# Patient Record
Sex: FEMALE | Race: WHITE | NOT HISPANIC OR LATINO | Employment: OTHER | URBAN - METROPOLITAN AREA
[De-identification: names, ages, dates, MRNs, and addresses within clinical notes are randomized per-mention and may not be internally consistent; named-entity substitution may affect disease eponyms.]

---

## 2017-01-03 ENCOUNTER — COMMUNICATION - HEALTHEAST (OUTPATIENT)
Dept: INTERNAL MEDICINE | Facility: CLINIC | Age: 77
End: 2017-01-03

## 2017-01-04 ENCOUNTER — AMBULATORY - HEALTHEAST (OUTPATIENT)
Dept: INTERNAL MEDICINE | Facility: CLINIC | Age: 77
End: 2017-01-04

## 2017-01-04 DIAGNOSIS — G89.29 CHRONIC HEADACHES: ICD-10-CM

## 2017-01-04 DIAGNOSIS — R51.9 CHRONIC HEADACHES: ICD-10-CM

## 2017-01-19 ENCOUNTER — AMBULATORY - HEALTHEAST (OUTPATIENT)
Dept: LAB | Facility: CLINIC | Age: 77
End: 2017-01-19

## 2017-01-19 ENCOUNTER — RECORDS - HEALTHEAST (OUTPATIENT)
Dept: ADMINISTRATIVE | Facility: OTHER | Age: 77
End: 2017-01-19

## 2017-01-19 DIAGNOSIS — M31.6 TA (TEMPORAL ARTERITIS) (H): ICD-10-CM

## 2017-01-27 ENCOUNTER — HOSPITAL ENCOUNTER (OUTPATIENT)
Dept: MRI IMAGING | Facility: CLINIC | Age: 77
Discharge: HOME | End: 2017-01-27
Attending: PSYCHIATRY & NEUROLOGY

## 2017-01-27 ENCOUNTER — RECORDS - HEALTHEAST (OUTPATIENT)
Dept: ADMINISTRATIVE | Facility: OTHER | Age: 77
End: 2017-01-27

## 2017-01-27 DIAGNOSIS — R51.9 HEAD ACHE: ICD-10-CM

## 2017-02-01 ENCOUNTER — COMMUNICATION - HEALTHEAST (OUTPATIENT)
Dept: INTERNAL MEDICINE | Facility: CLINIC | Age: 77
End: 2017-02-01

## 2017-02-01 ENCOUNTER — AMBULATORY - HEALTHEAST (OUTPATIENT)
Dept: INTERNAL MEDICINE | Facility: CLINIC | Age: 77
End: 2017-02-01

## 2017-02-27 ENCOUNTER — RECORDS - HEALTHEAST (OUTPATIENT)
Dept: ADMINISTRATIVE | Facility: OTHER | Age: 77
End: 2017-02-27

## 2017-03-20 ENCOUNTER — COMMUNICATION - HEALTHEAST (OUTPATIENT)
Dept: INTERNAL MEDICINE | Facility: CLINIC | Age: 77
End: 2017-03-20

## 2017-03-21 ENCOUNTER — RECORDS - HEALTHEAST (OUTPATIENT)
Dept: ADMINISTRATIVE | Facility: OTHER | Age: 77
End: 2017-03-21

## 2017-03-23 ENCOUNTER — COMMUNICATION - HEALTHEAST (OUTPATIENT)
Dept: INTERNAL MEDICINE | Facility: CLINIC | Age: 77
End: 2017-03-23

## 2017-05-22 ENCOUNTER — COMMUNICATION - HEALTHEAST (OUTPATIENT)
Dept: INTERNAL MEDICINE | Facility: CLINIC | Age: 77
End: 2017-05-22

## 2017-05-22 DIAGNOSIS — I10 HTN (HYPERTENSION): ICD-10-CM

## 2017-05-26 ENCOUNTER — AMBULATORY - HEALTHEAST (OUTPATIENT)
Dept: INTERNAL MEDICINE | Facility: CLINIC | Age: 77
End: 2017-05-26

## 2017-05-26 ENCOUNTER — COMMUNICATION - HEALTHEAST (OUTPATIENT)
Dept: SCHEDULING | Facility: CLINIC | Age: 77
End: 2017-05-26

## 2017-05-26 ENCOUNTER — COMMUNICATION - HEALTHEAST (OUTPATIENT)
Dept: INTERNAL MEDICINE | Facility: CLINIC | Age: 77
End: 2017-05-26

## 2017-06-05 ENCOUNTER — COMMUNICATION - HEALTHEAST (OUTPATIENT)
Dept: INTERNAL MEDICINE | Facility: CLINIC | Age: 77
End: 2017-06-05

## 2017-07-15 ENCOUNTER — COMMUNICATION - HEALTHEAST (OUTPATIENT)
Dept: INTERNAL MEDICINE | Facility: CLINIC | Age: 77
End: 2017-07-15

## 2017-08-31 ENCOUNTER — COMMUNICATION - HEALTHEAST (OUTPATIENT)
Dept: INTERNAL MEDICINE | Facility: CLINIC | Age: 77
End: 2017-08-31

## 2017-08-31 DIAGNOSIS — I10 ESSENTIAL HYPERTENSION: ICD-10-CM

## 2017-09-11 ENCOUNTER — RECORDS - HEALTHEAST (OUTPATIENT)
Dept: ADMINISTRATIVE | Facility: OTHER | Age: 77
End: 2017-09-11

## 2017-09-13 ENCOUNTER — COMMUNICATION - HEALTHEAST (OUTPATIENT)
Dept: INTERNAL MEDICINE | Facility: CLINIC | Age: 77
End: 2017-09-13

## 2017-09-14 ENCOUNTER — AMBULATORY - HEALTHEAST (OUTPATIENT)
Dept: INTERNAL MEDICINE | Facility: CLINIC | Age: 77
End: 2017-09-14

## 2017-09-14 DIAGNOSIS — I10 ESSENTIAL HYPERTENSION: ICD-10-CM

## 2017-10-02 ENCOUNTER — RECORDS - HEALTHEAST (OUTPATIENT)
Dept: ADMINISTRATIVE | Facility: OTHER | Age: 77
End: 2017-10-02

## 2017-10-20 ENCOUNTER — OFFICE VISIT - HEALTHEAST (OUTPATIENT)
Dept: INTERNAL MEDICINE | Facility: CLINIC | Age: 77
End: 2017-10-20

## 2017-10-20 DIAGNOSIS — I10 ESSENTIAL HYPERTENSION: ICD-10-CM

## 2017-10-20 DIAGNOSIS — H91.92 HEARING LOSS IN LEFT EAR: ICD-10-CM

## 2017-10-20 DIAGNOSIS — G89.29 CHRONIC HEADACHES: ICD-10-CM

## 2017-10-20 DIAGNOSIS — K21.9 GERD (GASTROESOPHAGEAL REFLUX DISEASE): ICD-10-CM

## 2017-10-20 DIAGNOSIS — Z00.00 MEDICARE ANNUAL WELLNESS VISIT, SUBSEQUENT: ICD-10-CM

## 2017-10-20 DIAGNOSIS — J30.9 ALLERGIC RHINITIS: ICD-10-CM

## 2017-10-20 DIAGNOSIS — M85.80 OSTEOPENIA: ICD-10-CM

## 2017-10-20 DIAGNOSIS — K58.9 IBS (IRRITABLE BOWEL SYNDROME): ICD-10-CM

## 2017-10-20 DIAGNOSIS — Z23 NEED FOR IMMUNIZATION AGAINST INFLUENZA: ICD-10-CM

## 2017-10-20 DIAGNOSIS — G47.00 INSOMNIA: ICD-10-CM

## 2017-10-20 DIAGNOSIS — R51.9 CHRONIC HEADACHES: ICD-10-CM

## 2017-10-20 DIAGNOSIS — C44.91 SKIN CANCER, BASAL CELL: ICD-10-CM

## 2017-10-20 DIAGNOSIS — E78.5 HYPERLIPIDEMIA: ICD-10-CM

## 2017-10-20 DIAGNOSIS — F41.1 ANXIETY, GENERALIZED: ICD-10-CM

## 2017-10-20 DIAGNOSIS — N32.81 OVERACTIVE BLADDER: ICD-10-CM

## 2017-10-20 LAB
CHOLEST SERPL-MCNC: 176 MG/DL
FASTING STATUS PATIENT QL REPORTED: YES
HDLC SERPL-MCNC: 73 MG/DL
LDLC SERPL CALC-MCNC: 89 MG/DL
TRIGL SERPL-MCNC: 72 MG/DL

## 2017-10-20 ASSESSMENT — MIFFLIN-ST. JEOR: SCORE: 1085.3

## 2017-10-25 ENCOUNTER — COMMUNICATION - HEALTHEAST (OUTPATIENT)
Dept: INTERNAL MEDICINE | Facility: CLINIC | Age: 77
End: 2017-10-25

## 2017-10-25 DIAGNOSIS — I10 ESSENTIAL HYPERTENSION: ICD-10-CM

## 2017-10-26 ENCOUNTER — COMMUNICATION - HEALTHEAST (OUTPATIENT)
Dept: SCHEDULING | Facility: CLINIC | Age: 77
End: 2017-10-26

## 2017-12-04 ENCOUNTER — COMMUNICATION - HEALTHEAST (OUTPATIENT)
Dept: INTERNAL MEDICINE | Facility: CLINIC | Age: 77
End: 2017-12-04

## 2017-12-04 ENCOUNTER — AMBULATORY - HEALTHEAST (OUTPATIENT)
Dept: INTERNAL MEDICINE | Facility: CLINIC | Age: 77
End: 2017-12-04

## 2017-12-04 DIAGNOSIS — I10 ESSENTIAL HYPERTENSION: ICD-10-CM

## 2017-12-04 DIAGNOSIS — G47.00 INSOMNIA: ICD-10-CM

## 2017-12-12 ENCOUNTER — RECORDS - HEALTHEAST (OUTPATIENT)
Dept: BONE DENSITY | Facility: CLINIC | Age: 77
End: 2017-12-12

## 2017-12-12 ENCOUNTER — OFFICE VISIT - HEALTHEAST (OUTPATIENT)
Dept: AUDIOLOGY | Facility: CLINIC | Age: 77
End: 2017-12-12

## 2017-12-12 ENCOUNTER — OFFICE VISIT - HEALTHEAST (OUTPATIENT)
Dept: OTOLARYNGOLOGY | Facility: CLINIC | Age: 77
End: 2017-12-12

## 2017-12-12 ENCOUNTER — RECORDS - HEALTHEAST (OUTPATIENT)
Dept: ADMINISTRATIVE | Facility: OTHER | Age: 77
End: 2017-12-12

## 2017-12-12 DIAGNOSIS — H90.3 SENSORINEURAL HEARING LOSS, ASYMMETRICAL: ICD-10-CM

## 2017-12-12 DIAGNOSIS — M85.80 OTHER SPECIFIED DISORDERS OF BONE DENSITY AND STRUCTURE, UNSPECIFIED SITE: ICD-10-CM

## 2017-12-12 DIAGNOSIS — H90.3 ASNHL (ASYMMETRICAL SENSORINEURAL HEARING LOSS): ICD-10-CM

## 2017-12-22 ENCOUNTER — OFFICE VISIT - HEALTHEAST (OUTPATIENT)
Dept: INTERNAL MEDICINE | Facility: CLINIC | Age: 77
End: 2017-12-22

## 2017-12-22 DIAGNOSIS — G47.00 INSOMNIA: ICD-10-CM

## 2017-12-22 DIAGNOSIS — E78.5 HYPERLIPIDEMIA: ICD-10-CM

## 2017-12-22 DIAGNOSIS — G89.29 CHRONIC HEADACHES: ICD-10-CM

## 2017-12-22 DIAGNOSIS — K58.9 IBS (IRRITABLE BOWEL SYNDROME): ICD-10-CM

## 2017-12-22 DIAGNOSIS — I10 ESSENTIAL HYPERTENSION: ICD-10-CM

## 2017-12-22 DIAGNOSIS — G89.29 CHRONIC ABDOMINAL PAIN: ICD-10-CM

## 2017-12-22 DIAGNOSIS — R10.9 CHRONIC ABDOMINAL PAIN: ICD-10-CM

## 2017-12-22 DIAGNOSIS — R51.9 CHRONIC HEADACHES: ICD-10-CM

## 2017-12-22 ASSESSMENT — MIFFLIN-ST. JEOR: SCORE: 1098.9

## 2018-01-02 ENCOUNTER — COMMUNICATION - HEALTHEAST (OUTPATIENT)
Dept: INTERNAL MEDICINE | Facility: CLINIC | Age: 78
End: 2018-01-02

## 2018-01-02 DIAGNOSIS — I10 HTN (HYPERTENSION): ICD-10-CM

## 2018-01-02 DIAGNOSIS — J30.9 ALLERGIC RHINITIS: ICD-10-CM

## 2018-02-06 ENCOUNTER — AMBULATORY - HEALTHEAST (OUTPATIENT)
Dept: INTERNAL MEDICINE | Facility: CLINIC | Age: 78
End: 2018-02-06

## 2018-02-06 ENCOUNTER — COMMUNICATION - HEALTHEAST (OUTPATIENT)
Dept: SCHEDULING | Facility: CLINIC | Age: 78
End: 2018-02-06

## 2018-02-06 ENCOUNTER — AMBULATORY - HEALTHEAST (OUTPATIENT)
Dept: LAB | Facility: CLINIC | Age: 78
End: 2018-02-06

## 2018-02-06 DIAGNOSIS — R30.0 DYSURIA: ICD-10-CM

## 2018-02-06 LAB
ALBUMIN UR-MCNC: NEGATIVE MG/DL
APPEARANCE UR: ABNORMAL
BACTERIA #/AREA URNS HPF: ABNORMAL HPF
BILIRUB UR QL STRIP: NEGATIVE
COLOR UR AUTO: YELLOW
GLUCOSE UR STRIP-MCNC: NEGATIVE MG/DL
HGB UR QL STRIP: ABNORMAL
KETONES UR STRIP-MCNC: NEGATIVE MG/DL
LEUKOCYTE ESTERASE UR QL STRIP: ABNORMAL
NITRATE UR QL: POSITIVE
PH UR STRIP: 6.5 [PH] (ref 5–8)
RBC #/AREA URNS AUTO: ABNORMAL HPF
SP GR UR STRIP: 1.01 (ref 1–1.03)
SQUAMOUS #/AREA URNS AUTO: ABNORMAL LPF
UROBILINOGEN UR STRIP-ACNC: ABNORMAL
WBC #/AREA URNS AUTO: ABNORMAL HPF

## 2018-02-08 LAB — BACTERIA SPEC CULT: ABNORMAL

## 2018-02-21 ENCOUNTER — COMMUNICATION - HEALTHEAST (OUTPATIENT)
Dept: INTERNAL MEDICINE | Facility: CLINIC | Age: 78
End: 2018-02-21

## 2018-03-10 ENCOUNTER — COMMUNICATION - HEALTHEAST (OUTPATIENT)
Dept: SCHEDULING | Facility: CLINIC | Age: 78
End: 2018-03-10

## 2018-03-10 DIAGNOSIS — N39.0 UTI (URINARY TRACT INFECTION): ICD-10-CM

## 2018-03-13 ENCOUNTER — COMMUNICATION - HEALTHEAST (OUTPATIENT)
Dept: INTERNAL MEDICINE | Facility: CLINIC | Age: 78
End: 2018-03-13

## 2018-03-13 ENCOUNTER — AMBULATORY - HEALTHEAST (OUTPATIENT)
Dept: INTERNAL MEDICINE | Facility: CLINIC | Age: 78
End: 2018-03-13

## 2018-03-13 DIAGNOSIS — N39.0 UTI (URINARY TRACT INFECTION): ICD-10-CM

## 2018-03-14 ENCOUNTER — COMMUNICATION - HEALTHEAST (OUTPATIENT)
Dept: INTERNAL MEDICINE | Facility: CLINIC | Age: 78
End: 2018-03-14

## 2018-03-27 ENCOUNTER — COMMUNICATION - HEALTHEAST (OUTPATIENT)
Dept: INTERNAL MEDICINE | Facility: CLINIC | Age: 78
End: 2018-03-27

## 2018-03-27 DIAGNOSIS — J30.9 ALLERGIC RHINITIS: ICD-10-CM

## 2018-04-09 ENCOUNTER — COMMUNICATION - HEALTHEAST (OUTPATIENT)
Dept: INTERNAL MEDICINE | Facility: CLINIC | Age: 78
End: 2018-04-09

## 2018-04-12 ENCOUNTER — COMMUNICATION - HEALTHEAST (OUTPATIENT)
Dept: SCHEDULING | Facility: CLINIC | Age: 78
End: 2018-04-12

## 2018-04-12 ENCOUNTER — AMBULATORY - HEALTHEAST (OUTPATIENT)
Dept: INTERNAL MEDICINE | Facility: CLINIC | Age: 78
End: 2018-04-12

## 2018-04-19 ENCOUNTER — COMMUNICATION - HEALTHEAST (OUTPATIENT)
Dept: INTERNAL MEDICINE | Facility: CLINIC | Age: 78
End: 2018-04-19

## 2018-05-29 ENCOUNTER — COMMUNICATION - HEALTHEAST (OUTPATIENT)
Dept: INTERNAL MEDICINE | Facility: CLINIC | Age: 78
End: 2018-05-29

## 2018-05-29 DIAGNOSIS — G47.00 INSOMNIA: ICD-10-CM

## 2018-06-04 ENCOUNTER — COMMUNICATION - HEALTHEAST (OUTPATIENT)
Dept: INTERNAL MEDICINE | Facility: CLINIC | Age: 78
End: 2018-06-04

## 2018-06-04 DIAGNOSIS — J30.9 ALLERGIC RHINITIS: ICD-10-CM

## 2018-06-07 ENCOUNTER — RECORDS - HEALTHEAST (OUTPATIENT)
Dept: ADMINISTRATIVE | Facility: OTHER | Age: 78
End: 2018-06-07

## 2018-07-06 ENCOUNTER — COMMUNICATION - HEALTHEAST (OUTPATIENT)
Dept: INTERNAL MEDICINE | Facility: CLINIC | Age: 78
End: 2018-07-06

## 2018-07-06 DIAGNOSIS — E78.5 HLD (HYPERLIPIDEMIA): ICD-10-CM

## 2018-07-24 ENCOUNTER — COMMUNICATION - HEALTHEAST (OUTPATIENT)
Dept: INTERNAL MEDICINE | Facility: CLINIC | Age: 78
End: 2018-07-24

## 2018-07-24 DIAGNOSIS — K27.9 PEPTIC ULCER DISEASE: ICD-10-CM

## 2018-10-01 ENCOUNTER — RECORDS - HEALTHEAST (OUTPATIENT)
Dept: ADMINISTRATIVE | Facility: OTHER | Age: 78
End: 2018-10-01

## 2018-10-22 ENCOUNTER — RECORDS - HEALTHEAST (OUTPATIENT)
Dept: ADMINISTRATIVE | Facility: OTHER | Age: 78
End: 2018-10-22

## 2018-10-22 ENCOUNTER — OFFICE VISIT - HEALTHEAST (OUTPATIENT)
Dept: INTERNAL MEDICINE | Facility: CLINIC | Age: 78
End: 2018-10-22

## 2018-10-22 ENCOUNTER — COMMUNICATION - HEALTHEAST (OUTPATIENT)
Dept: INTERNAL MEDICINE | Facility: CLINIC | Age: 78
End: 2018-10-22

## 2018-10-22 DIAGNOSIS — N32.81 OVERACTIVE BLADDER: ICD-10-CM

## 2018-10-22 DIAGNOSIS — G47.00 INSOMNIA: ICD-10-CM

## 2018-10-22 DIAGNOSIS — K21.9 GERD (GASTROESOPHAGEAL REFLUX DISEASE): ICD-10-CM

## 2018-10-22 DIAGNOSIS — Z23 NEED FOR IMMUNIZATION AGAINST INFLUENZA: ICD-10-CM

## 2018-10-22 DIAGNOSIS — Z00.00 MEDICARE ANNUAL WELLNESS VISIT, SUBSEQUENT: ICD-10-CM

## 2018-10-22 DIAGNOSIS — R51.9 CHRONIC HEADACHES: ICD-10-CM

## 2018-10-22 DIAGNOSIS — C44.91 SKIN CANCER, BASAL CELL: ICD-10-CM

## 2018-10-22 DIAGNOSIS — M85.80 OSTEOPENIA: ICD-10-CM

## 2018-10-22 DIAGNOSIS — F41.1 ANXIETY, GENERALIZED: ICD-10-CM

## 2018-10-22 DIAGNOSIS — E78.5 HYPERLIPIDEMIA: ICD-10-CM

## 2018-10-22 DIAGNOSIS — K58.9 IBS (IRRITABLE BOWEL SYNDROME): ICD-10-CM

## 2018-10-22 DIAGNOSIS — I10 ESSENTIAL HYPERTENSION: ICD-10-CM

## 2018-10-22 DIAGNOSIS — G89.29 CHRONIC HEADACHES: ICD-10-CM

## 2018-10-22 DIAGNOSIS — J30.9 ALLERGIC RHINITIS: ICD-10-CM

## 2018-10-22 LAB
ALBUMIN SERPL-MCNC: 4.2 G/DL (ref 3.5–5)
ALBUMIN UR-MCNC: NEGATIVE MG/DL
ALP SERPL-CCNC: 69 U/L (ref 45–120)
ALT SERPL W P-5'-P-CCNC: 14 U/L (ref 0–45)
ANION GAP SERPL CALCULATED.3IONS-SCNC: 12 MMOL/L (ref 5–18)
APPEARANCE UR: CLEAR
AST SERPL W P-5'-P-CCNC: 20 U/L (ref 0–40)
BACTERIA #/AREA URNS HPF: ABNORMAL HPF
BILIRUB DIRECT SERPL-MCNC: 0.2 MG/DL
BILIRUB SERPL-MCNC: 0.7 MG/DL (ref 0–1)
BILIRUB UR QL STRIP: NEGATIVE
BUN SERPL-MCNC: 8 MG/DL (ref 8–28)
CALCIUM SERPL-MCNC: 9.6 MG/DL (ref 8.5–10.5)
CHLORIDE BLD-SCNC: 96 MMOL/L (ref 98–107)
CHOLEST SERPL-MCNC: 170 MG/DL
CO2 SERPL-SCNC: 27 MMOL/L (ref 22–31)
COLOR UR AUTO: YELLOW
CREAT SERPL-MCNC: 0.68 MG/DL (ref 0.6–1.1)
FASTING STATUS PATIENT QL REPORTED: NORMAL
GFR SERPL CREATININE-BSD FRML MDRD: >60 ML/MIN/1.73M2
GLUCOSE BLD-MCNC: 99 MG/DL (ref 70–125)
GLUCOSE UR STRIP-MCNC: NEGATIVE MG/DL
HDLC SERPL-MCNC: 74 MG/DL
HGB BLD-MCNC: 13.2 G/DL (ref 12–16)
HGB UR QL STRIP: NEGATIVE
KETONES UR STRIP-MCNC: NEGATIVE MG/DL
LDLC SERPL CALC-MCNC: 79 MG/DL
LEUKOCYTE ESTERASE UR QL STRIP: ABNORMAL
NITRATE UR QL: NEGATIVE
PH UR STRIP: 8.5 [PH] (ref 5–8)
POTASSIUM BLD-SCNC: 4.5 MMOL/L (ref 3.5–5)
PROT SERPL-MCNC: 7.7 G/DL (ref 6–8)
RBC #/AREA URNS AUTO: ABNORMAL HPF
SODIUM SERPL-SCNC: 135 MMOL/L (ref 136–145)
SP GR UR STRIP: 1.01 (ref 1–1.03)
SQUAMOUS #/AREA URNS AUTO: ABNORMAL LPF
TRIGL SERPL-MCNC: 85 MG/DL
UROBILINOGEN UR STRIP-ACNC: ABNORMAL
WBC #/AREA URNS AUTO: ABNORMAL HPF

## 2018-10-22 ASSESSMENT — MIFFLIN-ST. JEOR: SCORE: 1089.83

## 2018-10-28 ENCOUNTER — COMMUNICATION - HEALTHEAST (OUTPATIENT)
Dept: INTERNAL MEDICINE | Facility: CLINIC | Age: 78
End: 2018-10-28

## 2018-11-06 ENCOUNTER — COMMUNICATION - HEALTHEAST (OUTPATIENT)
Dept: INTERNAL MEDICINE | Facility: CLINIC | Age: 78
End: 2018-11-06

## 2018-11-06 DIAGNOSIS — J30.9 ALLERGIC RHINITIS: ICD-10-CM

## 2018-11-17 ENCOUNTER — COMMUNICATION - HEALTHEAST (OUTPATIENT)
Dept: INTERNAL MEDICINE | Facility: CLINIC | Age: 78
End: 2018-11-17

## 2018-11-17 DIAGNOSIS — G47.00 INSOMNIA: ICD-10-CM

## 2018-11-17 DIAGNOSIS — I10 ESSENTIAL HYPERTENSION: ICD-10-CM

## 2018-12-12 ENCOUNTER — COMMUNICATION - HEALTHEAST (OUTPATIENT)
Dept: INTERNAL MEDICINE | Facility: CLINIC | Age: 78
End: 2018-12-12

## 2018-12-12 DIAGNOSIS — I10 HTN (HYPERTENSION): ICD-10-CM

## 2018-12-26 ENCOUNTER — COMMUNICATION - HEALTHEAST (OUTPATIENT)
Dept: INTERNAL MEDICINE | Facility: CLINIC | Age: 78
End: 2018-12-26

## 2018-12-26 DIAGNOSIS — I10 HTN (HYPERTENSION): ICD-10-CM

## 2018-12-30 ENCOUNTER — COMMUNICATION - HEALTHEAST (OUTPATIENT)
Dept: INTERNAL MEDICINE | Facility: CLINIC | Age: 78
End: 2018-12-30

## 2018-12-30 DIAGNOSIS — E78.5 HLD (HYPERLIPIDEMIA): ICD-10-CM

## 2018-12-30 DIAGNOSIS — I10 ESSENTIAL HYPERTENSION: ICD-10-CM

## 2019-01-17 ENCOUNTER — RECORDS - HEALTHEAST (OUTPATIENT)
Dept: ADMINISTRATIVE | Facility: OTHER | Age: 79
End: 2019-01-17

## 2019-01-17 ENCOUNTER — COMMUNICATION - HEALTHEAST (OUTPATIENT)
Dept: INTERNAL MEDICINE | Facility: CLINIC | Age: 79
End: 2019-01-17

## 2019-01-20 ENCOUNTER — COMMUNICATION - HEALTHEAST (OUTPATIENT)
Dept: INTERNAL MEDICINE | Facility: CLINIC | Age: 79
End: 2019-01-20

## 2019-01-20 DIAGNOSIS — K27.9 PEPTIC ULCER DISEASE: ICD-10-CM

## 2019-03-05 ENCOUNTER — COMMUNICATION - HEALTHEAST (OUTPATIENT)
Dept: INTERNAL MEDICINE | Facility: CLINIC | Age: 79
End: 2019-03-05

## 2019-03-06 ENCOUNTER — COMMUNICATION - HEALTHEAST (OUTPATIENT)
Dept: INTERNAL MEDICINE | Facility: CLINIC | Age: 79
End: 2019-03-06

## 2019-03-06 ENCOUNTER — AMBULATORY - HEALTHEAST (OUTPATIENT)
Dept: OTHER | Facility: CLINIC | Age: 79
End: 2019-03-06

## 2019-03-06 ENCOUNTER — OFFICE VISIT - HEALTHEAST (OUTPATIENT)
Dept: PODIATRY | Facility: CLINIC | Age: 79
End: 2019-03-06

## 2019-03-06 DIAGNOSIS — M21.6X2 PRONATION DEFORMITY OF BOTH FEET: ICD-10-CM

## 2019-03-06 DIAGNOSIS — G57.61 MORTON'S NEUROMA OF RIGHT FOOT: ICD-10-CM

## 2019-03-06 DIAGNOSIS — M21.6X1 PRONATION DEFORMITY OF BOTH FEET: ICD-10-CM

## 2019-03-06 ASSESSMENT — MIFFLIN-ST. JEOR: SCORE: 1079.63

## 2019-03-24 ENCOUNTER — COMMUNICATION - HEALTHEAST (OUTPATIENT)
Dept: INTERNAL MEDICINE | Facility: CLINIC | Age: 79
End: 2019-03-24

## 2019-03-24 DIAGNOSIS — R10.9 CHRONIC ABDOMINAL PAIN: ICD-10-CM

## 2019-03-24 DIAGNOSIS — G89.29 CHRONIC ABDOMINAL PAIN: ICD-10-CM

## 2019-04-09 ENCOUNTER — COMMUNICATION - HEALTHEAST (OUTPATIENT)
Dept: INTERNAL MEDICINE | Facility: CLINIC | Age: 79
End: 2019-04-09

## 2019-04-09 DIAGNOSIS — F41.1 ANXIETY, GENERALIZED: ICD-10-CM

## 2019-04-16 ENCOUNTER — COMMUNICATION - HEALTHEAST (OUTPATIENT)
Dept: INTERNAL MEDICINE | Facility: CLINIC | Age: 79
End: 2019-04-16

## 2019-04-23 ENCOUNTER — RECORDS - HEALTHEAST (OUTPATIENT)
Dept: ADMINISTRATIVE | Facility: OTHER | Age: 79
End: 2019-04-23

## 2019-05-02 ENCOUNTER — RECORDS - HEALTHEAST (OUTPATIENT)
Dept: ADMINISTRATIVE | Facility: OTHER | Age: 79
End: 2019-05-02

## 2019-06-15 ENCOUNTER — COMMUNICATION - HEALTHEAST (OUTPATIENT)
Dept: INTERNAL MEDICINE | Facility: CLINIC | Age: 79
End: 2019-06-15

## 2019-06-15 DIAGNOSIS — G89.29 CHRONIC ABDOMINAL PAIN: ICD-10-CM

## 2019-06-15 DIAGNOSIS — R10.9 CHRONIC ABDOMINAL PAIN: ICD-10-CM

## 2019-07-03 ENCOUNTER — COMMUNICATION - HEALTHEAST (OUTPATIENT)
Dept: SCHEDULING | Facility: CLINIC | Age: 79
End: 2019-07-03

## 2019-07-07 ENCOUNTER — COMMUNICATION - HEALTHEAST (OUTPATIENT)
Dept: INTERNAL MEDICINE | Facility: CLINIC | Age: 79
End: 2019-07-07

## 2019-08-13 ENCOUNTER — COMMUNICATION - HEALTHEAST (OUTPATIENT)
Dept: INTERNAL MEDICINE | Facility: CLINIC | Age: 79
End: 2019-08-13

## 2019-08-13 DIAGNOSIS — J30.9 ALLERGIC RHINITIS: ICD-10-CM

## 2019-08-14 ENCOUNTER — COMMUNICATION - HEALTHEAST (OUTPATIENT)
Dept: INTERNAL MEDICINE | Facility: CLINIC | Age: 79
End: 2019-08-14

## 2019-08-14 DIAGNOSIS — J30.9 ALLERGIC RHINITIS: ICD-10-CM

## 2019-08-15 ENCOUNTER — RECORDS - HEALTHEAST (OUTPATIENT)
Dept: ADMINISTRATIVE | Facility: OTHER | Age: 79
End: 2019-08-15

## 2019-09-12 ENCOUNTER — COMMUNICATION - HEALTHEAST (OUTPATIENT)
Dept: SCHEDULING | Facility: CLINIC | Age: 79
End: 2019-09-12

## 2019-09-13 ENCOUNTER — COMMUNICATION - HEALTHEAST (OUTPATIENT)
Dept: INTERNAL MEDICINE | Facility: CLINIC | Age: 79
End: 2019-09-13

## 2019-09-13 DIAGNOSIS — G47.00 INSOMNIA: ICD-10-CM

## 2019-09-13 DIAGNOSIS — R10.9 CHRONIC ABDOMINAL PAIN: ICD-10-CM

## 2019-09-13 DIAGNOSIS — G89.29 CHRONIC ABDOMINAL PAIN: ICD-10-CM

## 2019-09-13 DIAGNOSIS — I10 HTN (HYPERTENSION): ICD-10-CM

## 2019-09-16 ENCOUNTER — OFFICE VISIT - HEALTHEAST (OUTPATIENT)
Dept: INTERNAL MEDICINE | Facility: CLINIC | Age: 79
End: 2019-09-16

## 2019-09-16 DIAGNOSIS — R10.9 CHRONIC ABDOMINAL PAIN: ICD-10-CM

## 2019-09-16 DIAGNOSIS — G89.29 CHRONIC ABDOMINAL PAIN: ICD-10-CM

## 2019-09-16 DIAGNOSIS — K58.9 IRRITABLE BOWEL SYNDROME, UNSPECIFIED TYPE: ICD-10-CM

## 2019-09-16 LAB
ALBUMIN SERPL-MCNC: 4 G/DL (ref 3.5–5)
ALP SERPL-CCNC: 74 U/L (ref 45–120)
ALT SERPL W P-5'-P-CCNC: 10 U/L (ref 0–45)
ANION GAP SERPL CALCULATED.3IONS-SCNC: 10 MMOL/L (ref 5–18)
AST SERPL W P-5'-P-CCNC: 23 U/L (ref 0–40)
BILIRUB SERPL-MCNC: 0.5 MG/DL (ref 0–1)
BUN SERPL-MCNC: 9 MG/DL (ref 8–28)
CALCIUM SERPL-MCNC: 9.6 MG/DL (ref 8.5–10.5)
CHLORIDE BLD-SCNC: 95 MMOL/L (ref 98–107)
CO2 SERPL-SCNC: 26 MMOL/L (ref 22–31)
CREAT SERPL-MCNC: 0.73 MG/DL (ref 0.6–1.1)
ERYTHROCYTE [DISTWIDTH] IN BLOOD BY AUTOMATED COUNT: 12 % (ref 11–14.5)
GFR SERPL CREATININE-BSD FRML MDRD: >60 ML/MIN/1.73M2
GLUCOSE BLD-MCNC: 92 MG/DL (ref 70–125)
HCT VFR BLD AUTO: 39 % (ref 35–47)
HGB BLD-MCNC: 13.1 G/DL (ref 12–16)
LIPASE SERPL-CCNC: 28 U/L (ref 0–52)
MCH RBC QN AUTO: 30.8 PG (ref 27–34)
MCHC RBC AUTO-ENTMCNC: 33.6 G/DL (ref 32–36)
MCV RBC AUTO: 92 FL (ref 80–100)
PLATELET # BLD AUTO: 236 THOU/UL (ref 140–440)
PMV BLD AUTO: 7 FL (ref 7–10)
POTASSIUM BLD-SCNC: 4.2 MMOL/L (ref 3.5–5)
PROT SERPL-MCNC: 7.5 G/DL (ref 6–8)
RBC # BLD AUTO: 4.26 MILL/UL (ref 3.8–5.4)
SODIUM SERPL-SCNC: 131 MMOL/L (ref 136–145)
WBC: 5.1 THOU/UL (ref 4–11)

## 2019-09-16 ASSESSMENT — MIFFLIN-ST. JEOR: SCORE: 1097.77

## 2019-09-30 ENCOUNTER — COMMUNICATION - HEALTHEAST (OUTPATIENT)
Dept: SCHEDULING | Facility: CLINIC | Age: 79
End: 2019-09-30

## 2019-09-30 ENCOUNTER — OFFICE VISIT - HEALTHEAST (OUTPATIENT)
Dept: INTERNAL MEDICINE | Facility: CLINIC | Age: 79
End: 2019-09-30

## 2019-09-30 DIAGNOSIS — R51.9 RIGHT SIDED TEMPORAL HEADACHE: ICD-10-CM

## 2019-09-30 LAB — ERYTHROCYTE [SEDIMENTATION RATE] IN BLOOD BY WESTERGREN METHOD: 7 MM/HR (ref 0–20)

## 2019-10-01 ENCOUNTER — COMMUNICATION - HEALTHEAST (OUTPATIENT)
Dept: INTERNAL MEDICINE | Facility: CLINIC | Age: 79
End: 2019-10-01

## 2019-10-24 ENCOUNTER — OFFICE VISIT - HEALTHEAST (OUTPATIENT)
Dept: INTERNAL MEDICINE | Facility: CLINIC | Age: 79
End: 2019-10-24

## 2019-10-24 DIAGNOSIS — K21.9 GASTROESOPHAGEAL REFLUX DISEASE WITHOUT ESOPHAGITIS: ICD-10-CM

## 2019-10-24 DIAGNOSIS — Z00.00 MEDICARE ANNUAL WELLNESS VISIT, SUBSEQUENT: ICD-10-CM

## 2019-10-24 DIAGNOSIS — F41.1 ANXIETY, GENERALIZED: ICD-10-CM

## 2019-10-24 DIAGNOSIS — G47.00 INSOMNIA, UNSPECIFIED TYPE: ICD-10-CM

## 2019-10-24 DIAGNOSIS — I10 ESSENTIAL HYPERTENSION: ICD-10-CM

## 2019-10-24 DIAGNOSIS — K27.9 PEPTIC ULCER DISEASE: ICD-10-CM

## 2019-10-24 DIAGNOSIS — Z86.0100 PERSONAL HISTORY OF COLONIC POLYPS: ICD-10-CM

## 2019-10-24 DIAGNOSIS — E78.5 HYPERLIPIDEMIA, UNSPECIFIED HYPERLIPIDEMIA TYPE: ICD-10-CM

## 2019-10-24 DIAGNOSIS — K58.1 IRRITABLE BOWEL SYNDROME WITH CONSTIPATION: ICD-10-CM

## 2019-10-24 DIAGNOSIS — R51.9 CHRONIC NONINTRACTABLE HEADACHE, UNSPECIFIED HEADACHE TYPE: ICD-10-CM

## 2019-10-24 DIAGNOSIS — J30.9 ALLERGIC RHINITIS, UNSPECIFIED SEASONALITY, UNSPECIFIED TRIGGER: ICD-10-CM

## 2019-10-24 DIAGNOSIS — G89.29 CHRONIC NONINTRACTABLE HEADACHE, UNSPECIFIED HEADACHE TYPE: ICD-10-CM

## 2019-10-24 DIAGNOSIS — Z23 NEED FOR IMMUNIZATION AGAINST INFLUENZA: ICD-10-CM

## 2019-10-24 DIAGNOSIS — M85.80 OSTEOPENIA, UNSPECIFIED LOCATION: ICD-10-CM

## 2019-10-24 DIAGNOSIS — C44.91 SKIN CANCER, BASAL CELL: ICD-10-CM

## 2019-10-24 LAB
ALBUMIN UR-MCNC: ABNORMAL MG/DL
APPEARANCE UR: CLEAR
BACTERIA #/AREA URNS HPF: ABNORMAL HPF
BILIRUB UR QL STRIP: NEGATIVE
CHOLEST SERPL-MCNC: 175 MG/DL
COLOR UR AUTO: YELLOW
FASTING STATUS PATIENT QL REPORTED: YES
GLUCOSE UR STRIP-MCNC: NEGATIVE MG/DL
HDLC SERPL-MCNC: 82 MG/DL
HGB UR QL STRIP: NEGATIVE
KETONES UR STRIP-MCNC: NEGATIVE MG/DL
LDLC SERPL CALC-MCNC: 78 MG/DL
LEUKOCYTE ESTERASE UR QL STRIP: ABNORMAL
MUCOUS THREADS #/AREA URNS LPF: ABNORMAL LPF
NITRATE UR QL: NEGATIVE
PH UR STRIP: 7.5 [PH] (ref 5–8)
RBC #/AREA URNS AUTO: ABNORMAL HPF
SP GR UR STRIP: 1.01 (ref 1–1.03)
SQUAMOUS #/AREA URNS AUTO: ABNORMAL LPF
TRIGL SERPL-MCNC: 73 MG/DL
UROBILINOGEN UR STRIP-ACNC: ABNORMAL
WBC #/AREA URNS AUTO: ABNORMAL HPF

## 2019-10-24 ASSESSMENT — MIFFLIN-ST. JEOR: SCORE: 1077.36

## 2019-10-25 LAB
25(OH)D3 SERPL-MCNC: 41.2 NG/ML (ref 30–80)
25(OH)D3 SERPL-MCNC: 41.2 NG/ML (ref 30–80)
BACTERIA SPEC CULT: NORMAL

## 2019-11-25 ENCOUNTER — COMMUNICATION - HEALTHEAST (OUTPATIENT)
Dept: INTERNAL MEDICINE | Facility: CLINIC | Age: 79
End: 2019-11-25

## 2019-11-25 ENCOUNTER — AMBULATORY - HEALTHEAST (OUTPATIENT)
Dept: INTERNAL MEDICINE | Facility: CLINIC | Age: 79
End: 2019-11-25

## 2019-11-25 ENCOUNTER — COMMUNICATION - HEALTHEAST (OUTPATIENT)
Dept: SCHEDULING | Facility: CLINIC | Age: 79
End: 2019-11-25

## 2019-11-25 DIAGNOSIS — N39.0 URINARY TRACT INFECTION WITHOUT HEMATURIA, SITE UNSPECIFIED: ICD-10-CM

## 2019-12-05 ENCOUNTER — RECORDS - HEALTHEAST (OUTPATIENT)
Dept: ADMINISTRATIVE | Facility: OTHER | Age: 79
End: 2019-12-05

## 2019-12-08 ENCOUNTER — TRANSFERRED RECORDS (OUTPATIENT)
Dept: HEALTH INFORMATION MANAGEMENT | Facility: CLINIC | Age: 79
End: 2019-12-08

## 2019-12-12 ENCOUNTER — COMMUNICATION - HEALTHEAST (OUTPATIENT)
Dept: INTERNAL MEDICINE | Facility: CLINIC | Age: 79
End: 2019-12-12

## 2019-12-12 DIAGNOSIS — E78.5 HLD (HYPERLIPIDEMIA): ICD-10-CM

## 2019-12-12 DIAGNOSIS — R10.9 CHRONIC ABDOMINAL PAIN: ICD-10-CM

## 2019-12-12 DIAGNOSIS — G89.29 CHRONIC ABDOMINAL PAIN: ICD-10-CM

## 2019-12-12 DIAGNOSIS — I10 HTN (HYPERTENSION): ICD-10-CM

## 2019-12-12 DIAGNOSIS — G47.00 INSOMNIA: ICD-10-CM

## 2019-12-12 DIAGNOSIS — I10 ESSENTIAL HYPERTENSION: ICD-10-CM

## 2019-12-14 ENCOUNTER — COMMUNICATION - HEALTHEAST (OUTPATIENT)
Dept: INTERNAL MEDICINE | Facility: CLINIC | Age: 79
End: 2019-12-14

## 2019-12-14 DIAGNOSIS — I10 ESSENTIAL HYPERTENSION: ICD-10-CM

## 2019-12-16 ENCOUNTER — AMBULATORY - HEALTHEAST (OUTPATIENT)
Dept: INTERNAL MEDICINE | Facility: CLINIC | Age: 79
End: 2019-12-16

## 2019-12-16 ENCOUNTER — COMMUNICATION - HEALTHEAST (OUTPATIENT)
Dept: SCHEDULING | Facility: CLINIC | Age: 79
End: 2019-12-16

## 2019-12-16 DIAGNOSIS — N39.0 URINARY TRACT INFECTION WITHOUT HEMATURIA, SITE UNSPECIFIED: ICD-10-CM

## 2020-01-07 ENCOUNTER — RECORDS - HEALTHEAST (OUTPATIENT)
Dept: ADMINISTRATIVE | Facility: OTHER | Age: 80
End: 2020-01-07

## 2020-01-14 ENCOUNTER — COMMUNICATION - HEALTHEAST (OUTPATIENT)
Dept: SCHEDULING | Facility: CLINIC | Age: 80
End: 2020-01-14

## 2020-01-15 ENCOUNTER — RECORDS - HEALTHEAST (OUTPATIENT)
Dept: ADMINISTRATIVE | Facility: OTHER | Age: 80
End: 2020-01-15

## 2020-01-31 ENCOUNTER — RECORDS - HEALTHEAST (OUTPATIENT)
Dept: ADMINISTRATIVE | Facility: OTHER | Age: 80
End: 2020-01-31

## 2020-02-17 ENCOUNTER — RECORDS - HEALTHEAST (OUTPATIENT)
Dept: ADMINISTRATIVE | Facility: OTHER | Age: 80
End: 2020-02-17

## 2020-02-26 ENCOUNTER — COMMUNICATION - HEALTHEAST (OUTPATIENT)
Dept: INTERNAL MEDICINE | Facility: CLINIC | Age: 80
End: 2020-02-26

## 2020-02-26 DIAGNOSIS — F41.1 ANXIETY, GENERALIZED: ICD-10-CM

## 2020-03-20 ENCOUNTER — COMMUNICATION - HEALTHEAST (OUTPATIENT)
Dept: INTERNAL MEDICINE | Facility: CLINIC | Age: 80
End: 2020-03-20

## 2020-03-20 DIAGNOSIS — K21.9 GASTROESOPHAGEAL REFLUX DISEASE WITHOUT ESOPHAGITIS: ICD-10-CM

## 2020-04-12 ENCOUNTER — COMMUNICATION - HEALTHEAST (OUTPATIENT)
Dept: INTERNAL MEDICINE | Facility: CLINIC | Age: 80
End: 2020-04-12

## 2020-04-13 ENCOUNTER — OFFICE VISIT - HEALTHEAST (OUTPATIENT)
Dept: INTERNAL MEDICINE | Facility: CLINIC | Age: 80
End: 2020-04-13

## 2020-04-13 DIAGNOSIS — N39.0 URINARY TRACT INFECTION WITHOUT HEMATURIA, SITE UNSPECIFIED: ICD-10-CM

## 2020-04-13 ASSESSMENT — PATIENT HEALTH QUESTIONNAIRE - PHQ9: SUM OF ALL RESPONSES TO PHQ QUESTIONS 1-9: 0

## 2020-04-20 ENCOUNTER — OFFICE VISIT - HEALTHEAST (OUTPATIENT)
Dept: INTERNAL MEDICINE | Facility: CLINIC | Age: 80
End: 2020-04-20

## 2020-04-20 ENCOUNTER — AMBULATORY - HEALTHEAST (OUTPATIENT)
Dept: LAB | Facility: CLINIC | Age: 80
End: 2020-04-20

## 2020-04-20 ENCOUNTER — COMMUNICATION - HEALTHEAST (OUTPATIENT)
Dept: SCHEDULING | Facility: CLINIC | Age: 80
End: 2020-04-20

## 2020-04-20 DIAGNOSIS — N39.0 URINARY TRACT INFECTION WITHOUT HEMATURIA, SITE UNSPECIFIED: ICD-10-CM

## 2020-04-20 LAB
ALBUMIN UR-MCNC: NEGATIVE MG/DL
APPEARANCE UR: CLEAR
BILIRUB UR QL STRIP: NEGATIVE
COLOR UR AUTO: YELLOW
GLUCOSE UR STRIP-MCNC: NEGATIVE MG/DL
HGB UR QL STRIP: NEGATIVE
KETONES UR STRIP-MCNC: NEGATIVE MG/DL
LEUKOCYTE ESTERASE UR QL STRIP: NEGATIVE
NITRATE UR QL: NEGATIVE
PH UR STRIP: 8 [PH] (ref 4.5–8)
SP GR UR STRIP: 1.01 (ref 1–1.03)
UROBILINOGEN UR STRIP-ACNC: NORMAL

## 2020-04-24 ENCOUNTER — COMMUNICATION - HEALTHEAST (OUTPATIENT)
Dept: SCHEDULING | Facility: CLINIC | Age: 80
End: 2020-04-24

## 2020-05-04 ENCOUNTER — OFFICE VISIT - HEALTHEAST (OUTPATIENT)
Dept: INTERNAL MEDICINE | Facility: CLINIC | Age: 80
End: 2020-05-04

## 2020-05-04 DIAGNOSIS — N32.81 OVERACTIVE BLADDER: ICD-10-CM

## 2020-05-18 ENCOUNTER — COMMUNICATION - HEALTHEAST (OUTPATIENT)
Dept: INTERNAL MEDICINE | Facility: CLINIC | Age: 80
End: 2020-05-18

## 2020-05-18 ENCOUNTER — OFFICE VISIT - HEALTHEAST (OUTPATIENT)
Dept: INTERNAL MEDICINE | Facility: CLINIC | Age: 80
End: 2020-05-18

## 2020-05-18 DIAGNOSIS — M54.50 ACUTE MIDLINE LOW BACK PAIN WITHOUT SCIATICA: ICD-10-CM

## 2020-05-21 ENCOUNTER — COMMUNICATION - HEALTHEAST (OUTPATIENT)
Dept: INTERNAL MEDICINE | Facility: CLINIC | Age: 80
End: 2020-05-21

## 2020-05-21 DIAGNOSIS — M54.50 ACUTE BILATERAL LOW BACK PAIN WITHOUT SCIATICA: ICD-10-CM

## 2020-06-12 ENCOUNTER — RECORDS - HEALTHEAST (OUTPATIENT)
Dept: ADMINISTRATIVE | Facility: OTHER | Age: 80
End: 2020-06-12

## 2020-08-06 ENCOUNTER — COMMUNICATION - HEALTHEAST (OUTPATIENT)
Dept: INTERNAL MEDICINE | Facility: CLINIC | Age: 80
End: 2020-08-06

## 2020-08-06 DIAGNOSIS — F41.1 ANXIETY, GENERALIZED: ICD-10-CM

## 2020-08-20 ENCOUNTER — RECORDS - HEALTHEAST (OUTPATIENT)
Dept: ADMINISTRATIVE | Facility: OTHER | Age: 80
End: 2020-08-20

## 2020-09-08 ENCOUNTER — AMBULATORY - HEALTHEAST (OUTPATIENT)
Dept: INTERNAL MEDICINE | Facility: CLINIC | Age: 80
End: 2020-09-08

## 2020-09-08 ENCOUNTER — COMMUNICATION - HEALTHEAST (OUTPATIENT)
Dept: INTERNAL MEDICINE | Facility: CLINIC | Age: 80
End: 2020-09-08

## 2020-09-08 ENCOUNTER — COMMUNICATION - HEALTHEAST (OUTPATIENT)
Dept: SCHEDULING | Facility: CLINIC | Age: 80
End: 2020-09-08

## 2020-09-08 DIAGNOSIS — I10 ESSENTIAL HYPERTENSION: ICD-10-CM

## 2020-09-08 DIAGNOSIS — N39.0 URINARY TRACT INFECTION WITHOUT HEMATURIA, SITE UNSPECIFIED: ICD-10-CM

## 2020-09-17 ENCOUNTER — COMMUNICATION - HEALTHEAST (OUTPATIENT)
Dept: SCHEDULING | Facility: CLINIC | Age: 80
End: 2020-09-17

## 2020-09-17 ENCOUNTER — OFFICE VISIT - HEALTHEAST (OUTPATIENT)
Dept: INTERNAL MEDICINE | Facility: CLINIC | Age: 80
End: 2020-09-17

## 2020-09-17 ENCOUNTER — COMMUNICATION - HEALTHEAST (OUTPATIENT)
Dept: INTERNAL MEDICINE | Facility: CLINIC | Age: 80
End: 2020-09-17

## 2020-09-17 DIAGNOSIS — M25.50 ARTHRALGIA, UNSPECIFIED JOINT: ICD-10-CM

## 2020-09-21 ENCOUNTER — AMBULATORY - HEALTHEAST (OUTPATIENT)
Dept: LAB | Facility: CLINIC | Age: 80
End: 2020-09-21

## 2020-09-21 DIAGNOSIS — M25.50 ARTHRALGIA, UNSPECIFIED JOINT: ICD-10-CM

## 2020-09-22 ENCOUNTER — COMMUNICATION - HEALTHEAST (OUTPATIENT)
Dept: INTERNAL MEDICINE | Facility: CLINIC | Age: 80
End: 2020-09-22

## 2020-09-28 ENCOUNTER — RECORDS - HEALTHEAST (OUTPATIENT)
Dept: ADMINISTRATIVE | Facility: OTHER | Age: 80
End: 2020-09-28

## 2020-09-30 ENCOUNTER — COMMUNICATION - HEALTHEAST (OUTPATIENT)
Dept: INTERNAL MEDICINE | Facility: CLINIC | Age: 80
End: 2020-09-30

## 2020-10-02 ENCOUNTER — AMBULATORY - HEALTHEAST (OUTPATIENT)
Dept: INTERNAL MEDICINE | Facility: CLINIC | Age: 80
End: 2020-10-02

## 2020-10-02 DIAGNOSIS — Z51.81 ENCOUNTER FOR THERAPEUTIC DRUG MONITORING: ICD-10-CM

## 2020-10-02 DIAGNOSIS — E78.5 HYPERLIPIDEMIA, UNSPECIFIED HYPERLIPIDEMIA TYPE: ICD-10-CM

## 2020-10-02 DIAGNOSIS — I10 ESSENTIAL HYPERTENSION: ICD-10-CM

## 2020-10-02 DIAGNOSIS — M85.80 OSTEOPENIA, UNSPECIFIED LOCATION: ICD-10-CM

## 2020-10-05 ENCOUNTER — COMMUNICATION - HEALTHEAST (OUTPATIENT)
Dept: INTERNAL MEDICINE | Facility: CLINIC | Age: 80
End: 2020-10-05

## 2020-10-05 DIAGNOSIS — J30.9 ALLERGIC RHINITIS: ICD-10-CM

## 2020-10-05 LAB
B BURGDOR IGG+IGM SER QL: 0.07 INDEX VALUE
ERYTHROCYTE [SEDIMENTATION RATE] IN BLOOD BY WESTERGREN METHOD: 8 MM/HR (ref 0–20)

## 2020-10-13 ENCOUNTER — RECORDS - HEALTHEAST (OUTPATIENT)
Dept: ADMINISTRATIVE | Facility: OTHER | Age: 80
End: 2020-10-13

## 2020-11-11 ENCOUNTER — AMBULATORY - HEALTHEAST (OUTPATIENT)
Dept: LAB | Facility: CLINIC | Age: 80
End: 2020-11-11

## 2020-11-11 DIAGNOSIS — M85.80 OSTEOPENIA, UNSPECIFIED LOCATION: ICD-10-CM

## 2020-11-11 DIAGNOSIS — I10 ESSENTIAL HYPERTENSION: ICD-10-CM

## 2020-11-11 DIAGNOSIS — Z51.81 ENCOUNTER FOR THERAPEUTIC DRUG MONITORING: ICD-10-CM

## 2020-11-11 LAB
ALBUMIN SERPL-MCNC: 4.1 G/DL (ref 3.5–5)
ALP SERPL-CCNC: 57 U/L (ref 45–120)
ALT SERPL W P-5'-P-CCNC: 13 U/L (ref 0–45)
ANION GAP SERPL CALCULATED.3IONS-SCNC: 9 MMOL/L (ref 5–18)
AST SERPL W P-5'-P-CCNC: 19 U/L (ref 0–40)
BILIRUB SERPL-MCNC: 0.5 MG/DL (ref 0–1)
BUN SERPL-MCNC: 10 MG/DL (ref 8–28)
CALCIUM SERPL-MCNC: 8.7 MG/DL (ref 8.5–10.5)
CHLORIDE BLD-SCNC: 93 MMOL/L (ref 98–107)
CO2 SERPL-SCNC: 27 MMOL/L (ref 22–31)
CREAT SERPL-MCNC: 0.67 MG/DL (ref 0.6–1.1)
ERYTHROCYTE [DISTWIDTH] IN BLOOD BY AUTOMATED COUNT: 13.2 % (ref 11–14.5)
GFR SERPL CREATININE-BSD FRML MDRD: >60 ML/MIN/1.73M2
GLUCOSE BLD-MCNC: 89 MG/DL (ref 70–125)
HCT VFR BLD AUTO: 37.9 % (ref 35–47)
HGB BLD-MCNC: 13.1 G/DL (ref 12–16)
MAGNESIUM SERPL-MCNC: 2 MG/DL (ref 1.8–2.6)
MCH RBC QN AUTO: 30.5 PG (ref 27–34)
MCHC RBC AUTO-ENTMCNC: 34.6 G/DL (ref 32–36)
MCV RBC AUTO: 88 FL (ref 80–100)
PLATELET # BLD AUTO: 217 THOU/UL (ref 140–440)
PMV BLD AUTO: 8.4 FL (ref 8.5–12.5)
POTASSIUM BLD-SCNC: 3.7 MMOL/L (ref 3.5–5)
PROT SERPL-MCNC: 7.1 G/DL (ref 6–8)
RBC # BLD AUTO: 4.29 MILL/UL (ref 3.8–5.4)
SODIUM SERPL-SCNC: 129 MMOL/L (ref 136–145)
WBC: 5.5 THOU/UL (ref 4–11)

## 2020-11-12 LAB — 25(OH)D3 SERPL-MCNC: 41.4 NG/ML (ref 30–80)

## 2020-11-16 ENCOUNTER — OFFICE VISIT - HEALTHEAST (OUTPATIENT)
Dept: INTERNAL MEDICINE | Facility: CLINIC | Age: 80
End: 2020-11-16

## 2020-11-16 DIAGNOSIS — M85.80 OSTEOPENIA, UNSPECIFIED LOCATION: ICD-10-CM

## 2020-11-16 DIAGNOSIS — G47.00 INSOMNIA, UNSPECIFIED TYPE: ICD-10-CM

## 2020-11-16 DIAGNOSIS — K21.9 GASTROESOPHAGEAL REFLUX DISEASE WITHOUT ESOPHAGITIS: ICD-10-CM

## 2020-11-16 DIAGNOSIS — R51.9 CHRONIC NONINTRACTABLE HEADACHE, UNSPECIFIED HEADACHE TYPE: ICD-10-CM

## 2020-11-16 DIAGNOSIS — J30.9 ALLERGIC RHINITIS, UNSPECIFIED SEASONALITY, UNSPECIFIED TRIGGER: ICD-10-CM

## 2020-11-16 DIAGNOSIS — E78.5 HYPERLIPIDEMIA, UNSPECIFIED HYPERLIPIDEMIA TYPE: ICD-10-CM

## 2020-11-16 DIAGNOSIS — Z86.0100 PERSONAL HISTORY OF COLONIC POLYPS: ICD-10-CM

## 2020-11-16 DIAGNOSIS — G89.29 CHRONIC NONINTRACTABLE HEADACHE, UNSPECIFIED HEADACHE TYPE: ICD-10-CM

## 2020-11-16 DIAGNOSIS — C44.91 SKIN CANCER, BASAL CELL: ICD-10-CM

## 2020-11-16 DIAGNOSIS — F41.1 ANXIETY, GENERALIZED: ICD-10-CM

## 2020-11-16 DIAGNOSIS — K58.1 IRRITABLE BOWEL SYNDROME WITH CONSTIPATION: ICD-10-CM

## 2020-11-16 DIAGNOSIS — I10 ESSENTIAL HYPERTENSION: ICD-10-CM

## 2020-11-16 DIAGNOSIS — Z00.00 MEDICARE ANNUAL WELLNESS VISIT, SUBSEQUENT: ICD-10-CM

## 2020-11-16 LAB
CHOLEST SERPL-MCNC: 200 MG/DL
FASTING STATUS PATIENT QL REPORTED: YES
HDLC SERPL-MCNC: 96 MG/DL
LDLC SERPL CALC-MCNC: 86 MG/DL
TRIGL SERPL-MCNC: 91 MG/DL

## 2020-11-16 ASSESSMENT — MIFFLIN-ST. JEOR: SCORE: 1068.28

## 2020-12-14 ENCOUNTER — COMMUNICATION - HEALTHEAST (OUTPATIENT)
Dept: INTERNAL MEDICINE | Facility: CLINIC | Age: 80
End: 2020-12-14

## 2020-12-14 DIAGNOSIS — I10 HTN (HYPERTENSION): ICD-10-CM

## 2020-12-18 ENCOUNTER — COMMUNICATION - HEALTHEAST (OUTPATIENT)
Dept: INTERNAL MEDICINE | Facility: CLINIC | Age: 80
End: 2020-12-18

## 2020-12-18 ENCOUNTER — COMMUNICATION - HEALTHEAST (OUTPATIENT)
Dept: SCHEDULING | Facility: CLINIC | Age: 80
End: 2020-12-18

## 2020-12-18 DIAGNOSIS — I10 HTN (HYPERTENSION): ICD-10-CM

## 2020-12-18 DIAGNOSIS — I10 ESSENTIAL HYPERTENSION: ICD-10-CM

## 2020-12-18 DIAGNOSIS — E78.5 HLD (HYPERLIPIDEMIA): ICD-10-CM

## 2020-12-29 ENCOUNTER — AMBULATORY - HEALTHEAST (OUTPATIENT)
Dept: INTERNAL MEDICINE | Facility: CLINIC | Age: 80
End: 2020-12-29

## 2020-12-29 ENCOUNTER — COMMUNICATION - HEALTHEAST (OUTPATIENT)
Dept: INTERNAL MEDICINE | Facility: CLINIC | Age: 80
End: 2020-12-29

## 2020-12-29 DIAGNOSIS — R10.9 CHRONIC ABDOMINAL PAIN: ICD-10-CM

## 2020-12-29 DIAGNOSIS — G89.29 CHRONIC ABDOMINAL PAIN: ICD-10-CM

## 2021-01-06 ENCOUNTER — COMMUNICATION - HEALTHEAST (OUTPATIENT)
Dept: ADMINISTRATIVE | Facility: CLINIC | Age: 81
End: 2021-01-06

## 2021-01-07 ENCOUNTER — RECORDS - HEALTHEAST (OUTPATIENT)
Dept: ADMINISTRATIVE | Facility: OTHER | Age: 81
End: 2021-01-07

## 2021-01-07 ENCOUNTER — OFFICE VISIT - HEALTHEAST (OUTPATIENT)
Dept: INTERNAL MEDICINE | Facility: CLINIC | Age: 81
End: 2021-01-07

## 2021-01-07 DIAGNOSIS — N39.0 URINARY TRACT INFECTION WITHOUT HEMATURIA, SITE UNSPECIFIED: ICD-10-CM

## 2021-01-14 ENCOUNTER — COMMUNICATION - HEALTHEAST (OUTPATIENT)
Dept: INTERNAL MEDICINE | Facility: CLINIC | Age: 81
End: 2021-01-14

## 2021-01-30 ENCOUNTER — IMMUNIZATION (OUTPATIENT)
Dept: NURSING | Facility: CLINIC | Age: 81
End: 2021-01-30
Payer: COMMERCIAL

## 2021-01-30 PROCEDURE — 0001A PR COVID VAC PFIZER DIL RECON 30 MCG/0.3 ML IM: CPT

## 2021-01-30 PROCEDURE — 91300 PR COVID VAC PFIZER DIL RECON 30 MCG/0.3 ML IM: CPT

## 2021-02-02 ENCOUNTER — AMBULATORY - HEALTHEAST (OUTPATIENT)
Dept: INTERNAL MEDICINE | Facility: CLINIC | Age: 81
End: 2021-02-02

## 2021-02-02 ENCOUNTER — COMMUNICATION - HEALTHEAST (OUTPATIENT)
Dept: SCHEDULING | Facility: CLINIC | Age: 81
End: 2021-02-02

## 2021-02-02 DIAGNOSIS — F41.1 ANXIETY, GENERALIZED: ICD-10-CM

## 2021-02-20 ENCOUNTER — IMMUNIZATION (OUTPATIENT)
Dept: NURSING | Facility: CLINIC | Age: 81
End: 2021-02-20
Attending: INTERNAL MEDICINE
Payer: COMMERCIAL

## 2021-02-20 PROCEDURE — 0002A PR COVID VAC PFIZER DIL RECON 30 MCG/0.3 ML IM: CPT

## 2021-02-20 PROCEDURE — 91300 PR COVID VAC PFIZER DIL RECON 30 MCG/0.3 ML IM: CPT

## 2021-03-04 ENCOUNTER — COMMUNICATION - HEALTHEAST (OUTPATIENT)
Dept: INTERNAL MEDICINE | Facility: CLINIC | Age: 81
End: 2021-03-04

## 2021-03-04 DIAGNOSIS — K21.9 GASTROESOPHAGEAL REFLUX DISEASE WITHOUT ESOPHAGITIS: ICD-10-CM

## 2021-03-12 ENCOUNTER — OFFICE VISIT - HEALTHEAST (OUTPATIENT)
Dept: INTERNAL MEDICINE | Facility: CLINIC | Age: 81
End: 2021-03-12

## 2021-03-12 DIAGNOSIS — R63.5 WEIGHT GAIN: ICD-10-CM

## 2021-03-12 DIAGNOSIS — G47.00 INSOMNIA: ICD-10-CM

## 2021-03-12 DIAGNOSIS — F41.1 ANXIETY, GENERALIZED: ICD-10-CM

## 2021-03-12 DIAGNOSIS — J30.9 ALLERGIC RHINITIS: ICD-10-CM

## 2021-03-12 DIAGNOSIS — M15.9 OSTEOARTHRITIS OF MULTIPLE JOINTS, UNSPECIFIED OSTEOARTHRITIS TYPE: ICD-10-CM

## 2021-03-12 DIAGNOSIS — M54.16 LUMBAR RADICULOPATHY: ICD-10-CM

## 2021-03-12 DIAGNOSIS — I10 ESSENTIAL HYPERTENSION: ICD-10-CM

## 2021-03-12 LAB — TSH SERPL DL<=0.005 MIU/L-ACNC: 1.01 UIU/ML (ref 0.3–5)

## 2021-03-12 ASSESSMENT — MIFFLIN-ST. JEOR: SCORE: 1104.57

## 2021-03-15 ENCOUNTER — COMMUNICATION - HEALTHEAST (OUTPATIENT)
Dept: INTERNAL MEDICINE | Facility: CLINIC | Age: 81
End: 2021-03-15

## 2021-03-18 ENCOUNTER — RECORDS - HEALTHEAST (OUTPATIENT)
Dept: ADMINISTRATIVE | Facility: OTHER | Age: 81
End: 2021-03-18

## 2021-05-05 ENCOUNTER — COMMUNICATION - HEALTHEAST (OUTPATIENT)
Dept: INTERNAL MEDICINE | Facility: CLINIC | Age: 81
End: 2021-05-05

## 2021-05-06 ENCOUNTER — COMMUNICATION - HEALTHEAST (OUTPATIENT)
Dept: INTERNAL MEDICINE | Facility: CLINIC | Age: 81
End: 2021-05-06

## 2021-05-06 DIAGNOSIS — J30.9 ALLERGIC RHINITIS: ICD-10-CM

## 2021-05-19 ENCOUNTER — OFFICE VISIT - HEALTHEAST (OUTPATIENT)
Dept: FAMILY MEDICINE | Facility: CLINIC | Age: 81
End: 2021-05-19

## 2021-05-19 DIAGNOSIS — L03.012 CELLULITIS OF LEFT RING FINGER: ICD-10-CM

## 2021-05-19 DIAGNOSIS — R14.0 ABDOMINAL BLOATING: ICD-10-CM

## 2021-05-26 ASSESSMENT — PATIENT HEALTH QUESTIONNAIRE - PHQ9: SUM OF ALL RESPONSES TO PHQ QUESTIONS 1-9: 0

## 2021-05-27 ENCOUNTER — RECORDS - HEALTHEAST (OUTPATIENT)
Dept: ADMINISTRATIVE | Facility: CLINIC | Age: 81
End: 2021-05-27

## 2021-05-27 VITALS
WEIGHT: 141.31 LBS | HEART RATE: 53 BPM | RESPIRATION RATE: 14 BRPM | OXYGEN SATURATION: 100 % | TEMPERATURE: 97.8 F | SYSTOLIC BLOOD PRESSURE: 148 MMHG | DIASTOLIC BLOOD PRESSURE: 78 MMHG

## 2021-05-27 VITALS — SYSTOLIC BLOOD PRESSURE: 135 MMHG | HEART RATE: 40 BPM | DIASTOLIC BLOOD PRESSURE: 69 MMHG

## 2021-05-27 NOTE — TELEPHONE ENCOUNTER
Refill Approved    Rx renewed per Medication Renewal Policy. Medication was last renewed on 4/12/18.    Emma Cuba, [facility] Triage/Med Refill 3/24/2019     Requested Prescriptions   Pending Prescriptions Disp Refills     gabapentin (NEURONTIN) 600 MG tablet [Pharmacy Med Name: GABAPENTIN 600MG TABLETS] 270 tablet 0     Sig: TAKE 1 TABLET(600 MG) BY MOUTH THREE TIMES DAILY    Gabapentin/Levetiracetam/Tiagabine Refill Protocol  Passed - 3/24/2019  1:01 PM       Passed - PCP or prescribing provider visit in past 12 months or next 3 months    Last office visit with prescriber/PCP: 12/22/2017 Kevin Ho MD OR same dept: Visit date not found OR same specialty: 12/22/2017 Kevin Ho MD  Last physical: 10/22/2018 Last MTM visit: Visit date not found   Next visit within 3 mo: Visit date not found  Next physical within 3 mo: Visit date not found  Prescriber OR PCP: Kevin Ho MD  Last diagnosis associated with med order: There are no diagnoses linked to this encounter.  If protocol passes may refill for 12 months if within 3 months of last provider visit (or a total of 15 months).

## 2021-05-27 NOTE — TELEPHONE ENCOUNTER
Controlled Substance Refill Request  Medication:   Requested Prescriptions     Pending Prescriptions Disp Refills     diazePAM (VALIUM) 5 MG tablet [Pharmacy Med Name: DIAZEPAM 5MG TABLETS] 60 tablet 0     Sig: TAKE 1 TABLET(5 MG) BY MOUTH EVERY 6 HOURS AS NEEDED FOR ANXIETY     Date Last Fill: 10/22/18  Pharmacy: [facility]   Submit electronically to pharmacy    Controlled Substance Agreement on File:   Encounter-Level CSA Scan Date:    There are no encounter-level csa scan date.       Last office visit with primary: 12/22/2017

## 2021-05-28 ENCOUNTER — RECORDS - HEALTHEAST (OUTPATIENT)
Dept: ADMINISTRATIVE | Facility: CLINIC | Age: 81
End: 2021-05-28

## 2021-05-29 NOTE — TELEPHONE ENCOUNTER
Refill Approved    Rx renewed per Medication Renewal Policy. Medication was last renewed on 3/24/19.    Josiane Claros, [facility] Triage/Med Refill 6/17/2019     Requested Prescriptions   Pending Prescriptions Disp Refills     gabapentin (NEURONTIN) 600 MG tablet [Pharmacy Med Name: GABAPENTIN 600MG TABLETS] 270 tablet 0     Sig: TAKE 1 TABLET(600 MG) BY MOUTH THREE TIMES DAILY       Gabapentin/Levetiracetam/Tiagabine Refill Protocol  Passed - 6/15/2019  5:04 AM        Passed - PCP or prescribing provider visit in past 12 months or next 3 months     Last office visit with prescriber/PCP: 12/22/2017 Kevin Ho MD OR same dept: Visit date not found OR same specialty: 12/22/2017 Kevin Ho MD  Last physical: 10/22/2018 Last MTM visit: Visit date not found   Next visit within 3 mo: Visit date not found  Next physical within 3 mo: Visit date not found  Prescriber OR PCP: Kevin Ho MD  Last diagnosis associated with med order: 1. Chronic abdominal pain  - gabapentin (NEURONTIN) 600 MG tablet [Pharmacy Med Name: GABAPENTIN 600MG TABLETS]; TAKE 1 TABLET(600 MG) BY MOUTH THREE TIMES DAILY  Dispense: 270 tablet; Refill: 0    If protocol passes may refill for 12 months if within 3 months of last provider visit (or a total of 15 months).

## 2021-05-30 NOTE — TELEPHONE ENCOUNTER
Triage note:    78 year old female called with concerns about increased frequency of left sided head numbness/tingling over the last 2 weeks and an episode of double vision today that lasted 15 minutes.     The last couple of weeks she had been getting numb/tingling sensation on the left side of head at the end of the day or early evening. But during the day she has been feeling well. She is active and play pickle ball most days. She is on Gabapentin for headaches. She has prisms in the glasses for 10 years.       Today she got 'wavering' or 'double vision' in the left eye that lasted 15 minutes and now she already has the numb feeling in the left side of her head. Sometimes she thinks her left hand is numb but she isn't sure but she thinks it is just her arthritis.  She can't make a fist with her left hand which has been that way for years.      RN triaged to be seen in ED now (or in clinic with PCP approval).  PCP not in clinic the rest of the week.  She will go to [facility] ED now.     Josiane Claros RN, [facility] Med Refill/Triage, 7/3/2019 10:40 AM      Reason for Disposition    Double vision    Protocols used: VISION LOSS OR CHANGE-A-[place]

## 2021-05-31 ENCOUNTER — COMMUNICATION - HEALTHEAST (OUTPATIENT)
Dept: SCHEDULING | Facility: CLINIC | Age: 81
End: 2021-05-31

## 2021-05-31 ENCOUNTER — HOSPITAL ENCOUNTER (EMERGENCY)
Dept: EMERGENCY MEDICINE | Facility: CLINIC | Age: 81
Discharge: HOME | End: 2021-05-31
Attending: EMERGENCY MEDICINE
Payer: COMMERCIAL

## 2021-05-31 VITALS — BODY MASS INDEX: 22.99 KG/M2 | WEIGHT: 138 LBS | HEIGHT: 65 IN

## 2021-05-31 VITALS — HEIGHT: 65 IN | WEIGHT: 135 LBS | BODY MASS INDEX: 22.49 KG/M2

## 2021-05-31 DIAGNOSIS — R00.2 PALPITATIONS: ICD-10-CM

## 2021-05-31 LAB
ANION GAP SERPL CALCULATED.3IONS-SCNC: 11 MMOL/L (ref 5–18)
BUN SERPL-MCNC: 9 MG/DL (ref 8–28)
CALCIUM SERPL-MCNC: 9.2 MG/DL (ref 8.5–10.5)
CHLORIDE BLD-SCNC: 94 MMOL/L (ref 98–107)
CO2 SERPL-SCNC: 27 MMOL/L (ref 22–31)
CREAT SERPL-MCNC: 0.66 MG/DL (ref 0.6–1.1)
ERYTHROCYTE [DISTWIDTH] IN BLOOD BY AUTOMATED COUNT: 13.2 % (ref 11–14.5)
GFR SERPL CREATININE-BSD FRML MDRD: >60 ML/MIN/1.73M2
GLUCOSE BLD-MCNC: 100 MG/DL (ref 70–125)
HCT VFR BLD AUTO: 36.1 % (ref 35–47)
HGB BLD-MCNC: 12.3 G/DL (ref 12–16)
MAGNESIUM SERPL-MCNC: 2 MG/DL (ref 1.8–2.6)
MCH RBC QN AUTO: 29.8 PG (ref 27–34)
MCHC RBC AUTO-ENTMCNC: 34.1 G/DL (ref 32–36)
MCV RBC AUTO: 87 FL (ref 80–100)
PLATELET # BLD AUTO: 245 THOU/UL (ref 140–440)
PMV BLD AUTO: 9 FL (ref 8.5–12.5)
POTASSIUM BLD-SCNC: 3.3 MMOL/L (ref 3.5–5)
RBC # BLD AUTO: 4.13 MILL/UL (ref 3.8–5.4)
SODIUM SERPL-SCNC: 132 MMOL/L (ref 136–145)
TROPONIN I SERPL-MCNC: <0.01 NG/ML (ref 0–0.29)
TSH SERPL DL<=0.005 MIU/L-ACNC: 1.63 UIU/ML (ref 0.3–5)
WBC: 6.1 THOU/UL (ref 4–11)

## 2021-05-31 ASSESSMENT — MIFFLIN-ST. JEOR: SCORE: 1072.82

## 2021-05-31 NOTE — TELEPHONE ENCOUNTER
RN cannot approve Refill Request    RN can NOT refill this medication Pharmacy requesting 96 ml vs grams. Rx needs to be resent with 90 day supply in ml's. . Last office visit: 12/22/2017 Kevin Ho MD Last Physical: 10/22/2018 Last MTM visit: Visit date not found Last visit same specialty: 12/22/2017 Kevin Ho MD.  Next visit within 3 mo: Visit date not found  Next physical within 3 mo: Visit date not found      Christie Pringle, [facility] Triage/Med Refill 8/14/2019    Requested Prescriptions   Pending Prescriptions Disp Refills     fluticasone propionate (FLONASE) 50 mcg/actuation nasal spray [Pharmacy Med Name: FLUTICASONE 50MCG NASAL SP (120) RX] 96 g 0     Sig: SHAKE LIQUID AND USE 2 SPRAYS IN EACH NOSTRIL TWICE DAILY       Nasal Steroid Refill Protocol Passed - 8/14/2019  4:49 PM        Passed - Patient has had office visit/physical in last 2 years     Last office visit with prescriber/PCP: 12/22/2017 OR same dept: Visit date not found OR same specialty: 12/22/2017 Kevin Ho MD Last physical: 10/22/2018 Last MTM visit: Visit date not found    Next appt within 3 mo: Visit date not found  Next physical within 3 mo: Visit date not found  Prescriber OR PCP: Kevin Ho MD  Last diagnosis associated with med order: 1. Allergic rhinitis  - fluticasone propionate (FLONASE) 50 mcg/actuation nasal spray [Pharmacy Med Name: FLUTICASONE 50MCG NASAL SP (120) RX]; SHAKE LIQUID AND USE 2 SPRAYS IN EACH NOSTRIL TWICE DAILY; Refill: 2     If protocol passes may refill for 12 months if within 3 months of last provider visit (or a total of 15 months).

## 2021-05-31 NOTE — TELEPHONE ENCOUNTER
Refill Approved    Rx renewed per Medication Renewal Policy. Medication was last renewed on 11/8/18.    Elba Bowman, [facility] Triage/Med Refill 8/14/2019     Requested Prescriptions   Pending Prescriptions Disp Refills     fluticasone propionate (FLONASE) 50 mcg/actuation nasal spray [Pharmacy Med Name: FLUTICASONE 50MCG NASAL SP (120) RX]  0     Sig: SHAKE LIQUID AND USE 2 SPRAYS IN EACH NOSTRIL TWICE DAILY       Nasal Steroid Refill Protocol Passed - 8/13/2019  7:12 PM        Passed - Patient has had office visit/physical in last 2 years     Last office visit with prescriber/PCP: 12/22/2017 OR same dept: Visit date not found OR same specialty: 12/22/2017 Kevin Ho MD Last physical: 10/22/2018 Last MTM visit: Visit date not found    Next appt within 3 mo: Visit date not found  Next physical within 3 mo: Visit date not found  Prescriber OR PCP: Kevin Ho MD  Last diagnosis associated with med order: 1. Allergic rhinitis  - fluticasone propionate (FLONASE) 50 mcg/actuation nasal spray [Pharmacy Med Name: FLUTICASONE 50MCG NASAL SP (120) RX]; SHAKE LIQUID AND USE 2 SPRAYS IN EACH NOSTRIL TWICE DAILY; Refill: 0     If protocol passes may refill for 12 months if within 3 months of last provider visit (or a total of 15 months).

## 2021-06-01 ENCOUNTER — RECORDS - HEALTHEAST (OUTPATIENT)
Dept: ADMINISTRATIVE | Facility: CLINIC | Age: 81
End: 2021-06-01

## 2021-06-01 LAB
ATRIAL RATE - MUSE: 68 BPM
DIASTOLIC BLOOD PRESSURE - MUSE: NORMAL
INTERPRETATION ECG - MUSE: NORMAL
P AXIS - MUSE: 60 DEGREES
PR INTERVAL - MUSE: 236 MS
QRS DURATION - MUSE: 94 MS
QT - MUSE: 404 MS
QTC - MUSE: 429 MS
R AXIS - MUSE: 5 DEGREES
SYSTOLIC BLOOD PRESSURE - MUSE: NORMAL
T AXIS - MUSE: 66 DEGREES
VENTRICULAR RATE- MUSE: 68 BPM

## 2021-06-01 NOTE — TELEPHONE ENCOUNTER
Woke up at 4:30 with pulsating headaches.    Having one or 2 every 4-6 minutes.    On gabapentin for chronic headaches.  Patient states she was seen iin the ED @ [facility] this summer for HA's. and did have a CT scan then.  Could get back to sleep , until 7:30,am, and then became closer together .      Appointment made @ 1:20pm with Dr. Church.    Marie Miller RN  [facility] Triage/refill nurse        Reason for Disposition    Patient wants to be seen    Protocols used: HEADACHE-A-[place]

## 2021-06-01 NOTE — TELEPHONE ENCOUNTER
ROSA - Status Update  Who is Calling: Patient  Update: Patient stated that her headache is much better. Patient stated that she is still experiencing occasional episodes but they are much less. Patient stated that she slept well with no problem there so it was good. Patient stated that her blood pressure last night at 6 pm was 135/65 with a pule at 63, this morning at 9 am blood pressure was at 131/67 with a pulse at 61, and blood pressure now (11:25am) is at 130/74 with a pulse at 50.  Okay to leave a detailed message?:  Yes  [phone]

## 2021-06-01 NOTE — PROGRESS NOTES
[facility] Clinic Note  Rene Matthews   79 y.o. female    Date of Visit: 9/30/2019  Chief Complaint   Patient presents with     Headache     RT side near temple - just started early this AM       Assessment/Plan  1. Right sided temporal headache  Unknown etiology with noted elevated blood pressure today.  History and exam not consistent with cluster, migraine or tension type headache.  Does not have constellation of symptoms suggestive of GCA but this also remains on the differential.  Likely her elevated blood pressure may play a role however counseled the patient to recheck her blood pressure tomorrow and contact the clinic especially if her symptoms persist and/or worsen.  Potentially there is a role her anxiety might be playing, however she denies any major issues or life stressors at this time.  Counseled her to continue to take her gabapentin and as needed Valium.  - Erythrocyte Sedimentation Rate    Much or all of the text in this note was generated through the use of Dragon Dictate voice-to-text software. Errors in spelling or words which seem out of context are unintentional. Sound alike errors, in particular, may have escaped editing  Constantin Church MD    No follow-ups on file.    Subjective  This 79 y.o. old  female presenting with her  with complaint of acute pulsating headaches since 4:30 AM occurring every 4 to 6 minutes.  Roughly has had 57 episodes prior to presenting to the clinic today.  Each episode last 1 to 2 seconds with no associated symptoms.  Review of systems is negative for jaw pain or claudication, visual disturbance or eye pain, fatigue, nausea or emesis, lacrimation, photophobia, rhinorrhea, neck pain or ear pain, discomfort in the shoulders or in the hip girdle.  She describes the episode as a pulse like feeling and localized to the right side of her temple.  She denies any recent fall or head trauma.  This is never happened to her before.   She did have a history of shingles just superior to the affected area but denies any rash, numbness or tingling over that area.  She drinks an 8 ounce of Coke daily, rarely drinks alcohol most recently and 1/2 glass of wine on Saturday and is a never smoker.  Endorses a long-standing history of anxiety for which she takes Valium 5 mg daily.  Had a BMP and CBC done on 9/16/2019 that was completely unremarkable.  Rheumatologic disease and takes gabapentin 600 mg 3 times daily for nonspecific chronic headaches.  She is an otherwise active individual who next 15 miles a day and does not feel the symptoms have affected her function her quality of life today.  She endorses biking for roughly 15 minutes earlier this morning and thinks that it may have made her symptoms worse.  She endorses a chronically low heart rate due to the metoprolol any new medications.  Any significant life stressors at this time.  History is also pertinent for essential hypertension. Systolic pressures were elevated in my personal recheck I was able to get 170/70.  She does endorse whitecoat syndrome with normally systolic blood pressures at home less than 140.  She tries to avoid checking her blood pressure because she notes that checking it increases her anxiety.  She is adamant that she remains steadfast with her medications, including her antihypertensives as prescribed  She has a history of chronic headaches, sometimes quite severe, managed with gabapentin 3 times daily, as needed tramadol. Recently seen in July 2019 for strokelike symptoms with CT and MR head negative for any acute findings and discharged on doubling of aspirin dose from 81 mg 162 mg. Mild chronic small vessel ischemic changes were noted on imaging. Differential diagnosis at that time included atypical migraine or postherpetic neuralgia.   ROS A comprehensive review of systems was performed and was otherwise negative    Medications, allergies, and problem list were reviewed  and updated    Exam  General appearance: Pleasant, nontoxic, appears younger than stated age, alert and oriented and in no acute distress  Vitals:    09/30/19 1323   BP: 168/86   Pulse:    SpO2:      EYES: Eyelids, conjunctiva, and sclera were normal. Pupils were normal. Cornea, iris, and lens were normal bilaterally.  HEAD, EARS, NOSE, MOUTH, AND THROAT: Head and face were normal. Hearing was normal to voice and the ears were normal to external exam. Nose appearance was normal and there was no discharge. Oropharynx was normal.  No tenderness to percussion of the frontal and maxillary sinuses.  Unable to produce any tenderness to palpation on either side of the temple  RESPIRATORY: Breathing pattern was normal and the chest moved symmetrically. Lung sounds were normal and there were no abnormal sounds.  CARDIOVASCULAR: Heart rate and rhythm were normal.  S1 and S2 were normal and there were no extra sounds or murmurs. Peripheral pulses in arms and legs were normal.  JVP was normal. No peripheral edema.  GASTROINTESTINAL: Normoactive bowel sounds were present.  No organ enlargement or tenderness.  No tenderness, mass, or enlarged organs.   MUSCULOSKELETAL: Skeletal configuration was normal and muscle mass was normal for age.  Chronic osteoarthritic changes in both hands  SKIN/HAIR/NAILS: Skin color was normal.  There were no skin lesions.  Hair and nails were normal.  NEUROLOGIC: Alert and oriented to person, place, time, and circumstance. Speech was normal. Cranial nerves were normal. Motor strength was normal for age   PSYCHIATRIC:  Mood and affect were normal and the patient had normal recent and remote memory. The patient's judgment and insight were normal.    Additional Information   Current Outpatient Medications   Medication Sig Dispense Refill     acetaminophen (TYLENOL) 325 MG tablet Take 650 mg by mouth every 6 (six) hours as needed for pain.       amLODIPine (NORVASC) 2.5 MG tablet TAKE 1 TABLET(2.5 MG) BY  MOUTH DAILY 90 tablet 0     aspirin 81 MG EC tablet Take 81 mg by mouth daily.       calcium carbonate-vitamin D2 500 mg(1,250mg) -200 unit tablet Take 1 tablet by mouth 2 (two) times a day.       diazePAM (VALIUM) 5 MG tablet TAKE 1 TABLET(5 MG) BY MOUTH EVERY 6 HOURS AS NEEDED FOR ANXIETY 60 tablet 0     fluticasone propionate (FLONASE) 50 mcg/actuation nasal spray SHAKE LIQUID AND USE 2 SPRAYS IN EACH NOSTRIL TWICE DAILY 96 g 3     gabapentin (NEURONTIN) 600 MG tablet TAKE 1 TABLET(600 MG) BY MOUTH THREE TIMES DAILY 270 tablet 0     hydroCHLOROthiazide (HYDRODIURIL) 25 MG tablet Take 1 tablet (25 mg total) by mouth daily. 90 tablet 3     irbesartan (AVAPRO) 300 MG tablet TAKE 1 TABLET BY MOUTH EVERY DAY 90 tablet 3     metoprolol tartrate (LOPRESSOR) 50 MG tablet TAKE 1 TABLET BY MOUTH TWICE DAILY 180 tablet 3     omeprazole (PRILOSEC) 20 MG capsule Take 20 mg by mouth daily before breakfast.       polyethylene glycol (MIRALAX) 17 gram packet Take 17 g by mouth daily.       simvastatin (ZOCOR) 10 MG tablet Take 1 tablet (10 mg total) by mouth daily. (Patient taking differently: Take 10 mg by mouth at bedtime.       ) 90 tablet 3     traZODone (DESYREL) 50 MG tablet TAKE 1 TABLET BY MOUTH EVERY NIGHT AT BEDTIME 90 tablet 0     SHINGRIX, PF, 50 mcg/0.5 mL SusR TO BE ADMINISTERED BY PHARMACIST FOR IMMUNIZATION  0     No current facility-administered medications for this visit.      No Known Allergies  Social History     Patient does not qualify to have social determinant information on file (likely too young).   Social History Narrative    Retired teacher    , [place], 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     Social History     Tobacco Use     Smoking status: Never Smoker     Smokeless tobacco: Never Used   Substance Use Topics     Alcohol use: No     Drug use: No     Review and/or order of clinical lab tests: CBC and CMP from 9/16/2019  Review and/or order of radiology tests: CT and MR head from July 2019

## 2021-06-01 NOTE — PROGRESS NOTES
Office Visit - Follow Up   Rene Matthews   79 y.o. female    Date of Visit: 9/16/2019    Chief Complaint   Patient presents with     bloating     burping and gas        Assessment and Plan   1. Chronic abdominal pain with belching and burping and increased flatulence attributed to IBS  Exam is normal and history is reassuring.  We discussed dietary modification and information on FODMAPs provided.  - HM2(CBC w/o Differential)  - Comprehensive Metabolic Panel  - Lipase        Return in about 4 weeks (around 10/14/2019) for Annual physical.     History of Present Illness   This 79 y.o. old woman with hypertension and history of IBS here to discuss increasing bloating, belching and gas over the past couple months.  Denies any severe abdominal pain.  Some occasional cramping.  No change in bowel movements.  Typically has 4 or 5 BMs daily.  She continues to use MiraLAX every day.  No blood in her stools.  Last colonoscopy was in January of this year.  Reviewed her diet.  She probably is eating more fruits and vegetables during the summer months.  She also enjoys her ice cream which she eats on a daily basis.  She has had no unexpected weight loss.  Good appetite.  No nausea or vomiting.    She noticed some reflux-like symptoms when biking.  Relieved with belching.  She typically bikes 15 to 18 miles daily.  On one occasion when on uphill stretch, she noticed some symptoms but has since biked the same trial without difficulty.    Review of Systems:  Otherwise, a comprehensive review of systems was negative except as noted.     Medications, Allergies and Problem List   Patient Active Problem List   Diagnosis     Hypertension     Hyperlipidemia     IBS (irritable bowel syndrome)     Osteoarthritis     Allergic rhinitis     Peptic ulcer disease     Anxiety, generalized     Insomnia     Chronic abdominal pain     GERD (gastroesophageal reflux disease)     Chronic headaches     Overactive bladder     Hearing loss in left ear      Skin cancer, basal cell     Osteopenia     Personal history of colonic polyps       She has a past surgical history that includes Basal cell skin cancer; Ovarian cyst removal; Cervical biopsy w/ loop electrode excision; Endometrial biopsy; Strabismus surgery (2015); and Radiofrequency ablation right greater saphenous vein (2012).    No Known Allergies    Current Outpatient Medications   Medication Sig Dispense Refill     acetaminophen (TYLENOL) 325 MG tablet Take 650 mg by mouth every 6 (six) hours as needed for pain.       amLODIPine (NORVASC) 2.5 MG tablet TAKE 1 TABLET(2.5 MG) BY MOUTH DAILY 90 tablet 0     aspirin 81 MG EC tablet Take 81 mg by mouth daily.       calcium carbonate-vitamin D2 500 mg(1,250mg) -200 unit tablet Take 1 tablet by mouth 2 (two) times a day.       diazePAM (VALIUM) 5 MG tablet TAKE 1 TABLET(5 MG) BY MOUTH EVERY 6 HOURS AS NEEDED FOR ANXIETY 60 tablet 0     fluticasone propionate (FLONASE) 50 mcg/actuation nasal spray SHAKE LIQUID AND USE 2 SPRAYS IN EACH NOSTRIL TWICE DAILY 96 g 3     gabapentin (NEURONTIN) 600 MG tablet TAKE 1 TABLET(600 MG) BY MOUTH THREE TIMES DAILY 270 tablet 0     hydroCHLOROthiazide (HYDRODIURIL) 25 MG tablet Take 1 tablet (25 mg total) by mouth daily. 90 tablet 3     irbesartan (AVAPRO) 300 MG tablet TAKE 1 TABLET BY MOUTH EVERY DAY 90 tablet 3     metoprolol tartrate (LOPRESSOR) 50 MG tablet TAKE 1 TABLET BY MOUTH TWICE DAILY 180 tablet 3     omeprazole (PRILOSEC) 20 MG capsule Take 20 mg by mouth daily before breakfast.       polyethylene glycol (MIRALAX) 17 gram packet Take 17 g by mouth daily.       SHINGRIX, PF, 50 mcg/0.5 mL SusR TO BE ADMINISTERED BY PHARMACIST FOR IMMUNIZATION  0     simvastatin (ZOCOR) 10 MG tablet Take 1 tablet (10 mg total) by mouth daily. (Patient taking differently: Take 10 mg by mouth at bedtime.       ) 90 tablet 3     traZODone (DESYREL) 50 MG tablet TAKE 1 TABLET BY MOUTH EVERY NIGHT AT BEDTIME 90 tablet 0     No current  "facility-administered medications for this visit.         Physical Exam   General Appearance:   Well-appearing elderly woman    /72 (Patient Site: Left Arm, Patient Position: Sitting, Cuff Size: Adult Large)   Pulse (!) 48   Ht 5' 5\" (1.651 m)   Wt 136 lb (61.7 kg)   LMP  (LMP Unknown)   SpO2 97%   BMI 22.63 kg/m        Abdomen soft, no hepatosplenomegaly masses or tenderness     Additional Information   Social History     Tobacco Use     Smoking status: Never Smoker     Smokeless tobacco: Never Used   Substance Use Topics     Alcohol use: No     Drug use: No            Kevin Ho MD  "

## 2021-06-01 NOTE — TELEPHONE ENCOUNTER
Refill Approved    Rx renewed per Medication Renewal Policy. Medication was last renewed on 12/14/18.6/17/19.11/19/18    Elba Bowman, [facility] Triage/Med Refill 9/13/2019     Requested Prescriptions   Pending Prescriptions Disp Refills     traZODone (DESYREL) 50 MG tablet [Pharmacy Med Name: TRAZODONE 50MG TABLETS] 90 tablet 0     Sig: TAKE 1 TABLET BY MOUTH EVERY NIGHT AT BEDTIME       Tricyclics/Misc Antidepressant/Antianxiety Meds Refill Protocol Passed - 9/13/2019  5:04 AM        Passed - PCP or prescribing provider visit in last year     Last office visit with prescriber/PCP: 12/22/2017 Kevin Ho MD OR same dept: Visit date not found OR same specialty: 12/22/2017 Kevin Ho MD  Last physical: 10/22/2018 Last MTM visit: Visit date not found   Next visit within 3 mo: Visit date not found  Next physical within 3 mo: Visit date not found  Prescriber OR PCP: Kevin Ho MD  Last diagnosis associated with med order: 1. Insomnia  - traZODone (DESYREL) 50 MG tablet [Pharmacy Med Name: TRAZODONE 50MG TABLETS]; TAKE 1 TABLET BY MOUTH EVERY NIGHT AT BEDTIME  Dispense: 90 tablet; Refill: 0    2. HTN (hypertension)  - amLODIPine (NORVASC) 2.5 MG tablet [Pharmacy Med Name: AMLODIPINE BESYLATE 2.5MG TABLETS]; TAKE 1 TABLET(2.5 MG) BY MOUTH DAILY  Dispense: 90 tablet; Refill: 0    3. Chronic abdominal pain  - gabapentin (NEURONTIN) 600 MG tablet [Pharmacy Med Name: GABAPENTIN 600MG TABLETS]; TAKE 1 TABLET(600 MG) BY MOUTH THREE TIMES DAILY  Dispense: 270 tablet; Refill: 0    If protocol passes may refill for 12 months if within 3 months of last provider visit (or a total of 15 months).             amLODIPine (NORVASC) 2.5 MG tablet [Pharmacy Med Name: AMLODIPINE BESYLATE 2.5MG TABLETS] 90 tablet 0     Sig: TAKE 1 TABLET(2.5 MG) BY MOUTH DAILY       Calcium-Channel Blockers Protocol Passed - 9/13/2019  5:04 AM        Passed - PCP or prescribing provider visit in past 12 months or next 3  months     Last office visit with prescriber/PCP: 12/22/2017 Kevin Ho MD OR same dept: Visit date not found OR same specialty: 12/22/2017 Kevin Ho MD  Last physical: 10/22/2018 Last MTM visit: Visit date not found   Next visit within 3 mo: Visit date not found  Next physical within 3 mo: Visit date not found  Prescriber OR PCP: Kevin Ho MD  Last diagnosis associated with med order: 1. Insomnia  - traZODone (DESYREL) 50 MG tablet [Pharmacy Med Name: TRAZODONE 50MG TABLETS]; TAKE 1 TABLET BY MOUTH EVERY NIGHT AT BEDTIME  Dispense: 90 tablet; Refill: 0    2. HTN (hypertension)  - amLODIPine (NORVASC) 2.5 MG tablet [Pharmacy Med Name: AMLODIPINE BESYLATE 2.5MG TABLETS]; TAKE 1 TABLET(2.5 MG) BY MOUTH DAILY  Dispense: 90 tablet; Refill: 0    3. Chronic abdominal pain  - gabapentin (NEURONTIN) 600 MG tablet [Pharmacy Med Name: GABAPENTIN 600MG TABLETS]; TAKE 1 TABLET(600 MG) BY MOUTH THREE TIMES DAILY  Dispense: 270 tablet; Refill: 0    If protocol passes may refill for 12 months if within 3 months of last provider visit (or a total of 15 months).             Passed - Blood pressure filed in past 12 months     BP Readings from Last 1 Encounters:   07/03/19 162/71             gabapentin (NEURONTIN) 600 MG tablet [Pharmacy Med Name: GABAPENTIN 600MG TABLETS] 270 tablet 0     Sig: TAKE 1 TABLET(600 MG) BY MOUTH THREE TIMES DAILY       Gabapentin/Levetiracetam/Tiagabine Refill Protocol  Passed - 9/13/2019  5:04 AM        Passed - PCP or prescribing provider visit in past 12 months or next 3 months     Last office visit with prescriber/PCP: 12/22/2017 Kevin Ho MD OR same dept: Visit date not found OR same specialty: 12/22/2017 Kevin Ho MD  Last physical: 10/22/2018 Last MTM visit: Visit date not found   Next visit within 3 mo: Visit date not found  Next physical within 3 mo: Visit date not found  Prescriber OR PCP: Kevin Ho MD  Last diagnosis  associated with med order: 1. Insomnia  - traZODone (DESYREL) 50 MG tablet [Pharmacy Med Name: TRAZODONE 50MG TABLETS]; TAKE 1 TABLET BY MOUTH EVERY NIGHT AT BEDTIME  Dispense: 90 tablet; Refill: 0    2. HTN (hypertension)  - amLODIPine (NORVASC) 2.5 MG tablet [Pharmacy Med Name: AMLODIPINE BESYLATE 2.5MG TABLETS]; TAKE 1 TABLET(2.5 MG) BY MOUTH DAILY  Dispense: 90 tablet; Refill: 0    3. Chronic abdominal pain  - gabapentin (NEURONTIN) 600 MG tablet [Pharmacy Med Name: GABAPENTIN 600MG TABLETS]; TAKE 1 TABLET(600 MG) BY MOUTH THREE TIMES DAILY  Dispense: 270 tablet; Refill: 0    If protocol passes may refill for 12 months if within 3 months of last provider visit (or a total of 15 months).

## 2021-06-01 NOTE — TELEPHONE ENCOUNTER
Triage call:   Last 2 weeks has been more tired and bloated. Burping relives the feeling. Sour taste in her mouth when she has to burp. Is on Prilosec. Hx of an ulcer- from taking ASA. Reports that she is able to do normal activities but today she was more tired and did not go to play pickle ball with friends.      Triaged to be seen within the next 3 days- reviewed additional care advice with patient and she verbalizes understanding. Patient warm transferred to scheduling for appointment. Appointment with PCP on Monday @ 11:20am    Eladia Buenrostro RN [facility] Triage/Med Refill 9/12/2019 12:21 PM    Reason for Disposition    MILD weakness (i.e., does not interfere with ability to work, go to school, normal activities) and persists > 1 week    Protocols used: WEAKNESS (GENERALIZED) AND FATIGUE-A-[place]

## 2021-06-02 VITALS — BODY MASS INDEX: 21.99 KG/M2 | WEIGHT: 132 LBS | HEIGHT: 65 IN

## 2021-06-02 VITALS — HEIGHT: 65 IN | BODY MASS INDEX: 22.66 KG/M2 | WEIGHT: 136 LBS

## 2021-06-02 NOTE — PROGRESS NOTES
Assessment and Plan:       1. Medicare annual wellness visit, subsequent  Immunizations are reviewed and providing flu shot.  Everything else is up-to-date.  She has a living will.  Non-smoker.  Uses alcohol in moderation.  Exercising on a regular basis.  Up to date with colonoscopies and this should be repeated in January 2022 after multiple polyps removed earlier this year.  Breast exam completed and she will continue to get a mammogram annually.  Pelvic exam completed earlier this year by OB/GYN and I will resume at her next physical.  Last DEXA was 2017 and this should be repeated next year. Dementia and depression screening completed.  She sees an ophthalmologist regularly and gets glaucoma screening.  Skin exam performed and recommending regular use of sunblock.  She will continue to see dermatology every year.  Fasting glucose for diabetes screening was normal.    2. Essential hypertension  Good blood pressure control with current medications including amlodipine, HCTZ, Avapro, and metoprolol  - Urinalysis-UC if Indicated    3. Hyperlipidemia, unspecified hyperlipidemia type  Recheck lipid profile on simvastatin.  Continues aspirin 81 mg daily  - Lipid Cascade    4. Irritable bowel syndrome with constipation  Chronic abdominal pain currently well controlled.  We discussed FODMAP diet at last visit    5. Anxiety, generalized  She takes diazepam daily as needed usually averaging 8 to 10 days/month and this works well  - diazePAM (VALIUM) 5 MG tablet; Take 1 tablet (5 mg total) by mouth daily as needed for anxiety.  Dispense: 60 tablet; Refill: 0    6. Chronic nonintractable headache, unspecified headache type  Well-controlled taking gabapentin 600 mg 3 times daily    7. Skin cancer, basal cell  She will see her dermatologist annually    8. Osteopenia, unspecified location  We will plan to repeat DEXA in 2020.  She tries to get adequate calcium and vitamin D in her diet and with supplements  - Vitamin D, Total  (25-Hydroxy)    9. Insomnia, unspecified type  Using trazodone at bedtime    10. Gastroesophageal reflux disease without esophagitis  Well-controlled with omeprazole    11. Peptic ulcer disease  Continue omeprazole while on chronic aspirin    12. Allergic rhinitis, unspecified seasonality, unspecified trigger  Well-controlled with Flonase and no longer using Afrin    13. Personal history of colonic polyps  3 adenomatous polyps removed January 2019 with recommendation to return in 3 years    14. Need for immunization against influenza    - Influenza High Dose, Seasonal 65+ yrs    Over 25 minutes was spent addressing these chronic and new medical problems beyond time spent performing annual wellness visit with over 50% of the time spent counseling and coordination of care including discussing chronic medical problems of IBS with chronic abdominal pain, hypertension, generalized anxiety, allergic rhinitis, and history of peptic ulcer disease.    The patient's current medical problems were reviewed.    I have had an Advance Directives discussion with the patient.  The following health maintenance schedule was reviewed with the patient and provided in printed form in the after visit summary:   Health Maintenance   Topic Date Due     ZOSTER VACCINES (3 of 3) 04/26/2019     MEDICARE ANNUAL WELLNESS VISIT  10/22/2019     DXA SCAN  12/12/2019     FALL RISK ASSESSMENT  10/24/2020     TD 18+ HE  09/28/2022     ADVANCE CARE PLANNING  10/24/2024     PNEUMOCOCCAL IMMUNIZATION 65+ LOW/MEDIUM RISK  Completed     INFLUENZA VACCINE RULE BASED  Completed        Subjective:   Chief Complaint: Rene Matthews is an 79 y.o. female here for an Annual Wellness visit.   HPI: In addition to annual wellness visit, several chronic medical problems discussed during today's visit    Hypertension is well controlled with combination of medications including amlodipine, HCTZ, Avapro, and metoprolol.  She checks her blood pressure at home and it is  consistently under 140 systolic.  Occasionally under 110 but asymptomatic.  Heart rate is in the 50s but asymptomatic.  She is able to exercise including riding her bike for 18 miles per day without trouble    Allergic rhinitis symptoms are well controlled using Flonase daily and no longer needing Afrin    Recently evaluated for severe right-sided headache which did resolve.  Sed rate reassuring.  She does have chronic headaches and continues on gabapentin 600 mg 3 times daily which is working well    Reflux symptoms are well controlled with omeprazole and she has had no recurrent ulcer symptoms.  She also has IBS with chronic abdominal pain currently tolerable.    Some increased itching in her scalp no rash seen    Some itching and dryness in her back.  She can try some steroid cream that she has available    Review of Systems:    Please see above.  The rest of the review of systems are negative for all systems.    Patient Care Team:  Kevin Ho MD as PCP - General (Internal Medicine)  Kevin Ho MD as Assigned PCP     Patient Active Problem List   Diagnosis     Hypertension     Hyperlipidemia     IBS (irritable bowel syndrome)     Osteoarthritis     Allergic rhinitis     Peptic ulcer disease     Anxiety, generalized     Insomnia     Chronic abdominal pain     GERD (gastroesophageal reflux disease)     Chronic headaches     Overactive bladder     Hearing loss in left ear     Skin cancer, basal cell     Osteopenia     Personal history of colonic polyps     Past Medical History:   Diagnosis Date     Abnormal Pap smear of cervix     ASCUS with negative biopsy     Allergic rhinitis      Anxiety, generalized      Cervical polyp      Chest pain     secondary to gerd     Chronic abdominal pain     Negative CT scan and colonoscopy, musculoskeletal etiology suspected     Chronic sinusitis 10/18/2016     Frequent headaches      GERD (gastroesophageal reflux disease)     Noncardiac chest pain     Hearing  loss in left ear 10/20/2017     Herpes zoster 2006     Hyperlipidemia      Hypertension      IBS (irritable bowel syndrome)      Insomnia      Osteoarthritis      Osteopenia     DEXA 2014 T score -2.0 stable, DEXA December 2017 T score -1.6 left femoral neck stable     Overactive bladder 10/18/2016     Peptic ulcer disease 2006    w/ gastric ulcer     Personal history of colonic polyps      colonoscopy January 2014 normal.  Colonoscopy January 2019 with multiple polyps, repeat in 3 years     Screening     Negative for AAA CT scan 2013     Skin cancer, basal cell 10/20/2017      Past Surgical History:   Procedure Laterality Date     Basal cell skin cancer       CERVICAL BIOPSY  W/ LOOP ELECTRODE EXCISION       ENDOMETRIAL BIOPSY      Negative     OVARIAN CYST REMOVAL      lysis of adhesions     Radiofrequency ablation right greater saphenous vein  2012     STRABISMUS SURGERY  2015      Family History   Problem Relation Age of Onset     Stroke Mother      Hypertension Sister      Hypertension Brother       Social History     Socioeconomic History     Marital status:      Spouse name: Not on file     Number of children: Not on file     Years of education: Not on file     Highest education level: Not on file   Occupational History     Not on file   Social Needs     Financial resource strain: Not on file     Food insecurity:     Worry: Not on file     Inability: Not on file     Transportation needs:     Medical: Not on file     Non-medical: Not on file   Tobacco Use     Smoking status: Never Smoker     Smokeless tobacco: Never Used   Substance and Sexual Activity     Alcohol use: No     Drug use: No     Sexual activity: Not on file   Lifestyle     Physical activity:     Days per week: Not on file     Minutes per session: Not on file     Stress: Not on file   Relationships     Social connections:     Talks on phone: Not on file     Gets together: Not on file     Attends Che[employer] service: Not on file     Active  member of club or organization: Not on file     Attends meetings of clubs or organizations: Not on file     Relationship status: Not on file     Intimate partner violence:     Fear of current or ex partner: Not on file     Emotionally abused: Not on file     Physically abused: Not on file     Forced sexual activity: Not on file   Other Topics Concern     Not on file   Social History Narrative    Retired teacher    , [place], 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       amLODIPine (NORVASC) 2.5 MG tablet TAKE 1 TABLET(2.5 MG) BY MOUTH DAILY 90 tablet 0     aspirin 81 MG EC tablet Take 81 mg by mouth daily.       calcium carbonate-vitamin D2 500 mg(1,250mg) -200 unit tablet Take 1 tablet by mouth daily.              diazePAM (VALIUM) 5 MG tablet TAKE 1 TABLET(5 MG) BY MOUTH EVERY 6 HOURS AS NEEDED FOR ANXIETY 60 tablet 0     fluticasone propionate (FLONASE) 50 mcg/actuation nasal spray SHAKE LIQUID AND USE 2 SPRAYS IN EACH NOSTRIL TWICE DAILY 96 g 3     gabapentin (NEURONTIN) 600 MG tablet TAKE 1 TABLET(600 MG) BY MOUTH THREE TIMES DAILY 270 tablet 0     hydroCHLOROthiazide (HYDRODIURIL) 25 MG tablet Take 1 tablet (25 mg total) by mouth daily. 90 tablet 3     irbesartan (AVAPRO) 300 MG tablet TAKE 1 TABLET BY MOUTH EVERY DAY 90 tablet 3     metoprolol tartrate (LOPRESSOR) 50 MG tablet TAKE 1 TABLET BY MOUTH TWICE DAILY 180 tablet 3     omeprazole (PRILOSEC) 20 MG capsule Take 20 mg by mouth daily before breakfast.       polyethylene glycol (MIRALAX) 17 gram packet Take 17 g by mouth daily.       SHINGRIX, PF, 50 mcg/0.5 mL SusR TO BE ADMINISTERED BY PHARMACIST FOR IMMUNIZATION  0     simvastatin (ZOCOR) 10 MG tablet Take 1 tablet (10 mg total) by mouth daily. (Patient taking differently: Take 10 mg by mouth at bedtime.       ) 90 tablet 3     traZODone  "(DESYREL) 50 MG tablet TAKE 1 TABLET BY MOUTH EVERY NIGHT AT BEDTIME 90 tablet 0     No current facility-administered medications for this visit.       Objective:   Vital Signs:   Visit Vitals  /70 (Patient Site: Left Arm, Patient Position: Sitting, Cuff Size: Adult Large)   Pulse (!) 46   Ht 5' 4\" (1.626 m)   Wt 135 lb (61.2 kg)   LMP  (LMP Unknown)   SpO2 98%   BMI 23.17 kg/m           PHYSICAL EXAM  EYES: Eyelids, conjunctiva, and sclera were normal. Pupils were normal. Cornea, iris, and lens were normal bilaterally.  HEAD, EARS, NOSE, MOUTH, AND THROAT: Head and face were normal. Nose appearance was normal and there was no discharge. Oropharynx was normal.  NECK: Neck appearance was normal. There were no neck masses and the thyroid was not enlarged and no nodules are felt.  No lymphadenopathy.  RESPIRATORY: Breathing pattern was normal and the chest moved symmetrically.  Percussion/auscultatory percussion was normal.  Lung sounds were normal and there were no rales or wheezes.  CARDIOVASCULAR: Heart rate and rhythm were normal.  S1 and S2 were normal and there were no extra sounds or murmurs. Peripheral pulses in arms and legs were normal.  Jugular venous pressure was normal.  There was no peripheral edema.  No carotid bruits.  BREAST: No palpable masses or tenderness.  No axillary nodes.  GASTROINTESTINAL: The abdomen was normal in contour.  Bowel sounds were present.   Palpation detected no tenderness, mass, or enlarged organs.   PELVIC: Deferred as performed by OB/GYN April 2019, resume at her physical in 2020  MUSCULOSKELETAL: Skeletal configuration was normal and muscle mass was normal for age. Joint appearance was overall normal.  LYMPHATIC: There were no enlarged nodes.  SKIN/HAIR/NAILS: Skin color was normal.  Hair and nails were normal.There were no worrisome skin lesions.  NEUROLOGIC: The patient was alert and oriented to person, place, time, and circumstance. Speech was normal. Cranial nerves " were normal. Motor strength was normal for age. The patient was normally coordinated.  Sensation intact.  PSYCHIATRIC:  Mood and affect were normal and the patient had normal recent and remote memory. The patient's judgment and insight were normal.    Assessment Results 10/24/2019   Activities of Daily Living No help needed   Instrumental Activities of Daily Living No help needed   Get Up and Go Score Less than 12 seconds   Mini Cog Total Score 5   Some recent data might be hidden     A Mini-Cog score of 0-2 suggests the possibility of dementia, score of 3-5 suggests no dementia    Identified Health Risks:     Patient's advanced directive was discussed and I am comfortable with the patient's wishes.

## 2021-06-02 NOTE — PATIENT INSTRUCTIONS - HE
Advance Directive  Patient s advance directive was discussed and I am comfortable with the patient s wishes.  Patient Education   Personalized Prevention Plan  You are due for the preventive services outlined below.  Your care team is available to assist you in scheduling these services.  If you have already completed any of these items, please share that information with your care team to update in your medical record.  Health Maintenance   Topic Date Due     ZOSTER VACCINES (3 of 3) 04/26/2019     DXA SCAN  12/12/2019     MEDICARE ANNUAL WELLNESS VISIT  10/24/2020     FALL RISK ASSESSMENT  10/24/2020     TD 18+ HE  09/28/2022     ADVANCE CARE PLANNING  10/24/2024     PNEUMOCOCCAL IMMUNIZATION 65+ LOW/MEDIUM RISK  Completed     INFLUENZA VACCINE RULE BASED  Completed         Schedule mammogram for this fall and continue annually  Repeat DEXA in 2020 for osteoporosis screening  Colonoscopy will be due in early 2022  Continue to see dermatology every year

## 2021-06-03 VITALS
SYSTOLIC BLOOD PRESSURE: 140 MMHG | WEIGHT: 136 LBS | BODY MASS INDEX: 22.66 KG/M2 | OXYGEN SATURATION: 97 % | HEIGHT: 65 IN | DIASTOLIC BLOOD PRESSURE: 72 MMHG | HEART RATE: 48 BPM

## 2021-06-03 VITALS
WEIGHT: 134 LBS | BODY MASS INDEX: 22.3 KG/M2 | HEART RATE: 56 BPM | DIASTOLIC BLOOD PRESSURE: 86 MMHG | OXYGEN SATURATION: 96 % | SYSTOLIC BLOOD PRESSURE: 168 MMHG

## 2021-06-03 VITALS
WEIGHT: 135 LBS | SYSTOLIC BLOOD PRESSURE: 138 MMHG | OXYGEN SATURATION: 98 % | BODY MASS INDEX: 23.05 KG/M2 | HEIGHT: 64 IN | HEART RATE: 46 BPM | DIASTOLIC BLOOD PRESSURE: 70 MMHG

## 2021-06-03 NOTE — TELEPHONE ENCOUNTER
"  Caller is in [place]      Would like PCP to call in RX to [place] for ABX for suspected UTI      \"Urgency\"      [facility]        Pt tele# [phone]      I will send a message to provider to address as able       She will await call from provider before starting AZO Stat that she has on hand           Tanner Grant, RN   Triage and Medication Refills    "

## 2021-06-03 NOTE — TELEPHONE ENCOUNTER
Left detailed message for the patient relaying message below from Dr. Ho.  Asked the patient to call if she has any further questions.  Meena ROLLINS CMA/CMT....................12:42 PM

## 2021-06-04 NOTE — TELEPHONE ENCOUNTER
Called patient and she is still in [place], [place]. Please send to [facility].     Called prescription to [facility].     Mere Norris LPN

## 2021-06-04 NOTE — TELEPHONE ENCOUNTER
Refill Approved    Rx renewed per Medication Renewal Policy. Medication was last renewed on 11/19/18.    Elba Bowman, [facility] Triage/Med Refill 12/16/2019     Requested Prescriptions   Pending Prescriptions Disp Refills     irbesartan (AVAPRO) 300 MG tablet [Pharmacy Med Name: IRBESARTAN 300MG TABLETS] 90 tablet 0     Sig: TAKE 1 TABLET BY MOUTH EVERY DAY       Angiotensin Receptor Blocker Protocol Passed - 12/14/2019  9:47 PM        Passed - PCP or prescribing provider visit in past 12 months       Last office visit with prescriber/PCP: 9/16/2019 Kevin Ho MD OR same dept: 9/30/2019 Constantin Church MD OR same specialty: 9/30/2019 Constantin Church MD  Last physical: 10/24/2019 Last MTM visit: Visit date not found   Next visit within 3 mo: Visit date not found  Next physical within 3 mo: Visit date not found  Prescriber OR PCP: Kevin Ho MD  Last diagnosis associated with med order: 1. Essential hypertension  - irbesartan (AVAPRO) 300 MG tablet [Pharmacy Med Name: IRBESARTAN 300MG TABLETS]; TAKE 1 TABLET BY MOUTH EVERY DAY  Dispense: 90 tablet; Refill: 0    If protocol passes may refill for 12 months if within 3 months of last provider visit (or a total of 15 months).             Passed - Serum potassium within the past 12 months     Lab Results   Component Value Date    Potassium 4.2 09/16/2019             Passed - Blood pressure filed in past 12 months     BP Readings from Last 1 Encounters:   10/24/19 138/70             Passed - Serum creatinine within the past 12 months     Creatinine   Date Value Ref Range Status   09/16/2019 0.73 0.60 - 1.10 mg/dL Final

## 2021-06-04 NOTE — TELEPHONE ENCOUNTER
Refills Approved x 6    Rx renewed per Medication Renewal Policy. Medication was last renewed on   Amlodipine, Gabapentin and Trazodone = 9/13/2019  HCTZ and Simvastatin = 12/30/2018 with 3 refills  Metoprolol tartrate = 12/27/2018 with 3 refills  Last office visit: 10/24/2019 with PCP: Dr LETITIA Sanchez, [facility] Triage/Med Refill 12/14/2019     Requested Prescriptions   Pending Prescriptions Disp Refills     amLODIPine (NORVASC) 2.5 MG tablet [Pharmacy Med Name: AMLODIPINE BESYLATE 2.5MG TABLETS] 90 tablet 0     Sig: TAKE 1 TABLET(2.5 MG) BY MOUTH DAILY       Calcium-Channel Blockers Protocol Passed - 12/14/2019  8:51 AM        Passed - PCP or prescribing provider visit in past 12 months or next 3 months     Last office visit with prescriber/PCP: 9/16/2019 Kevin Ho MD OR same dept: 9/30/2019 Constantin Church MD OR same specialty: 9/30/2019 Constantin Church MD  Last physical: 10/24/2019 Last MTM visit: Visit date not found   Next visit within 3 mo: Visit date not found  Next physical within 3 mo: Visit date not found  Prescriber OR PCP: Kevin Ho MD  Last diagnosis associated with med order: 1. HTN (hypertension)  - amLODIPine (NORVASC) 2.5 MG tablet [Pharmacy Med Name: AMLODIPINE BESYLATE 2.5MG TABLETS]; TAKE 1 TABLET(2.5 MG) BY MOUTH DAILY  Dispense: 90 tablet; Refill: 0  - metoprolol tartrate (LOPRESSOR) 50 MG tablet [Pharmacy Med Name: METOPROLOL TARTRATE 50MG TABLETS]; TAKE 1 TABLET BY MOUTH TWICE DAILY  Dispense: 180 tablet; Refill: 0    2. HLD (hyperlipidemia)  - simvastatin (ZOCOR) 10 MG tablet [Pharmacy Med Name: SIMVASTATIN 10MG TABLETS]; TAKE 1 TABLET(10 MG) BY MOUTH DAILY  Dispense: 90 tablet; Refill: 0    3. Insomnia  - traZODone (DESYREL) 50 MG tablet [Pharmacy Med Name: TRAZODONE 50MG TABLETS]; TAKE 1 TABLET BY MOUTH EVERY NIGHT AT BEDTIME  Dispense: 90 tablet; Refill: 0    4. Essential hypertension  - hydroCHLOROthiazide  (HYDRODIURIL) 25 MG tablet [Pharmacy Med Name: HYDROCHLOROTHIAZIDE 25MG TABLETS]; TAKE 1 TABLET(25 MG) BY MOUTH DAILY  Dispense: 90 tablet; Refill: 0    5. Chronic abdominal pain  - gabapentin (NEURONTIN) 600 MG tablet [Pharmacy Med Name: GABAPENTIN 600MG TABLETS]; TAKE 1 TABLET(600 MG) BY MOUTH THREE TIMES DAILY  Dispense: 270 tablet; Refill: 0    If protocol passes may refill for 12 months if within 3 months of last provider visit (or a total of 15 months).             Passed - Blood pressure filed in past 12 months     BP Readings from Last 1 Encounters:   10/24/19 138/70             simvastatin (ZOCOR) 10 MG tablet [Pharmacy Med Name: SIMVASTATIN 10MG TABLETS] 90 tablet 0     Sig: TAKE 1 TABLET(10 MG) BY MOUTH DAILY       Statins Refill Protocol (Hmg CoA Reductase Inhibitors) Passed - 12/14/2019  8:51 AM        Passed - PCP or prescribing provider visit in past 12 months      Last office visit with prescriber/PCP: 9/16/2019 Kevin Ho MD OR same dept: 9/30/2019 Constantin Church MD OR same specialty: 9/30/2019 Constantin Church MD  Last physical: 10/24/2019 Last MTM visit: Visit date not found   Next visit within 3 mo: Visit date not found  Next physical within 3 mo: Visit date not found  Prescriber OR PCP: eKvin Ho MD  Last diagnosis associated with med order: 1. HTN (hypertension)  - amLODIPine (NORVASC) 2.5 MG tablet [Pharmacy Med Name: AMLODIPINE BESYLATE 2.5MG TABLETS]; TAKE 1 TABLET(2.5 MG) BY MOUTH DAILY  Dispense: 90 tablet; Refill: 0  - metoprolol tartrate (LOPRESSOR) 50 MG tablet [Pharmacy Med Name: METOPROLOL TARTRATE 50MG TABLETS]; TAKE 1 TABLET BY MOUTH TWICE DAILY  Dispense: 180 tablet; Refill: 0    2. HLD (hyperlipidemia)  - simvastatin (ZOCOR) 10 MG tablet [Pharmacy Med Name: SIMVASTATIN 10MG TABLETS]; TAKE 1 TABLET(10 MG) BY MOUTH DAILY  Dispense: 90 tablet; Refill: 0    3. Insomnia  - traZODone (DESYREL) 50 MG tablet [Pharmacy Med Name: TRAZODONE 50MG  TABLETS]; TAKE 1 TABLET BY MOUTH EVERY NIGHT AT BEDTIME  Dispense: 90 tablet; Refill: 0    4. Essential hypertension  - hydroCHLOROthiazide (HYDRODIURIL) 25 MG tablet [Pharmacy Med Name: HYDROCHLOROTHIAZIDE 25MG TABLETS]; TAKE 1 TABLET(25 MG) BY MOUTH DAILY  Dispense: 90 tablet; Refill: 0    5. Chronic abdominal pain  - gabapentin (NEURONTIN) 600 MG tablet [Pharmacy Med Name: GABAPENTIN 600MG TABLETS]; TAKE 1 TABLET(600 MG) BY MOUTH THREE TIMES DAILY  Dispense: 270 tablet; Refill: 0    If protocol passes may refill for 12 months if within 3 months of last provider visit (or a total of 15 months).             traZODone (DESYREL) 50 MG tablet [Pharmacy Med Name: TRAZODONE 50MG TABLETS] 90 tablet 0     Sig: TAKE 1 TABLET BY MOUTH EVERY NIGHT AT BEDTIME       Tricyclics/Misc Antidepressant/Antianxiety Meds Refill Protocol Passed - 12/14/2019  8:51 AM        Passed - PCP or prescribing provider visit in last year     Last office visit with prescriber/PCP: 9/16/2019 Kevin Ho MD OR same dept: 9/30/2019 Constantin Church MD OR same specialty: 9/30/2019 Constantin Church MD  Last physical: 10/24/2019 Last MTM visit: Visit date not found   Next visit within 3 mo: Visit date not found  Next physical within 3 mo: Visit date not found  Prescriber OR PCP: Kevin Ho MD  Last diagnosis associated with med order: 1. HTN (hypertension)  - amLODIPine (NORVASC) 2.5 MG tablet [Pharmacy Med Name: AMLODIPINE BESYLATE 2.5MG TABLETS]; TAKE 1 TABLET(2.5 MG) BY MOUTH DAILY  Dispense: 90 tablet; Refill: 0  - metoprolol tartrate (LOPRESSOR) 50 MG tablet [Pharmacy Med Name: METOPROLOL TARTRATE 50MG TABLETS]; TAKE 1 TABLET BY MOUTH TWICE DAILY  Dispense: 180 tablet; Refill: 0    2. HLD (hyperlipidemia)  - simvastatin (ZOCOR) 10 MG tablet [Pharmacy Med Name: SIMVASTATIN 10MG TABLETS]; TAKE 1 TABLET(10 MG) BY MOUTH DAILY  Dispense: 90 tablet; Refill: 0    3. Insomnia  - traZODone (DESYREL) 50 MG tablet  [Pharmacy Med Name: TRAZODONE 50MG TABLETS]; TAKE 1 TABLET BY MOUTH EVERY NIGHT AT BEDTIME  Dispense: 90 tablet; Refill: 0    4. Essential hypertension  - hydroCHLOROthiazide (HYDRODIURIL) 25 MG tablet [Pharmacy Med Name: HYDROCHLOROTHIAZIDE 25MG TABLETS]; TAKE 1 TABLET(25 MG) BY MOUTH DAILY  Dispense: 90 tablet; Refill: 0    5. Chronic abdominal pain  - gabapentin (NEURONTIN) 600 MG tablet [Pharmacy Med Name: GABAPENTIN 600MG TABLETS]; TAKE 1 TABLET(600 MG) BY MOUTH THREE TIMES DAILY  Dispense: 270 tablet; Refill: 0    If protocol passes may refill for 12 months if within 3 months of last provider visit (or a total of 15 months).             metoprolol tartrate (LOPRESSOR) 50 MG tablet [Pharmacy Med Name: METOPROLOL TARTRATE 50MG TABLETS] 180 tablet 0     Sig: TAKE 1 TABLET BY MOUTH TWICE DAILY       Beta-Blockers Refill Protocol Passed - 12/14/2019  8:51 AM        Passed - PCP or prescribing provider visit in past 12 months or next 3 months     Last office visit with prescriber/PCP: 9/16/2019 Kevin Ho MD OR same dept: 9/30/2019 Constantin Church MD OR same specialty: 9/30/2019 Constantin Church MD  Last physical: 10/24/2019 Last MTM visit: Visit date not found   Next visit within 3 mo: Visit date not found  Next physical within 3 mo: Visit date not found  Prescriber OR PCP: Kevin Ho MD  Last diagnosis associated with med order: 1. HTN (hypertension)  - amLODIPine (NORVASC) 2.5 MG tablet [Pharmacy Med Name: AMLODIPINE BESYLATE 2.5MG TABLETS]; TAKE 1 TABLET(2.5 MG) BY MOUTH DAILY  Dispense: 90 tablet; Refill: 0  - metoprolol tartrate (LOPRESSOR) 50 MG tablet [Pharmacy Med Name: METOPROLOL TARTRATE 50MG TABLETS]; TAKE 1 TABLET BY MOUTH TWICE DAILY  Dispense: 180 tablet; Refill: 0    2. HLD (hyperlipidemia)  - simvastatin (ZOCOR) 10 MG tablet [Pharmacy Med Name: SIMVASTATIN 10MG TABLETS]; TAKE 1 TABLET(10 MG) BY MOUTH DAILY  Dispense: 90 tablet; Refill: 0    3. Insomnia  -  traZODone (DESYREL) 50 MG tablet [Pharmacy Med Name: TRAZODONE 50MG TABLETS]; TAKE 1 TABLET BY MOUTH EVERY NIGHT AT BEDTIME  Dispense: 90 tablet; Refill: 0    4. Essential hypertension  - hydroCHLOROthiazide (HYDRODIURIL) 25 MG tablet [Pharmacy Med Name: HYDROCHLOROTHIAZIDE 25MG TABLETS]; TAKE 1 TABLET(25 MG) BY MOUTH DAILY  Dispense: 90 tablet; Refill: 0    5. Chronic abdominal pain  - gabapentin (NEURONTIN) 600 MG tablet [Pharmacy Med Name: GABAPENTIN 600MG TABLETS]; TAKE 1 TABLET(600 MG) BY MOUTH THREE TIMES DAILY  Dispense: 270 tablet; Refill: 0    If protocol passes may refill for 12 months if within 3 months of last provider visit (or a total of 15 months).             Passed - Blood pressure filed in past 12 months     BP Readings from Last 1 Encounters:   10/24/19 138/70             hydroCHLOROthiazide (HYDRODIURIL) 25 MG tablet [Pharmacy Med Name: HYDROCHLOROTHIAZIDE 25MG TABLETS] 90 tablet 0     Sig: TAKE 1 TABLET(25 MG) BY MOUTH DAILY       Diuretics/Combination Diuretics Refill Protocol  Passed - 12/14/2019  8:51 AM        Passed - Visit with PCP or prescribing provider visit in past 12 months     Last office visit with prescriber/PCP: 9/16/2019 Kevin Ho MD OR same dept: 9/30/2019 Constantin Church MD OR same specialty: 9/30/2019 Constantin Church MD  Last physical: 10/24/2019 Last MTM visit: Visit date not found   Next visit within 3 mo: Visit date not found  Next physical within 3 mo: Visit date not found  Prescriber OR PCP: Kevin Ho MD  Last diagnosis associated with med order: 1. HTN (hypertension)  - amLODIPine (NORVASC) 2.5 MG tablet [Pharmacy Med Name: AMLODIPINE BESYLATE 2.5MG TABLETS]; TAKE 1 TABLET(2.5 MG) BY MOUTH DAILY  Dispense: 90 tablet; Refill: 0  - metoprolol tartrate (LOPRESSOR) 50 MG tablet [Pharmacy Med Name: METOPROLOL TARTRATE 50MG TABLETS]; TAKE 1 TABLET BY MOUTH TWICE DAILY  Dispense: 180 tablet; Refill: 0    2. HLD (hyperlipidemia)  -  simvastatin (ZOCOR) 10 MG tablet [Pharmacy Med Name: SIMVASTATIN 10MG TABLETS]; TAKE 1 TABLET(10 MG) BY MOUTH DAILY  Dispense: 90 tablet; Refill: 0    3. Insomnia  - traZODone (DESYREL) 50 MG tablet [Pharmacy Med Name: TRAZODONE 50MG TABLETS]; TAKE 1 TABLET BY MOUTH EVERY NIGHT AT BEDTIME  Dispense: 90 tablet; Refill: 0    4. Essential hypertension  - hydroCHLOROthiazide (HYDRODIURIL) 25 MG tablet [Pharmacy Med Name: HYDROCHLOROTHIAZIDE 25MG TABLETS]; TAKE 1 TABLET(25 MG) BY MOUTH DAILY  Dispense: 90 tablet; Refill: 0    5. Chronic abdominal pain  - gabapentin (NEURONTIN) 600 MG tablet [Pharmacy Med Name: GABAPENTIN 600MG TABLETS]; TAKE 1 TABLET(600 MG) BY MOUTH THREE TIMES DAILY  Dispense: 270 tablet; Refill: 0    If protocol passes may refill for 12 months if within 3 months of last provider visit (or a total of 15 months).             Passed - Serum Potassium in past 12 months      Lab Results   Component Value Date    Potassium 4.2 09/16/2019             Passed - Serum Sodium in past 12 months      Lab Results   Component Value Date    Sodium 131 (L) 09/16/2019             Passed - Blood pressure on file in past 12 months     BP Readings from Last 1 Encounters:   10/24/19 138/70             Passed - Serum Creatinine in past 12 months      Creatinine   Date Value Ref Range Status   09/16/2019 0.73 0.60 - 1.10 mg/dL Final             gabapentin (NEURONTIN) 600 MG tablet [Pharmacy Med Name: GABAPENTIN 600MG TABLETS] 270 tablet 0     Sig: TAKE 1 TABLET(600 MG) BY MOUTH THREE TIMES DAILY       Gabapentin/Levetiracetam/Tiagabine Refill Protocol  Passed - 12/14/2019  8:51 AM        Passed - PCP or prescribing provider visit in past 12 months or next 3 months     Last office visit with prescriber/PCP: 9/16/2019 Kevin Ho MD OR same dept: 9/30/2019 Constantin Church MD OR same specialty: 9/30/2019 Constantin Church MD  Last physical: 10/24/2019 Last MTM visit: Visit date not found   Next  visit within 3 mo: Visit date not found  Next physical within 3 mo: Visit date not found  Prescriber OR PCP: Kevin Ho MD  Last diagnosis associated with med order: 1. HTN (hypertension)  - amLODIPine (NORVASC) 2.5 MG tablet [Pharmacy Med Name: AMLODIPINE BESYLATE 2.5MG TABLETS]; TAKE 1 TABLET(2.5 MG) BY MOUTH DAILY  Dispense: 90 tablet; Refill: 0  - metoprolol tartrate (LOPRESSOR) 50 MG tablet [Pharmacy Med Name: METOPROLOL TARTRATE 50MG TABLETS]; TAKE 1 TABLET BY MOUTH TWICE DAILY  Dispense: 180 tablet; Refill: 0    2. HLD (hyperlipidemia)  - simvastatin (ZOCOR) 10 MG tablet [Pharmacy Med Name: SIMVASTATIN 10MG TABLETS]; TAKE 1 TABLET(10 MG) BY MOUTH DAILY  Dispense: 90 tablet; Refill: 0    3. Insomnia  - traZODone (DESYREL) 50 MG tablet [Pharmacy Med Name: TRAZODONE 50MG TABLETS]; TAKE 1 TABLET BY MOUTH EVERY NIGHT AT BEDTIME  Dispense: 90 tablet; Refill: 0    4. Essential hypertension  - hydroCHLOROthiazide (HYDRODIURIL) 25 MG tablet [Pharmacy Med Name: HYDROCHLOROTHIAZIDE 25MG TABLETS]; TAKE 1 TABLET(25 MG) BY MOUTH DAILY  Dispense: 90 tablet; Refill: 0    5. Chronic abdominal pain  - gabapentin (NEURONTIN) 600 MG tablet [Pharmacy Med Name: GABAPENTIN 600MG TABLETS]; TAKE 1 TABLET(600 MG) BY MOUTH THREE TIMES DAILY  Dispense: 270 tablet; Refill: 0    If protocol passes may refill for 12 months if within 3 months of last provider visit (or a total of 15 months).

## 2021-06-04 NOTE — TELEPHONE ENCOUNTER
Status update - UTI in [place] a month ago       Completed ABX     Starting at 3am today started urinating every hour or so      She did a UTI test strip at home   Negative Nitrates   Positive  Lukocyes       No fevers     No pain with urination    Yes frequency  / urgency     No bloody   Not cloudy   Not odor     No back pain  No discharge / no odor       Tele# [phone]      Would like to get message to provider about this for advice / direction        Tanner Grant, RN   Triage and Medication Refills      Reason for Disposition    Urinating more frequently than usual (i.e., frequency)    Protocols used: URINARY SYMPTOMS-A-AH

## 2021-06-05 VITALS
SYSTOLIC BLOOD PRESSURE: 120 MMHG | OXYGEN SATURATION: 98 % | BODY MASS INDEX: 22.71 KG/M2 | HEART RATE: 53 BPM | WEIGHT: 133 LBS | HEIGHT: 64 IN | DIASTOLIC BLOOD PRESSURE: 80 MMHG

## 2021-06-05 VITALS
WEIGHT: 141 LBS | HEART RATE: 58 BPM | TEMPERATURE: 97.7 F | SYSTOLIC BLOOD PRESSURE: 120 MMHG | HEIGHT: 64 IN | DIASTOLIC BLOOD PRESSURE: 72 MMHG | BODY MASS INDEX: 24.07 KG/M2

## 2021-06-05 NOTE — TELEPHONE ENCOUNTER
Pt is calling in about a recent injury behind the left knee in December after playing pickle ball on 12/5. Pt went to the Ortho urgent care on 12/8, and x-rays were negative. Pt used ice, heat, takes Tylenol twice per day, and rested for a week and a half, but is very active playing frequent pickle ball, riding an indoor bike, so then returned to her normal activities.   Pt still has a lot of discomfort in the knee, and sometimes when using stairs it feels like it may give out. Pt denies pain, but reports it is very uncomfortable at times.   Home care measures discussed, and per protocol pt should be evaluated by a physician within 3 days. Pt would prefer to go back to the orthopedic place, and will try to get an appointment there first. Pt was advised to call back if she is unable to get into the ortho clinic in the next few days, or if symptoms worsen.     Gerald Khalil, RN [facility] Triage/Medication Refill    Reason for Disposition    Injury is still painful or swollen after 2 weeks    Knee giving way (or buckling) when walking    Protocols used: KNEE INJURY-A-OH

## 2021-06-06 NOTE — TELEPHONE ENCOUNTER
Controlled Substance Refill Request  Medication Name:   Requested Prescriptions     Pending Prescriptions Disp Refills     diazePAM (VALIUM) 5 MG tablet [Pharmacy Med Name: DIAZEPAM 5MG TABLETS] 60 tablet 0     Sig: TAKE 1 TABLET(5 MG) BY MOUTH DAILY AS NEEDED FOR ANXIETY     Date Last Fill: 10/24/19 #60 tablets  [facility]  Submit electronically to pharmacy  Controlled Substance Agreement on file:   Encounter-Level CSA Scan Date:    There are no encounter-level csa scan date.        Last office visit:  10/24/19    Natasha Noble RN  Triage Nurse Advisor

## 2021-06-07 ENCOUNTER — OFFICE VISIT - HEALTHEAST (OUTPATIENT)
Dept: INTERNAL MEDICINE | Facility: CLINIC | Age: 81
End: 2021-06-07

## 2021-06-07 DIAGNOSIS — K58.1 IRRITABLE BOWEL SYNDROME WITH CONSTIPATION: ICD-10-CM

## 2021-06-07 DIAGNOSIS — F41.1 ANXIETY, GENERALIZED: ICD-10-CM

## 2021-06-07 DIAGNOSIS — R00.2 PALPITATIONS: ICD-10-CM

## 2021-06-07 DIAGNOSIS — Z51.81 ENCOUNTER FOR THERAPEUTIC DRUG MONITORING: ICD-10-CM

## 2021-06-07 DIAGNOSIS — N39.41 URGE INCONTINENCE OF URINE: ICD-10-CM

## 2021-06-07 DIAGNOSIS — I10 ESSENTIAL HYPERTENSION: ICD-10-CM

## 2021-06-07 LAB
ALBUMIN UR-MCNC: NEGATIVE G/DL
ANION GAP SERPL CALCULATED.3IONS-SCNC: 12 MMOL/L (ref 5–18)
APPEARANCE UR: CLEAR
BILIRUB UR QL STRIP: NEGATIVE
BUN SERPL-MCNC: 9 MG/DL (ref 8–28)
CALCIUM SERPL-MCNC: 9 MG/DL (ref 8.5–10.5)
CHLORIDE BLD-SCNC: 96 MMOL/L (ref 98–107)
CO2 SERPL-SCNC: 25 MMOL/L (ref 22–31)
COLOR UR AUTO: YELLOW
CREAT SERPL-MCNC: 0.68 MG/DL (ref 0.6–1.1)
GFR SERPL CREATININE-BSD FRML MDRD: >60 ML/MIN/1.73M2
GLUCOSE BLD-MCNC: 86 MG/DL (ref 70–125)
GLUCOSE UR STRIP-MCNC: NEGATIVE MG/DL
HGB UR QL STRIP: NEGATIVE
KETONES UR STRIP-MCNC: NEGATIVE MG/DL
LEUKOCYTE ESTERASE UR QL STRIP: NEGATIVE
MAGNESIUM SERPL-MCNC: 2.3 MG/DL (ref 1.8–2.6)
NITRATE UR QL: NEGATIVE
PH UR STRIP: 7 [PH] (ref 5–8)
POTASSIUM BLD-SCNC: 3.9 MMOL/L (ref 3.5–5)
SODIUM SERPL-SCNC: 133 MMOL/L (ref 136–145)
SP GR UR STRIP: 1.02 (ref 1–1.03)
UROBILINOGEN UR STRIP-ACNC: NORMAL

## 2021-06-07 ASSESSMENT — MIFFLIN-ST. JEOR: SCORE: 1095.5

## 2021-06-07 NOTE — PROGRESS NOTES
"Rene Matthews is a 79 y.o. female who is being evaluated via a billable telephone visit.      The patient has been notified of following:     \"This telephone visit will be conducted via a call between you and your physician/provider. We have found that certain health care needs can be provided without the need for a physical exam.  This service lets us provide the care you need with a short phone conversation.  If a prescription is necessary we can send it directly to your pharmacy.  If lab work is needed we can place an order for that and you can then stop by our lab to have the test done at a later time.    Telephone visits are billed at different rates depending on your insurance coverage. During this emergency period, for some insurers they may be billed the same as an in-person visit.  Please reach out to your insurance provider with any questions.    If during the course of the call the physician/provider feels a telephone visit is not appropriate, you will not be charged for this service.\"    Patient has given verbal consent to a Telephone visit? Yes      Additional provider notes:    79-year-old woman diagnosed with UTI last week with urinary frequency and urgency prescribed Cipro 500 mg twice daily for 5 days.  This seemed to improve her symptoms but after discontinuing the antibiotic, symptoms have returned over the weekend.  She describes some mild dysuria.  No fevers or chills.  No hematuria.  No back pain or abdominal pain.  No associated nausea.    Assessment/Plan:  1. Urinary tract infection without hematuria, site unspecified  I suspect she has a UTI with organism resistant to quinolones.  I am recommending that she leave a UA UC so that we can identify cause of her UTI and get her a new prescription based on sensitivities.  - Urinalysis-UC if Indicated; Future        Phone call duration:  5 minutes    Kevin Ho MD    "

## 2021-06-07 NOTE — TELEPHONE ENCOUNTER
Dr. Ho,  Patient has been scheduled for a video visit today at 2:30 pm to discuss.  Meena ROLLINS, KEMI/CMT....................9:11 AM

## 2021-06-07 NOTE — TELEPHONE ENCOUNTER
Medication Request  Medication name: Cipro Take 1 tablet (500 mg total) by mouth 2 times a day for 5 days. Requested Pharmacy: [facility], [phone]  Reason for request: urinary urgency  When did you use medication last?:  12/16/19  Patient offered appointment:  patient declined  Okay to leave a detailed message: yes

## 2021-06-07 NOTE — TELEPHONE ENCOUNTER
Spoke with the patient and relayed message below from Dr. Ho.  She verbalized understanding and had no further questions at this time.  Patient has scheduled a video visit with Dr. Ho for Monday May 4th.  Meena ROLLINS CMA/JACKY....................4:58 PM

## 2021-06-07 NOTE — TELEPHONE ENCOUNTER
Spoke with the patient and relayed message below from Dr. Ho.  She verbalized understanding and had no further questions at this time.  Appointment has been scheduled at 11:20 am today.  Meena ROLLINS CMA/JACKY....................10:14 AM

## 2021-06-07 NOTE — TELEPHONE ENCOUNTER
Hao Alarcon for prescription requested below?  If so, please advise on dose, sig, and quantity and we can set up for you to review.  Meena ROLLINS, CMA/CMT....................7:19 AM

## 2021-06-07 NOTE — TELEPHONE ENCOUNTER
Refill Approved    Rx renewed per Medication Renewal Policy. Medication was last renewed on 7/3/19    Elba Bowman, [facility] Triage/Med Refill 3/20/2020     Requested Prescriptions   Pending Prescriptions Disp Refills     omeprazole (PRILOSEC) 20 MG capsule [Pharmacy Med Name: OMEPRAZOLE 20MG CAPSULES] 180 capsule 0     Sig: TAKE 1 CAPSULE BY MOUTH TWICE DAILY       GI Medications Refill Protocol Passed - 3/20/2020  3:59 AM        Passed - PCP or prescribing provider visit in last 12 or next 3 months.     Last office visit with prescriber/PCP: 9/16/2019 Kevin Ho MD OR same dept: 9/30/2019 Constantin Church MD OR same specialty: 9/30/2019 Constantin Church MD  Last physical: 10/24/2019 Last MTM visit: Visit date not found   Next visit within 3 mo: Visit date not found  Next physical within 3 mo: Visit date not found  Prescriber OR PCP: Kevin Ho MD  Last diagnosis associated with med order: There are no diagnoses linked to this encounter.  If protocol passes may refill for 12 months if within 3 months of last provider visit (or a total of 15 months).

## 2021-06-07 NOTE — TELEPHONE ENCOUNTER
Done with Abx but continues to have urgency and feels that she has UTI  Did home testing strip which shows elevated leukocytes.      Reason for Disposition    [1] Taking antibiotic > 72 hours (3 days) for UTI AND [2] painful urination or frequency not improved    Protocols used: URINARY TRACT INFECTION ON ANTIBIOTIC FOLLOW-UP CALL - FEMALE-A-

## 2021-06-07 NOTE — PROGRESS NOTES
"Rene Matthews is a 79 y.o. female who is being evaluated via a billable video visit.      The patient has been notified of following:     \"This video visit will be conducted via a call between you and your physician/provider. We have found that certain health care needs can be provided without the need for an in-person physical exam.  This service lets us provide the care you need with a video conversation.  If a prescription is necessary we can send it directly to your pharmacy.  If lab work is needed we can place an order for that and you can then stop by our lab to have the test done at a later time.    Video visits are billed at different rates depending on your insurance coverage. Please reach out to your insurance provider with any questions.    If during the course of the call the physician/provider feels a video visit is not appropriate, you will not be charged for this service.\"    Patient has given verbal consent to a Video visit? Yes        Patient would like the video invitation sent by: Send to e-mail at: [email or web address]    Will anyone else be joining your video visit? No        Video Start Time: 10:39am    Additional provider notes:     Office Visit - Follow Up   Rene Matthews   79 y.o. female    Date of Visit: 5/4/2020    Chief Complaint   Patient presents with     Urinary Urgency     cyst in stomach, may that be the issue        Assessment and Plan   1. Overactive bladder  Intermittent urinary frequency which I suspect is more related to overactive bladder rather than recurrent UTI.  No antibiotics are indicated at this time.  Most recent urine culture without growth.  She will monitor her symptoms and any increase in symptoms such as urgency, frequency or dysuria, will recheck UA UC.  He is very comfortable with this plan.     Return in about 6 months (around 11/4/2020) for Annual physical.     History of Present Illness   This 79 y.o. old woman with hypertension and multiple other medical problems " recently experiencing increased urinary frequency and urgency.  Thought to be possible UTI and treated with Cipro.  Symptoms seem to be improved but after completing the 5-day course, she began to notice persistent urgency.  A UA UC was obtained but did reveal no growth in the urinary culture.  No antibiotics were restarted.  She reports that she will still have some intermittent urgency.  However, most of the day, symptoms are not bothersome and not interfering with her usual activities.  When she thinks about needing to urinate, he will notice some urgency at those times.  She denies any hematuria.  She is asking about the history of a cyst that is known to be present in her liver which has not been imaged for a number of years.  She denies any new pain in her right upper quadrant.    Review of Systems: No fevers or chills.       Medications, Allergies and Problem List   Patient Active Problem List   Diagnosis     Hypertension     Hyperlipidemia     IBS (irritable bowel syndrome)     Osteoarthritis     Allergic rhinitis     Peptic ulcer disease     Anxiety, generalized     Insomnia     Chronic abdominal pain     GERD (gastroesophageal reflux disease)     Chronic headaches     Overactive bladder     Hearing loss in left ear     Skin cancer, basal cell     Osteopenia     Personal history of colonic polyps       She has a past surgical history that includes Basal cell skin cancer; Ovarian cyst removal; Cervical biopsy w/ loop electrode excision; Endometrial biopsy; Strabismus surgery (2015); and Radiofrequency ablation right greater saphenous vein (2012).    No Known Allergies    Current Outpatient Medications   Medication Sig Dispense Refill     acetaminophen (TYLENOL) 325 MG tablet Take 650 mg by mouth every 6 (six) hours as needed for pain.       amLODIPine (NORVASC) 2.5 MG tablet Take 1 tablet (2.5 mg total) by mouth daily. 90 tablet 3     aspirin 81 MG EC tablet Take 81 mg by mouth daily.       calcium  carbonate-vitamin D2 500 mg(1,250mg) -200 unit tablet Take 1 tablet by mouth daily.              diazePAM (VALIUM) 5 MG tablet TAKE 1 TABLET(5 MG) BY MOUTH DAILY AS NEEDED FOR ANXIETY 60 tablet 0     fluticasone propionate (FLONASE) 50 mcg/actuation nasal spray SHAKE LIQUID AND USE 2 SPRAYS IN EACH NOSTRIL TWICE DAILY 96 g 3     gabapentin (NEURONTIN) 600 MG tablet Take 1 tablet (600 mg total) by mouth 3 (three) times a day. 270 tablet 3     hydroCHLOROthiazide (HYDRODIURIL) 25 MG tablet Take 1 tablet (25 mg total) by mouth daily. 90 tablet 3     irbesartan (AVAPRO) 300 MG tablet TAKE 1 TABLET BY MOUTH EVERY DAY 90 tablet 2     metoprolol tartrate (LOPRESSOR) 50 MG tablet TAKE 1 TABLET BY MOUTH TWICE DAILY 180 tablet 3     omeprazole (PRILOSEC) 20 MG capsule TAKE 1 CAPSULE BY MOUTH TWICE DAILY 180 capsule 1     polyethylene glycol (MIRALAX) 17 gram packet Take 17 g by mouth daily.       SHINGRIX, PF, 50 mcg/0.5 mL SusR TO BE ADMINISTERED BY PHARMACIST FOR IMMUNIZATION  0     simvastatin (ZOCOR) 10 MG tablet Take 1 tablet (10 mg total) by mouth at bedtime. 90 tablet 3     traZODone (DESYREL) 50 MG tablet Take 1 tablet (50 mg total) by mouth at bedtime. 90 tablet 3     No current facility-administered medications for this visit.         Physical Exam   General Appearance:   Well-appearing elderly woman             Additional Information   Social History     Tobacco Use     Smoking status: Never Smoker     Smokeless tobacco: Never Used   Substance Use Topics     Alcohol use: No     Drug use: No             Time: total time spent with the patient was 15 minutes of which >50% was spent in counseling and coordination of care     Kevin Ho MD      Video-Visit Details    Type of service:  Video Visit    Video End Time (time video stopped): 10:54 AM  Originating Location (pt. Location): Home    Distant Location (provider location):  [facility] INTERNAL MEDICINE     Platform used for Video Visit: Susan DEVI  MD [place]

## 2021-06-07 NOTE — TELEPHONE ENCOUNTER
Patient called last week,afteer having a positive AZO test.  She reports, Dr. Mirza gave her a 5 day script for CIPRO, 500mg for 5 days. She states she finished this RX, and now, again today, she is having frequency and urgency once again.  She reports no other symptoms, ie; fever, pain with urination.    Per MD, how should patient follow up. Another RX, or leaving a urine sample at LAB?    Please advise. Patient pharmacy listed in chart, and   Please let patient know.    Thank You.    Marie Miller RN  [facility] Triage/refill nurse        Reason for Disposition    [1] Reasonable improvement on antibiotics AND [2] no fever    Protocols used: URINARY TRACT INFECTION ON ANTIBIOTIC FOLLOW-UP CALL - FEMALE-A-RONAL

## 2021-06-07 NOTE — TELEPHONE ENCOUNTER
I'm not sure why she is having urgency but it's not a UTI with the normal culture this week. There are other causes of urinary frequency including overactive bladder. I would suggest setting up an appoint with me to discuss when I return.

## 2021-06-07 NOTE — PROGRESS NOTES
"Rene Matthews is a 79 y.o. female who is being evaluated via a billable telephone visit.      The patient has been notified of following:     \"This telephone visit will be conducted via a call between you and your physician/provider. We have found that certain health care needs can be provided without the need for a physical exam.  This service lets us provide the care you need with a short phone conversation.  If a prescription is necessary we can send it directly to your pharmacy.  If lab work is needed we can place an order for that and you can then stop by our lab to have the test done at a later time.    Telephone visits are billed at different rates depending on your insurance coverage. During this emergency period, for some insurers they may be billed the same as an in-person visit.  Please reach out to your insurance provider with any questions.    If during the course of the call the physician/provider feels a telephone visit is not appropriate, you will not be charged for this service.\"    Patient has given verbal consent to a Telephone visit? Yes    Patient would like to receive their AVS by AVS Preference: Leonarda.    Additional provider notes:    79-year-old woman with increased urinary frequency and urgency over the weekend feeling like her typical UTI symptoms.  No hematuria.  No fever or chills.    Assessment/Plan:  1. Urinary tract infection without hematuria, site unspecified  Probable UTI.  Begin Cipro 500 mg twice daily for 5 days.  - ciprofloxacin HCl (CIPRO) 500 MG tablet; Take 1 tablet (500 mg total) by mouth 2 (two) times a day for 5 days.  Dispense: 10 tablet; Refill: 0        Phone call duration:  5 minutes    Kevin Ho MD    "

## 2021-06-08 NOTE — PROGRESS NOTES
"Rene Matthews is a 79 y.o. female who is being evaluated via a billable telephone visit.      The patient has been notified of following:     \"This telephone visit will be conducted via a call between you and your physician/provider. We have found that certain health care needs can be provided without the need for a physical exam.  This service lets us provide the care you need with a short phone conversation.  If a prescription is necessary we can send it directly to your pharmacy.  If lab work is needed we can place an order for that and you can then stop by our lab to have the test done at a later time.    Telephone visits are billed at different rates depending on your insurance coverage. During this emergency period, for some insurers they may be billed the same as an in-person visit.  Please reach out to your insurance provider with any questions.    If during the course of the call the physician/provider feels a telephone visit is not appropriate, you will not be charged for this service.\"    Patient has given verbal consent to a Telephone visit? Yes    What phone number would you like to be contacted at? [phone]        Additional provider notes:     Office Visit - Follow Up   Rene Matthews   79 y.o. female    Date of Visit: 5/18/2020    Chief Complaint   Patient presents with     Back Pain     see mesage        Assessment and Plan   1. Acute midline low back pain without sciatica  Acute pain involving low back located midline at level of waist.  Nothing radiating down her leg.  This does not sound like vertebral fracture or herniated disc.  I suspect musculoligamentous injury.  Recommending continued use of Tylenol and applying heat as needed.  She can try some tizanidine as a muscle relaxer.  I would prefer that to her using increasing amounts of her Valium.  Most importantly, she should avoid any activities that increase her pain.  She should not bike or play pickle ball.  She wants to resume some of her " back exercises and stretching exercises may be reasonable but she should avoid any that require use of the muscles of her low back.  Walking should be fine.  We discussed referral to physical therapy if symptoms persist.  Imaging is not indicated at this time.  We will have her give this another 7 to 10 days.  - TiZANidine (ZANAFLEX) 2 MG capsule; Take 1 capsule (2 mg total) by mouth 3 (three) times a day as needed for muscle spasms.  Dispense: 20 capsule; Refill: 0    No follow-ups on file.     History of Present Illness   This 79 y.o. old woman with hypertension experiencing pain in her lower mid back for the last week.  She does not recall any injury or trauma.  She describes pain centered in her low back near her spine at the waistline.  Nothing radiating into her buttocks or down her legs.  Pain is a 6/10 at its worst and bothers her most when she is changing position including getting up from sitting to standing.  Pain is less bothersome when she is lying down and also does not bother her as much when she is standing and walking.  She has been trying to avoid her usual exercises and activities including biking.  She takes Tylenol twice a day.  She tried some heat earlier in the week that was partially helpful.  She does not take NSAIDs as she has history of peptic ulcer disease.  She has tried some extra Valium thinking this might provide some muscle relaxation.    Review of Systems: There is no skin rash.  No abdominal pain.  No hematuria.       Medications, Allergies and Problem List   Patient Active Problem List   Diagnosis     Hypertension     Hyperlipidemia     IBS (irritable bowel syndrome)     Osteoarthritis     Allergic rhinitis     Peptic ulcer disease     Anxiety, generalized     Insomnia     Chronic abdominal pain     GERD (gastroesophageal reflux disease)     Chronic headaches     Overactive bladder     Hearing loss in left ear     Skin cancer, basal cell     Osteopenia     Personal history of  colonic polyps     Acute low back pain without sciatica       She has a past surgical history that includes Basal cell skin cancer; Ovarian cyst removal; Cervical biopsy w/ loop electrode excision; Endometrial biopsy; Strabismus surgery (2015); and Radiofrequency ablation right greater saphenous vein (2012).    No Known Allergies    Current Outpatient Medications   Medication Sig Dispense Refill     acetaminophen (TYLENOL) 325 MG tablet Take 650 mg by mouth every 6 (six) hours as needed for pain.       amLODIPine (NORVASC) 2.5 MG tablet Take 1 tablet (2.5 mg total) by mouth daily. 90 tablet 3     aspirin 81 MG EC tablet Take 81 mg by mouth daily.       calcium carbonate-vitamin D2 500 mg(1,250mg) -200 unit tablet Take 1 tablet by mouth daily.              diazePAM (VALIUM) 5 MG tablet TAKE 1 TABLET(5 MG) BY MOUTH DAILY AS NEEDED FOR ANXIETY 60 tablet 0     fluticasone propionate (FLONASE) 50 mcg/actuation nasal spray SHAKE LIQUID AND USE 2 SPRAYS IN EACH NOSTRIL TWICE DAILY 96 g 3     gabapentin (NEURONTIN) 600 MG tablet Take 1 tablet (600 mg total) by mouth 3 (three) times a day. 270 tablet 3     hydroCHLOROthiazide (HYDRODIURIL) 25 MG tablet Take 1 tablet (25 mg total) by mouth daily. 90 tablet 3     irbesartan (AVAPRO) 300 MG tablet TAKE 1 TABLET BY MOUTH EVERY DAY 90 tablet 2     metoprolol tartrate (LOPRESSOR) 50 MG tablet TAKE 1 TABLET BY MOUTH TWICE DAILY 180 tablet 3     omeprazole (PRILOSEC) 20 MG capsule TAKE 1 CAPSULE BY MOUTH TWICE DAILY 180 capsule 1     polyethylene glycol (MIRALAX) 17 gram packet Take 17 g by mouth daily.       SHINGRIX, PF, 50 mcg/0.5 mL SusR TO BE ADMINISTERED BY PHARMACIST FOR IMMUNIZATION  0     simvastatin (ZOCOR) 10 MG tablet Take 1 tablet (10 mg total) by mouth at bedtime. 90 tablet 3     traZODone (DESYREL) 50 MG tablet Take 1 tablet (50 mg total) by mouth at bedtime. 90 tablet 3     TiZANidine (ZANAFLEX) 2 MG capsule Take 1 capsule (2 mg total) by mouth 3 (three) times a day  as needed for muscle spasms. 20 capsule 0     No current facility-administered medications for this visit.         Physical Exam            Additional Information   Social History     Tobacco Use     Smoking status: Never Smoker     Smokeless tobacco: Never Used   Substance Use Topics     Alcohol use: No     Drug use: No              Kevin Ho MD        Phone call duration:  21 minutes    Kevin Ho MD

## 2021-06-08 NOTE — TELEPHONE ENCOUNTER
Who is calling:  Patient  Reason for Call:  Pt calling sunny she has been experiencing back weakness X 4-5 days.  Pt states she has to be careful when stooping down, and as the day progresses she starts to feel better due to movement.  Pain/wekness is intermittent.  Pt wondering if she should see Ortho? Pharmacy on file. Please advise  Date of last appointment with primary care: N/A  Okay to leave a detailed message: Yes

## 2021-06-08 NOTE — TELEPHONE ENCOUNTER
Incoming fax from pharmacy stating to change Tizanidine caps to tablets. Insurance will no longer cover capsules.     Mere Norris LPN

## 2021-06-08 NOTE — TELEPHONE ENCOUNTER
Appointment made today with Dr Ho. Patient notified. Serena Valerio, [facility]

## 2021-06-11 NOTE — TELEPHONE ENCOUNTER
Not sure what is responsible for her symptoms.  I would recommend a video visit this afternoon at 4 PM

## 2021-06-11 NOTE — TELEPHONE ENCOUNTER
Had sore knee and hip joints.  Plays pickel ball.  Recent flu shots.  Aching lower body all weekend.  Low back sore too.    Monday covid test negative.    Very uncomfortable due to achyness.    She did bike yesterday afternoon and then took a hot bath.    Hardest part of the day is morning.  Knees and small of the back is worst areas.    Taking tylenol 3 x day now.    What would pcp advise?? She does not have a physical scheduled with him till late October.    Provider please advise and have team update patient      Mellissa Banda RN [facility] Triage Nurse Advisor    Reason for Disposition    MODERATE pain (e.g., symptoms interfere with work or school, limping) and present > 3 days    Additional Information    Negative: Thigh or calf pain and only 1 side and present > 1 hour    Negative: Thigh or calf swelling and only 1 side    Negative: Patient sounds very sick or weak to the triager    Negative: Can't move swollen joint at all    Negative: SEVERE pain (e.g., excruciating, unable to walk)    Negative: Very swollen joint    Negative: Painful rash with multiple small blisters grouped together (i.e., dermatomal distribution or 'band' or 'stripe')    Negative: Looks like a boil, infected sore, deep ulcer, or other infected rash (spreading redness, pus)    Protocols used: KNEE PAIN-A-OH

## 2021-06-11 NOTE — PROGRESS NOTES
"Rene Matthews is a 80 y.o. female who is being evaluated via a billable telephone visit.      The patient has been notified of following:     \"This telephone visit will be conducted via a call between you and your physician/provider. We have found that certain health care needs can be provided without the need for a physical exam.  This service lets us provide the care you need with a short phone conversation.  If a prescription is necessary we can send it directly to your pharmacy.  If lab work is needed we can place an order for that and you can then stop by our lab to have the test done at a later time.    Telephone visits are billed at different rates depending on your insurance coverage. During this emergency period, for some insurers they may be billed the same as an in-person visit.  Please reach out to your insurance provider with any questions.    If during the course of the call the physician/provider feels a telephone visit is not appropriate, you will not be charged for this service.\"    Patient has given verbal consent to a Telephone visit? Yes    What phone number would you like to be contacted at? [phone]    Patient would like to receive their AVS by AVS Preference: Leonarda.    Additional provider notes:   80-year-old woman with osteoarthritis involving both knees now having increasing pain in multiple joints including hips and ankles.  Also pain across low back.  No increased activity.  Symptoms began over the weekend 4 days ago.  Using Tylenol without much benefit.  Denies any new pain in her shoulders or upper extremities.  No fevers or chills.  No rashes.  She did get a flu shot last Friday.  She does do quite a bit of outdoor activity but does not recall removing any ticks from her body.    Assessment/Plan:  1. Arthralgia, unspecified joint  Increasing joint pain over the past week following flu shot.  Possible reaction.  If symptoms are not subsiding, I am recommending that she get labs " drawn including a Lyme titer and sed rate.  PMR consideration although upper body symptoms are absent at this time  - Lyme Antibody Cascade; Future  - Sedimentation Rate; Future        Phone call duration:  8 minutes    Kevin Ho MD

## 2021-06-11 NOTE — TELEPHONE ENCOUNTER
Spoke to patient to reschedule 10/27 AWV.    Rescheduled for 11/16.  Pt requested have fasting labs done the week before because the rescheduled appt is in the afternoon.    Advised Pt that I would request, orders from Dr. Ho.

## 2021-06-11 NOTE — TELEPHONE ENCOUNTER
Rene is calling and states that last time she was up three times to go to bathroom.  Rene saved sample and tested with azo urine was red and tan and high nitrates.  Rene is having urgency and frequency.  Rene denies fever cough and shortness of breath.  Rene is requesting an antibiotic for UTI.  Rene states that she had intercourse and was swimming in the river over the weekend.      COVID 19 Nurse Triage Plan/Patient Instructions    Please be aware that novel coronavirus (COVID-19) may be circulating in the community. If you develop symptoms such as fever, cough, or SOB or if you have concerns about the presence of another infection including coronavirus (COVID-19), please contact your health care provider or visit [email or web address].     Disposition/Instructions    Virtual Visit with provider recommended. Reference Visit Selection Guide.    Thank you for taking steps to prevent the spread of this virus.  o Limit your contact with others.  o Wear a simple mask to cover your cough.  o Wash your hands well and often.    Resources    [facility]: About COVID-19: [email or web address]    CDC: What to Do If You're Sick: www.cdc.gov/coronavirus/2019-ncov/about/steps-when-sick.html    CDC: Ending Home Isolation: www.cdc.gov/coronavirus/2019-ncov/hcp/disposition-in-home-patients.html     CDC: Caring for Someone: www.cdc.gov/coronavirus/2019-ncov/if-you-are-sick/care-for-someone.html     [facility]: Interim Guidance for Hospital Discharge to Home: www.health.[facility].mn.us/diseases/coronavirus/hcp/hospdischarge.pdf    [facility] clinical trials (COVID-19 research studies): clinicalaffairs.[facility]-clinical-trials     Below are the COVID-19 hotlines at the [facility] ([facility]). Interpreters are available.   o For health questions: Call 809-907-3138 or 1-524.853.9316 (7 a.m. to 7 p.m.)  o For questions about schools and childcare: Call 544-314-6168 or 1-768.809.2049 (7 a.m. to 7 p.m.)        Reason for Disposition    Urinating more frequently than usual (i.e., frequency)    Additional Information    Negative: Shock suspected (e.g., cold/pale/clammy skin, too weak to stand, low BP, rapid pulse)    Negative: Sounds like a life-threatening emergency to the triager    Negative: [1] Unable to urinate (or only a few drops) > 4 hours AND     [2] bladder feels very full (e.g., palpable bladder or strong urge to urinate)    Negative: [1] Decreased urination and [2] drinking very little AND [2] dehydration suspected (e.g., dark urine, no urine > 12 hours, very dry mouth, very lightheaded)    Negative: Patient sounds very sick or weak to the triager    Negative: Fever > 100.5 F (38.1 C)    Negative: Side (flank) or lower back pain present    Negative: [1] Can't control passage of urine (i.e., urinary incontinence) AND [2] new onset (< 2 weeks) or worsening    Protocols used: URINARY SYMPTOMS-A-AH

## 2021-06-11 NOTE — TELEPHONE ENCOUNTER
Left a detailed message with patient. Advised that Dr. Ho wants to do a video visit and to call back if that does not work.     Mere Norris LPN

## 2021-06-11 NOTE — TELEPHONE ENCOUNTER
I have called a prescription for Cipro 500 mg twice daily for 5 days to her pharmacy.  Please inform patient.  Also, she should not take muscle relaxer tizanidine during the 5 days that she is on the Cipro.

## 2021-06-11 NOTE — TELEPHONE ENCOUNTER
Who is calling:  Patient  Reason for Call:  4:00 virtual visit appointment today. 9/17/2020  Date of last appointment with primary care: Today  Okay to leave a detailed message: Yes    Patient would like to have a face time visit today on her Ipad if possible. If not able to she would like to do a phone visit.  Please advise.

## 2021-06-12 ENCOUNTER — HEALTH MAINTENANCE LETTER (OUTPATIENT)
Age: 81
End: 2021-06-12

## 2021-06-12 NOTE — TELEPHONE ENCOUNTER
Left message on voicemail regarding message below. Please help patient schedule lab appt. Serena Valerio CMA

## 2021-06-13 NOTE — PROGRESS NOTES
Assessment and Plan:       1. Medicare annual wellness visit, subsequent  Immunizations are reviewed and will provide flu shot.  She has a living will. non-smoker.  Uses alcohol in moderation.  Regular exercise discussed.  Up to date with colonoscopies and this should be repeated in 2019.  Breast exam completed and she will continue to get a mammogram annually.  Pelvic exam is normal.  Last DEXA was 2014 and this should be repeated this year. Dementia and depression screening completed.  She sees an ophthalmologist regularly and gets glaucoma screening.  Skin exam performed and recommending regular use of sunblock.  She sees a dermatologist every year.    Will screen for diabetes with fasting glucose.      2. Need for immunization against influenza    - Influenza High Dose, Seasonal 65+ yrs    3. Essential hypertension  Blood pressure is mildly elevated today.  She has a history of whitecoat hypertension.  She will recheck at home several times over the next week and report if persistently elevated.  - Urinalysis  - Basic Metabolic Panel  - Hemoglobin    4. Hyperlipidemia  Recheck lipid profile on simvastatin and monitor LFTs.  Continue aspirin 81 mg daily  - Lipid Cascade  - Hepatic Profile    5. Anxiety, generalized  She has used diazepam every 2-3 days and was has been stable.      6. Chronic headaches  Evaluated by neurology last winter.  MRI unremarkable.  Initially thought due to sinus infection but proved not to be.  Much improved using gabapentin 600 mg 3 times daily.  She is hoping to taper and she can decrease by 300 mg every week.  She understands that there is a good chance her symptoms will recur    7. Overactive bladder  We discussed timed voiding    8. Osteopenia  We will make arrangements for follow-up DEXA.  Recheck vitamin D level  - Vitamin D, Total (25-Hydroxy)  - DXA Bone Density Scan; Future    9. Insomnia  Using trazodone    10. GERD (gastroesophageal reflux disease)  History of recurrent  chest pain.  Currently stable with omeprazole.  Most days she only takes once daily    11. Allergic rhinitis  Doing very well with Flonase twice daily.  No longer needing Afrin.  No longer needing Claritin    12. IBS (irritable bowel syndrome)  Chronic constipation doing well with MiraLAX daily    13. Hearing loss in left ear  Refer to audiology    14. Skin cancer, basal cell  She sees dermatology annually      The patient's current medical problems were reviewed.  Over 25 minutes was spent addressing these chronic and new medical problems beyond time spent performing annual wellness visit with over 50% of the time spent counseling and coordination of care      I have had an Advance Directives discussion with the patient.  The following health maintenance schedule was reviewed with the patient and provided in printed form in the after visit summary:   Health Maintenance   Topic Date Due     DXA SCAN  08/20/2007     FALL RISK ASSESSMENT  10/20/2018     TD 18+ HE  09/28/2022     ADVANCE DIRECTIVES DISCUSSED WITH PATIENT  10/20/2022     COLONOSCOPY  01/08/2024     PNEUMOCOCCAL POLYSACCHARIDE VACCINE AGE 65 AND OVER  Completed     INFLUENZA VACCINE RULE BASED  Completed     PNEUMOCOCCAL CONJUGATE VACCINE FOR ADULTS (PCV13 OR PREVNAR)  Completed     ZOSTER VACCINE  Completed        Subjective:   Chief Complaint: Rene Matthews is an 77 y.o. female here for an Annual Wellness visit.   HPI: In addition to wellness visit, multiple chronic medical problems were addressed.  She has noticed worsening hearing out of her left ear.  She is interested in pursuing a hearing aid.  Also history of osteopenia.  Last DEXA was stable in 2014.  She tries to get adequate calcium and vitamin D in her diet and with supplements.  She has hypertension and remains on combination of Avapro, hydrochlorothiazide, and metoprolol.  She had not been checking her blood pressure regularly but did check it yesterday and it was moderately elevated.  She  rates checked it later in the day and it was fine with systolic under 140.  She does continue to deal with generalized anxiety.  She will use diazepam every 2-3 days.  This seems to work well for her.  Remains on simvastatin to manage her cholesterol.  No recurrent chest pains.  This has been attributed to GERD and she remains on omeprazole.  Chronic abdominal pain secondary to IBS and constipation is also well controlled with MiraLAX.  She describes some bloating.  Benign hepatic cyst noted 4 years ago.  Reassured.    Review of Systems:    Please see above.  The rest of the review of systems are negative for all systems.    Patient Care Team:  Kevin Ho MD as PCP - General (Internal Medicine)     Patient Active Problem List   Diagnosis     Hypertension     Hyperlipidemia     Osteopenia     IBS (irritable bowel syndrome)     Osteoarthritis     Personal history of colonic polyps     Allergic rhinitis     Peptic ulcer disease     Anxiety, generalized     Insomnia     Chronic abdominal pain     GERD (gastroesophageal reflux disease)     Chest pain     Chronic headaches     Overactive bladder     Hearing loss in left ear     Skin cancer, basal cell     Past Medical History:   Diagnosis Date     Abnormal Pap smear of cervix     ASCUS with negative biopsy     Allergic rhinitis      Anxiety, generalized      Cervical polyp      Chest pain     secondary to gerd     Chronic abdominal pain     Negative CT scan and colonoscopy, musculoskeletal etiology suspected     Chronic sinusitis 10/18/2016     Frequent headaches      GERD (gastroesophageal reflux disease)     Noncardiac chest pain     Hearing loss in left ear 10/20/2017     Herpes zoster 2006     Hyperlipidemia      Hypertension      IBS (irritable bowel syndrome)      Insomnia      Osteoarthritis      Osteopenia     DEXA 2014 T score -2.0 stable     Overactive bladder 10/18/2016     Peptic ulcer disease 2006    w/ gastric ulcer     Personal history of colonic  polyps     Last colonoscopy January 2014 normal     Screening     Negative for AAA CT scan 2013     Skin cancer, basal cell 10/20/2017      Past Surgical History:   Procedure Laterality Date     Basal cell skin cancer       CERVICAL BIOPSY  W/ LOOP ELECTRODE EXCISION       ENDOMETRIAL BIOPSY      Negative     OVARIAN CYST REMOVAL      lysis of adhesions     Radiofrequency ablation right greater saphenous vein  2012     STRABISMUS SURGERY  2015      Family History   Problem Relation Age of Onset     Stroke Mother      Hypertension Sister      Hypertension Brother       Social History     Social History     Marital status:      Spouse name: N/A     Number of children: N/A     Years of education: N/A     Occupational History     Not on file.     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       acetaminophen-codeine (TYLENOL #3) 300-30 mg per tablet Take 1 tablet by mouth every 4 (four) hours as needed for pain. 30 tablet 0     aspirin 81 MG EC tablet Take 81 mg by mouth daily.       calcium carbonate-vitamin D2 500 mg(1,250mg) -200 unit tablet Take 1 tablet by mouth 2 (two) times a day.       diazePAM (VALIUM) 5 MG tablet Take 1 tablet (5 mg total) by mouth every 6 (six) hours as needed for anxiety. 60 tablet 0     fluticasone (FLONASE) 50 mcg/actuation nasal spray 2 sprays into each nostril 2 (two) times a day. 32 g 11     gabapentin (NEURONTIN) 600 MG tablet Take 600 mg by mouth 3 (three) times a day. Take 2 tablets 3 times a day       hydroCHLOROthiazide (HYDRODIURIL) 25 MG  "tablet Take 1 tablet (25 mg total) by mouth daily. 90 tablet 3     irbesartan (AVAPRO) 300 MG tablet TAKE ONE TABLET BY MOUTH ONCE DAILY AT BEDTIME 90 tablet 3     metoprolol tartrate (LOPRESSOR) 50 MG tablet TAKE ONE TABLET BY MOUTH TWICE DAILY 180 tablet 3     OMEGA-3/DHA/EPA/FISH OIL (FISH OIL-OMEGA-3 FATTY ACIDS) 300-1,000 mg capsule Take 2 g by mouth daily.       omeprazole (PRILOSEC) 20 MG capsule Take 1 capsule (20 mg total) by mouth daily before breakfast. 90 capsule 3     polyethylene glycol (MIRALAX) 17 gram packet Take 17 g by mouth daily.       simvastatin (ZOCOR) 10 MG tablet TAKE ONE TABLET BY MOUTH ONCE DAILY 90 tablet 3     traZODone (DESYREL) 50 MG tablet TAKE ONE TABLET BY MOUTH ONCE DAILY 90 tablet 3     No current facility-administered medications for this visit.       Objective:   Vital Signs:   Visit Vitals     /80 (Patient Site: Left Arm, Patient Position: Sitting, Cuff Size: Adult Regular)     Pulse (!) 46     Ht 5' 4.5\" (1.638 m)     Wt 135 lb (61.2 kg)     LMP  (LMP Unknown)     SpO2 98%     BMI 22.81 kg/m2            PHYSICAL EXAM   EYES: Eyelids, conjunctiva, and sclera were normal. Pupils were normal. Cornea, iris, and lens were normal bilaterally.  HEAD, EARS, NOSE, MOUTH, AND THROAT: Head and face were normal. Nose appearance was normal and there was no discharge. Oropharynx was normal.  NECK: Neck appearance was normal. There were no neck masses and the thyroid was not enlarged and no nodules are felt.  No lymphadenopathy.  RESPIRATORY: Breathing pattern was normal and the chest moved symmetrically.  Percussion/auscultatory percussion was normal.  Lung sounds were normal and there were no rales or wheezes.  CARDIOVASCULAR: Heart rate and rhythm were normal.  S1 and S2 were normal and there were no extra sounds or murmurs. Peripheral pulses in arms and legs were normal.  Jugular venous pressure was normal.  There was no peripheral edema.  No carotid bruits.  BREAST: No palpable " masses or tenderness.  No axillary nodes.  GASTROINTESTINAL: The abdomen was normal in contour.  Bowel sounds were present.   Palpation detected no tenderness, mass, or enlarged organs.   PELVIC: No external lesions.  Uterus was normal size, shape, and consistency, without palpable lesions.  Adnexa were nontender without palpable mass.  MUSCULOSKELETAL: Skeletal configuration was normal and muscle mass was normal for age. Joint appearance was overall normal.  LYMPHATIC: There were no enlarged nodes.  SKIN/HAIR/NAILS: Skin color was normal.  Hair and nails were normal.There were no skin lesions.  NEUROLOGIC: The patient was alert and oriented to person, place, time, and circumstance. Speech was normal. Cranial nerves were normal. Motor strength was normal for age. The patient was normally coordinated.  Sensation intact.  PSYCHIATRIC:  Mood and affect were normal and the patient had normal recent and remote memory. The patient's judgment and insight were normal.    Assessment Results 10/20/2017   Activities of Daily Living No help needed   Instrumental Activities of Daily Living No help needed   Get Up and Go Score Less than 12 seconds   Mini Cog Total Score 5   Some recent data might be hidden     A Mini-Cog score of 0-2 suggests the possibility of dementia, score of 3-5 suggests no dementia    Identified Health Risks:     The patient was provided with written information regarding signs of hearing loss.  Refer to audiology  She is at risk for falling and has been provided with information to reduce the risk of falling at home.  Patient's advanced directive was discussed and I am comfortable with the patient's wishes.

## 2021-06-13 NOTE — PROGRESS NOTES
Assessment and Plan:     1. Medicare annual wellness visit, subsequent  Immunizations are reviewed and everything is up-to-date.  She has a living will.  Non-smoker.  Uses alcohol in moderation.  Exercising on a regular basis.  Up to date with colonoscopies and this should be repeated in January 2022 with history of multiple polyps found on last colonoscopy.  Breast exam completed and she will continue to get a mammogram annually.   Last DEXA was 2017 and this should be repeated in 2021. Dementia and depression screening completed.  She sees an ophthalmologist every year. Skin exam performed and recommending regular use of sunblock.  She will continue to see dermatology every year.  Screened for diabetes with fasting glucose.     2. Anxiety, generalized  Increasing anxiety, some of it situational.  Using diazepam on a more regular basis.  I am discouraging her from doing this.  Recommending that she start mirtazapine 7.5 mg at bedtime.  The dose can be increased to 15 mg if needed.  This will replace trazodone.  - mirtazapine (REMERON) 7.5 MG tablet; Take 1 tablet (7.5 mg total) by mouth at bedtime.  Dispense: 90 tablet; Refill: 3    3. Essential hypertension  Blood pressure looks adequately controlled with combination of amlodipine, metoprolol, Avapro and HCTZ    4. Osteopenia, unspecified location  Last DEXA with osteopenia.  We will plan to recheck in 2021.  Continue to maintain good calcium and vitamin D intake.    5. Insomnia, unspecified type  Mirtazapine is replacing trazodone  - mirtazapine (REMERON) 7.5 MG tablet; Take 1 tablet (7.5 mg total) by mouth at bedtime.  Dispense: 90 tablet; Refill: 3    6. Irritable bowel syndrome with constipation  Managing chronic constipation with MiraLAX    7. Hyperlipidemia, unspecified hyperlipidemia type  Recheck lipid profile on simvastatin  - Lipid Cascade FASTING    8. Gastroesophageal reflux disease without esophagitis  Reflux well-controlled with omeprazole.  No  dysphagia    9. Chronic nonintractable headache, unspecified headache type  We discussed tapering of gabapentin.  Currently 600 mg 3 times daily.  She can try decreasing by 300 mg every 2 weeks.    10. Skin cancer, basal cell  She will continue to see her dermatologist annually    11. Allergic rhinitis, unspecified seasonality, unspecified trigger  Well-controlled with Flonase    12. Personal history of colonic polyps  Multiple polyps found on her last colonoscopy and this should be repeated in 2022    Over 25 minutes was spent addressing these chronic and new medical problems beyond time spent performing annual wellness visit with over 50% of the time spent counseling and coordination of care discussing anxiety, hypertension, chronic headaches, and GERD    The patient's current medical problems were reviewed.    I have had an Advance Directives discussion with the patient.  The following health maintenance schedule was reviewed with the patient and provided in printed form in the after visit summary:   Health Maintenance   Topic Date Due     DXA SCAN  12/12/2019     MEDICARE ANNUAL WELLNESS VISIT  11/16/2021     FALL RISK ASSESSMENT  11/16/2021     TD 18+ HE  09/28/2022     LIPID  10/24/2024     ADVANCE CARE PLANNING  11/16/2025     Pneumococcal Vaccine: 65+ Years  Completed     INFLUENZA VACCINE RULE BASED  Completed     ZOSTER VACCINES  Completed     Pneumococcal Vaccine: Pediatrics (0 to 5 Years) and At-Risk Patients (6 to 64 Years)  Aged Out     HEPATITIS B VACCINES  Aged Out     HEPATITIS C SCREENING  Discontinued        Subjective:   Chief Complaint: Rene Matthews is an 80 y.o. female here for an Annual Wellness visit.   HPI: In addition to annual wellness visit, multiple chronic medical problems were addressed during today's visit.    She is experiencing increasing anxiety some of it situational secondary to elections and Covid.  Using diazepam on a more regular basis which has been discouraged.  Sleeping well  with trazodone.    Managing hypertension with combination of medications generally well controlled when checking at home.    We discussed her chronic headaches.  Well controlled taking gabapentin 600 mg 3 times daily but discussed whether she might be able to taper this dose down slowly.    IBS symptoms including chronic constipation stable using MiraLAX regularly.    Review of Systems:    Please see above.  The rest of the review of systems are negative for all systems.    Patient Care Team:  Kevin Ho MD as PCP - General (Internal Medicine)  Kevin Ho MD as Assigned PCP     Patient Active Problem List   Diagnosis     Hypertension     Hyperlipidemia     IBS (irritable bowel syndrome)     Osteoarthritis     Allergic rhinitis     Peptic ulcer disease     Anxiety, generalized     Insomnia     Chronic abdominal pain     GERD (gastroesophageal reflux disease)     Chronic headaches     Overactive bladder     Hearing loss in left ear     Skin cancer, basal cell     Osteopenia     Personal history of colonic polyps     Past Medical History:   Diagnosis Date     Abnormal Pap smear of cervix     ASCUS with negative biopsy     Acute low back pain without sciatica 5/18/2020     Allergic rhinitis      Anxiety, generalized      Cervical polyp      Chest pain     secondary to gerd     Chronic abdominal pain     Negative CT scan and colonoscopy, musculoskeletal etiology suspected     Chronic sinusitis 10/18/2016     Frequent headaches      GERD (gastroesophageal reflux disease)     Noncardiac chest pain     Hearing loss in left ear 10/20/2017     Herpes zoster 2006     Hyperlipidemia      Hypertension      IBS (irritable bowel syndrome)      Insomnia      Osteoarthritis      Osteopenia     DEXA 2014 T score -2.0 stable, DEXA December 2017 T score -1.6 left femoral neck stable     Overactive bladder 10/18/2016     Peptic ulcer disease 2006    w/ gastric ulcer     Personal history of colonic polyps       colonoscopy January 2014 normal.  Colonoscopy January 2019 with multiple polyps, repeat in 3 years     Screening     Negative for AAA CT scan 2013     Skin cancer, basal cell 10/20/2017      Past Surgical History:   Procedure Laterality Date     Basal cell skin cancer       CERVICAL BIOPSY  W/ LOOP ELECTRODE EXCISION       ENDOMETRIAL BIOPSY      Negative     OVARIAN CYST REMOVAL      lysis of adhesions     Radiofrequency ablation right greater saphenous vein  2012     STRABISMUS SURGERY  2015      Family History   Problem Relation Age of Onset     Stroke Mother      Hypertension Sister      Hypertension Brother       Social History     Socioeconomic History     Marital status:      Spouse name: Not on file     Number of children: Not on file     Years of education: Not on file     Highest education level: Not on file   Occupational History     Not on file   Social Needs     Financial resource strain: Not on file     Food insecurity     Worry: Not on file     Inability: Not on file     Transportation needs     Medical: Not on file     Non-medical: Not on file   Tobacco Use     Smoking status: Never Smoker     Smokeless tobacco: Never Used   Substance and Sexual Activity     Alcohol use: No     Drug use: No     Sexual activity: Not on file   Lifestyle     Physical activity     Days per week: Not on file     Minutes per session: Not on file     Stress: Not on file   Relationships     Social connections     Talks on phone: Not on file     Gets together: Not on file     Attends [employer] service: Not on file     Active member of club or organization: Not on file     Attends meetings of clubs or organizations: Not on file     Relationship status: Not on file     Intimate partner violence     Fear of current or ex partner: Not on file     Emotionally abused: Not on file     Physically abused: Not on file     Forced sexual activity: Not on file   Other Topics Concern     Not on file   Social History Narrative     "Retired teacher    , [place], 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       amLODIPine (NORVASC) 2.5 MG tablet Take 1 tablet (2.5 mg total) by mouth daily. 90 tablet 3     aspirin 81 MG EC tablet Take 81 mg by mouth daily.       calcium carbonate-vitamin D2 500 mg(1,250mg) -200 unit tablet Take 1 tablet by mouth daily.              diazePAM (VALIUM) 5 MG tablet TAKE 1 TABLET(5 MG) BY MOUTH DAILY AS NEEDED FOR ANXIETY 60 tablet 0     fluticasone propionate (FLONASE) 50 mcg/actuation nasal spray SHAKE LIQUID AND USE 2 SPRAYS IN EACH NOSTRIL TWICE DAILY 96 g 3     gabapentin (NEURONTIN) 600 MG tablet Take 1 tablet (600 mg total) by mouth 3 (three) times a day. 270 tablet 3     hydroCHLOROthiazide (HYDRODIURIL) 25 MG tablet Take 1 tablet (25 mg total) by mouth daily. 90 tablet 3     irbesartan (AVAPRO) 300 MG tablet Take 1 tablet (300 mg total) by mouth daily. 90 tablet 3     melatonin 2.5 mg Tab half-tablet Take 2.5 mg by mouth at bedtime as needed.       metoprolol tartrate (LOPRESSOR) 50 MG tablet TAKE 1 TABLET BY MOUTH TWICE DAILY 180 tablet 3     omeprazole (PRILOSEC) 20 MG capsule TAKE 1 CAPSULE BY MOUTH TWICE DAILY 180 capsule 1     polyethylene glycol (MIRALAX) 17 gram packet Take 17 g by mouth daily.       SHINGRIX, PF, 50 mcg/0.5 mL SusR TO BE ADMINISTERED BY PHARMACIST FOR IMMUNIZATION  0     simvastatin (ZOCOR) 10 MG tablet Take 1 tablet (10 mg total) by mouth at bedtime. 90 tablet 3     mirtazapine (REMERON) 7.5 MG tablet Take 1 tablet (7.5 mg total) by mouth at bedtime. 90 tablet 3     No current facility-administered medications for this visit.       Objective:   Vital Signs:   Visit Vitals  /80   Pulse (!) 53   Ht 5' 4\" (1.626 m)   Wt 133 lb (60.3 kg)   LMP  (LMP Unknown)   SpO2 98%   BMI 22.83 kg/m           VisionScreening:  No " exam data present     PHYSICAL EXAM  EYES: Eyelids, conjunctiva, and sclera were normal. Pupils were normal. Cornea, iris, and lens were normal bilaterally.  HEAD, EARS, NOSE, MOUTH, AND THROAT: Head and face were normal.  TMs normal  NECK: Neck appearance was normal. There were no neck masses and the thyroid was not enlarged and no nodules are felt.  No lymphadenopathy.  RESPIRATORY: Breathing pattern was normal and the chest moved symmetrically.  Percussion/auscultatory percussion was normal.  Lung sounds were normal and there were no rales or wheezes.  CARDIOVASCULAR: Heart rate and rhythm were normal.  S1 and S2 were normal and there were no extra sounds or murmurs. Peripheral pulses in arms and legs were normal.  Jugular venous pressure was normal.  There was no peripheral edema.  No carotid bruits.  BREAST: No palpable masses or tenderness.  No axillary nodes.  GASTROINTESTINAL: The abdomen was normal in contour.  Bowel sounds were present.   Palpation detected no tenderness, mass, or enlarged organs.   PELVIC: No external lesions.    Uterus was normal size, shape, and consistency, without palpable lesions.  Adnexa were nontender without palpable mass.  MUSCULOSKELETAL: Skeletal configuration was normal and muscle mass was normal for age. Joint appearance was overall normal.  LYMPHATIC: There were no enlarged nodes.  SKIN/HAIR/NAILS: Skin color was normal.  Hair and nails were normal.There were no skin lesions.  NEUROLOGIC: The patient was alert and oriented to person, place, time, and circumstance. Speech was normal. Cranial nerves were normal. Motor strength was normal for age. The patient was normally coordinated.  Sensation intact.  PSYCHIATRIC:  Mood and affect were normal and the patient had normal recent and remote memory. The patient's judgment and insight were normal.    Assessment Results 11/16/2020   Activities of Daily Living No help needed   Instrumental Activities of Daily Living No help needed    Get Up and Go Score Less than 12 seconds   Mini Cog Total Score 4   Some recent data might be hidden     A Mini-Cog score of 0-2 suggests the possibility of dementia, score of 3-5 suggests no dementia      Identified Health Risks:     The patient was provided with suggestions to help her develop a healthy emotional lifestyle.   Patient's advanced directive was discussed and I am comfortable with the patient's wishes.

## 2021-06-13 NOTE — TELEPHONE ENCOUNTER
Pt is calling in about her medication refills that are being refused at her pharmacy. Pt was last seen for a physical on 11/16/2020. No refills available.      Please call Rene at [phone] to renew her prescriptions.       Gerald Khalil RN [facility] Triage/Medication Refill

## 2021-06-13 NOTE — PATIENT INSTRUCTIONS - HE
Patient Education   Your Health Risk Assessment indicates you feel you are not in good emotional health.    Recreation   Recreation is not limited to sports and team events. It includes any activity that provides relaxation, interest, enjoyment, and exercise. Recreation provides an outlet for physical, mental, and social energy. It can give a sense of worth and achievement. It can help you stay healthy.    Mental Exercise and Social Involvement  Mental and emotional health is as important as physical health. Keep in touch with friends and family. Stay as active as possible. Continue to learn and challenge yourself.   Things you can do to stay mentally active are:    Learn something new, like a foreign language or musical instrument.     Play SCRABBLE or do crossword puzzles. If you cannot find people to play these games with you at home, you can play them with others on your computer through the Internet.     Join a games club--anything from card games to chess or checkers or lawn bowling.     Start a new hobby.     Go back to school.     Volunteer.     Read.     Keep up with world events.         Advance Directive  Patient s advance directive was discussed and I am comfortable with the patient s wishes.  Patient Education   Personalized Prevention Plan  You are due for the preventive services outlined below.  Your care team is available to assist you in scheduling these services.  If you have already completed any of these items, please share that information with your care team to update in your medical record.  Health Maintenance   Topic Date Due     DXA SCAN  12/12/2019     MEDICARE ANNUAL WELLNESS VISIT  11/16/2021     FALL RISK ASSESSMENT  11/16/2021     TD 18+ HE  09/28/2022     LIPID  10/24/2024     ADVANCE CARE PLANNING  10/24/2024     Pneumococcal Vaccine: 65+ Years  Completed     INFLUENZA VACCINE RULE BASED  Completed     ZOSTER VACCINES  Completed     Pneumococcal Vaccine: Pediatrics (0 to 5 Years)  and At-Risk Patients (6 to 64 Years)  Aged Out     HEPATITIS B VACCINES  Aged Out     HEPATITIS C SCREENING  Discontinued         Continue annual mammograms    Repeat DEXA in 2021 for osteoporosis screening    Continue to see your eye doctor every year    Schedule appointment with dermatology every year for skin exam    You can try tapering gabapentin later this year by decreasing the dose to 600 mg in the morning, 300 mg in the afternoon and 600 mg at night.  After 2 weeks, you can try decreasing by another 300 mg by taking 300 mg in the morning, 300 mg in the afternoon and 600 mg at night.  After another 2 weeks, try decreasing to 300 mg 3 times daily.

## 2021-06-13 NOTE — TELEPHONE ENCOUNTER
Refill Approved    Rx renewed per Medication Renewal Policy. Medication was last renewed on 12/14/19.    Elba Bowman, [facility] Connection Triage/Med Refill 12/14/2020     Requested Prescriptions   Pending Prescriptions Disp Refills     metoprolol tartrate (LOPRESSOR) 50 MG tablet 180 tablet 3     Sig: TAKE 1 TABLET BY MOUTH TWICE DAILY       Beta-Blockers Refill Protocol Passed - 12/14/2020  3:16 PM        Passed - PCP or prescribing provider visit in past 12 months or next 3 months     Last office visit with prescriber/PCP: 9/16/2019 Kevin Ho MD OR same dept: Visit date not found OR same specialty: 9/30/2019 Constantin Church MD  Last physical: 11/16/2020 Last MTM visit: Visit date not found   Next visit within 3 mo: Visit date not found  Next physical within 3 mo: Visit date not found  Prescriber OR PCP: Kevin Ho MD  Last diagnosis associated with med order: 1. HTN (hypertension)  - metoprolol tartrate (LOPRESSOR) 50 MG tablet; TAKE 1 TABLET BY MOUTH TWICE DAILY  Dispense: 180 tablet; Refill: 3    If protocol passes may refill for 12 months if within 3 months of last provider visit (or a total of 15 months).             Passed - Blood pressure filed in past 12 months     BP Readings from Last 1 Encounters:   11/16/20 120/80

## 2021-06-14 NOTE — TELEPHONE ENCOUNTER
I think mirtazapine is a much better option than trazodone.  Another strategy would be to get her the 15 mg tablet which she can break in half every night.  It may not cost much more than the 7.5 mg dose and would result in costing only half as much.  I will send this if she agrees

## 2021-06-14 NOTE — TELEPHONE ENCOUNTER
Reason for call: BLADDER INFECTION    Symptom or request: PT REQUESTING A RX FOR CIPRO, SHE HAS URGENCY AND HAVING TO GO ALL THE TIME. SXS STARTED OVER NIGHT. PT TOOK ONE OF THOSE EASY-O(?) TEST THAT YOU BUY OTC AND IT SHOWED HER LUKOCYTES WERE ON THE HIGH SIDE. CAN A PARTNER SEND IN A RX FOR HER OR PLEASE ADVISE.     Duration (how long have symptoms been present): OVERNIGHT    Have you been treated for this before? No    Additional comments: [facility] IN [place]    Phone Number patient can be reached at:  Home number on file [phone] (home)    Best Time:  ANYTIME    Can we leave a detailed message on this number: Yes    Call taken on 1/6/2021 at 9:55 AM by Sharon Gay

## 2021-06-14 NOTE — TELEPHONE ENCOUNTER
Florence is calling and states that she was put on a different medication and was started on Mirtazapine 7.5 mg.   Florence states she ordered Mirtazapine 7.5mg is going to cost $142.46 for 96 day supply.   Florence is willing to go back to Trazadone.  Please phone Florence.  Florence would like to be put on Trazadone not unless there is a huge difference.  Please phone Florence.  Florence did get her first covid vaccination in [place], [place].      COVID 19 Nurse Triage Plan/Patient Instructions    Please be aware that novel coronavirus (COVID-19) may be circulating in the community. If you develop symptoms such as fever, cough, or SOB or if you have concerns about the presence of another infection including coronavirus (COVID-19), please contact your health care provider or visit [email or web address].     Disposition/Instructions    Home care recommended. Follow home care protocol based instructions.    Thank you for taking steps to prevent the spread of this virus.  o Limit your contact with others.  o Wear a simple mask to cover your cough.  o Wash your hands well and often.    Resources    [facility]: About COVID-19: [email or web address]    CDC: What to Do If You're Sick: www.cdc.gov/coronavirus/2019-ncov/about/steps-when-sick.html    CDC: Ending Home Isolation: www.cdc.gov/coronavirus/2019-ncov/hcp/disposition-in-home-patients.html     CDC: Caring for Someone: www.cdc.gov/coronavirus/2019-ncov/if-you-are-sick/care-for-someone.html     [facility]: Interim Guidance for Hospital Discharge to Home: www.health.[facility].[place]/diseases/coronavirus/hcp/hospdischarge.pdf    [facility] clinical trials (COVID-19 research studies): clinicalaffairs.[facility]/umn-clinical-trials     Below are the COVID-19 hotlines at the [place] Department of Health ([facility]). Interpreters are available.   o For health questions: Call 006-246-2835 or 1-326.186.9628 (7 a.m. to 7 p.m.)  o For questions about schools and childcare: Call  [phone] or 1-797.460.5953 (7 a.m. to 7 p.m.)       Reason for Disposition    Caller has NON-URGENT medication question about med that PCP prescribed and triager unable to answer question    Additional Information    Negative: MORE THAN A DOUBLE DOSE of a prescription or over-the-counter (OTC) drug    Negative: DOUBLE DOSE (an extra dose or lesser amount) of over-the-counter (OTC) drug and any symptoms (e.g., dizziness, nausea, pain, sleepiness)    Negative: DOUBLE DOSE (an extra dose or lesser amount) of prescription drug and any symptoms (e.g., dizziness, nausea, pain, sleepiness)    Negative: Took another person's prescription drug    Negative: DOUBLE DOSE (an extra dose or lesser amount) of prescription drug and NO symptoms (Exception: a double dose of antibiotics)    Negative: Diabetes drug error or overdose (e.g., took wrong type of insulin or took extra dose)    Negative: Caller has medication question about med not prescribed by PCP and triager unable to answer question (e.g., compatibility with other med, storage)    Negative: Request for URGENT new prescription or refill of 'essential' medication (i.e., likelihood of harm to patient if not taken) and triager unable to fill per department policy    Negative: Prescription not at pharmacy and was prescribed today by PCP    Negative: Pharmacy calling with prescription questions and triager unable to answer question    Negative: Caller has URGENT medication question about med that PCP prescribed and triager unable to answer question    Protocols used: MEDICATION QUESTION CALL-A-[place]

## 2021-06-14 NOTE — PROGRESS NOTES
Office Visit - Follow Up   Rene Matthews   77 y.o. female    Date of Visit: 12/22/2017    Chief Complaint   Patient presents with     medication check        Assessment and Plan   1. Essential hypertension  Blood pressure remains elevated at the office but running much better controlled at home.  Frequently systolic of around 130.  The current dose of amlodipine 2.5 mg which was recently added to her usual medications including Avapro, hydrochlorothiazide, and metoprolol.  Not experiencing any side effects.    2. Insomnia  Stopping Benadryl seems reasonable as she is doing well with trazodone.  I would not add melatonin unless insomnia becomes worse    3. Hyperlipidemia  I would encourage her to continue the same dose of simvastatin as her recent lipids looked under excellent control.    4. Chronic abdominal pain with IBS and chronic constipation  She wishes to decrease the use of MiraLAX.  Every other day or half scoop seems reasonable if her symptoms remain stable        6. Chronic headaches  Doing well with gabapentin but wishes to decrease the dose.  She can slowly titrate down to 300 mg 3 times daily watching for any recurrent symptoms    Return in about 6 months (around 6/22/2018) for Annual physical.     History of Present Illness   This 77 y.o. old woman is here to follow-up hypertension as well as multiple other concerns.  She called several times since her last visit with concerns that her blood pressure was running above goal.  Systolic was as high as 180 on one occasion.  This is not entirely new but normally her blood pressure will typically trended down when she rechecks but this was not happening.  Often around 160 systolic despite taking combination of medications including metoprolol, Avapro, and hydrochlorothiazide.  We started 2.5 mg of amlodipine.  She is tolerating the medication well without side effects.  No edema.  She has had some numbness in her left hand and temple region which is  intermittent and sounding minimally bothersome.  The numbness will quickly resolve when she shakes her hand.  She also has concerns about her other medications.  She would like to cut back on the doses of gabapentin and MiraLAX.  Chronic abdominal pain secondary to constipation and IBS is well managed since starting MiraLAX.  Headaches are doing well with gabapentin.  She is also wanting to stop Benadryl which she takes at night for insomnia but is also on trazodone.  She is questioning whether she should add melatonin.    Review of Systems:  Otherwise, a comprehensive review of systems was negative except as noted.     Medications, Allergies and Problem List   Patient Active Problem List   Diagnosis     Hypertension     Hyperlipidemia     IBS (irritable bowel syndrome)     Osteoarthritis     Personal history of colonic polyps     Allergic rhinitis     Peptic ulcer disease     Anxiety, generalized     Insomnia     Chronic abdominal pain     GERD (gastroesophageal reflux disease)     Chest pain     Chronic headaches     Overactive bladder     Hearing loss in left ear     Skin cancer, basal cell     Osteopenia       She has a past surgical history that includes Basal cell skin cancer; Ovarian cyst removal; Cervical biopsy w/ loop electrode excision; Endometrial biopsy; Strabismus surgery (2015); and Radiofrequency ablation right greater saphenous vein (2012).    No Known Allergies    Current Outpatient Prescriptions   Medication Sig Dispense Refill     acetaminophen (TYLENOL) 325 MG tablet Take 650 mg by mouth every 6 (six) hours as needed for pain.       acetaminophen-codeine (TYLENOL #3) 300-30 mg per tablet Take 1 tablet by mouth every 4 (four) hours as needed for pain. 30 tablet 0     amLODIPine (NORVASC) 2.5 MG tablet Take 1 tablet (2.5 mg total) by mouth daily. 30 tablet 11     aspirin 81 MG EC tablet Take 81 mg by mouth daily.       calcium carbonate-vitamin D2 500 mg(1,250mg) -200 unit tablet Take 1 tablet by  "mouth 2 (two) times a day.       diazePAM (VALIUM) 5 MG tablet Take 1 tablet (5 mg total) by mouth every 6 (six) hours as needed for anxiety. 60 tablet 0     fluticasone (FLONASE) 50 mcg/actuation nasal spray 2 sprays into each nostril 2 (two) times a day. 32 g 11     gabapentin (NEURONTIN) 600 MG tablet Take 600 mg by mouth 3 (three) times a day. Take 2 tablets 3 times a day       hydroCHLOROthiazide (HYDRODIURIL) 25 MG tablet Take 1 tablet (25 mg total) by mouth daily. 90 tablet 3     irbesartan (AVAPRO) 300 MG tablet Take 1 tablet (300 mg total) by mouth daily. 90 tablet 3     metoprolol tartrate (LOPRESSOR) 50 MG tablet TAKE ONE TABLET BY MOUTH TWICE DAILY 180 tablet 3     OMEGA-3/DHA/EPA/FISH OIL (FISH OIL-OMEGA-3 FATTY ACIDS) 300-1,000 mg capsule Take 2 g by mouth daily.       omeprazole (PRILOSEC) 20 MG capsule Take 1 capsule (20 mg total) by mouth daily before breakfast. 90 capsule 3     polyethylene glycol (MIRALAX) 17 gram packet Take 17 g by mouth daily.       simvastatin (ZOCOR) 10 MG tablet TAKE ONE TABLET BY MOUTH ONCE DAILY 90 tablet 3     traZODone (DESYREL) 50 MG tablet Take 1 tablet (50 mg total) by mouth at bedtime. 90 tablet 1     No current facility-administered medications for this visit.         Physical Exam   General Appearance:   Well-appearing elderly woman    /60 (Patient Site: Left Arm, Patient Position: Sitting, Cuff Size: Adult Regular)  Pulse (!) 54  Ht 5' 4.5\" (1.638 m)  Wt 138 lb (62.6 kg)  LMP  (LMP Unknown)  SpO2 98%  BMI 23.32 kg/m2             Additional Information   Social History   Substance Use Topics     Smoking status: Never Smoker     Smokeless tobacco: Never Used     Alcohol use No            Kevin Ho MD  "

## 2021-06-14 NOTE — PROGRESS NOTES
Hearing evaluation in conjunction with ENT exam (Dr. Duong)    History:  Decreased hearing in left ear for approximately ten years; she denied history of head injury, fall, or MVA. She denied tinnitus, dizziness, otalgia, recent illness, or history of significant noise exposure.    Results:  Otoscopy was unremarkable in either ear. Hearing sensitivity was assessed with good reliability using insert phones.      Right ear:   Normal/borderline normal hearing sensitivity for 500-3000 Hz, sloping to moderate-severe sensorineural hearing loss for 7496-5548 Hz.    Left ear:  Normal/borderline normal hearing sensitivity for 250-1000 Hz, sloping to moderate-severe sensorineural hearing loss for 1558-6045 Hz.  Hearing asymmetry was noted between ears; Charisma was negative at multiple frequencies.      Speech reception thresholds showed agreement with pure tone findings in each ear. Word recognition ability was good for the right ear; fair for the left ear, with presentation levels above typical conversational volume in both ears.    Tympanometry was consistent with normal middle ear function, bilaterally.    Recommendations:  Follow-up with ENT; retest hearing annually (to monitor) or per medical management.  Wear hearing protection consistently in noise.  Rene Matthews is a potential binaural amplification candidate, if patient motivation exists and medical clearance is granted.    Emily Morales, [facility]  [place] Licensed Audiologist [phone]

## 2021-06-14 NOTE — PROGRESS NOTES
Rene Matthews is a 80 y.o. female who is being evaluated via a billable telephone visit.      What phone number would you like to be contacted at? [phone]  How would you like to obtain your AVS? AVS Preference: MyChart.     1. Urinary tract infection without hematuria, site unspecified  Increased urgency and frequency of urination in the past 48 hours with history of recurrent UTI.  Symptoms similar to past episodes.  No dysuria or hematuria.  No fevers or chills.  No abdominal or back pain.  She is using Pyridium which is partially helpful.  Will begin Cipro 500 mg twice daily for 5 days.  Should her symptoms persist, she will need to come in and drop off a UA UC.  - ciprofloxacin HCl (CIPRO) 500 MG tablet; Take 1 tablet (500 mg total) by mouth 2 (two) times a day for 5 days.  Dispense: 10 tablet; Refill: 0    Subjective     Rene Matthews is 80 y.o. and presents to clinic today for the following health issues   HPI experiencing increased urinary urgency and frequency over the past 48 hours.  See above            Review of Systems  See above      Objective       Vitals:  No vitals were obtained today due to virtual visit.    Physical Exam  No physical exam was able to be completed during virtual visit            Phone call duration: 5 minutes

## 2021-06-14 NOTE — PROGRESS NOTES
Rene Matthews is a 77 y.o. female seen in consultation at the request of Dr. Vargas for hearing loss.  Patient has noticed gradual hearing loss over 10 years.  Denies otologic history of infections or surgeries. Denies tinnitus and vertigo.  Denied strong family history of hearing loss.       ALLERGY:  No Known Allergies    MEDICATIONS:     Current Outpatient Prescriptions on File Prior to Visit   Medication Sig Dispense Refill     acetaminophen (TYLENOL) 325 MG tablet Take 650 mg by mouth every 6 (six) hours as needed for pain.       acetaminophen-codeine (TYLENOL #3) 300-30 mg per tablet Take 1 tablet by mouth every 4 (four) hours as needed for pain. 30 tablet 0     amLODIPine (NORVASC) 2.5 MG tablet Take 1 tablet (2.5 mg total) by mouth daily. 30 tablet 11     aspirin 81 MG EC tablet Take 81 mg by mouth daily.       calcium carbonate-vitamin D2 500 mg(1,250mg) -200 unit tablet Take 1 tablet by mouth 2 (two) times a day.       diazePAM (VALIUM) 5 MG tablet Take 1 tablet (5 mg total) by mouth every 6 (six) hours as needed for anxiety. 60 tablet 0     fluticasone (FLONASE) 50 mcg/actuation nasal spray 2 sprays into each nostril 2 (two) times a day. 32 g 11     gabapentin (NEURONTIN) 600 MG tablet Take 600 mg by mouth 3 (three) times a day. Take 2 tablets 3 times a day       hydroCHLOROthiazide (HYDRODIURIL) 25 MG tablet Take 1 tablet (25 mg total) by mouth daily. 90 tablet 3     irbesartan (AVAPRO) 300 MG tablet Take 1 tablet (300 mg total) by mouth daily. 90 tablet 3     metoprolol tartrate (LOPRESSOR) 50 MG tablet TAKE ONE TABLET BY MOUTH TWICE DAILY 180 tablet 3     OMEGA-3/DHA/EPA/FISH OIL (FISH OIL-OMEGA-3 FATTY ACIDS) 300-1,000 mg capsule Take 2 g by mouth daily.       omeprazole (PRILOSEC) 20 MG capsule Take 1 capsule (20 mg total) by mouth daily before breakfast. 90 capsule 3     polyethylene glycol (MIRALAX) 17 gram packet Take 17 g by mouth daily.       simvastatin (ZOCOR) 10 MG tablet TAKE ONE TABLET BY  MOUTH ONCE DAILY 90 tablet 3     traZODone (DESYREL) 50 MG tablet Take 1 tablet (50 mg total) by mouth at bedtime. 90 tablet 1     No current facility-administered medications on file prior to visit.        Past Medical/Surgical History, Family History and Social History reviewed in detail and documented separately in the medical record.    Complete Review of Systems:  A 10-point review was performed.  Pertinent positives are noted in the HPI and on a separate scanned document in the chart.    EXAM:  There were no vitals filed for this visit.    Nurse documentation reviewed  and documented separately.    General Appearance: Pleasant, alert, appropriate appearance for age. No acute distress    Head Exam: Normal. Normocephalic, atraumatic.    Eye Exam: Normal external eye, conjunctiva, lids, cornea. Extra-ocular movements are intact.    Left external ear: normal  Left otoscopic exam: Normal EAC. Normal TM     Right external ear: normal  Right otoscopic exam: Normal EAC. Normal TM    Nose Exam: Normal external nose. Septum midline. Nasal mucosa normal.  Inferior turbinates normal.    OroPharynx Exam: Dental hygiene adequate. Normal tongue. Normal buccal mucosa. Normal palate.  Normal pharynx. Normal tonsils.    Neck Exam: Supple, no masses or nodes. Trachea and larynx midline.    Thyroid Exam: No tenderness, nodules or enlargement.    Salivary Glands: nontender without masses    Neuro: Alert and oriented times 3, CN 2-12 grossly intact, no nystagmus, PERRL, EOMI, normal speech and gait    Chest/Respiratory Exam: Normal chest wall motion and respiratory effort. No audible stridor or wheezing.    Cardiovascular Exam: Regular rate and rhythm.  No cyanosis, clubbing or edema.    Pulses: carotid pulses normal    ASSESSMENT:  1. ASNHL (asymmetrical sensorineural hearing loss)        PLAN: Findings, assessment, and management options were discussed. She has had two MRIs in the last 8 years, the last just 11 months ago and  showed no retrocochlear abnormality.  She is medically cleared for hearing aids.  Recommend annual hearing test.

## 2021-06-14 NOTE — TELEPHONE ENCOUNTER
Yes, pt is willing to try the 15mg tablets and cut n half.  Please send to [facility] in [place].  Dasia Huang CMA

## 2021-06-14 NOTE — TELEPHONE ENCOUNTER
Refill Request  Did you contact pharmacy: Yes  Medication name: Gabapentin 600mg three times a day.   Requested Prescriptions      No prescriptions requested or ordered in this encounter     Who prescribed the medication: Kevin Ho MD   Requested Pharmacy: [facility] [place]  Is patient out of medication: No.  7 days left  Patient notified refills processed in 3 business days:  yes and no  Okay to leave a detailed message: yes      Spoke to patient, states that she has called for this refill and didn't get any response. Please advise. Thanks.

## 2021-06-14 NOTE — TELEPHONE ENCOUNTER
Patient contacted.  Offered appointment today with Marek Cai CNP.  Patient declined stating she does not want to come in.  I explained the need for a UA and Culture to be done to determine best medication for treatment.    Patient agreed to schedule with Dr. Ho at 9:40 tomorrow, however, she would like him to review her message to see if he may be willing to prescribe over the phone, without UA/UC.  Mackenzie LOUISE CMA

## 2021-06-15 ENCOUNTER — HOSPITAL ENCOUNTER (OUTPATIENT)
Dept: CARDIOLOGY | Facility: CLINIC | Age: 81
Discharge: HOME | End: 2021-06-15
Attending: INTERNAL MEDICINE
Payer: COMMERCIAL

## 2021-06-15 DIAGNOSIS — R00.2 PALPITATIONS: ICD-10-CM

## 2021-06-15 LAB
AORTIC ROOT: 3.3 CM
AR DECEL SLOPE: 1660 MM/S2
AR PEAK VELOCITY: 415 CM/S
ASCENDING AORTA: 3.3 CM
AV REGURGITANT PEAK GRADIENT: 68.9 MMHG
AV REGURGITATION PRESSURE HALF TIME: 732 MS
BSA FOR ECHO PROCEDURE: 1.69 M2
CV BLOOD PRESSURE: ABNORMAL MMHG
CV ECHO HEIGHT: 64 IN
CV ECHO WEIGHT: 139 LBS
DOP CALC LVOT AREA: 3.14 CM2
DOP CALC LVOT DIAMETER: 2 CM
DOP CALC LVOT PEAK VEL: 105 CM/S
DOP CALC LVOT STROKE VOLUME: 82 CM3
DOP CALCLVOT PEAK VEL VTI: 26.1 CM
EJECTION FRACTION: 66 % (ref 55–75)
FRACTIONAL SHORTENING: 32.1 % (ref 28–44)
INTERVENTRICULAR SEPTUM IN END DIASTOLE: 0.8 CM (ref 0.6–0.9)
IVC PROX: 1.7 CM
IVS/PW RATIO: 0.9
LA AREA 1: 17.2 CM2
LA AREA 2: 19.8 CM2
LEFT ATRIUM LENGTH: 4.42 CM
LEFT ATRIUM SIZE: 4 CM
LEFT ATRIUM TO AORTIC ROOT RATIO: 1.21 NO UNITS
LEFT ATRIUM VOLUME INDEX: 38.8 ML/M2
LEFT ATRIUM VOLUME: 65.5 ML
LEFT VENTRICLE CARDIAC INDEX: 2.7 L/MIN/M2
LEFT VENTRICLE CARDIAC OUTPUT: 4.5 L/MIN
LEFT VENTRICLE DIASTOLIC VOLUME INDEX: 46.7 CM3/M2 (ref 29–61)
LEFT VENTRICLE DIASTOLIC VOLUME: 79 CM3 (ref 46–106)
LEFT VENTRICLE HEART RATE: 55 BPM
LEFT VENTRICLE MASS INDEX: 95.9 G/M2
LEFT VENTRICLE SYSTOLIC VOLUME INDEX: 16 CM3/M2 (ref 8–24)
LEFT VENTRICLE SYSTOLIC VOLUME: 27 CM3 (ref 14–42)
LEFT VENTRICULAR INTERNAL DIMENSION IN DIASTOLE: 5.3 CM (ref 3.8–5.2)
LEFT VENTRICULAR INTERNAL DIMENSION IN SYSTOLE: 3.6 CM (ref 2.2–3.5)
LEFT VENTRICULAR MASS: 162.1 G
LEFT VENTRICULAR OUTFLOW TRACT MEAN GRADIENT: 2 MMHG
LEFT VENTRICULAR OUTFLOW TRACT MEAN VELOCITY: 72.1 CM/S
LEFT VENTRICULAR OUTFLOW TRACT PEAK GRADIENT: 4 MMHG
LEFT VENTRICULAR POSTERIOR WALL IN END DIASTOLE: 0.9 CM (ref 0.6–0.9)
LV STROKE VOLUME INDEX: 48.5 ML/M2
MITRAL VALVE E/A RATIO: 0.7
MV AVERAGE E/E' RATIO: 7.8 CM/S
MV DECELERATION TIME: 250 MS
MV E'TISSUE VEL-LAT: 7.41 CM/S
MV E'TISSUE VEL-MED: 10.6 CM/S
MV LATERAL E/E' RATIO: 9.5
MV MEDIAL E/E' RATIO: 6.6
MV PEAK A VELOCITY: 96 CM/S
MV PEAK E VELOCITY: 70.2 CM/S
NUC REST DIASTOLIC VOLUME INDEX: 2224 LBS
NUC REST SYSTOLIC VOLUME INDEX: 64 IN
PV ACCELERATION TIME: 120 MS
RIGHT VENTRICULAR INTERNAL DIMENSION IN DYSTOLE: 3.4 CM
TRICUSPID REGURGITATION PEAK PRESSURE GRADIENT: 28.9 MMHG
TRICUSPID VALVE ANULAR PLANE SYSTOLIC EXCURSION: 2.9 CM
TRICUSPID VALVE PEAK REGURGITANT VELOCITY: 269 CM/S

## 2021-06-15 ASSESSMENT — MIFFLIN-ST. JEOR: SCORE: 1095.5

## 2021-06-15 NOTE — TELEPHONE ENCOUNTER
Refill Request  Medication name:   Requested Prescriptions     Pending Prescriptions Disp Refills     omeprazole (PRILOSEC) 20 MG capsule 180 capsule 3     Sig: TAKE 1 CAPSULE BY MOUTH TWICE DAILY     Who prescribed the medication: pcp  Last refill on medication: 12/5/20  [facility]  Last appointment with PCP: 1/7/21 Virtual  Next appointment: Appointment scheduled for 12/6/2021    Yoselin Sr CMA

## 2021-06-15 NOTE — PROGRESS NOTES
Assessment & Plan     Lumbar radiculopathy  Recent evaluation by [facility] regarding low back pain radiating into her right hip and involving her knee.  MRI obtained and epidural steroid injection was given at L2-L3.  Review of MRI shows moderate right L2-L3 joint effusions with mild to moderate stenosis but no high-grade spinal canal or foraminal stenosis.  Still having some pain across her back but reports that right leg pain may be partially better.  We discussed increasing her gabapentin back to 600 mg 3 times daily to help manage her symptoms which would be reasonable.  She will slowly increase by 300 mg every 2 weeks.    Osteoarthritis of multiple joints, unspecified osteoarthritis type  Increasing arthritis symptoms involving multiple joints.  Worse when she first starts moving and improves with activity.  Recommending that she continue acetaminophen taking up to 3000 mg daily.    Weight gain  She has gained 8 pounds over the past 3 months.  I suspect this is a side effect of the mirtazapine 7.5 mg at bedtime.  She is not convinced it is helping her anymore than the trazodone and it would be reasonable to discontinue.  We will also check TSH to exclude hypothyroidism.  She will monitor her weight and let me know if it does not return to baseline.  - Thyroid Stimulating Hormone (TSH)    Anxiety, generalized  Mirtazapine was started in November as she was feeling increasing anxiety much of it situational related to Covid and politics.  She was needing more diazepam at that time.  She states that she seems to be doing better and is unsure if she needs this medication.  Given potential side effects of weight gain, will discontinue and have her resume her previous trazodone 50 mg at bedtime.  We discussed restricting her diazepam use taking no more than once or twice per week and only with high levels of anxiety.    Insomnia  Resuming trazodone 50 mg at bedtime for chronic insomnia replacing  mirtazapine  - traZODone (DESYREL) 50 MG tablet  Dispense: 90 tablet; Refill: 3    Essential hypertension  Blood pressure looks well controlled with current medication    Allergic rhinitis  Sent new prescription for Flonase which will hopefully be covered with instructions to give once daily but I still want her to use twice daily.  She may need to purchase OTC to supplement what her insurance will cover.  - fluticasone propionate (FLONASE) 50 mcg/actuation nasal spray  Dispense: 48 g; Refill: 3        34 minutes spent on the date of the encounter doing chart review, history and exam, documentation and further activities as noted above       Return in about 9 months (around 12/12/2021) for Annual physical.    Kevin Ho MD  [facility]    Subjective       HPI 80-year-old woman here to discuss increasing pain involving multiple joints and low back pain radiating into her right leg along with concerns for weight gain with history of anxiety and chronic insomnia.  See assessment and plan for details of visit    Current Outpatient Medications on File Prior to Visit   Medication Sig Dispense Refill     acetaminophen (TYLENOL) 325 MG tablet Take 650 mg by mouth every 6 (six) hours as needed for pain.       amLODIPine (NORVASC) 2.5 MG tablet Take 1 tablet (2.5 mg total) by mouth daily. 90 tablet 3     aspirin 81 MG EC tablet Take 81 mg by mouth daily.       calcium carbonate-vitamin D2 500 mg(1,250mg) -200 unit tablet Take 1 tablet by mouth daily.              gabapentin (NEURONTIN) 600 MG tablet Take 1 tablet (600 mg total) by mouth 3 (three) times a day. (Patient taking differently: Take 300 mg by mouth 3 (three) times a day. ) 270 tablet 3     hydroCHLOROthiazide (HYDRODIURIL) 25 MG tablet Take 1 tablet (25 mg total) by mouth daily. 90 tablet 3     irbesartan (AVAPRO) 300 MG tablet Take 1 tablet (300 mg total) by mouth daily. 90 tablet 3     melatonin 2.5 mg Tab half-tablet Take  "2.5 mg by mouth at bedtime as needed.       metoprolol tartrate (LOPRESSOR) 50 MG tablet TAKE 1 TABLET BY MOUTH TWICE DAILY 180 tablet 3     omeprazole (PRILOSEC) 20 MG capsule TAKE 1 CAPSULE BY MOUTH TWICE DAILY 180 capsule 3     polyethylene glycol (MIRALAX) 17 gram packet Take 17 g by mouth daily.       SHINGRIX, PF, 50 mcg/0.5 mL SusR TO BE ADMINISTERED BY PHARMACIST FOR IMMUNIZATION  0     simvastatin (ZOCOR) 10 MG tablet Take 1 tablet (10 mg total) by mouth at bedtime. 90 tablet 3     [DISCONTINUED] fluticasone propionate (FLONASE) 50 mcg/actuation nasal spray SHAKE LIQUID AND USE 2 SPRAYS IN EACH NOSTRIL TWICE DAILY 96 g 3     [DISCONTINUED] mirtazapine (REMERON) 15 MG tablet Take 0.5 tablets (7.5 mg total) by mouth at bedtime. 45 tablet 3     diazePAM (VALIUM) 5 MG tablet TAKE 1 TABLET(5 MG) BY MOUTH DAILY AS NEEDED FOR ANXIETY 60 tablet 0     No current facility-administered medications on file prior to visit.         Review of Systems    12 point ROS is negative other than what is described in assessment and plan and above      Objective    Vitals:    03/12/21 1454   BP: 120/72   Patient Site: Right Arm   Patient Position: Sitting   Pulse: (!) 58   Temp: 97.7  F (36.5  C)   Weight: 141 lb (64 kg)   Height: 5' 4\" (1.626 m)        Physical Exam  Abdomen is soft without hepatosplenomegaly masses or tenderness  Negative straight leg raise  Normal range of motion of right hip including flexion and rotation.  Normal range of motion of knee with good flexion and no effusion      "

## 2021-06-15 NOTE — PATIENT INSTRUCTIONS - HE
Increase gabapentin to 300 mg in the morning, 300 mg in the afternoon and 600 mg at bedtime for the next 2 weeks.  If this seems to be helping, continue to increase the dose by 300 mg every 2 weeks back to your previous dose of 600 mg 3 times daily.

## 2021-06-16 PROBLEM — H91.92 HEARING LOSS IN LEFT EAR: Status: ACTIVE | Noted: 2017-10-20

## 2021-06-16 PROBLEM — N39.41 URGE INCONTINENCE OF URINE: Status: ACTIVE | Noted: 2021-06-07

## 2021-06-16 PROBLEM — R00.2 PALPITATIONS: Status: RESOLVED | Noted: 2021-06-07 | Resolved: 2021-06-15

## 2021-06-16 PROBLEM — R63.5 WEIGHT GAIN: Status: ACTIVE | Noted: 2021-03-12

## 2021-06-16 PROBLEM — C44.91 SKIN CANCER, BASAL CELL: Status: ACTIVE | Noted: 2017-10-20

## 2021-06-17 ENCOUNTER — COMMUNICATION - HEALTHEAST (OUTPATIENT)
Dept: INTERNAL MEDICINE | Facility: CLINIC | Age: 81
End: 2021-06-17

## 2021-06-17 DIAGNOSIS — I10 ESSENTIAL HYPERTENSION: ICD-10-CM

## 2021-06-17 NOTE — TELEPHONE ENCOUNTER
Pharmacy called to notify us that her insurance will not cover Fluticasone. Do you want to try something else or do a prior auth? Thanks.

## 2021-06-17 NOTE — TELEPHONE ENCOUNTER
I suspect they will not cover it as it is an OTC medication.  Let patient know that she will have to purchase the medicine out-of-pocket as it is over-the-counter.

## 2021-06-17 NOTE — TELEPHONE ENCOUNTER
Refill Request  Medication name:   Requested Prescriptions     Pending Prescriptions Disp Refills     fluticasone propionate (FLONASE) 50 mcg/actuation nasal spray 48 g 3     Si sprays into each nostril daily.     Who prescribed the medication: [place]  Last refill on medication: 21  Requested Pharmacy: [facility]  Last appointment with PCP: 21  Next appointment: Appointment scheduled for 21    RT Meagan (R)

## 2021-06-21 NOTE — PROGRESS NOTES
Assessment and Plan:       1. Medicare annual wellness visit, subsequent  Immunizations are reviewed and flu shot given.  Recommending Shingrix.  She has a living will.  Non-smoker.  Uses alcohol in moderation.  Exercising on a regular basis.  Up to date with colonoscopies and this should be repeated in January 2019.  Breast exam completed and she will continue to get a mammogram annually.  Pelvic exam normal.  Last DEXA was 2017 and was stable and this should be repeated 2-3 years. Dementia and depression screening completed.  She sees an ophthalmologist regularly and gets glaucoma screening.  Skin exam performed and recommending regular use of sunblock.    Will screen for diabetes with fasting glucose.      2. Need for immunization against influenza    - Influenza High Dose, Seasonal 65+ yrs    3. Essential hypertension  Good blood pressure control with current medication  - Basic Metabolic Panel  - Urinalysis  - Hemoglobin    4. Hyperlipidemia  Recheck lipid profile on simvastatin and monitor LFTs.  Continue aspirin daily  - Lipid Cascade  - Hepatic Profile    5. Anxiety, generalized  Using diazepam more frequently because of increasing stress worrying about [place].  We discussed restricting to using no more than 2 or 3 times per week.    6. IBS (irritable bowel syndrome)  Well-controlled with MiraLAX.  No further abdominal pain    7. Allergic rhinitis  Using Flonase.  We discussed tapering dose to 2 sprays each nostril daily    8. Chronic headaches  Well-controlled with gabapentin    9. GERD (gastroesophageal reflux disease) and history of peptic ulcer disease  Well-controlled with omeprazole    10. Osteopenia  DEXA 2017 stable.  Continue to get adequate calcium and vitamin D.  Recheck 2-3 years    11. Overactive bladder  Discussed Kegel exercises    12. Skin cancer, basal cell  She sees dermatologist regularly    13. Insomnia  Using trazodone at bedtime        The patient's current medical problems were  reviewed.    Over 25 minutes was spent addressing these chronic and new medical problems beyond time spent performing annual wellness visit with over 50% of the time spent counseling and coordination of care    I have had an Advance Directives discussion with the patient.  The following health maintenance schedule was reviewed with the patient and provided in printed form in the after visit summary:   Health Maintenance   Topic Date Due     FALL RISK ASSESSMENT  10/22/2019     DXA SCAN  12/12/2019     TD 18+ HE  09/28/2022     ADVANCE DIRECTIVES DISCUSSED WITH PATIENT  10/22/2023     PNEUMOCOCCAL POLYSACCHARIDE VACCINE AGE 65 AND OVER  Completed     INFLUENZA VACCINE RULE BASED  Completed     PNEUMOCOCCAL CONJUGATE VACCINE FOR ADULTS (PCV13 OR PREVNAR)  Completed     ZOSTER VACCINE  Completed        Subjective:   Chief Complaint: Rene Matthews is an 78 y.o. female here for an Annual Wellness visit.   HPI: In addition to wellness visit, multiple chronic medical problems addressed.    Hypertension is currently well controlled with combination of amlodipine, hydrochlorothiazide, metoprolol, and Avapro.  Heart rate runs low but asymptomatic and unchanged.  Denies chest pain.  No TIA or CVA symptoms.    Cholesterol is well managed taking simvastatin without side effects.  No myalgias.    IBS and chronic abdominal pain with chronic constipation well controlled with MiraLAX.  She will often have several stools after taking but is tolerable.    Chronic headaches well controlled with gabapentin 600 mg 3 times daily.    Chronic anxiety worsened by concerns for 's health and political climate.  Using Valium more frequently but understands habit-forming nature and the need to restrict taking it daily.  Also uses trazodone at bedtime to help with sleep.    Review of Systems:    Please see above.  The rest of the review of systems are negative for all systems.    Patient Care Team:  Kevin Ho MD as PCP - General  (Internal Medicine)     Patient Active Problem List   Diagnosis     Hypertension     Hyperlipidemia     IBS (irritable bowel syndrome)     Osteoarthritis     Personal history of colonic polyps     Allergic rhinitis     Peptic ulcer disease     Anxiety, generalized     Insomnia     Chronic abdominal pain     GERD (gastroesophageal reflux disease)     Chronic headaches     Overactive bladder     Hearing loss in left ear     Skin cancer, basal cell     Osteopenia     Past Medical History:   Diagnosis Date     Abnormal Pap smear of cervix     ASCUS with negative biopsy     Allergic rhinitis      Anxiety, generalized      Cervical polyp      Chest pain     secondary to gerd     Chronic abdominal pain     Negative CT scan and colonoscopy, musculoskeletal etiology suspected     Chronic sinusitis 10/18/2016     Frequent headaches      GERD (gastroesophageal reflux disease)     Noncardiac chest pain     Hearing loss in left ear 10/20/2017     Herpes zoster 2006     Hyperlipidemia      Hypertension      IBS (irritable bowel syndrome)      Insomnia      Osteoarthritis      Osteopenia     DEXA 2014 T score -2.0 stable, DEXA December 2017 T score -1.6 left femoral neck stable     Overactive bladder 10/18/2016     Peptic ulcer disease 2006    w/ gastric ulcer     Personal history of colonic polyps     Last colonoscopy January 2014 normal     Screening     Negative for AAA CT scan 2013     Skin cancer, basal cell 10/20/2017      Past Surgical History:   Procedure Laterality Date     Basal cell skin cancer       CERVICAL BIOPSY  W/ LOOP ELECTRODE EXCISION       ENDOMETRIAL BIOPSY      Negative     OVARIAN CYST REMOVAL      lysis of adhesions     Radiofrequency ablation right greater saphenous vein  2012     STRABISMUS SURGERY  2015      Family History   Problem Relation Age of Onset     Stroke Mother      Hypertension Sister      Hypertension Brother       Social History     Social History     Marital status:      Spouse  name: N/A     Number of children: N/A     Years of education: N/A     Occupational History     Not on file.     Social History Main Topics     Smoking status: Never Smoker     Smokeless tobacco: Never Used     Alcohol use No     Drug use: Not on file     Sexual activity: Not on file     Other Topics Concern     Not on file     Social History Narrative    Retired teacher    , [place], 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      Current Outpatient Prescriptions   Medication Sig Dispense Refill     acetaminophen (TYLENOL) 325 MG tablet Take 650 mg by mouth every 6 (six) hours as needed for pain.       amLODIPine (NORVASC) 2.5 MG tablet Take 1 tablet (2.5 mg total) by mouth daily. 90 tablet 3     aspirin 81 MG EC tablet Take 81 mg by mouth daily.       calcium carbonate-vitamin D2 500 mg(1,250mg) -200 unit tablet Take 1 tablet by mouth 2 (two) times a day.       diazePAM (VALIUM) 5 MG tablet Take 1 tablet (5 mg total) by mouth every 6 (six) hours as needed for anxiety. 60 tablet 0     fluticasone (FLONASE) 50 mcg/actuation nasal spray INSTILL 2 SPRAYS IN EACH NOSTRIL TWICE DAILY 48 g 2     gabapentin (NEURONTIN) 600 MG tablet Take 1 tablet (600 mg total) by mouth 3 (three) times a day. 270 tablet 3     hydroCHLOROthiazide (HYDRODIURIL) 25 MG tablet Take 1 tablet (25 mg total) by mouth daily. 90 tablet 3     irbesartan (AVAPRO) 300 MG tablet Take 1 tablet (300 mg total) by mouth daily. 90 tablet 3     metoprolol tartrate (LOPRESSOR) 50 MG tablet TAKE ONE TABLET BY MOUTH TWICE DAILY 180 tablet 3     omeprazole (PRILOSEC) 20 MG capsule TAKE 1 CAPSULE BY MOUTH TWICE DAILY 180 capsule 0     polyethylene glycol (MIRALAX) 17 gram packet Take 17 g by mouth daily.       simvastatin (ZOCOR) 10 MG tablet TAKE 1 TABLET BY MOUTH EVERY DAY 90 tablet 1     traZODone (DESYREL) 50 MG tablet TAKE 1 TABLET BY MOUTH EVERY NIGHT AT BEDTIME 90 tablet 1     OMEGA-3/DHA/EPA/FISH OIL (FISH OIL-OMEGA-3 FATTY ACIDS) 300-1,000 mg capsule Take 2 g by  "mouth daily.       No current facility-administered medications for this visit.       Objective:   Vital Signs:   Visit Vitals     /70 (Patient Site: Left Arm, Patient Position: Sitting, Cuff Size: Adult Regular)     Pulse (!) 50     Ht 5' 4.5\" (1.638 m)     Wt 136 lb (61.7 kg)     LMP  (LMP Unknown)     SpO2 98%     BMI 22.98 kg/m2            PHYSICAL EXAM  EYES: Eyelids, conjunctiva, and sclera were normal. Pupils were normal. Cornea, iris, and lens were normal bilaterally.  HEAD, EARS, NOSE, MOUTH, AND THROAT: Head and face were normal. Nose appearance was normal and there was no discharge. Oropharynx was normal.  NECK: Neck appearance was normal. There were no neck masses and the thyroid was not enlarged and no nodules are felt.  No lymphadenopathy.  RESPIRATORY: Breathing pattern was normal and the chest moved symmetrically.  Percussion/auscultatory percussion was normal.  Lung sounds were normal and there were no rales or wheezes.  CARDIOVASCULAR: Heart rate and rhythm were normal.  S1 and S2 were normal and there were no extra sounds or murmurs. Peripheral pulses in arms and legs were normal.  Jugular venous pressure was normal.  There was no peripheral edema.  No carotid bruits.  BREAST: No palpable masses or tenderness.  No axillary nodes.  GASTROINTESTINAL: The abdomen was normal in contour.  Bowel sounds were present.   Palpation detected no tenderness, mass, or enlarged organs.   PELVIC: No external lesions.  Uterus was normal size, shape, and consistency, without palpable lesions.  Adnexa were nontender without palpable mass.  MUSCULOSKELETAL: Skeletal configuration was normal and muscle mass was normal for age. Joint appearance was overall normal.  LYMPHATIC: There were no enlarged nodes.  SKIN/HAIR/NAILS: Skin color was normal.  Hair and nails were normal.There were no skin lesions.  NEUROLOGIC: The patient was alert and oriented to person, place, time, and circumstance. Speech was normal. " Cranial nerves were normal. Motor strength was normal for age. The patient was normally coordinated.  Sensation intact.  PSYCHIATRIC:  Mood and affect were normal and the patient had normal recent and remote memory. The patient's judgment and insight were normal.    Assessment Results 10/22/2018   Activities of Daily Living No help needed   Instrumental Activities of Daily Living No help needed   Get Up and Go Score Less than 12 seconds   Mini Cog Total Score 5   Some recent data might be hidden     A Mini-Cog score of 0-2 suggests the possibility of dementia, score of 3-5 suggests no dementia    Identified Health Risks:     She already has hearing aids  She is at risk for falling and has been provided with information to reduce the risk of falling at home.  Patient's advanced directive was discussed and I am comfortable with the patient's wishes.

## 2021-06-23 NOTE — TELEPHONE ENCOUNTER
Who is calling:  Patient   Reason for Call:  Requesting past dates of colonscopies. Writer went through chart with patient. No further f/u needed for patient   Date of last appointment with primary care: 10/22/18  Has the patient been recently seen:  No  Okay to leave a detailed message: Yes

## 2021-06-23 NOTE — TELEPHONE ENCOUNTER
Refill Approved    Rx renewed per Medication Renewal Policy. Medication was last renewed on 7/24/18.    Elba Bowman, [facility] Triage/Med Refill 1/22/2019     Requested Prescriptions   Pending Prescriptions Disp Refills     omeprazole (PRILOSEC) 20 MG capsule [Pharmacy Med Name: OMEPRAZOLE 20MG CAPSULES] 180 capsule 0     Sig: TAKE 1 CAPSULE BY MOUTH TWICE DAILY    GI Medications Refill Protocol Passed - 1/20/2019  3:14 PM       Passed - PCP or prescribing provider visit in last 12 or next 3 months.    Last office visit with prescriber/PCP: 12/22/2017 Kevin Ho MD OR same dept: Visit date not found OR same specialty: 12/22/2017 Kevin Ho MD  Last physical: 10/22/2018 Last MTM visit: Visit date not found   Next visit within 3 mo: Visit date not found  Next physical within 3 mo: Visit date not found  Prescriber OR PCP: Kevin Ho MD  Last diagnosis associated with med order: 1. Peptic ulcer disease  - omeprazole (PRILOSEC) 20 MG capsule [Pharmacy Med Name: OMEPRAZOLE 20MG CAPSULES]; TAKE 1 CAPSULE BY MOUTH TWICE DAILY  Dispense: 180 capsule; Refill: 0    If protocol passes may refill for 12 months if within 3 months of last provider visit (or a total of 15 months).

## 2021-06-24 NOTE — PROGRESS NOTES
S: Patient with  were seen today at the [facility] in [place] with a prescription from Dr. Marmolejo for bilateral custom FOs. According to the patient, she is experiencing pain and discomfort at the RT MTPJs while ambulating, active, or when pressure is applied to the area. Patient reports that the pain was first noticed 6 months ago. Patient states that she tapes her toes together to prevent them from splaying out due to advice given to her by a family member who is orthopedic physician.     O: Patient is 5'5'', weighs 132 lbs, and current shoe size is a woman's 9.5/10. Foot exam was not performed at this visit.     A: Patient was informed that her insurance does not cover custom FOs due to her diagnosis. Patient was shown examples of off the shelf FOs to try ([employer]) if she does not wish to proceed with the custom FOs.     P: Patient will purchase a pair of off the shelf [employer] FOs to trial before proceeding further with any treatment. Patient will follow up with the [facility] as needed.     This note was electronically signed by Stevie BHANDARI , ABC #[id number], License #[id number]

## 2021-06-24 NOTE — PROGRESS NOTES
FOOT AND ANKLE SURGERY/PODIATRY CONSULT NOTE         ASSESSMENT:   Kennedy's neuroma right foot, pronation deformity bilateral feet      TREATMENT:  I have recommended orthotics with a neuroma plug.        HPI:Rene Matthews is a pleasant 78-year-old female presented to the clinic today complaining of pain involving her right foot.  The patient stated that the pain is on the ball of her right foot and affects the third and fourth toes of her right foot.  She stated that she feels like there is a palpable on the ball of her foot.  This is aggravated with weightbearing and ambulation.  She has very little pain while resting.  She stated that her overall pain is mild in nature.  She denies any trauma to the right foot.  She has not had any associated redness or swelling.  The pain is completely relieved with nonweightbearing.  She denies any other previous treatment.      Past Medical History:   Diagnosis Date     Abnormal Pap smear of cervix     ASCUS with negative biopsy     Allergic rhinitis      Anxiety, generalized      Cervical polyp      Chest pain     secondary to gerd     Chronic abdominal pain     Negative CT scan and colonoscopy, musculoskeletal etiology suspected     Chronic sinusitis 10/18/2016     Frequent headaches      GERD (gastroesophageal reflux disease)     Noncardiac chest pain     Hearing loss in left ear 10/20/2017     Herpes zoster 2006     Hyperlipidemia      Hypertension      IBS (irritable bowel syndrome)      Insomnia      Osteoarthritis      Osteopenia     DEXA 2014 T score -2.0 stable, DEXA December 2017 T score -1.6 left femoral neck stable     Overactive bladder 10/18/2016     Peptic ulcer disease 2006    w/ gastric ulcer     Personal history of colonic polyps      colonoscopy January 2014 normal.  Colonoscopy January 2019 with multiple polyps, repeat in 3 years     Screening     Negative for AAA CT scan 2013     Skin cancer, basal cell 10/20/2017       Past Surgical History:   Procedure  Laterality Date     Basal cell skin cancer       CERVICAL BIOPSY  W/ LOOP ELECTRODE EXCISION       ENDOMETRIAL BIOPSY      Negative     OVARIAN CYST REMOVAL      lysis of adhesions     Radiofrequency ablation right greater saphenous vein  2012     STRABISMUS SURGERY  2015       No Known Allergies      Current Outpatient Medications:      acetaminophen (TYLENOL) 325 MG tablet, Take 650 mg by mouth every 6 (six) hours as needed for pain., Disp: , Rfl:      amLODIPine (NORVASC) 2.5 MG tablet, TAKE 1 TABLET(2.5 MG) BY MOUTH DAILY, Disp: 90 tablet, Rfl: 2     aspirin 81 MG EC tablet, Take 81 mg by mouth daily., Disp: , Rfl:      calcium carbonate-vitamin D2 500 mg(1,250mg) -200 unit tablet, Take 1 tablet by mouth 2 (two) times a day., Disp: , Rfl:      diazePAM (VALIUM) 5 MG tablet, Take 1 tablet (5 mg total) by mouth every 6 (six) hours as needed for anxiety., Disp: 60 tablet, Rfl: 0     fluticasone (FLONASE) 50 mcg/actuation nasal spray, PLACE 2 SPRAYS IN EACH NOSTRIL TWICE DAILY, Disp: 48 g, Rfl: 3     gabapentin (NEURONTIN) 600 MG tablet, Take 1 tablet (600 mg total) by mouth 3 (three) times a day., Disp: 270 tablet, Rfl: 3     hydroCHLOROthiazide (HYDRODIURIL) 25 MG tablet, Take 1 tablet (25 mg total) by mouth daily., Disp: 90 tablet, Rfl: 3     irbesartan (AVAPRO) 300 MG tablet, TAKE 1 TABLET BY MOUTH EVERY DAY, Disp: 90 tablet, Rfl: 3     metoprolol succinate (TOPROL-XL) 100 MG 24 hr tablet, Take 100 mg by mouth., Disp: , Rfl:      metoprolol tartrate (LOPRESSOR) 50 MG tablet, TAKE 1 TABLET BY MOUTH TWICE DAILY, Disp: 180 tablet, Rfl: 3     olmesartan (BENICAR) 40 MG tablet, Take 40 mg by mouth., Disp: , Rfl:      omeprazole (PRILOSEC) 20 MG capsule, TAKE 1 CAPSULE BY MOUTH TWICE DAILY, Disp: 180 capsule, Rfl: 9     polyethylene glycol (MIRALAX) 17 gram packet, Take 17 g by mouth daily., Disp: , Rfl:      SHINGRIX, PF, 50 mcg/0.5 mL SusR, TO BE ADMINISTERED BY PHARMACIST FOR IMMUNIZATION, Disp: , Rfl: 0      simvastatin (ZOCOR) 10 MG tablet, Take 1 tablet (10 mg total) by mouth daily., Disp: 90 tablet, Rfl: 3     traZODone (DESYREL) 50 MG tablet, TAKE 1 TABLET BY MOUTH EVERY NIGHT AT BEDTIME, Disp: 90 tablet, Rfl: 3    Family History   Problem Relation Age of Onset     Stroke Mother      Hypertension Sister      Hypertension Brother        Social History     Socioeconomic History     Marital status:      Spouse name: Not on file     Number of children: Not on file     Years of education: Not on file     Highest education level: Not on file   Occupational History     Not on file   Social Needs     Financial resource strain: Not on file     Food insecurity:     Worry: Not on file     Inability: Not on file     Transportation needs:     Medical: Not on file     Non-medical: Not on file   Tobacco Use     Smoking status: Never Smoker     Smokeless tobacco: Never Used   Substance and Sexual Activity     Alcohol use: No     Drug use: No     Sexual activity: Not on file   Lifestyle     Physical activity:     Days per week: Not on file     Minutes per session: Not on file     Stress: Not on file   Relationships     Social connections:     Talks on phone: Not on file     Gets together: Not on file     Attends [employer] service: Not on file     Active member of club or organization: Not on file     Attends meetings of clubs or organizations: Not on file     Relationship status: Not on file     Intimate partner violence:     Fear of current or ex partner: Not on file     Emotionally abused: Not on file     Physically abused: Not on file     Forced sexual activity: Not on file   Other Topics Concern     Not on file   Social History Narrative    Retired teacher    , [place], 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       Review of Systems - Patient denies fever, chills, rash, wound, stiffness, limping, numbness, weakness, heart burn, blood in stool, chest pain with activity, calf pain when walking, shortness of breath with  activity, chronic cough, easy bleeding/bruising, swelling of ankles, excessive thirst, fatigue, depression, anxiety.  Patient admits to right foot pain.      OBJECTIVE:  Appearance: alert, well appearing, and in no distress.    Vitals:    03/06/19 1358   BP: 120/62   Pulse: 64   Resp: 20       BMI= Body mass index is 21.97 kg/m .    General appearance: Patient is alert and fully cooperative with history & exam.  No sign of distress is noted during the visit.  Psychiatric: Affect is pleasant & appropriate.  Patient appears motivated to improve health.  Respiratory: Breathing is regular & unlabored while sitting.  HEENT: Hearing is intact to spoken word.  Speech is clear.  No gross evidence of visual impairment that would impact ambulation.    Vascular: Dorsalis pedis and posterior tibial pulses are palpable. There is pedal hair growth bilaterally.  CFT < 3 sec from anterior tibial surface to distal digits bilaterally. There is no appreciable edema noted.  Dermatologic: Turgor and texture are within normal limits. No coloration or temperature changes. No primary or secondary lesions noted.  Neurologic: All epicritic and proprioceptive sensations are grossly intact bilaterally.  There is a positive Bj sign noted third intermetatarsal space right foot.  There is pain on palpation third intermetatarsal space right foot  Musculoskeletal: All active and passive ankle, subtalar, midtarsal, and 1st MPJ range of motion are grossly intact without pain or crepitus, with the exception of none. Manual muscle strength is +5/5 bilaterally in all compartments. All dorsiflexors, plantarflexors, invertors, evertors are intact bilaterally. Tenderness present to third intermetatarsal space right foot on palpation.  No tenderness to right foot with range of motion.  There is severe flattening of the medial longitudinal arch noted bilaterally upon weightbearing.  Calf is soft and non-tender without warmth or induration    Imaging:      No results found.       TREATMENT:  I have recommended orthotics with a neuroma plug.  The patient is to return to the clinic if her pain persist.    Iban Marmolejo; CRYS  [facility] Foot & Ankle Surgery/Podiatry

## 2021-06-24 NOTE — PATIENT INSTRUCTIONS - HE
Please call one of the [place] locations below to schedule an appointment. If you received a prescription please bring it with you to your appointment. Some locations are limited to what they carry.    Office Locations    [facility]  [address] [place] [address]  Home Medical Equipment, [address]   Phone: [phone]   Orthotics and Prosthetics, [address]   Phone: [phone]    [facility]  Home Medical Equipment  [address], [address], [place] [address]   Phone: [phone]    Orthotics and Prosthetics ([facility])    [address], [address], [place] [address]  Phone: [phone]    [facility] [place]  [address]  [address]   [place] [address]   Phone: [phone]    [facility]  [address]  [place] [address]  Phone: [phone]    [facility] Bld[facility]   [address]  [place] [address]  Phone: [phone]    [facility]  [address]  [place] [address]  Phone: [phone]    [facility]  [address]  [address]  [place] [address]  Phone: [phone]    [place]   [address] [place] [address]   Phone: [phone]

## 2021-06-25 NOTE — ED TRIAGE NOTES
"Pt presents to ED with reports of \"fluttery\" heart beat starting around 1730 after washing windows today.  Pt states she rode her bike 10 miles this morning, which is normal for her.  Denies chest pain or shortness of breath. No hx of irregular heart rate.  Takes metoprolol for HTN.  States feeling has been constant since onset.  "

## 2021-06-25 NOTE — TELEPHONE ENCOUNTER
Triage Call: Today around supper time patient noted having an irregular heart beat, fluttering in her chest. Prior to the call /99 Pulse 125. No shortness of breath, no chest pain. BP now 166/86 Pulse 110. Patient was very active earlier today. Biked 10 miles and washed windows.     Paging Dr Kevin Ho Per MD--Patient needs to be seen in the ER. May have gone into [facility]. Okay to go to a nearby hospital.     The patient was called back and given Dr Ho's advisement. Patient stated understanding and will go into the [facility] ER.     COVID 19 Nurse Triage Plan/Patient Instructions    Please be aware that novel coronavirus (COVID-19) may be circulating in the community. If you develop symptoms such as fever, cough, or SOB or if you have concerns about the presence of another infection including coronavirus (COVID-19), please contact your health care provider or visit  [email or web address].    Disposition/Instructions    ED Visit recommended. Follow protocol based instructions.      Bring Your Own Device:  Please also bring your smart device(s) (smart phones, tablets, laptops) and their charging cables for your personal use and to communicate with your care team during your visit.      Thank you for taking steps to prevent the spread of this virus.  o Limit your contact with others.  o Wear a simple mask to cover your cough.  o Wash your hands well and often.    Resources    [facility]: About COVID-19: [email or web address]    CDC: What to Do If You're Sick: www.cdc.gov/coronavirus/2019-ncov/about/steps-when-sick.html    CDC: Ending Home Isolation: www.cdc.gov/coronavirus/2019-ncov/hcp/disposition-in-home-patients.html     CDC: Caring for Someone: www.cdc.gov/coronavirus/2019-ncov/if-you-are-sick/care-for-someone.html     MANJINDER: Interim Guidance for Hospital Discharge to Home: www.health.[facility].[place]/diseases/coronavirus/hcp/hospdischarge.pdf    [facility]  clinical trials (COVID-19 research studies): [email or web address]-clinical-trials     Below are the COVID-19 hotlines at the [place] Department of Health ([facility]). Interpreters are available.   o For health questions: Call 120-346-9180 or 1-277.453.5023 (7 a.m. to 7 p.m.)  o For questions about schools and childcare: Call 154-518-5133 or 1-663.988.7795 (7 a.m. to 7 p.m.)     Serena Ford RN Nurse Triage 5/31/2021 6:39 PM     Reason for Disposition    Age > 60 years (Exception: brief heart beat symptoms that went away and now feels well)    [1] Skipped or extra beat(s) AND [2] increases with exercise or exertion    [1] Skipped or extra beat(s) AND [2] occurs 4 or more times per minute    Additional Information    Negative: Passed out (i.e., lost consciousness, collapsed and was not responding)    Negative: Shock suspected (e.g., cold/pale/clammy skin, too weak to stand, low BP, rapid pulse)    Negative: Difficult to awaken or acting confused (e.g., disoriented, slurred speech)    Negative: Visible sweat on face or sweat dripping down face    Negative: Unable to walk, or can only walk with assistance (e.g., requires support)    Negative: [1] Received SHOCK from implantable cardiac defibrillator AND [2] persisting symptoms (i.e., palpitations, lightheadedness)    Negative: [1] Dizziness, lightheadedness, or weakness AND [2] heart beating very rapidly (e.g., > 140 / minute)    Negative: [1] Dizziness, lightheadedness, or weakness AND [2] heart beating very slowly  (e.g., < 50 / minute)    Negative: Sounds like a life-threatening emergency to the triager    Negative: Chest pain    Negative: Difficulty breathing    Negative: Dizziness, lightheadedness, or weakness    Negative: [1] Heart beating very rapidly (e.g., > 140 / minute) AND [2] present now  (Exception: during exercise)    Negative: Heart beating very slowly (e.g., < 50 / minute)  (Exception: athlete)    Negative: New or worsened shortness of breath with  activity (dyspnea on exertion)    Negative: Patient sounds very sick or weak to the triager    Negative: [1] Heart beating very rapidly (e.g., > 140 / minute) AND [2] not present now  (Exception: during exercise)    Protocols used: HEART RATE AND HEARTBEAT [id number]

## 2021-06-25 NOTE — TELEPHONE ENCOUNTER
Refill Request  Medication name:   Requested Prescriptions     Pending Prescriptions Disp Refills     irbesartan (AVAPRO) 300 MG tablet 90 tablet 3     Sig: Take 1 tablet (300 mg total) by mouth daily.     Who prescribed the medication: [place]  Last refill on medication: 06/02/21  Requested Pharmacy: [facility]  Last appointment with PCP: 06/07/21  Next appointment: Appointment scheduled for 12/06/21    RT Miguel RhodesR)

## 2021-06-26 NOTE — PROGRESS NOTES
Assessment & Plan     Palpitations  80-year-old woman with hypertension who had episode of palpitations with racing irregular heart last week.  By the time she got to urgent care, symptoms had resolved and her EKG showed normal sinus rhythm.  Her  who has atrial fibrillation also checked her pulse and felt it was irregular.  Besides palpitations, she did not feel lightheaded, dizzy or short of breath.  There was some chest discomfort but this resolved after belching.  She has no history of atrial fibrillation but risk factors are present.  Her exam is unremarkable.  Reviewed labs from last week including normal TSH.  There was hypokalemia with potassium 3.3 which may have been a contributing factor.  This will be rechecked.  I would recommend getting an echocardiogram completed to make sure there is no structural heart disease.  We discussed wearing a monitor but she would prefer to wait to see if symptoms recur which seems reasonable.  She should certainly wear a 2-week event monitor should she have any recurrent palpitations/rapid heart rate even if brief in duration.  We also discussed being seen immediately should any such symptoms occur.  - Echo Complete    Essential hypertension  Blood pressure is well controlled with combination of medications including amlodipine, metoprolol, Avapro and HCTZ.  Hypokalemia seen last week.  Secondary to HCTZ.  If persistent or potassium on the low end of normal, I would recommend starting potassium replacement long-term.  - Basic Metabolic Panel    Urge incontinence of urine  She also wants to discuss problems she is having with urinary urgency and incontinence.  Steadily worsening problem.  Denies dysuria.  No hematuria.  Will obtain UA UC but I suspect she is experiencing urge incontinence.  We discussed the role of Kegel exercises.  Information provided.  If symptoms persist and are not responding to this, she may benefit from referral to urology clinic to discuss  other treatment options including biofeedback.  - Urinalysis-UC if Indicated    Anxiety, generalized  She needs a refill of her diazepam which she takes infrequently when anxiety levels are high  - diazePAM (VALIUM) 5 MG tablet  Dispense: 60 tablet; Refill: 0    Irritable bowel syndrome with constipation  She continues to get bloating and gas.  She uses MiraLAX but stools can frequently be too loose.  We discussed cutting back on the amount or frequency that she uses    Encounter for therapeutic drug monitoring  Recheck magnesium while on diuretic  - Magnesium        35 minutes spent on the date of the encounter doing chart review, history and exam, documentation and further activities per the note       Return in about 6 months (around 12/7/2021) for Annual physical.    Kevin Ho MD  [facility]    Subjective       HPI 80-year-old woman here to discuss recent episode of palpitations with suspicion for paroxysmal atrial fibrillation.  See assessment and plan for details of visit    Current Outpatient Medications on File Prior to Visit   Medication Sig Dispense Refill     acetaminophen (TYLENOL) 325 MG tablet Take 650 mg by mouth every 6 (six) hours as needed for pain.       amLODIPine (NORVASC) 2.5 MG tablet Take 1 tablet (2.5 mg total) by mouth daily. 90 tablet 3     aspirin 81 MG EC tablet Take 81 mg by mouth daily.       calcium carbonate-vitamin D2 500 mg(1,250mg) -200 unit tablet Take 1 tablet by mouth daily.              fluticasone propionate (FLONASE) 50 mcg/actuation nasal spray 2 sprays into each nostril daily. 48 g 3     gabapentin (NEURONTIN) 600 MG tablet Take 1 tablet (600 mg total) by mouth 3 (three) times a day. (Patient taking differently: Take 300 mg by mouth 3 (three) times a day. ) 270 tablet 3     hydroCHLOROthiazide (HYDRODIURIL) 25 MG tablet Take 1 tablet (25 mg total) by mouth daily. 90 tablet 3     irbesartan (AVAPRO) 300 MG tablet Take 1 tablet  "(300 mg total) by mouth daily. 90 tablet 3     melatonin 2.5 mg Tab half-tablet Take 2.5 mg by mouth at bedtime as needed.       metoprolol tartrate (LOPRESSOR) 50 MG tablet TAKE 1 TABLET BY MOUTH TWICE DAILY 180 tablet 3     omeprazole (PRILOSEC) 20 MG capsule TAKE 1 CAPSULE BY MOUTH TWICE DAILY 180 capsule 3     polyethylene glycol (MIRALAX) 17 gram packet Take 17 g by mouth daily.       simvastatin (ZOCOR) 10 MG tablet Take 1 tablet (10 mg total) by mouth at bedtime. 90 tablet 3     traZODone (DESYREL) 50 MG tablet Take 1 tablet (50 mg total) by mouth at bedtime. 90 tablet 3     [DISCONTINUED] diazePAM (VALIUM) 5 MG tablet TAKE 1 TABLET(5 MG) BY MOUTH DAILY AS NEEDED FOR ANXIETY 60 tablet 0     [DISCONTINUED] Lactobacillus rhamnosus GG (CULTURELLE) 10-15 Billion cell capsule Take 1 capsule by mouth daily. 30 capsule 1     [DISCONTINUED] SHINGRIX, PF, 50 mcg/0.5 mL SusR TO BE ADMINISTERED BY PHARMACIST FOR IMMUNIZATION  0     No current facility-administered medications on file prior to visit.         Review of Systems  No edema.  No exertional chest pain.  12 point ROS is negative other than what is described in assessment and plan and above      Objective    Vitals:    06/07/21 1300 06/07/21 1328   BP: 150/64 136/78   Patient Site: Right Arm    Patient Position: Sitting    Cuff Size: Adult Regular    Pulse: (!) 47    Temp: 97.4  F (36.3  C)    TempSrc: Tympanic    SpO2: 98%    Weight: 139 lb (63 kg)    Height: 5' 4\" (1.626 m)         Physical Exam  Well-appearing elderly woman  Lungs clear bilaterally no rales or wheezes  Heart regular rate and rhythm without murmur or gallop  Extremities without edema      "

## 2021-06-26 NOTE — ED PROVIDER NOTES
EMERGENCY DEPARTMENT ENCOUNTER      NAME: Rene Matthews  AGE: 80 y.o. female  YOB: 1940  MRN: [id number]  EVALUATION DATE & TIME: 2021  7:56 PM    PCP: Kevin Ho MD    ED PROVIDER: Ketan Callahan DO.      Chief Complaint   Patient presents with     Palpitations         FINAL IMPRESSION:  1. Palpitations          ED COURSE & MEDICAL DECISION MAKIN y.o. female with history of hypertension, hyperlipidemia, anxiety, IBS, and peptic ulcer disease presents to the Emergency Department for evaluation of palpitations.  The patient states that the palpitations started while at rest earlier this afternoon.  The palpitations continued for over an hour.  However, she states that the palpitations resolved as she was driving into the ED.  The patient denied any associated chest pain or pressure, shortness of breath, dizziness.  The patient states that she biked 10 miles earlier this morning without any difficulty.  The patient checked her heart rate while at home noted that the heart rate was anywhere from 110s to 140s and she stated that it was irregular feeling.  Upon arrival to the ED the patient was noted to be hypertensive but she was otherwise hemodynamically stable.  An EKG was obtained prior to my evaluation.  EKG revealed normal sinus rhythm with a first-degree AV block.  There were no new or concerning ST or T wave changes noted when compared to the most recent EKG.  At the time of her initial evaluation the patient had no symptoms or complaints.  She did not appear to be in any obvious distress or discomfort.  The patient's physical exam was unremarkable.    The patient was noted to have a slightly decreased sodium of 132 and a slight decreased potassium of 3.3.  The CBC, BMP, magnesium, troponin, and TSH were all unremarkable otherwise.      The patient was informed that her symptoms are likely due to an paroxysmal arrhythmia including A. fib, a flutter, and SVT.  Patient was  educated about arrhythmias and reassured.  She was instructed to follow-up with her primary care physician for reevaluation and to obtain a Holter monitor.  The patient was instructed to return back to ED sooner for any worsening palpitations, shortness of breath, chest pain or pressure, dizziness, or any other new or concerning symptoms.  The patient and her  indicated agreement and understanding of the discharge instructions.    Pertinent Labs & Imaging studies reviewed. (See chart for details)    7:59 PM I met with the patient, obtained an initial history, performed an examination and discussed the plan.  The patient was masked and proper PPE was worn by the provider for the duration of this visit including an N95, gloves, and protective eyewear.     8:49 PM I rechecked the patient, updated her with current results and recommendations, and discussed plans for treatment today in the ED. Discharge instructions were given at this time and the patient was advised close out patient follow up. Strict return precautions were given at this time. Patient agreeable.       At the conclusion of the encounter I discussed the results of all of the tests and the disposition.  Questions were answered. The patient or family acknowledged understanding and was agreeable with the care plan.         MEDICATIONS GIVEN IN THE EMERGENCY:  Medications   sodium chloride flush 10 mL (NS) (has no administration in time range)       NEW PRESCRIPTIONS STARTED AT TODAY'S ER VISIT  Discharge Medication List as of 5/31/2021  8:56 PM      CONTINUE these medications which have NOT CHANGED    Details   acetaminophen (TYLENOL) 325 MG tablet Take 650 mg by mouth every 6 (six) hours as needed for pain., Until Discontinued, Historical Med      amLODIPine (NORVASC) 2.5 MG tablet Take 1 tablet (2.5 mg total) by mouth daily., Starting Fri 12/18/2020, Normal      aspirin 81 MG EC tablet Take 81 mg by mouth daily., Until Discontinued, Historical Med       calcium carbonate-vitamin D2 500 mg(1,250mg) -200 unit tablet Take 1 tablet by mouth daily.       , Historical Med      diazePAM (VALIUM) 5 MG tablet TAKE 1 TABLET(5 MG) BY MOUTH DAILY AS NEEDED FOR ANXIETY, Normal      fluticasone propionate (FLONASE) 50 mcg/actuation nasal spray 2 sprays into each nostril daily., Starting Thu 5/6/2021, Normal      gabapentin (NEURONTIN) 600 MG tablet Take 1 tablet (600 mg total) by mouth 3 (three) times a day., Starting Tue 12/29/2020, Normal      hydroCHLOROthiazide (HYDRODIURIL) 25 MG tablet Take 1 tablet (25 mg total) by mouth daily., Starting Fri 12/18/2020, Normal      irbesartan (AVAPRO) 300 MG tablet Take 1 tablet (300 mg total) by mouth daily., Starting Tue 9/8/2020, Normal      Lactobacillus rhamnosus GG (CULTURELLE) 10-15 Billion cell capsule Take 1 capsule by mouth daily., Starting Wed 5/19/2021, Until Sun 7/18/2021, OTC      melatonin 2.5 mg Tab half-tablet Take 2.5 mg by mouth at bedtime as needed., Historical Med      metoprolol tartrate (LOPRESSOR) 50 MG tablet TAKE 1 TABLET BY MOUTH TWICE DAILY, Normal      omeprazole (PRILOSEC) 20 MG capsule TAKE 1 CAPSULE BY MOUTH TWICE DAILY, Normal      polyethylene glycol (MIRALAX) 17 gram packet Take 17 g by mouth daily., Until Discontinued, Historical Med      SHINGRIX, PF, 50 mcg/0.5 mL SusR TO BE ADMINISTERED BY PHARMACIST FOR IMMUNIZATION, Historical Med      simvastatin (ZOCOR) 10 MG tablet Take 1 tablet (10 mg total) by mouth at bedtime., Starting Fri 12/18/2020, Normal      traZODone (DESYREL) 50 MG tablet Take 1 tablet (50 mg total) by mouth at bedtime., Starting Fri 3/12/2021, Normal                =================================================================    HPI  Rene Matthews is a 80 y.o. female with a documented pertinent past medical history of hypertension, hyperlipidemia, osteoarthritis/osteopenia, IBS, and anxiety who presents to this emergency department via walk-in and escorted by self for  "evaluation of palpitations.     Patient information was obtained from: Patient     Use of Intrepreter: N/A     The patient presents with complaints of heart palpitations that she describes as \"fluttering\" that were persistent in nature since onset around 5:30 PM this evening until resolving independently on her way to [facility] ED for evaluation. She estimates that this episode lasting about 1.5 hours in duration. At that time, the patient checked her pulse and blood pressure on numerous occasions, both of which were elevated; she reports having a pulse range of 100-150 beats per minute, which concerned her given that her usual reading averages 50 beats per minute. In addition to her heart racing, she does note that the beat itself felt irregular as well. At the time of symptom onset, the patient was sitting down and resting. She notes that she went for a 10 mile bike ride this morning, which is not abnormal for her, and that she was pretty active today washing screens outside the house. The patient denies ever having had symptoms of this nature before. She denies any recent medication changes. The patient denies any recent increases in stress or workload. She denies chest pain, shortness of breath, dizziness, or any other complaints at this time.    To note, the patient denies a social history of alcohol use. She does report consuming about 6 oz of a Coca[employer]a caffienated beverage just prior to symptom onset.     Furthermore, the patient reports taking Metoprolol and a baby Aspirin daily for hypertension.     REVIEW OF SYSTEMS   Review of Systems   Respiratory: Negative for shortness of breath.    Cardiovascular: Positive for palpitations (\"fluttering\"). Negative for chest pain.   Neurological: Negative for dizziness.   All other systems reviewed and are negative.     All other systems reviewed and are negative    PAST MEDICAL HISTORY:  Past Medical History:   Diagnosis Date     Abnormal Pap smear of cervix     " ASCUS with negative biopsy     Acute low back pain without sciatica 5/18/2020     Allergic rhinitis      Anxiety, generalized      Cervical polyp      Chest pain     secondary to gerd     Chronic abdominal pain     Negative CT scan and colonoscopy, musculoskeletal etiology suspected     Chronic sinusitis 10/18/2016     Frequent headaches      GERD (gastroesophageal reflux disease)     Noncardiac chest pain     Hearing loss in left ear 10/20/2017     Herpes zoster 2006     Hyperlipidemia      Hypertension      IBS (irritable bowel syndrome)      Insomnia      Osteoarthritis      Osteopenia     DEXA 2014 T score -2.0 stable, DEXA December 2017 T score -1.6 left femoral neck stable     Overactive bladder 10/18/2016     Peptic ulcer disease 2006    w/ gastric ulcer     Personal history of colonic polyps      colonoscopy January 2014 normal.  Colonoscopy January 2019 with multiple polyps, repeat in 3 years     Screening     Negative for AAA CT scan 2013     Skin cancer, basal cell 10/20/2017     Weight gain 3/12/2021       PAST SURGICAL HISTORY:  Past Surgical History:   Procedure Laterality Date     Basal cell skin cancer       CERVICAL BIOPSY  W/ LOOP ELECTRODE EXCISION       ENDOMETRIAL BIOPSY      Negative     OVARIAN CYST REMOVAL      lysis of adhesions     Radiofrequency ablation right greater saphenous vein  2012     STRABISMUS SURGERY  2015         CURRENT MEDICATIONS:    No current facility-administered medications on file prior to encounter.      Current Outpatient Medications on File Prior to Encounter   Medication Sig     acetaminophen (TYLENOL) 325 MG tablet Take 650 mg by mouth every 6 (six) hours as needed for pain.     amLODIPine (NORVASC) 2.5 MG tablet Take 1 tablet (2.5 mg total) by mouth daily.     aspirin 81 MG EC tablet Take 81 mg by mouth daily.     calcium carbonate-vitamin D2 500 mg(1,250mg) -200 unit tablet Take 1 tablet by mouth daily.            diazePAM (VALIUM) 5 MG tablet TAKE 1 TABLET(5 MG)  BY MOUTH DAILY AS NEEDED FOR ANXIETY     fluticasone propionate (FLONASE) 50 mcg/actuation nasal spray 2 sprays into each nostril daily.     gabapentin (NEURONTIN) 600 MG tablet Take 1 tablet (600 mg total) by mouth 3 (three) times a day. (Patient taking differently: Take 300 mg by mouth 3 (three) times a day. )     hydroCHLOROthiazide (HYDRODIURIL) 25 MG tablet Take 1 tablet (25 mg total) by mouth daily.     irbesartan (AVAPRO) 300 MG tablet Take 1 tablet (300 mg total) by mouth daily.     Lactobacillus rhamnosus GG (CULTURELLE) 10-15 Billion cell capsule Take 1 capsule by mouth daily.     melatonin 2.5 mg Tab half-tablet Take 2.5 mg by mouth at bedtime as needed.     metoprolol tartrate (LOPRESSOR) 50 MG tablet TAKE 1 TABLET BY MOUTH TWICE DAILY     omeprazole (PRILOSEC) 20 MG capsule TAKE 1 CAPSULE BY MOUTH TWICE DAILY     polyethylene glycol (MIRALAX) 17 gram packet Take 17 g by mouth daily.     SHINGRIX, PF, 50 mcg/0.5 mL SusR TO BE ADMINISTERED BY PHARMACIST FOR IMMUNIZATION     simvastatin (ZOCOR) 10 MG tablet Take 1 tablet (10 mg total) by mouth at bedtime.     traZODone (DESYREL) 50 MG tablet Take 1 tablet (50 mg total) by mouth at bedtime.       ALLERGIES:  No Known Allergies    FAMILY HISTORY:  Family History   Problem Relation Age of Onset     Stroke Mother      Hypertension Sister      Hypertension Brother        SOCIAL HISTORY:   Social History     Socioeconomic History     Marital status:      Spouse name: Not on file     Number of children: Not on file     Years of education: Not on file     Highest education level: Not on file   Occupational History     Not on file   Social Needs     Financial resource strain: Not on file     Food insecurity     Worry: Not on file     Inability: Not on file     Transportation needs     Medical: Not on file     Non-medical: Not on file   Tobacco Use     Smoking status: Never Smoker     Smokeless tobacco: Never Used   Substance and Sexual Activity     Alcohol  "use: No     Drug use: No     Sexual activity: Not on file   Lifestyle     Physical activity     Days per week: Not on file     Minutes per session: Not on file     Stress: Not on file   Relationships     Social connections     Talks on phone: Not on file     Gets together: Not on file     Attends Buddhism service: Not on file     Active member of club or organization: Not on file     Attends meetings of clubs or organizations: Not on file     Relationship status: Not on file     Intimate partner violence     Fear of current or ex partner: Not on file     Emotionally abused: Not on file     Physically abused: Not on file     Forced sexual activity: Not on file   Other Topics Concern     Not on file   Social History Narrative    Retired teacher    , [place], 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       VITALS:  Patient Vitals for the past 24 hrs:   BP Temp Temp src Pulse Resp SpO2 Height Weight   05/31/21 2058 131/56 -- -- -- -- -- -- --   05/31/21 1920 (!) 194/88 98.1  F (36.7  C) Oral 71 18 97 % 5' 4\" (1.626 m) 134 lb (60.8 kg)       PHYSICAL EXAM    VITAL SIGNS: /56 (Patient Position: Sitting)   Pulse 71   Temp 98.1  F (36.7  C) (Oral)   Resp 18   Ht 5' 4\" (1.626 m)   Wt 134 lb (60.8 kg)   LMP  (LMP Unknown)   SpO2 97%   BMI 23.00 kg/m     General presentation: Alert, Vital signs reviewed. No acute distress. Not ill appearing.   HENT: ENT inspection is normal. Oropharynx is moist and clear.   Eye: Pupils are equal and reactive to light. EOMFI  Neck: The neck is supple, with full ROM, with no evidence of meningismus. No lymphadenopathy.  Pulmonary: Currently in no acute respiratory distress. Normal, non labored respirations, the lung sounds are normal with good equal air movement. Clear to auscultation bilaterally.    Circulatory: Regular rate and rhythm. No murmurs, rubs, or gallops. Peripheral pulses are strong and equal in bilateral upper and lower extremities.   Abdominal: The abdomen is soft. " Nontender. No rigidity, guarding, or rebound. Bowel sounds normal.   Neurologic: Alert, oriented to person, place, and time. No motor deficit. No sensory deficit. Cranial nerves II through XII are intact.  Musculoskeletal: No extremity tenderness. Full range of motion in all extremities. No extremity edema.   Skin: Skin color is normal. No rash. Warm. Dry to touch.        LAB:  All pertinent labs reviewed and interpreted.  Results for orders placed or performed during the hospital encounter of 05/31/21   Basic Metabolic Panel   Result Value Ref Range    Sodium 132 (L) 136 - 145 mmol/L    Potassium 3.3 (L) 3.5 - 5.0 mmol/L    Chloride 94 (L) 98 - 107 mmol/L    CO2 27 22 - 31 mmol/L    Anion Gap, Calculation 11 5 - 18 mmol/L    Glucose 100 70 - 125 mg/dL    Calcium 9.2 8.5 - 10.5 mg/dL    BUN 9 8 - 28 mg/dL    Creatinine 0.66 0.60 - 1.10 mg/dL    GFR MDRD Af Amer >60 >60 mL/min/1.73m2    GFR MDRD Non Af Amer >60 >60 mL/min/1.73m2   Troponin I   Result Value Ref Range    Troponin I <0.01 0.00 - 0.29 ng/mL   Magnesium   Result Value Ref Range    Magnesium 2.0 1.8 - 2.6 mg/dL   HM2(CBC w/o Differential)   Result Value Ref Range    WBC 6.1 4.0 - 11.0 thou/uL    RBC 4.13 3.80 - 5.40 mill/uL    Hemoglobin 12.3 12.0 - 16.0 g/dL    Hematocrit 36.1 35.0 - 47.0 %    MCV 87 80 - 100 fL    MCH 29.8 27.0 - 34.0 pg    MCHC 34.1 32.0 - 36.0 g/dL    RDW 13.2 11.0 - 14.5 %    Platelets 245 140 - 440 thou/uL    MPV 9.0 8.5 - 12.5 fL   Thyroid Cascade   Result Value Ref Range    TSH 1.63 0.30 - 5.00 uIU/mL       RADIOLOGY:  Reviewed all pertinent imaging. Please see official radiology report.  No results found.    EKG:    Normal sinus rhythm.  Rate of 68.  First-degree AV block.  Normal QRS.  Normal QT.  Nonspecific ST segment changes noted.  Compared to the EKG on 7/3/2019 no significant changes are noted.    I have independently reviewed and interpreted the EKG(s) documented above.      I, Bailey Hernandez , am serving as a scribe to  document services personally performed by Dr. Ketan Callahan based on my observation and the provider's statements to me. I, Ketan Callahan, DO attest that Baileydianna Tobiasema is acting in a scribe capacity, has observed my performance of the services and has documented them in accordance with my direction.    Ketan Callahan D.O.  Emergency Medicine  [facility] EMERGENCY ROOM  [address] [address]  Dept: [phone]  Loc: [phone]         Ketan Callahan DO  05/31/21 9603

## 2021-07-05 PROBLEM — R00.2 PALPITATIONS: Status: ACTIVE | Noted: 2021-06-07

## 2021-07-06 VITALS — BODY MASS INDEX: 23.73 KG/M2 | WEIGHT: 139 LBS | HEIGHT: 64 IN

## 2021-07-06 VITALS
HEART RATE: 47 BPM | WEIGHT: 139 LBS | HEIGHT: 64 IN | TEMPERATURE: 97.4 F | DIASTOLIC BLOOD PRESSURE: 78 MMHG | BODY MASS INDEX: 23.73 KG/M2 | OXYGEN SATURATION: 98 % | SYSTOLIC BLOOD PRESSURE: 136 MMHG

## 2021-07-06 VITALS — HEIGHT: 64 IN | WEIGHT: 134 LBS | BODY MASS INDEX: 22.88 KG/M2

## 2021-08-04 ENCOUNTER — MYC MEDICAL ADVICE (OUTPATIENT)
Dept: INTERNAL MEDICINE | Facility: CLINIC | Age: 81
End: 2021-08-04

## 2021-08-05 ENCOUNTER — MYC MEDICAL ADVICE (OUTPATIENT)
Dept: INTERNAL MEDICINE | Facility: CLINIC | Age: 81
End: 2021-08-05

## 2021-10-12 ENCOUNTER — OFFICE VISIT (OUTPATIENT)
Dept: FAMILY MEDICINE | Facility: CLINIC | Age: 81
End: 2021-10-12
Payer: COMMERCIAL

## 2021-10-12 VITALS
OXYGEN SATURATION: 95 % | HEART RATE: 53 BPM | DIASTOLIC BLOOD PRESSURE: 70 MMHG | RESPIRATION RATE: 18 BRPM | TEMPERATURE: 98.5 F | SYSTOLIC BLOOD PRESSURE: 145 MMHG | BODY MASS INDEX: 23.34 KG/M2 | WEIGHT: 136 LBS

## 2021-10-12 DIAGNOSIS — R10.13 EPIGASTRIC PAIN: ICD-10-CM

## 2021-10-12 DIAGNOSIS — K21.9 GASTROESOPHAGEAL REFLUX DISEASE, UNSPECIFIED WHETHER ESOPHAGITIS PRESENT: ICD-10-CM

## 2021-10-12 DIAGNOSIS — R35.0 URINARY FREQUENCY: Primary | ICD-10-CM

## 2021-10-12 LAB
ALBUMIN UR-MCNC: NEGATIVE MG/DL
APPEARANCE UR: CLEAR
BILIRUB UR QL STRIP: NEGATIVE
COLOR UR AUTO: YELLOW
GLUCOSE UR STRIP-MCNC: NEGATIVE MG/DL
HGB UR QL STRIP: NEGATIVE
KETONES UR STRIP-MCNC: NEGATIVE MG/DL
LEUKOCYTE ESTERASE UR QL STRIP: ABNORMAL
NITRATE UR QL: NEGATIVE
PH UR STRIP: 8.5 [PH] (ref 5–8)
SP GR UR STRIP: 1.02 (ref 1–1.03)
UROBILINOGEN UR STRIP-ACNC: 0.2 E.U./DL

## 2021-10-12 PROCEDURE — 81003 URINALYSIS AUTO W/O SCOPE: CPT | Performed by: PHYSICIAN ASSISTANT

## 2021-10-12 PROCEDURE — 99214 OFFICE O/P EST MOD 30 MIN: CPT | Performed by: PHYSICIAN ASSISTANT

## 2021-10-12 RX ORDER — SIMVASTATIN 10 MG
TABLET ORAL
COMMUNITY
Start: 2021-09-07 | End: 2021-12-09

## 2021-10-12 RX ORDER — AMLODIPINE BESYLATE 2.5 MG/1
2.5 TABLET ORAL
COMMUNITY
Start: 2020-12-18 | End: 2021-12-02

## 2021-10-12 RX ORDER — MIRTAZAPINE 7.5 MG/1
TABLET, FILM COATED ORAL
COMMUNITY
Start: 2020-11-16 | End: 2022-03-13

## 2021-10-12 RX ORDER — FLUTICASONE PROPIONATE 50 MCG
2 SPRAY, SUSPENSION (ML) NASAL
COMMUNITY
Start: 2021-05-06

## 2021-10-12 RX ORDER — IRBESARTAN 300 MG/1
300 TABLET ORAL DAILY
COMMUNITY
Start: 2021-09-02 | End: 2022-09-06

## 2021-10-12 RX ORDER — GABAPENTIN 600 MG/1
600 TABLET ORAL
COMMUNITY
Start: 2020-12-29 | End: 2021-12-02

## 2021-10-12 NOTE — PATIENT INSTRUCTIONS
Patient Education     GERD (Adult)    The esophagus is a tube that carries food from the mouth to the stomach. A valve (the LES, lower esophageal sphincter) at the lower end of the esophagus prevents stomach acid from flowing upward. When this valve doesn't work properly, stomach contents may repeatedly flow back up (reflux) into the esophagus. This is called gastroesophageal reflux disease (GERD). GERD can irritate the esophagus. It can cause problems with pain, swallowing or breathing. In severe cases, GERD can cause recurrent pneumonia (from aspiration or breathing in particles) or other serious problems.  Symptoms of reflux include burning, pressure or sharp pain in the upper abdomen or mid to lower chest. The pain can spread to the neck, back, or shoulder. There may be belching, an acid taste in the back of the throat, chronic cough, or sore throat, or hoarseness. GERD symptoms often occur during the day after a big meal. They can also occur at night when lying down.   Home care  Lifestyle changes can help reduce symptoms. If needed, your healthcare provider may prescribe medicines. Symptoms often improve with treatment, but if treatment is stopped, the symptoms often return after a few months. So most persons with GERD will need to continue treatment or get treatment on and off.  Lifestyle changes    Limit or avoid fatty, fried, and spicy foods, as well as coffee, chocolate, mint, and foods with high acid content such as tomatoes and citrus fruit and juices (orange, grapefruit, lemon).    Don t eat large meals, especially at night. Frequent, smaller meals are best. Don't lie down right after eating. And don t eat anything 3 hours before going to bed.    Don't drink alcohol or smoke. As much as possible, stay away from second hand smoke.    If you are overweight, losing weight will reduce symptoms.     Don't wear tight clothing around your stomach area.    If your symptoms occur during sleep, use a foam wedge  "to elevate your upper body (not just your head.) Or, place 4\" blocks under the head of your bed. Or use 2 bed risers under your bedframe.  Medicines  If needed, medicines can help relieve the symptoms of GERD and prevent damage to the esophagus. Discuss a medicine plan with your healthcare provider. This may include one or more of the following medicines:    Antacids to help neutralize the normal acids in your stomach.    Acid blockers (Histamine or H2 blockers) to decrease acid production.    Acid inhibitors (proton pump inhibitors PPIs) to decrease acid production in a different way than the blockers. They may work better, but can take a little longer to take effect.  Take an antacid 30 to 60 minutes after eating and at bedtime, but not at the same time as an acid blocker.  Try not to take medicines such as ibuprofen and aspirin. If you are taking aspirin for your heart or other medical reasons, talk to your healthcare provider about stopping it.  Follow-up care  Follow up with your healthcare provider or as advised by our staff.  When to seek medical advice  Call your healthcare provider if any of the following occur:    Stomach pain gets worse or moves to the lower right abdomen (appendix area)    Chest pain appears or gets worse, or spreads to the back, neck, shoulder, or arm    An over-the-counter trial of medicine doesn't relieve your symptoms    Weight loss that can't be explained    Trouble or pain swallowing    Frequent vomiting (can t keep down liquids)    Blood in the stool or vomit (red or black in color)    Feeling weak or dizzy    Fever of 100.4 F (38 C) or higher, or as directed by your healthcare provider  [employer] last reviewed this educational content on 3/1/2018    6457-9250 [employer]. All rights reserved. This information is not intended as a substitute for professional medical care. Always follow your healthcare professional's instructions.           "

## 2021-10-12 NOTE — PROGRESS NOTES
Assessment & Plan:      Problem List Items Addressed This Visit        Digestive    GERD (gastroesophageal reflux disease)      Other Visit Diagnoses     Urinary frequency    -  Primary    Relevant Orders    UA macro with reflex to Microscopic and Culture - Clinc Collect (Completed)    Urine Microscopic    Epigastric pain            Medical Decision Making  Patient with history of irritable bowel syndrome and acid reflux presents with epigastric discomfort and increased urinary frequency.  Urinalysis is negative for UTI.  Symptoms appear consistent with acid reflux.  Patient has noted good improvement of her symptoms after increasing omeprazole to 40 mg once daily.  Recommend she continue at this dose and follow-up with primary care in 3 to 7 days for symptom recheck.  She exhibited no tenderness on abdominal exam.  Discussed signs of worsening symptoms and when to follow-up with PCP if no symptom improvement.     Subjective:      Rene Matthews is a 81 year old female with history of irritable bowel syndrome and acid reflux here for evaluation of gassiness and nausea.  Onset of symptoms was 24 hours ago.  Patient noted that last night she had some pressure in the epigastric region with increased heartburn.  She increased her omeprazole to 40 mg last night and took a dose of Tums.  She states her symptoms are improving today.  They did go out to eat last night and patient had half of a cheeseburger.  Associated symptoms include increased urinary frequency, but patient otherwise denies dysuria.  Patient did have several bowel movements last night as well, but this is not abnormal for her.     The following portions of the patient's history were reviewed and updated as appropriate: allergies, current medications, and problem list.     Review of Systems  Pertinent items are noted in HPI.    Allergies  No Known Allergies    Family History   Problem Relation Age of Onset     Cerebrovascular Disease Mother       Hypertension Sister      Hypertension Brother        Social History     Tobacco Use     Smoking status: Never Smoker     Smokeless tobacco: Never Used   Substance Use Topics     Alcohol use: No        Objective:      BP (!) 145/70   Pulse 53   Temp 98.5  F (36.9  C)   Resp 18   Wt 61.7 kg (136 lb)   SpO2 95%   BMI 23.34 kg/m    General appearance - alert, well appearing, and in no distress and non-toxic  Chest - clear to auscultation, no wheezes, rales or rhonchi, symmetric air entry  Heart - normal rate, regular rhythm, normal S1, S2, no murmurs, rubs, clicks or gallops  Abdomen - soft, nontender, nondistended, no masses or organomegaly  Back exam - No CVA tenderness  Skin - normal coloration and turgor, no rashes, no suspicious skin lesions noted     Lab & Imaging Results    Results for orders placed or performed in visit on 10/12/21 (from the past 24 hour(s))   UA macro with reflex to Microscopic and Culture - Clinc Collect    Specimen: Urine, Clean Catch   Result Value Ref Range    Color Urine Yellow Colorless, Straw, Light Yellow, Yellow    Appearance Urine Clear Clear    Glucose Urine Negative Negative mg/dL    Bilirubin Urine Negative Negative    Ketones Urine Negative Negative mg/dL    Specific Gravity Urine 1.020 1.005 - 1.030    Blood Urine Negative Negative    pH Urine 8.5 (H) 5.0 - 8.0    Protein Albumin Urine Negative Negative mg/dL    Urobilinogen Urine 0.2 0.2, 1.0 E.U./dL    Nitrite Urine Negative Negative    Leukocyte Esterase Urine Trace (A) Negative       I personally reviewed these results and discussed findings with the patient.

## 2021-10-26 ENCOUNTER — TRANSFERRED RECORDS (OUTPATIENT)
Dept: HEALTH INFORMATION MANAGEMENT | Facility: CLINIC | Age: 81
End: 2021-10-26
Payer: COMMERCIAL

## 2021-11-12 ENCOUNTER — TELEPHONE (OUTPATIENT)
Dept: INTERNAL MEDICINE | Facility: CLINIC | Age: 81
End: 2021-11-12
Payer: COMMERCIAL

## 2021-11-12 NOTE — TELEPHONE ENCOUNTER
Reason for Call:  Other call back    Detailed comments: Pt is wondering if she should still be taking baby aspirin - she saw some stuff on the news... Please call back and advise.     Phone Number Patient can be reached at: Home number on file [phone] (home)    Best Time: ANY    Can we leave a detailed message on this number? YES    Call taken on 11/12/2021 at 1:50 PM by Messi Gay

## 2021-11-15 ENCOUNTER — TRANSFERRED RECORDS (OUTPATIENT)
Dept: HEALTH INFORMATION MANAGEMENT | Facility: CLINIC | Age: 81
End: 2021-11-15
Payer: COMMERCIAL

## 2021-11-18 NOTE — TELEPHONE ENCOUNTER
It is a very complicated decision and not an easy answer.  There are benefits and risks of taking aspirin.  Probably best to discuss at her wellness visit in December.  I would not recommend stopping until that discussion.

## 2021-12-09 DIAGNOSIS — E78.00 HYPERCHOLESTEROLEMIA: Primary | ICD-10-CM

## 2021-12-09 RX ORDER — SIMVASTATIN 10 MG
10 TABLET ORAL AT BEDTIME
Qty: 90 TABLET | Refills: 1 | Status: SHIPPED | OUTPATIENT
Start: 2021-12-09 | End: 2022-03-13

## 2021-12-09 NOTE — TELEPHONE ENCOUNTER
Refill Request  Medication name: Pending Prescriptions:                       Disp   Refills    simvastatin (ZOCOR) 10 MG tablet                            Who prescribed the medication: Georgia  Last refill on medication: 09/02/21  Requested Pharmacy: [facility]  Last appointment with PCP: 06/07/21  Next appointment: Appointment scheduled for 12/23/21

## 2021-12-22 ASSESSMENT — ACTIVITIES OF DAILY LIVING (ADL): CURRENT_FUNCTION: NO ASSISTANCE NEEDED

## 2021-12-23 ENCOUNTER — OFFICE VISIT (OUTPATIENT)
Dept: INTERNAL MEDICINE | Facility: CLINIC | Age: 81
End: 2021-12-23
Payer: COMMERCIAL

## 2021-12-23 VITALS
HEIGHT: 64 IN | BODY MASS INDEX: 23.66 KG/M2 | HEART RATE: 50 BPM | SYSTOLIC BLOOD PRESSURE: 139 MMHG | DIASTOLIC BLOOD PRESSURE: 80 MMHG | OXYGEN SATURATION: 97 % | WEIGHT: 138.6 LBS

## 2021-12-23 DIAGNOSIS — N32.81 OVERACTIVE BLADDER: ICD-10-CM

## 2021-12-23 DIAGNOSIS — N39.41 URGE INCONTINENCE OF URINE: ICD-10-CM

## 2021-12-23 DIAGNOSIS — M54.16 CHRONIC RADICULAR LOW BACK PAIN: ICD-10-CM

## 2021-12-23 DIAGNOSIS — I10 PRIMARY HYPERTENSION: ICD-10-CM

## 2021-12-23 DIAGNOSIS — M15.0 PRIMARY OSTEOARTHRITIS INVOLVING MULTIPLE JOINTS: ICD-10-CM

## 2021-12-23 DIAGNOSIS — J30.9 ALLERGIC RHINITIS, UNSPECIFIED SEASONALITY, UNSPECIFIED TRIGGER: ICD-10-CM

## 2021-12-23 DIAGNOSIS — K58.9 IRRITABLE BOWEL SYNDROME, UNSPECIFIED TYPE: ICD-10-CM

## 2021-12-23 DIAGNOSIS — G89.29 CHRONIC NONINTRACTABLE HEADACHE, UNSPECIFIED HEADACHE TYPE: ICD-10-CM

## 2021-12-23 DIAGNOSIS — Z78.0 POSTMENOPAUSAL STATUS: ICD-10-CM

## 2021-12-23 DIAGNOSIS — R20.8 BURNING SENSATION OF FEET: ICD-10-CM

## 2021-12-23 DIAGNOSIS — G47.00 INSOMNIA, UNSPECIFIED TYPE: ICD-10-CM

## 2021-12-23 DIAGNOSIS — R51.9 CHRONIC NONINTRACTABLE HEADACHE, UNSPECIFIED HEADACHE TYPE: ICD-10-CM

## 2021-12-23 DIAGNOSIS — Z86.0100 PERSONAL HISTORY OF COLONIC POLYPS: ICD-10-CM

## 2021-12-23 DIAGNOSIS — E78.5 HYPERLIPIDEMIA, UNSPECIFIED HYPERLIPIDEMIA TYPE: ICD-10-CM

## 2021-12-23 DIAGNOSIS — C44.91 SKIN CANCER, BASAL CELL: ICD-10-CM

## 2021-12-23 DIAGNOSIS — M85.80 OSTEOPENIA, UNSPECIFIED LOCATION: ICD-10-CM

## 2021-12-23 DIAGNOSIS — G89.29 CHRONIC ABDOMINAL PAIN: ICD-10-CM

## 2021-12-23 DIAGNOSIS — R10.9 CHRONIC ABDOMINAL PAIN: ICD-10-CM

## 2021-12-23 DIAGNOSIS — R00.2 PALPITATIONS: ICD-10-CM

## 2021-12-23 DIAGNOSIS — G89.29 CHRONIC RADICULAR LOW BACK PAIN: ICD-10-CM

## 2021-12-23 DIAGNOSIS — K21.9 GASTROESOPHAGEAL REFLUX DISEASE WITHOUT ESOPHAGITIS: ICD-10-CM

## 2021-12-23 DIAGNOSIS — Z51.81 ENCOUNTER FOR THERAPEUTIC DRUG MONITORING: ICD-10-CM

## 2021-12-23 DIAGNOSIS — Z00.00 ENCOUNTER FOR MEDICARE ANNUAL WELLNESS EXAM: Primary | ICD-10-CM

## 2021-12-23 DIAGNOSIS — F41.1 ANXIETY, GENERALIZED: ICD-10-CM

## 2021-12-23 PROBLEM — R63.5 WEIGHT GAIN: Status: RESOLVED | Noted: 2021-03-12 | Resolved: 2021-12-23

## 2021-12-23 PROBLEM — R14.3 FLATULENCE SYMPTOM: Status: ACTIVE | Noted: 2021-10-26

## 2021-12-23 PROBLEM — N95.2 ATROPHY OF VAGINA: Status: ACTIVE | Noted: 2021-12-23

## 2021-12-23 PROBLEM — N95.0 POSTMENOPAUSAL BLEEDING: Status: ACTIVE | Noted: 2021-12-23

## 2021-12-23 PROBLEM — N95.0 POSTMENOPAUSAL BLEEDING: Status: RESOLVED | Noted: 2021-12-23 | Resolved: 2021-12-23

## 2021-12-23 PROBLEM — K76.89 LIVER CYST: Status: ACTIVE | Noted: 2021-12-23

## 2021-12-23 LAB
ALBUMIN SERPL-MCNC: 3.7 G/DL (ref 3.5–5)
ALP SERPL-CCNC: 70 U/L (ref 45–120)
ALT SERPL W P-5'-P-CCNC: 11 U/L (ref 0–45)
ANION GAP SERPL CALCULATED.3IONS-SCNC: 11 MMOL/L (ref 5–18)
AST SERPL W P-5'-P-CCNC: 21 U/L (ref 0–40)
BILIRUB SERPL-MCNC: 0.7 MG/DL (ref 0–1)
BUN SERPL-MCNC: 7 MG/DL (ref 8–28)
CALCIUM SERPL-MCNC: 9.5 MG/DL (ref 8.5–10.5)
CHLORIDE BLD-SCNC: 96 MMOL/L (ref 98–107)
CHOLEST SERPL-MCNC: 168 MG/DL
CO2 SERPL-SCNC: 28 MMOL/L (ref 22–31)
CREAT SERPL-MCNC: 0.7 MG/DL (ref 0.6–1.1)
ERYTHROCYTE [DISTWIDTH] IN BLOOD BY AUTOMATED COUNT: 13 % (ref 10–15)
FASTING STATUS PATIENT QL REPORTED: YES
GFR SERPL CREATININE-BSD FRML MDRD: 87 ML/MIN/1.73M2
GLUCOSE BLD-MCNC: 84 MG/DL (ref 70–125)
HCT VFR BLD AUTO: 37.8 % (ref 35–47)
HDLC SERPL-MCNC: 77 MG/DL
HGB BLD-MCNC: 12.7 G/DL (ref 11.7–15.7)
LDLC SERPL CALC-MCNC: 74 MG/DL
MAGNESIUM SERPL-MCNC: 2.1 MG/DL (ref 1.8–2.6)
MCH RBC QN AUTO: 30 PG (ref 26.5–33)
MCHC RBC AUTO-ENTMCNC: 33.6 G/DL (ref 31.5–36.5)
MCV RBC AUTO: 89 FL (ref 78–100)
PLATELET # BLD AUTO: 233 10E3/UL (ref 150–450)
POTASSIUM BLD-SCNC: 3.6 MMOL/L (ref 3.5–5)
PROT SERPL-MCNC: 7.7 G/DL (ref 6–8)
RBC # BLD AUTO: 4.24 10E6/UL (ref 3.8–5.2)
SODIUM SERPL-SCNC: 135 MMOL/L (ref 136–145)
TRIGL SERPL-MCNC: 86 MG/DL
VIT B12 SERPL-MCNC: 210 PG/ML (ref 213–816)
WBC # BLD AUTO: 6.5 10E3/UL (ref 4–11)

## 2021-12-23 PROCEDURE — 83735 ASSAY OF MAGNESIUM: CPT | Performed by: INTERNAL MEDICINE

## 2021-12-23 PROCEDURE — 99397 PER PM REEVAL EST PAT 65+ YR: CPT | Performed by: INTERNAL MEDICINE

## 2021-12-23 PROCEDURE — 82306 VITAMIN D 25 HYDROXY: CPT | Performed by: INTERNAL MEDICINE

## 2021-12-23 PROCEDURE — 36415 COLL VENOUS BLD VENIPUNCTURE: CPT | Performed by: INTERNAL MEDICINE

## 2021-12-23 PROCEDURE — 99214 OFFICE O/P EST MOD 30 MIN: CPT | Mod: 25 | Performed by: INTERNAL MEDICINE

## 2021-12-23 PROCEDURE — 85027 COMPLETE CBC AUTOMATED: CPT | Performed by: INTERNAL MEDICINE

## 2021-12-23 PROCEDURE — 80053 COMPREHEN METABOLIC PANEL: CPT | Performed by: INTERNAL MEDICINE

## 2021-12-23 PROCEDURE — 80061 LIPID PANEL: CPT | Performed by: INTERNAL MEDICINE

## 2021-12-23 PROCEDURE — 82607 VITAMIN B-12: CPT | Performed by: INTERNAL MEDICINE

## 2021-12-23 RX ORDER — POLYETHYLENE GLYCOL 3350 17 G/17G
17 POWDER, FOR SOLUTION ORAL
COMMUNITY
End: 2022-03-13

## 2021-12-23 RX ORDER — ACETAMINOPHEN 325 MG/1
325 TABLET ORAL 3 TIMES DAILY PRN
COMMUNITY

## 2021-12-23 RX ORDER — GABAPENTIN 300 MG/1
300 CAPSULE ORAL 3 TIMES DAILY
Qty: 270 CAPSULE | Refills: 3 | Status: SHIPPED | OUTPATIENT
Start: 2021-12-23 | End: 2023-01-30

## 2021-12-23 ASSESSMENT — ACTIVITIES OF DAILY LIVING (ADL): CURRENT_FUNCTION: NO ASSISTANCE NEEDED

## 2021-12-23 ASSESSMENT — MIFFLIN-ST. JEOR: SCORE: 1092.66

## 2021-12-23 NOTE — PROGRESS NOTES
"SUBJECTIVE:   Rene Matthews is a 81 year old female who presents for Preventive Visit.    HPI-in addition to her annual wellness visit, Rene is here to follow-up chronic medical problems including hypertension, anxiety, osteoarthritis, insomnia, skin cancer, GERD and osteopenia and multiple other issues.  See assessment and plan for details.    Patient has been advised of split billing requirements and indicates understanding: Yes   Are you in the first 12 months of your Medicare coverage?  No    Healthy Habits:     In general, how would you rate your overall health?  Good    Frequency of exercise:  6-7 days/week    Duration of exercise:  45-60 minutes    Do you usually eat at least 4 servings of fruit and vegetables a day, include whole grains    & fiber and avoid regularly eating high fat or \"junk\" foods?  Yes    Taking medications regularly:  Yes    Barriers to taking medications:  None    Medication side effects:  None    Ability to successfully perform activities of daily living:  No assistance needed    Home Safety:  Lack of grab bars in the bathroom    Hearing Impairment:  Difficulty following dialogue in the theater, need to ask people to speak up or repeat themselves and no hearing concerns    In the past 6 months, have you been bothered by leaking of urine?  No    In general, how would you rate your overall mental or emotional health?  Good      PHQ-2 Total Score: 0    Additional concerns today:  Yes    Do you feel safe in your environment? Yes    Have you ever done Advance Care Planning? (For example, a Health Directive, POLST, or a discussion with a medical provider or your loved ones about your wishes): Yes, patient states has an Advance Care Planning document and will bring a copy to the clinic.        Fall risk  Fallen 2 or more times in the past year?: No  Any fall with injury in the past year?: No    Cognitive Screening   1) Repeat 3 items (Leader, Season, Table)    2) Clock draw: NORMAL  3) 3 item " "recall: Recalls 3 objects  Results: 3 items recalled: COGNITIVE IMPAIRMENT LESS LIKELY    Mini-CogTM Copyright S Jhon. Licensed by the author for use in [facility]; reprinted with permission ([email or web address]). All rights reserved.      Do you have sleep apnea, excessive snoring or daytime drowsiness?: no    Reviewed and updated as needed this visit by clinical staff  Tobacco  Allergies  Meds  Problems  Med Hx  Surg Hx  Fam Hx         Reviewed and updated as needed this visit by Provider  Tobacco  Allergies  Meds  Problems  Med Hx  Surg Hx  Fam Hx        Social History     Tobacco Use     Smoking status: Never Smoker     Smokeless tobacco: Never Used   Substance Use Topics     Alcohol use: No         Alcohol Use 12/22/2021   Prescreen: >3 drinks/day or >7 drinks/week? Not Applicable               Current providers sharing in care for this patient include:   Patient Care Team:  Kevin Ho MD as PCP - General  Kevin Ho MD as Assigned PCP    The following health maintenance items are reviewed in Epic and correct as of today:  Health Maintenance Due   Topic Date Due     ANNUAL REVIEW OF HM ORDERS  Never done     Lab work is in process          Review of Systems  12 point review of systems is negative other than what is discussed in the assessment and plan    OBJECTIVE:   /80   Pulse 50   Ht 1.632 m (5' 4.25\")   Wt 62.9 kg (138 lb 9.6 oz)   SpO2 97%   BMI 23.61 kg/m   Estimated body mass index is 23.61 kg/m  as calculated from the following:    Height as of this encounter: 1.632 m (5' 4.25\").    Weight as of this encounter: 62.9 kg (138 lb 9.6 oz).  Physical Exam  EYES: Eyelids, conjunctiva, and sclera were normal. Pupils were normal. Cornea, iris, and lens were normal bilaterally.  HEAD, EARS, NOSE, MOUTH, AND THROAT: Head and face were normal. TMs and external auditory canals are normal  NECK: Neck appearance was normal. There were no neck masses and the " thyroid was not enlarged and no nodules are felt.  No lymphadenopathy.  RESPIRATORY: Breathing pattern was normal and the chest moved symmetrically.   Lung sounds were normal and there were no rales or wheezes.  CARDIOVASCULAR: Heart rate and rhythm were normal.  S1 and S2 were normal and there were no extra sounds or murmurs. Peripheral pulses in arms and legs were normal.  Jugular venous pressure was normal.  There was no peripheral edema.  No carotid bruits.  BREAST: No palpable masses or tenderness.  No axillary nodes.  GASTROINTESTINAL:  Bowel sounds were present.   Palpation detected no tenderness, mass, or enlarged organs.   MUSCULOSKELETAL: Skeletal configuration was normal and muscle mass was normal for age. Joint appearance was overall normal.  LYMPHATIC: There were no enlarged nodes.  SKIN/HAIR/NAILS: Skin color was normal.  Hair and nails were normal.There were no skin lesions.  NEUROLOGIC: The patient was alert and oriented to person, place, time, and circumstance. Speech was normal. Cranial nerves were normal. Motor strength was normal for age. The patient was normally coordinated.  Sensation intact.  PSYCHIATRIC:  Mood and affect were normal and the patient had normal recent and remote memory. The patient's judgment and insight were normal.        ASSESSMENT / PLAN:   1. Encounter for Medicare annual wellness exam  Immunizations are reviewed and recommending that she get her tetanus updated before next fall.  She has a living will.  Non-smoker.  Uses alcohol in moderation.  Exercising on a regular basis.  Up to date with colonoscopies and this should be repeated in early 2022.  Breast exam completed and she will continue to get a mammogram annually.   Last DEXA was over 2 years ago and this should be repeated now. Dementia and depression screening completed.  She sees an ophthalmologist every year. Skin exam performed and recommending regular use of sunblock.  She will continue to see her  dermatologist every year.  Will screen for diabetes with fasting glucose.     2. Primary hypertension  Good blood pressure control with current medication including amlodipine, metoprolol, olmesartan and HCTZ  - CBC with platelets; Future  - Comprehensive metabolic panel (BMP + Alb, Alk Phos, ALT, AST, Total. Bili, TP); Future  - CBC with platelets  - Comprehensive metabolic panel (BMP + Alb, Alk Phos, ALT, AST, Total. Bili, TP)    3. Anxiety, generalized    Will use diazepam as needed averaging once or twice per week.  Caution regarding more regular use and side effects that she gets older along with habit-forming nature    4. Primary osteoarthritis involving multiple joints  She continues on gabapentin but at lower dose of 300 mg 3 times daily to manage her chronic pain  - gabapentin (NEURONTIN) 300 MG capsule; Take 1 capsule (300 mg) by mouth 3 times daily  Dispense: 270 capsule; Refill: 3    5. Chronic radicular low back pain  She underwent MICHEL with Dr. Bradley for radicular low back pain earlier this year    6. Chronic nonintractable headache, unspecified headache type  Headaches are well controlled with gabapentin  - gabapentin (NEURONTIN) 300 MG capsule; Take 1 capsule (300 mg) by mouth 3 times daily  Dispense: 270 capsule; Refill: 3    7. Irritable bowel syndrome, unspecified type  Chronic abdominal pain attributed to IBS.  She continues on Benefiber and MiraLAX    8. Chronic abdominal pain  As above    9. Burning sensation of feet  We will rule out B12 deficiency which may be contributing to sensation in feet  - Vitamin B12; Future  - Vitamin B12    10. Overactive bladder  Stable    11. Urge incontinence of urine  Kegel exercises    12. Palpitations  No recurrent palpitations.  Normal echocardiogram earlier this year.    13. Insomnia, unspecified type  Unclear if she is taking mirtazapine or trazodone.  Will need to clarify    14. Skin cancer, basal cell  She should see her dermatologist every year    15.  "Osteopenia, unspecified location  We will make arrangements for follow-up DEXA.  She tries maintain good calcium and vitamin D intake  - Vitamin D deficiency screening; Future  - DX Hip/Pelvis/Spine; Future  - Vitamin D deficiency screening    16. Hyperlipidemia, unspecified hyperlipidemia type  Recheck lipid profile on simvastatin  - Lipid Profile (Chol, Trig, HDL, LDL calc); Future  - Lipid Profile (Chol, Trig, HDL, LDL calc)    17. Gastroesophageal reflux disease without esophagitis  Reflux is managed with omeprazole.  No dysphagia    18. Allergic rhinitis, unspecified seasonality, unspecified trigger  Continues on Flonase    19. Personal history of colonic polyps  She is scheduled for colonoscopy early next year    20. Encounter for therapeutic drug monitoring  Monitor electrolytes while on hydrochlorothiazide  - Magnesium; Future  - Magnesium    21. Postmenopausal status    - DX Hip/Pelvis/Spine; Future    Patient has been advised of split billing requirements and indicates understanding: Yes  COUNSELING:  Reviewed preventive health counseling, as reflected in patient instructions       Regular exercise       Healthy diet/nutrition       Vision screening       Dental care       Bladder control       Osteoporosis prevention/bone health       Colon cancer screening    Estimated body mass index is 23.61 kg/m  as calculated from the following:    Height as of this encounter: 1.632 m (5' 4.25\").    Weight as of this encounter: 62.9 kg (138 lb 9.6 oz).        She reports that she has never smoked. She has never used smokeless tobacco.      Appropriate preventive services were discussed with this patient, including applicable screening as appropriate for cardiovascular disease, diabetes, osteopenia/osteoporosis, and glaucoma.  As appropriate for age/gender, discussed screening for colorectal cancer, prostate cancer, breast cancer, and cervical cancer. Checklist reviewing preventive services available has been given to " the patient.    Reviewed patients plan of care and provided an AVS. The Basic Care Plan (routine screening as documented in Health Maintenance) for Rene meets the Care Plan requirement. This Care Plan has been established and reviewed with the Patient.    Counseling Resources:  ATP IV Guidelines  Pooled Cohorts Equation Calculator  Breast Cancer Risk Calculator  Breast Cancer: Medication to Reduce Risk  FRAX Risk Assessment  ICSI Preventive Guidelines  Dietary Guidelines for Americans, 2010  USDA's MyPlate  ASA Prophylaxis  Lung CA Screening    Kevin Ho MD  [facility]    Identified Health Risks:    The patient was provided with written information regarding signs of hearing loss.

## 2021-12-23 NOTE — PATIENT INSTRUCTIONS
Annual flu shot every fall    You should get a tetanus booster in the next year.  You can get a Td vaccine at your pharmacy    You are due for a colonoscopy in early 2022    Continue to get an annual mammogram    Annual eye exam    See your dermatologist every year for total-body skin exam    DEXA will be scheduled for osteoporosis screening    Patient Education   Personalized Prevention Plan  You are due for the preventive services outlined below.  Your care team is available to assist you in scheduling these services.  If you have already completed any of these items, please share that information with your care team to update in your medical record.  Health Maintenance Due   Topic Date Due     ANNUAL REVIEW OF HM ORDERS  Never done       Signs of Hearing Loss      Hearing much better with one ear can be a sign of hearing loss.   Hearing loss is a problem shared by many people. In fact, it is one of the most common health problems, particularly as people age. Most people age 65 and older have some hearing loss. By age 80, almost everyone does. Hearing loss often occurs slowly over the years. So you may not realize your hearing has gotten worse.  Have your hearing checked  Call your healthcare provider if you:    Have to strain to hear normal conversation    Have to watch other people s faces very carefully to follow what they re saying    Need to ask people to repeat what they ve said    Often misunderstand what people are saying    Turn the volume of the television or radio up so high that others complain    Feel that people are mumbling when they re talking to you    Find that the effort to hear leaves you feeling tired and irritated    Notice, when using the phone, that you hear better with one ear than the other  [employer] last reviewed this educational content on 1/1/2020 2000-2021 [employer]. All rights reserved. This information is not intended as a substitute for professional medical care.  Always follow your healthcare professional's instructions.

## 2021-12-24 DIAGNOSIS — E53.8 VITAMIN B12 DEFICIENCY (NON ANEMIC): Primary | ICD-10-CM

## 2021-12-24 LAB — DEPRECATED CALCIDIOL+CALCIFEROL SERPL-MC: 36 UG/L (ref 30–80)

## 2021-12-24 RX ORDER — LANOLIN ALCOHOL/MO/W.PET/CERES
1000 CREAM (GRAM) TOPICAL DAILY
COMMUNITY
Start: 2021-12-24

## 2022-01-12 ENCOUNTER — ANCILLARY PROCEDURE (OUTPATIENT)
Dept: BONE DENSITY | Facility: CLINIC | Age: 82
End: 2022-01-12
Attending: INTERNAL MEDICINE
Payer: COMMERCIAL

## 2022-01-12 DIAGNOSIS — Z78.0 POSTMENOPAUSAL STATUS: ICD-10-CM

## 2022-01-12 DIAGNOSIS — M85.80 OSTEOPENIA, UNSPECIFIED LOCATION: ICD-10-CM

## 2022-01-12 PROCEDURE — 77080 DXA BONE DENSITY AXIAL: CPT | Mod: TC | Performed by: RADIOLOGY

## 2022-01-16 PROBLEM — M85.80 OSTEOPENIA: Status: ACTIVE | Noted: 2022-01-16

## 2022-01-20 ENCOUNTER — TELEPHONE (OUTPATIENT)
Dept: INTERNAL MEDICINE | Facility: CLINIC | Age: 82
End: 2022-01-20
Payer: COMMERCIAL

## 2022-01-20 NOTE — TELEPHONE ENCOUNTER
Reason for Call:  Other Tdap vaccine    Detailed comments: Patient is wondering if PCP will send a Rx/order for patient to have her Tdap vaccine at [facility] in [place], [place]. Please call patient and let her know if this is possible to do? Patient states the phone number to the pharmacy is [phone].     Phone Number Patient can be reached at: Home number on file [phone] (home)    Best Time: any    Can we leave a detailed message on this number? YES    Call taken on 1/20/2022 at 2:22 PM by Peggy Montiel

## 2022-01-20 NOTE — TELEPHONE ENCOUNTER
Called pt.  Told her that she didn't need an order from us.  She can just show up there and get it.  Verified with the pharmacy too.

## 2022-01-29 ENCOUNTER — TRANSFERRED RECORDS (OUTPATIENT)
Dept: HEALTH INFORMATION MANAGEMENT | Facility: CLINIC | Age: 82
End: 2022-01-29
Payer: COMMERCIAL

## 2022-03-08 DIAGNOSIS — I10 PRIMARY HYPERTENSION: Primary | ICD-10-CM

## 2022-03-08 RX ORDER — METOPROLOL SUCCINATE 50 MG/1
50 TABLET, EXTENDED RELEASE ORAL 2 TIMES DAILY
Qty: 180 TABLET | Refills: 3 | Status: SHIPPED | OUTPATIENT
Start: 2022-03-08 | End: 2022-08-25 | Stop reason: ALTCHOICE

## 2022-03-08 NOTE — TELEPHONE ENCOUNTER
"Per encounter on 12/23/2021, Georgia's note stated:  \"2. Primary hypertension  Good blood pressure control with current medication including amlodipine, metoprolol, olmesartan and hydrochlorothiazide\"    I do not see that it states to discontinue the metoprolol.  Please clarify.    She is prescribed 100 mg tablets - take 2x daily.  She states she is taking a 50 mg tablet 2x daily.  "

## 2022-03-08 NOTE — TELEPHONE ENCOUNTER
Reason for Call:  Medication or medication refill: metoprolol 50 Mg tablets    Do you use a [facility]? NO Name of the pharmacy and phone number for the current request:  [facility] [address] @ [facility] in [place], [phone]    Name of the medication requested: metoprolol 50 Mg tablets    Other request: Patient called and states pharmacy told her that Dr Ho discontinued this medication. Patient states she just saw PCP on 12/23/2021 and he didn't mention her not taking the metoprolol 50 Mg tablets. Patient states she takes 50 Mg tablets twice a day. Please call patient to discuss her questions/concerns.    Can we leave a detailed message on this number? YES    Phone number patient can be reached at: Cell number on file:    Telephone Information:   Mobile [phone]       Best Time: any    Call taken on 3/8/2022 at 9:29 AM by Peggy Montiel

## 2022-03-13 ENCOUNTER — NURSE TRIAGE (OUTPATIENT)
Dept: NURSING | Facility: CLINIC | Age: 82
End: 2022-03-13

## 2022-03-13 ENCOUNTER — OFFICE VISIT (OUTPATIENT)
Dept: URGENT CARE | Facility: CLINIC | Age: 82
End: 2022-03-13
Payer: COMMERCIAL

## 2022-03-13 DIAGNOSIS — N30.00 ACUTE CYSTITIS WITHOUT HEMATURIA: Primary | ICD-10-CM

## 2022-03-13 PROBLEM — D12.2 BENIGN NEOPLASM OF ASCENDING COLON: Status: ACTIVE | Noted: 2022-02-21

## 2022-03-13 PROCEDURE — 99213 OFFICE O/P EST LOW 20 MIN: CPT | Mod: TEL

## 2022-03-13 RX ORDER — SULFAMETHOXAZOLE/TRIMETHOPRIM 800-160 MG
1 TABLET ORAL 2 TIMES DAILY
Qty: 10 TABLET | Refills: 0 | Status: SHIPPED | OUTPATIENT
Start: 2022-03-13 | End: 2022-03-18

## 2022-03-13 RX ORDER — SIMVASTATIN 10 MG
1 TABLET ORAL EVERY 24 HOURS
COMMUNITY
Start: 2022-02-17 | End: 2022-06-07

## 2022-03-13 RX ORDER — METHYLPREDNISOLONE 4 MG
TABLET, DOSE PACK ORAL
COMMUNITY
Start: 2022-01-29 | End: 2022-03-13

## 2022-03-13 RX ORDER — CYCLOBENZAPRINE HCL 5 MG
TABLET ORAL
COMMUNITY
Start: 2022-01-29 | End: 2022-03-13

## 2022-03-13 NOTE — PROGRESS NOTES
Assessment & Plan     Acute cystitis without hematuria  Discussed with pt that normally would prefer to get a urine on patients 65 and older, however her sx are typical, she has a positive home screen with nitrates and leukocytes. Not able to easily get an out patient urine without face to face visit for lab only on weekend.  She prefers trial of abx  But agreeable to follow-up for persistent or worsening sx.    Bactrim as ordered.   Push fluids, rest and ibuprofen or tylenol for comfort.      - sulfamethoxazole-trimethoprim (BACTRIM DS) 800-160 MG tablet  Dispense: 10 tablet; Refill: 0             No follow-ups on file.    Virtual Urgent Care  [facility] VIRTUAL URGENT CARE    Lucy Henriquez is a 81 year old female who presents to clinic today for the following health issues:  Chief Complaint   Patient presents with     Urinary Problem     HPI    Pt seen for virtual visit via telephone  Start time: 8:24 am  End time: 8:32  Pt location: Home in [place]  (This provider does have [place] license)   1 day hx of Dysuria, nocturia, urgency, frequency.    Positive ASO nitrates and leukocytes positive.  Has had UTI in the past.    Last infection years ago.  Pt  Denies any dysuria, frequency, urgency, hematuria, back pain or abdomen pain.          Review of Systems  Constitutional, HEENT, cardiovascular, pulmonary, gi and gu systems are negative, except as otherwise noted.      Objective    There were no vitals taken for this visit.  Physical Exam   nad  Able to talk in full sentences.

## 2022-03-13 NOTE — TELEPHONE ENCOUNTER
"\"I have a bladder infection.\"    Patient reporting she did a Azo test strip at home that \"came back very positive for nitrates and leukocytes.\"    Symptoms starting 3/12/22 with urinary frequency, and dysuria.    Afebrile.    Denies back or flank pain.     Reporting history of bladder infections with similar symptoms.     Disposition to see provider with in 24 hours.    Patient prefers to start with Virtual Visit.    Transferred to Central Scheduling.    Radha Bui RN  [facility]        COVID 19 Nurse Triage Plan/Patient Instructions    Please be aware that novel coronavirus (COVID-19) may be circulating in the community. If you develop symptoms such as fever, cough, or SOB or if you have concerns about the presence of another infection including coronavirus (COVID-19), please contact your health care provider or visit [email or web address].     Disposition/Instructions    In-Person Visit with provider recommended. Reference Visit Selection Guide.    Thank you for taking steps to prevent the spread of this virus.  o Limit your contact with others.  o Wear a simple mask to cover your cough.  o Wash your hands well and often.    Resources    [facility]: About COVID-19: [email or web address]    CDC: What to Do If You're Sick: www.cdc.gov/coronavirus/2019-ncov/about/steps-when-sick.html    CDC: Ending Home Isolation: www.cdc.gov/coronavirus/2019-ncov/hcp/disposition-in-home-patients.html     CDC: Caring for Someone: www.cdc.gov/coronavirus/2019-ncov/if-you-are-sick/care-for-someone.html     [facility]: Interim Guidance for Hospital Discharge to Home: www.health.[facility].mn.us/diseases/coronavirus/hcp/hospdischarge.pdf    [facility] clinical trials (COVID-19 research studies): [email or web address]-trials     Below are the COVID-19 hotlines at the [place] Department of Health ([facility]). Interpreters are available.   o For health questions: Call 602-714-9302 or " 1-933.774.7186 (7 a.m. to 7 p.m.)  o For questions about schools and childcare: Call 322-657-8358 or 1-795.699.1178 (7 a.m. to 7 p.m.)                       Reason for Disposition    Age > 50 years    Additional Information    Negative: Shock suspected (e.g., cold/pale/clammy skin, too weak to stand, low BP, rapid pulse)    Negative: Sounds like a life-threatening emergency to the triager    Negative: Followed a genital area injury    Negative: Taking antibiotic for urinary tract infection (UTI)    Negative: Pregnant    Negative: Postpartum (from 0 to 6 weeks after delivery)    Negative: [1] Unable to urinate (or only a few drops) > 4 hours AND [2] bladder feels very full (e.g., palpable bladder or strong urge to urinate)    Negative: Vomiting    Negative: Patient sounds very sick or weak to the triager    Negative: [1] SEVERE pain with urination  (e.g., excruciating) AND [2] not improved after 2 hours of pain medicine and Sitz bath    Negative: Fever > 100.4 F (38.0 C)    Negative: Side (flank) or lower back pain present    Negative: Diabetes mellitus or weak immune system (e.g., HIV positive, cancer chemo, splenectomy, organ transplant, chronic steroids)    Negative: Bedridden (e.g., nursing home patient, CVA, chronic illness, recovering from surgery)    Negative: Artificial heart valve or artificial joint    Negative: Unusual vaginal discharge (e.g., bad smelling, yellow, green, or foamy-white)    Negative: Patient is worried about sexually transmitted disease (STD)    Negative: Possibility of pregnancy    Negative: Blood in urine (red, pink, or tea-colored)    Protocols used: URINATION PAIN - FEMALE-A-

## 2022-06-07 DIAGNOSIS — E78.5 HLD (HYPERLIPIDEMIA): Primary | ICD-10-CM

## 2022-06-07 DIAGNOSIS — G47.00 INSOMNIA, UNSPECIFIED TYPE: ICD-10-CM

## 2022-06-07 NOTE — TELEPHONE ENCOUNTER
Refill Request  Medication name: Pending Prescriptions:                       Disp   Refills    traZODone (DESYREL) 50 MG tablet          90 tab*0          Who prescribed the medication: Celeste  Last refill on medication: 03/09/22  Requested Pharmacy: [facility]  Last appointment with PCP: 12/23/21  Next appointment: Appointment scheduled for 01/02/23

## 2022-06-08 NOTE — TELEPHONE ENCOUNTER
"Outpatient Medication Detail     Disp Refills Start End FABIOLA   simvastatin (ZOCOR) 10 MG tablet 90 tablet 3 12/18/2020  No   Sig - Route: Take 1 tablet (10 mg total) by mouth at bedtime. - Oral   Sent to pharmacy as: simvastatin 10 mg tablet (ZOCOR)   E-Prescribing Status: Receipt confirmed by pharmacy (12/18/2020  4:22 PM CST)       simvastatin (ZOCOR) 10 MG tablet [[id number]]    Electronically signed by: Kevin Ho MD on 12/18/20 1622 Status: Active   Ordering user: Kevin Ho MD 12/18/20 1622 Authorized by: Kevin Ho MD   Frequency: QHS 12/18/20 - Until Discontinued Released by: Kevin Ho MD 12/18/20 1622   Diagnoses  HLD (hyperlipidemia) [E78.5]     Routing refill request to provider for review/approval because:  A break in medication  Due to medication information not transferring due to [facility] please review the medication information prior to signing to ensure accuracy.    Last Written Prescription Date:  3/9/22  Last Fill Quantity: 90,  # refills: 0   Last office visit provider:  12/23/21     Requested Prescriptions   Pending Prescriptions Disp Refills     traZODone (DESYREL) 50 MG tablet 90 tablet 0       Serotonin Modulators Passed - 6/8/2022 12:03 PM        Passed - Recent (12 mo) or future (30 days) visit within the authorizing provider's specialty     Patient has had an office visit with the authorizing provider or a provider within the authorizing providers department within the previous 12 mos or has a future within next 30 days. See \"Patient Info\" tab in inbasket, or \"Choose Columns\" in Meds & Orders section of the refill encounter.              Passed - Medication is active on med list        Passed - Patient is age 18 or older        Passed - No active pregnancy on record        Passed - No positive pregnancy test in past 12 months           simvastatin (ZOCOR) 10 MG tablet 90 tablet 0     Sig: Take 1 tablet (10 mg) by mouth every 24 hours       Statins " "Protocol Passed - 6/8/2022 12:03 PM        Passed - LDL on file in past 12 months     Recent Labs   Lab Test 12/23/21  1007   LDL 74             Passed - No abnormal creatine kinase in past 12 months     No lab results found.             Passed - Recent (12 mo) or future (30 days) visit within the authorizing provider's specialty     Patient has had an office visit with the authorizing provider or a provider within the authorizing providers department within the previous 12 mos or has a future within next 30 days. See \"Patient Info\" tab in inbasket, or \"Choose Columns\" in Meds & Orders section of the refill encounter.              Passed - Medication is active on med list        Passed - Patient is age 18 or older        Passed - No active pregnancy on record        Passed - No positive pregnancy test in past 12 months             Jerome Armstrong RN 06/08/22 12:04 PM  "

## 2022-06-09 RX ORDER — SIMVASTATIN 10 MG
10 TABLET ORAL EVERY 24 HOURS
Qty: 90 TABLET | Refills: 3 | Status: SHIPPED | OUTPATIENT
Start: 2022-06-09 | End: 2023-06-01

## 2022-06-09 RX ORDER — TRAZODONE HYDROCHLORIDE 50 MG/1
50 TABLET, FILM COATED ORAL AT BEDTIME
Qty: 90 TABLET | Refills: 3 | Status: SHIPPED | OUTPATIENT
Start: 2022-06-09 | End: 2023-06-02

## 2022-07-05 ENCOUNTER — NURSE TRIAGE (OUTPATIENT)
Dept: NURSING | Facility: CLINIC | Age: 82
End: 2022-07-05

## 2022-07-05 DIAGNOSIS — R30.0 DYSURIA: Primary | ICD-10-CM

## 2022-07-05 RX ORDER — SULFAMETHOXAZOLE/TRIMETHOPRIM 800-160 MG
1 TABLET ORAL 2 TIMES DAILY
Qty: 6 TABLET | Refills: 0 | Status: SHIPPED | OUTPATIENT
Start: 2022-07-05 | End: 2022-07-08

## 2022-07-05 NOTE — CONFIDENTIAL NOTE
S-(situation): Call from patient who reports urgency since noon; 2 nights ago - she did the azo test strips and says  nitrates ok, leukocyte are high.    Patient is asking PCP or on-call provider to prescribe antibiotic without being seen.    B-(background):       A-(assessment): needs evaluation  Patient requesting on-call be contacted.    5:15 pm, paged on-call provider, Dr. correa via Smart Web to call [facility] back.  Call back from Dr. Correa who recommends Bactrim DS one tablet twice daily #6 for 3 days; if not better then needs to be seen.      R-(recommendations): advised she should be seen today. Patient says she does not want to drive to the . [place] [facility] Informed usually providers want patient to do an appointment. Patient does not want to do this either. She says the doctor usually just prescribes for her and she does not understand why it changed.    Reviewed care advice with caller per RN triage protocol guideline.  Advised to call back with worsening symptoms, concerns or questions.   Caller verbalized understanding.          Riddhi Gay RN/[facility]

## 2022-07-11 ENCOUNTER — TELEPHONE (OUTPATIENT)
Dept: INTERNAL MEDICINE | Facility: CLINIC | Age: 82
End: 2022-07-11

## 2022-07-11 NOTE — TELEPHONE ENCOUNTER
"Not sure about the \"home test\".  Her kidney function was normal in December.  She should send me the report.  "

## 2022-07-11 NOTE — TELEPHONE ENCOUNTER
Reason for Call:  Other info/update    Detailed comments: Pt was given from [facility], an at home kidney check test and hers was abnormal. They didn't state why, but she will send a msg through [employer] on what the results said.    Phone Number Patient can be reached at: Home number on file [phone] (home)    Best Time: any    Can we leave a detailed message on this number? Not Applicable    Call taken on 7/11/2022 at 11:56 AM by Messi Gay

## 2022-07-11 NOTE — TELEPHONE ENCOUNTER
No mychart message yet. Last OV was 12/23/21 labs were done at that time. Next OV is 1/2/23.    No visits with results within 6 Month(s) from this visit.   Latest known visit with results is:   Office Visit on 12/23/2021   Component Date Value Ref Range Status     Magnesium 12/23/2021 2.1  1.8 - 2.6 mg/dL Final     WBC Count 12/23/2021 6.5  4.0 - 11.0 10e3/uL Final     RBC Count 12/23/2021 4.24  3.80 - 5.20 10e6/uL Final     Hemoglobin 12/23/2021 12.7  11.7 - 15.7 g/dL Final     Hematocrit 12/23/2021 37.8  35.0 - 47.0 % Final     MCV 12/23/2021 89  78 - 100 fL Final     MCH 12/23/2021 30.0  26.5 - 33.0 pg Final     MCHC 12/23/2021 33.6  31.5 - 36.5 g/dL Final     RDW 12/23/2021 13.0  10.0 - 15.0 % Final     Platelet Count 12/23/2021 233  150 - 450 10e3/uL Final     Sodium 12/23/2021 135 (A) 136 - 145 mmol/L Final     Potassium 12/23/2021 3.6  3.5 - 5.0 mmol/L Final     Chloride 12/23/2021 96 (A) 98 - 107 mmol/L Final     Carbon Dioxide (CO2) 12/23/2021 28  22 - 31 mmol/L Final     Anion Gap 12/23/2021 11  5 - 18 mmol/L Final     Urea Nitrogen 12/23/2021 7 (A) 8 - 28 mg/dL Final     Creatinine 12/23/2021 0.70  0.60 - 1.10 mg/dL Final     Calcium 12/23/2021 9.5  8.5 - 10.5 mg/dL Final     Glucose 12/23/2021 84  70 - 125 mg/dL Final     Alkaline Phosphatase 12/23/2021 70  45 - 120 U/L Final     AST 12/23/2021 21  0 - 40 U/L Final     ALT 12/23/2021 11  0 - 45 U/L Final     Protein Total 12/23/2021 7.7  6.0 - 8.0 g/dL Final     Albumin 12/23/2021 3.7  3.5 - 5.0 g/dL Final     Bilirubin Total 12/23/2021 0.7  0.0 - 1.0 mg/dL Final     GFR Estimate 12/23/2021 87  >60 mL/min/1.73m2 Final    Effective December 21, 2021 eGFRcr in adults is calculated using the 2021 CKD-EPI creatinine equation which includes age and gender (Saritha et al., NEJ, DOI: 10.1056/QGNMzi2538168)     Cholesterol 12/23/2021 168  <=199 mg/dL Final     Triglycerides 12/23/2021 86  <=149 mg/dL Final     Direct Measure HDL 12/23/2021 77  >=50 mg/dL Final     HDL Cholesterol Reference Range:     0-2 years:   No reference ranges established for patients under 2 years old  at [facility] for lipid analytes.    2-8 years:  Greater than 45 mg/dL     18 years and older:   Female: Greater than or equal to 50 mg/dL   Male:   Greater than or equal to 40 mg/dL     LDL Cholesterol Calculated 12/23/2021 74  <=129 mg/dL Final     Patient Fasting > 8hrs? 12/23/2021 Yes   Final     Vitamin D, Total (25-Hydroxy) 12/23/2021 36  30 - 80 ug/L Final     Vitamin B12 12/23/2021 210 (A) 213 - 816 pg/mL Final

## 2022-07-12 NOTE — TELEPHONE ENCOUNTER
Pt sent [employer] message with results of the test mentioned and RN routed to [place] in that encounter to be addressed.

## 2022-07-29 ENCOUNTER — NURSE TRIAGE (OUTPATIENT)
Dept: INTERNAL MEDICINE | Facility: CLINIC | Age: 82
End: 2022-07-29

## 2022-07-29 ENCOUNTER — OFFICE VISIT (OUTPATIENT)
Dept: FAMILY MEDICINE | Facility: CLINIC | Age: 82
End: 2022-07-29
Payer: COMMERCIAL

## 2022-07-29 VITALS
WEIGHT: 137 LBS | DIASTOLIC BLOOD PRESSURE: 70 MMHG | TEMPERATURE: 98 F | OXYGEN SATURATION: 97 % | BODY MASS INDEX: 23.33 KG/M2 | RESPIRATION RATE: 18 BRPM | SYSTOLIC BLOOD PRESSURE: 118 MMHG | HEART RATE: 59 BPM

## 2022-07-29 DIAGNOSIS — I49.9 IRREGULAR HEART BEAT: Primary | ICD-10-CM

## 2022-07-29 LAB
ATRIAL RATE - MUSE: 48 BPM
DIASTOLIC BLOOD PRESSURE - MUSE: NORMAL MMHG
INTERPRETATION ECG - MUSE: NORMAL
P AXIS - MUSE: 80 DEGREES
PR INTERVAL - MUSE: 236 MS
QRS DURATION - MUSE: 88 MS
QT - MUSE: 470 MS
QTC - MUSE: 419 MS
R AXIS - MUSE: 15 DEGREES
SYSTOLIC BLOOD PRESSURE - MUSE: NORMAL MMHG
T AXIS - MUSE: 235 DEGREES
VENTRICULAR RATE- MUSE: 48 BPM

## 2022-07-29 PROCEDURE — 93010 ELECTROCARDIOGRAM REPORT: CPT | Performed by: INTERNAL MEDICINE

## 2022-07-29 PROCEDURE — 93005 ELECTROCARDIOGRAM TRACING: CPT | Performed by: PHYSICIAN ASSISTANT

## 2022-07-29 PROCEDURE — 99214 OFFICE O/P EST MOD 30 MIN: CPT | Performed by: PHYSICIAN ASSISTANT

## 2022-07-29 NOTE — TELEPHONE ENCOUNTER
"See triage note below.    Nydia Rock RN, BSN  [facility]     Nurse Triage SBAR    Is this a 2nd Level Triage? NO    Situation: Pt transferred to RN line for trigae due to irregular heartbeats.     Background: She has had this in the past - Pt reports she went into ER for this as well. Has not been assessed by PCP in the past.     Assessment: Pt reporting fast, irregular, and skipped heart beats.   No chest pain. No sweating. No dizziness/lightheadedness.   No shortness of breath - she reports \"just a funny feeling in my throat\"    Pt reports her HR bounces between 60-70 bpm.   She states this is abnormal for her as she typically has HR around 50.     Taking hydrochlorothiazide, metoprolol, and amlodipine.     Protocol Recommended Disposition:   See Today In Office    Recommendation: Per protocol, advised [facility] due to Pt's concerns and her history.  She [facility]. Advised her not to drive.     See above.     Does the patient meet one of the following criteria for ADS visit consideration? 16+ years old, with an [facility] PCP     TIP  Providers, please consider if this condition is appropriate for management at one of our Acute and Diagnostic Services sites.     If patient is a good candidate, please use dotphrase <dot>triageresponse and select Refer to ADS to document.    Reason for Disposition    Age > 60 years    Patient wants to be seen    Additional Information    Negative: Passed out (i.e., fainted, collapsed and was not responding)    Negative: Shock suspected (e.g., cold/pale/clammy skin, too weak to stand, low BP, rapid pulse)    Negative: Difficult to awaken or acting confused (e.g., disoriented, slurred speech)    Negative: Visible sweat on face or sweat dripping down face    Negative: Unable to walk, or can only walk with assistance (e.g., requires support)    Negative: Received SHOCK from implantable cardiac defibrillator and has persisting symptoms (i.e., palpitations, " "lightheadedness)    Negative: Dizziness, lightheadedness, or weakness and heart beating very rapidly (e.g., > 140 / minute)    Negative: Dizziness, lightheadedness, or weakness and heart beating very slowly (e.g., < 50 / minute)    Negative: Sounds like a life-threatening emergency to the triager    Negative: Chest pain    Negative: Difficulty breathing    Negative: Dizziness, lightheadedness, or weakness    Negative: Heart beating very rapidly (e.g., > 140 / minute) and present now (Exception: during exercise)    Negative: Heart beating very slowly (e.g., < 50 / minute) (Exception: athlete)    Negative: New or worsened shortness of breath with activity (dyspnea on exertion)    Negative: Patient sounds very sick or weak to the triager    Negative: Wearing a 'Holter monitor' or 'cardiac event monitor'    Negative: Received SHOCK from implantable cardiac defibrillator (and now feels well)    Answer Assessment - Initial Assessment Questions  1. DESCRIPTION: \"Please describe your heart rate or heart beat that you are having\" (e.g., fast/slow, regular/irregular, skipped or extra beats, \"palpitations\")      Pt reports irregular heart beats. Pt also noting some skips.   2. ONSET: \"When did it start?\" (Minutes, hours or days)       Pt started this morning.   3. DURATION: \"How long does it last\" (e.g., seconds, minutes, hours)      Pt reports it does not last more than a day or so. Pt reports it is ongoing for an hour.    4. PATTERN \"Does it come and go, or has it been constant since it started?\"  \"Does it get worse with exertion?\"   \"Are you feeling it now?\"      Pt reports no change in symptoms with exertion. Pt reports it has been constant since this morning.   5. TAP: \"Using your hand, can you tap out what you are feeling on a chair or table in front of you, so that I can hear?\" (Note: not all patients can do this)        NA  6. HEART RATE: \"Can you tell me your heart rate?\" \"How many beats in 15 seconds?\"  (Note: not " "all patients can do this)        60-70 bpm. Has changed.   7. RECURRENT SYMPTOM: \"Have you ever had this before?\" If so, ask: \"When was the last time?\" and \"What happened that time?\"       Pt has had this before. She has gone into ER for this in the past.  8. CAUSE: \"What do you think is causing the palpitations?\"      Unsure.   9. CARDIAC HISTORY: \"Do you have any history of heart disease?\" (e.g., heart attack, angina, bypass surgery, angioplasty, arrhythmia)       NA  10. OTHER SYMPTOMS: \"Do you have any other symptoms?\" (e.g., dizziness, chest pain, sweating, difficulty breathing)        No dizziness, no lightheadedness, no sweating, no chest pain, \"Just a funny feeling in my chest and throat\"  11. PREGNANCY: \"Is there any chance you are pregnant?\" \"When was your last menstrual period?\"        NA    Protocols used: HEART RATE AND HEARTBEAT [id number]      "

## 2022-07-29 NOTE — PROGRESS NOTES
Assessment & Plan:      Problem List Items Addressed This Visit    None     Visit Diagnoses     Irregular heart beat    -  Primary    Relevant Orders    EKG 12-lead, tracing only (Completed)    Adult Cardiology al [facility] Referral        Medical Decision Making  Patient presents with an acute episode of irregular heartbeat sensation.  EKG here at the clinic shows sinus bradycardia which patient has had previously.  She otherwise is asymptomatic.  Recommend close monitoring of symptoms.  If symptoms recur, patient should present to the emergency room.  Otherwise, placed urgent referral to cardiology at this time.     Subjective:      History provided by the patient.  She is here with her .  Rene Matthews is a 81 year old female here for evaluation of irregular heartbeat.  Patient had a spell that last about 30 minutes earlier this morning around breakfast.  She felt a fluttering sensation in her chest.  Associated symptoms included feeling weak and slightly nauseous.  She states this might of been from her anxiety which she has had in the past.  After 30 minutes, the episode resolved and patient denies symptoms at this time.  She denies chest pains, shortness of breath, and diaphoresis.  Patient had a similar spell over a year ago and was seen in the emergency room at that time.  She had a negative work-up.  Patient has not followed up with cardiology.     The following portions of the patient's history were reviewed and updated as appropriate: allergies, current medications, and problem list.     Review of Systems  Pertinent items are noted in HPI.    Allergies  No Known Allergies    Family History   Problem Relation Age of Onset     Cerebrovascular Disease Mother      Hypertension Sister      Hypertension Brother        Social History     Tobacco Use     Smoking status: Never Smoker     Smokeless tobacco: Never Used   Substance Use Topics     Alcohol use: No        Objective:      /70   Pulse 59    Temp 98  F (36.7  C) (Oral)   Resp 18   Wt 62.1 kg (137 lb)   SpO2 97%   BMI 23.33 kg/m    General appearance - alert, well appearing, and in no distress and non-toxic  Chest - clear to auscultation, no wheezes, rales or rhonchi, symmetric air entry  Heart - normal rate, regular rhythm, normal S1, S2, no murmurs, rubs, clicks or gallops    The use of Dragon/Acquia dictation services was used to construct the content of this note; any grammatical errors are non-intentional. Please contact the author directly if you are in need of any clarification.

## 2022-08-08 ENCOUNTER — TRANSFERRED RECORDS (OUTPATIENT)
Dept: HEALTH INFORMATION MANAGEMENT | Facility: CLINIC | Age: 82
End: 2022-08-08

## 2022-08-25 ENCOUNTER — TELEPHONE (OUTPATIENT)
Dept: INTERNAL MEDICINE | Facility: CLINIC | Age: 82
End: 2022-08-25

## 2022-08-25 ENCOUNTER — OFFICE VISIT (OUTPATIENT)
Dept: CARDIOLOGY | Facility: CLINIC | Age: 82
End: 2022-08-25
Attending: PHYSICIAN ASSISTANT
Payer: COMMERCIAL

## 2022-08-25 VITALS
BODY MASS INDEX: 22.92 KG/M2 | OXYGEN SATURATION: 98 % | TEMPERATURE: 97.7 F | DIASTOLIC BLOOD PRESSURE: 66 MMHG | HEIGHT: 65 IN | SYSTOLIC BLOOD PRESSURE: 154 MMHG | RESPIRATION RATE: 16 BRPM | WEIGHT: 137.6 LBS | HEART RATE: 40 BPM

## 2022-08-25 DIAGNOSIS — I35.1 AORTIC VALVE INSUFFICIENCY, ETIOLOGY OF CARDIAC VALVE DISEASE UNSPECIFIED: ICD-10-CM

## 2022-08-25 DIAGNOSIS — I49.9 IRREGULAR HEART BEAT: ICD-10-CM

## 2022-08-25 DIAGNOSIS — R00.2 PALPITATIONS: ICD-10-CM

## 2022-08-25 DIAGNOSIS — I10 HYPERTENSION, UNSPECIFIED TYPE: Primary | ICD-10-CM

## 2022-08-25 DIAGNOSIS — R00.1 BRADYCARDIA: ICD-10-CM

## 2022-08-25 PROCEDURE — 99204 OFFICE O/P NEW MOD 45 MIN: CPT | Performed by: INTERNAL MEDICINE

## 2022-08-25 RX ORDER — AMLODIPINE BESYLATE 5 MG/1
5 TABLET ORAL DAILY
Qty: 90 TABLET | Refills: 3 | Status: SHIPPED | OUTPATIENT
Start: 2022-08-25 | End: 2022-10-26

## 2022-08-25 RX ORDER — METOPROLOL SUCCINATE 50 MG/1
50 TABLET, EXTENDED RELEASE ORAL DAILY
Start: 2022-08-25 | End: 2023-02-01

## 2022-08-25 NOTE — TELEPHONE ENCOUNTER
Central Prior Authorization Team   Phone: [phone]    PA Initiation    Medication: diazepam (VALIUM) 5 MG tablet  Insurance Company: [employer] - Phone [phone] Fax [phone]  Pharmacy Filling the Rx: [id number] - [place], [place] - [address] AT [facility] [address]  Filling Pharmacy Phone: [phone]  Filling Pharmacy Fax:    Start Date: 8/25/2022

## 2022-08-25 NOTE — TELEPHONE ENCOUNTER
"Prior Authorization Request   Who s requesting:  Pharmacy  Pharmacy Name and Location: [facility]  Medication Name: diazepam  Insurance Plan: [employer]  Key: None,\"   "

## 2022-08-25 NOTE — PROGRESS NOTES
"       [facility] HEART CARE   [address], [place] [address]   [email or web address]   OFFICE: [phone]          Thank you Dr. Ayala for asking the [facility] Heart Care team to participate in the care of your patient, Rene Matthews.     Impression and Plan      1.  Palpitations.  As noted below, Florence was evaluated by Andre Ayala-PAC for \"irregular heartbeat\".  She had reported to that sensation of \"fluttering\".  An ECG was performed that revealed a regular rhythm which was sinus.   As noted below, she has reported some recurrent \"fluttering\" symptoms over the past several months though these are quite infrequent and relatively short-lived.  Plan:    1 month one-patch monitor to fully clarify if there is any rhythm disturbance including atrial fibrillation or flutter.     2. Aortic insufficiency.  This was felt mild in degree on echocardiogram 15 Remedios 2021.     3.  Bradycardia.  Patient noted to be somewhat bradycardic on today's examination though asymptomatic.  Plan:    Decrease metoprolol succinate from 50 mg twice daily to 50 mg once daily.    1 patch monitor as per problem #1.     4.  Hypertension.  Blood pressure somewhat elevated in the office today.    Plan to increase amlodipine from 2.5 mg daily to 5 mg daily particular in light of decreasing metoprolol.    She does have a home blood pressure monitoring device and I asked that she continue to monitor her blood pressure on an intermittent basis and call should she have a tendency toward higher readings with the aforementioned changes.    Follow-up and further recommendations as needed pending ambulatory monitor results.    35 minutes spent reviewing prior records (including documentation, laboratory studies, cardiac testing/imaging), interview with patient along with physical exam, planning, and subsequent documentation/crafting of note.       History of Present Illness    Once again I would like to thank " "you again for asking me to participate in the care of your patient, Rene Matthews.  As you know, but to reiterate for my own records, Rene Matthews is a 82 year old female with recently had been evaluated by Andre Zelaya for \"irregular heartbeat\".  She had reported to that sensation of \"fluttering\".  She felt somewhat \"weak\" and also had some nausea.     On interview, Florence De La Cruz reports that she has had some episodic intermittent \"fluttering\".  This is usually relatively short-lived and rather sporadic.  She can go sometimes a couple of weeks if not longer without experiencing the sensation.  Other than the subjective palpitations she is otherwise fairly asymptomatic.  She specifically denies significant lightheadedness.  She reports no chest pain.  She denies any near fainting in the like.  She otherwise states that she has been doing well from a cardiac standpoint.  She denies any fevers, chills, or other constitutional symptoms.    Further review of systems is otherwise negative/noncontributory (medical record and 13 point review of systems reviewed as well and pertinent positives noted).       Cardiac Diagnostics     Twelve-lead ECG (personally reviewed) 29 July 2022: Sinus rhythm with borderline first-degree AV block.  Heart rate 48 bpm.  Nonspecific T wave changes.      Twelve-lead ECG (personally reviewed) 31 May 2021: Sinus rhythm with heart rate of 68 bpm.  Borderline first-degree AV block.  Nonspecific T wave changes.      Echocardiogram 15 Remedios 2021:   1. Normal left ventricular size and systolic performance with ejection fraction of 55 to 60%.   2. Mild aortic insufficiency.   3. Normal right ventricular size and systolic performance.   4. Mild left atrial enlargement.         Physical Examination       BP (!) 154/66 (BP Location: Left arm, Patient Position: Sitting, Cuff Size: Adult Large)   Pulse (!) 40   Temp 97.7  F (36.5  C) (Oral)   Resp 16   Ht 1.638 m (5' 4.5\")   Wt 62.4 kg (137 lb " 9.6 oz)   LMP  (LMP Unknown)   SpO2 98%   Breastfeeding No   BMI 23.25 kg/m          Wt Readings from Last 3 Encounters:   08/25/22 62.4 kg (137 lb 9.6 oz)   07/29/22 62.1 kg (137 lb)   12/23/21 62.9 kg (138 lb 9.6 oz)     The patient is alert and oriented times three. Sclerae are anicteric. Mucosal membranes are moist. Jugular venous pressure is normal. No significant adenopathy/thyromegally appreciated. Lungs are clear with good expansion. On cardiovascular exam, the patient has a regular S1 and S2. Abdomen is soft and non-tender. Extremities reveal no clubbing, cyanosis, or edema.       Family History/Social History/Risk Factors   Patient does not smoke.  Family history reviewed, and family history includes Cerebrovascular Disease in her mother; Hypertension in her brother and sister.        Medical History  Surgical History Family History Social History   Past Medical History:   Diagnosis Date     Abnormal Pap smear of cervix     ASCUS with negative biopsy     Acute low back pain without sciatica 05/18/2020     Allergic rhinitis      Anxiety, generalized      Burning sensation of feet      Cervical polyp      Chest pain     secondary to gerd     Chronic abdominal pain     Negative CT scan and colonoscopy, musculoskeletal etiology suspected     Chronic radicular low back pain     MICHEL after evaluation by Dr. Bradley     Chronic sinusitis 10/18/2016     Frequent headaches      GERD (gastroesophageal reflux disease)     Noncardiac chest pain     Hearing loss in left ear 10/20/2017     Herpes zoster 01/01/2006     Hyperlipidemia      Hypertension      IBS (irritable bowel syndrome)      Insomnia      Osteoarthritis      Osteopenia     DEXA 2014 T score -2.0 stable, DEXA December 2017 T score -1.6 left femoral neck stable.  DEXA January 2022 -2.1 L1 L4 and -1.8 bilateral femoral neck     Overactive bladder 10/18/2016     Palpitations 06/07/2021    Symptoms suspicious for atrial fibrillation.  Echocardiogram June 2021  with normal left ventricular systolic function with mild left atrial enlargement and mild AI and MR     Peptic ulcer disease 01/01/2006    w/ gastric ulcer     Personal history of colonic polyps      colonoscopy January 2014 normal.  Colonoscopy January 2019 with multiple polyps, repeat in 3 years     Postmenopausal bleeding 12/23/2021     Screening     Negative for AAA CT scan 2013     Skin cancer, basal cell 10/20/2017     Urge incontinence of urine 06/07/2021     Vitamin B12 deficiency (non anemic)      Weight gain 03/12/2021     Past Surgical History:   Procedure Laterality Date     BIOPSY CERVICAL, LOCAL EXCISION, SINGLE/MULTIPLE       ENDOMETRIAL BIOPSY      Negative     OTHER SURGICAL HISTORY      Basal cell skin cancer     OTHER SURGICAL HISTORY  2012    Radiofrequency ablation right greater saphenous vein     OVARIAN CYST REMOVAL      lysis of adhesions     STRABISMUS SURGERY  2015     Family History   Problem Relation Age of Onset     Cerebrovascular Disease Mother      Hypertension Sister      Hypertension Brother         Social History     Socioeconomic History     Marital status:      Spouse name: Not on file     Number of children: Not on file     Years of education: Not on file     Highest education level: Not on file   Occupational History     Not on file   Tobacco Use     Smoking status: Never Smoker     Smokeless tobacco: Never Used   Substance and Sexual Activity     Alcohol use: No     Drug use: No     Sexual activity: Not on file   Other Topics Concern     Not on file   Social History Narrative    Retired teacher  , [place], 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     Social Determinants of Health     Financial Resource Strain: Not on file   Food Insecurity: Not on file   Transportation Needs: Not on file   Physical Activity: Not on file   Stress: Not on file   Social Connections: Not on file   Intimate Partner Violence: Not on file   Housing Stability: Not on file           Medications   Allergies   Current Outpatient Medications   Medication Sig Dispense Refill     acetaminophen (TYLENOL) 325 MG tablet Take 325 mg by mouth 3 times daily as needed       amLODIPine (NORVASC) 5 MG tablet Take 1 tablet (5 mg) by mouth daily 90 tablet 3     aspirin (ASA) 81 MG EC tablet Take 81 mg by mouth daily       Calcium Carb-Ergocalciferol 500-200 MG-UNIT TABS Take 1 tablet by mouth daily       cyanocobalamin (VITAMIN B-12) 1000 MCG tablet Take 1 tablet (1,000 mcg) by mouth daily       diazepam (VALIUM) 5 MG tablet TAKE 1 TABLET(5 MG) BY MOUTH DAILY AS NEEDED FOR ANXIETY 60 tablet 0     fluticasone (FLONASE) 50 MCG/ACT nasal spray Spray 2 sprays in nostril 1 spray each nostril in the AM, 2 sprays in the PM       gabapentin (NEURONTIN) 300 MG capsule Take 1 capsule (300 mg) by mouth 3 times daily 270 capsule 3     hydrochlorothiazide (HYDRODIURIL) 25 MG tablet TAKE 1 TABLET(25 MG) BY MOUTH DAILY 90 tablet 3     irbesartan (AVAPRO) 300 MG tablet Take 300 mg by mouth daily        metoprolol succinate ER (TOPROL XL) 50 MG 24 hr tablet Take 1 tablet (50 mg) by mouth daily       omeprazole (PRILOSEC) 20 MG DR capsule TAKE 1 CAPSULE BY MOUTH TWICE DAILY 180 capsule 3     simvastatin (ZOCOR) 10 MG tablet Take 1 tablet (10 mg) by mouth every 24 hours 90 tablet 3     traZODone (DESYREL) 50 MG tablet Take 1 tablet (50 mg) by mouth At Bedtime 90 tablet 3     Wheat Dextrin (BENEFIBER PO) Take by mouth daily Once daily       olmesartan (BENICAR) 40 MG tablet Take 40 mg by mouth daily. (Patient not taking: No sig reported)       No Known Allergies       Lab Results    Chemistry/lipid CBC Cardiac Enzymes/BNP/TSH/INR   Recent Labs   Lab Test 12/23/21  1007   CHOL 168   HDL 77   LDL 74   TRIG 86     Recent Labs   Lab Test 12/23/21  1007 11/16/20  1416 10/24/19  0909   LDL 74 86 78     Recent Labs   Lab Test 12/23/21  1007   *   POTASSIUM 3.6   CHLORIDE 96*   CO2 28   GLC 84   BUN 7*   CR 0.70   GFRESTIMATED 87   EBONY 9.5      Recent Labs   Lab Test 12/23/21  1007 06/07/21  1343 05/31/21 1938   CR 0.70 0.68 0.66     No results for input(s): A1C in the last 64691 hours.       Recent Labs   Lab Test 12/23/21  1007   WBC 6.5   HGB 12.7   HCT 37.8   MCV 89        Recent Labs   Lab Test 12/23/21  1007 05/31/21  1938 11/11/20  1018   HGB 12.7 12.3 13.1    Recent Labs   Lab Test 05/31/21 1938   TROPONINI <0.01     No results for input(s): BNP, NTBNPI, NTBNP in the last 78304 hours.  Recent Labs   Lab Test 05/31/21 1938   TSH 1.63     Recent Labs   Lab Test 07/03/19  1208   INR 0.96          Medications  Allergies   Current Outpatient Medications   Medication Sig Dispense Refill     acetaminophen (TYLENOL) 325 MG tablet Take 325 mg by mouth 3 times daily as needed       amLODIPine (NORVASC) 5 MG tablet Take 1 tablet (5 mg) by mouth daily 90 tablet 3     aspirin (ASA) 81 MG EC tablet Take 81 mg by mouth daily       Calcium Carb-Ergocalciferol 500-200 MG-UNIT TABS Take 1 tablet by mouth daily       cyanocobalamin (VITAMIN B-12) 1000 MCG tablet Take 1 tablet (1,000 mcg) by mouth daily       diazepam (VALIUM) 5 MG tablet TAKE 1 TABLET(5 MG) BY MOUTH DAILY AS NEEDED FOR ANXIETY 60 tablet 0     fluticasone (FLONASE) 50 MCG/ACT nasal spray Spray 2 sprays in nostril 1 spray each nostril in the AM, 2 sprays in the PM       gabapentin (NEURONTIN) 300 MG capsule Take 1 capsule (300 mg) by mouth 3 times daily 270 capsule 3     hydrochlorothiazide (HYDRODIURIL) 25 MG tablet TAKE 1 TABLET(25 MG) BY MOUTH DAILY 90 tablet 3     irbesartan (AVAPRO) 300 MG tablet Take 300 mg by mouth daily        metoprolol succinate ER (TOPROL XL) 50 MG 24 hr tablet Take 1 tablet (50 mg) by mouth daily       omeprazole (PRILOSEC) 20 MG DR capsule TAKE 1 CAPSULE BY MOUTH TWICE DAILY 180 capsule 3     simvastatin (ZOCOR) 10 MG tablet Take 1 tablet (10 mg) by mouth every 24 hours 90 tablet 3     traZODone (DESYREL) 50 MG tablet Take 1 tablet (50 mg) by mouth At Bedtime  90 tablet 3     Wheat Dextrin (BENEFIBER PO) Take by mouth daily Once daily       olmesartan (BENICAR) 40 MG tablet Take 40 mg by mouth daily. (Patient not taking: No sig reported)        No Known Allergies     Lab Results   Lab Results   Component Value Date     12/23/2021    CO2 28 12/23/2021    BUN 7 12/23/2021     Lab Results   Component Value Date    WBC 6.5 12/23/2021    HGB 12.7 12/23/2021    HCT 37.8 12/23/2021    MCV 89 12/23/2021     12/23/2021     Lab Results   Component Value Date    CHOL 168 12/23/2021    TRIG 86 12/23/2021    HDL 77 12/23/2021     Lab Results   Component Value Date    INR 0.96 07/03/2019     No results found for: BNP  Lab Results   Component Value Date    TROPONINI <0.01 05/31/2021     Lab Results   Component Value Date    TSH 1.63 05/31/2021         Medical History  Surgical History   Past Medical History:   Diagnosis Date     Abnormal Pap smear of cervix     ASCUS with negative biopsy     Acute low back pain without sciatica 05/18/2020     Allergic rhinitis      Anxiety, generalized      Burning sensation of feet      Cervical polyp      Chest pain     secondary to gerd     Chronic abdominal pain     Negative CT scan and colonoscopy, musculoskeletal etiology suspected     Chronic radicular low back pain     MICHEL after evaluation by Dr. Bradley     Chronic sinusitis 10/18/2016     Frequent headaches      GERD (gastroesophageal reflux disease)     Noncardiac chest pain     Hearing loss in left ear 10/20/2017     Herpes zoster 01/01/2006     Hyperlipidemia      Hypertension      IBS (irritable bowel syndrome)      Insomnia      Osteoarthritis      Osteopenia     DEXA 2014 T score -2.0 stable, DEXA December 2017 T score -1.6 left femoral neck stable.  DEXA January 2022 -2.1 L1 L4 and -1.8 bilateral femoral neck     Overactive bladder 10/18/2016     Palpitations 06/07/2021    Symptoms suspicious for atrial fibrillation.  Echocardiogram June 2021 with normal left ventricular  systolic function with mild left atrial enlargement and mild AI and MR     Peptic ulcer disease 01/01/2006    w/ gastric ulcer     Personal history of colonic polyps      colonoscopy January 2014 normal.  Colonoscopy January 2019 with multiple polyps, repeat in 3 years     Postmenopausal bleeding 12/23/2021     Screening     Negative for AAA CT scan 2013     Skin cancer, basal cell 10/20/2017     Urge incontinence of urine 06/07/2021     Vitamin B12 deficiency (non anemic)      Weight gain 03/12/2021      Past Surgical History:   Procedure Laterality Date     BIOPSY CERVICAL, LOCAL EXCISION, SINGLE/MULTIPLE       ENDOMETRIAL BIOPSY      Negative     OTHER SURGICAL HISTORY      Basal cell skin cancer     OTHER SURGICAL HISTORY  2012    Radiofrequency ablation right greater saphenous vein     OVARIAN CYST REMOVAL      lysis of adhesions     STRABISMUS SURGERY  2015

## 2022-08-25 NOTE — LETTER
"8/25/2022    Kevin Ho MD  [address] [facility] [address]    RE: Rene DAVILA Byron       Dear Colleague,     I had the pleasure of seeing Rene Matthews in the [facility].         [facility]   [address], [place] [address]   [email or web address]   OFFICE: [phone]          Thank you Dr. Ayala for asking the [facility] team to participate in the care of your patient, Rene Matthews.     Impression and Plan      1.  Palpitations.  As noted below, Florence was evaluated by Andre Ayala-PAC for \"irregular heartbeat\".  She had reported to that sensation of \"fluttering\".  An ECG was performed that revealed a regular rhythm which was sinus.   As noted below, she has reported some recurrent \"fluttering\" symptoms over the past several months though these are quite infrequent and relatively short-lived.  Plan:    1 month one-patch monitor to fully clarify if there is any rhythm disturbance including atrial fibrillation or flutter.     2. Aortic insufficiency.  This was felt mild in degree on echocardiogram 15 Remedios 2021.     3.  Bradycardia.  Patient noted to be somewhat bradycardic on today's examination though asymptomatic.  Plan:    Decrease metoprolol succinate from 50 mg twice daily to 50 mg once daily.    1 patch monitor as per problem #1.     4.  Hypertension.  Blood pressure somewhat elevated in the office today.    Plan to increase amlodipine from 2.5 mg daily to 5 mg daily particular in light of decreasing metoprolol.    She does have a home blood pressure monitoring device and I asked that she continue to monitor her blood pressure on an intermittent basis and call should she have a tendency toward higher readings with the aforementioned changes.    Follow-up and further recommendations as needed pending ambulatory monitor results.    35 minutes spent reviewing prior records (including documentation, laboratory " "studies, cardiac testing/imaging), interview with patient along with physical exam, planning, and subsequent documentation/crafting of note.       History of Present Illness    Once again I would like to thank you again for asking me to participate in the care of your patient, Rene Matthews.  As you know, but to reiterate for my own records, Rene Matthews is a 82 year old female with recently had been evaluated by Andre Zelaya for \"irregular heartbeat\".  She had reported to that sensation of \"fluttering\".  She felt somewhat \"weak\" and also had some nausea.     On interview, Florence De La Cruz reports that she has had some episodic intermittent \"fluttering\".  This is usually relatively short-lived and rather sporadic.  She can go sometimes a couple of weeks if not longer without experiencing the sensation.  Other than the subjective palpitations she is otherwise fairly asymptomatic.  She specifically denies significant lightheadedness.  She reports no chest pain.  She denies any near fainting in the like.  She otherwise states that she has been doing well from a cardiac standpoint.  She denies any fevers, chills, or other constitutional symptoms.    Further review of systems is otherwise negative/noncontributory (medical record and 13 point review of systems reviewed as well and pertinent positives noted).       Cardiac Diagnostics     Twelve-lead ECG (personally reviewed) 29 July 2022: Sinus rhythm with borderline first-degree AV block.  Heart rate 48 bpm.  Nonspecific T wave changes.      Twelve-lead ECG (personally reviewed) 31 May 2021: Sinus rhythm with heart rate of 68 bpm.  Borderline first-degree AV block.  Nonspecific T wave changes.      Echocardiogram 15 Remedios 2021:   1. Normal left ventricular size and systolic performance with ejection fraction of 55 to 60%.   2. Mild aortic insufficiency.   3. Normal right ventricular size and systolic performance.   4. Mild left atrial enlargement.         Physical " "Examination       BP (!) 154/66 (BP Location: Left arm, Patient Position: Sitting, Cuff Size: Adult Large)   Pulse (!) 40   Temp 97.7  F (36.5  C) (Oral)   Resp 16   Ht 1.638 m (5' 4.5\")   Wt 62.4 kg (137 lb 9.6 oz)   LMP  (LMP Unknown)   SpO2 98%   Breastfeeding No   BMI 23.25 kg/m          Wt Readings from Last 3 Encounters:   08/25/22 62.4 kg (137 lb 9.6 oz)   07/29/22 62.1 kg (137 lb)   12/23/21 62.9 kg (138 lb 9.6 oz)     The patient is alert and oriented times three. Sclerae are anicteric. Mucosal membranes are moist. Jugular venous pressure is normal. No significant adenopathy/thyromegally appreciated. Lungs are clear with good expansion. On cardiovascular exam, the patient has a regular S1 and S2. Abdomen is soft and non-tender. Extremities reveal no clubbing, cyanosis, or edema.       Family History/Social History/Risk Factors   Patient does not smoke.  Family history reviewed, and family history includes Cerebrovascular Disease in her mother; Hypertension in her brother and sister.        Medical History  Surgical History Family History Social History   Past Medical History:   Diagnosis Date     Abnormal Pap smear of cervix     ASCUS with negative biopsy     Acute low back pain without sciatica 05/18/2020     Allergic rhinitis      Anxiety, generalized      Burning sensation of feet      Cervical polyp      Chest pain     secondary to gerd     Chronic abdominal pain     Negative CT scan and colonoscopy, musculoskeletal etiology suspected     Chronic radicular low back pain     MICHEL after evaluation by Dr. Bradley     Chronic sinusitis 10/18/2016     Frequent headaches      GERD (gastroesophageal reflux disease)     Noncardiac chest pain     Hearing loss in left ear 10/20/2017     Herpes zoster 01/01/2006     Hyperlipidemia      Hypertension      IBS (irritable bowel syndrome)      Insomnia      Osteoarthritis      Osteopenia     DEXA 2014 T score -2.0 stable, DEXA December 2017 T score -1.6 left " femoral neck stable.  DEXA January 2022 -2.1 L1 L4 and -1.8 bilateral femoral neck     Overactive bladder 10/18/2016     Palpitations 06/07/2021    Symptoms suspicious for atrial fibrillation.  Echocardiogram June 2021 with normal left ventricular systolic function with mild left atrial enlargement and mild AI and MR     Peptic ulcer disease 01/01/2006    w/ gastric ulcer     Personal history of colonic polyps      colonoscopy January 2014 normal.  Colonoscopy January 2019 with multiple polyps, repeat in 3 years     Postmenopausal bleeding 12/23/2021     Screening     Negative for AAA CT scan 2013     Skin cancer, basal cell 10/20/2017     Urge incontinence of urine 06/07/2021     Vitamin B12 deficiency (non anemic)      Weight gain 03/12/2021     Past Surgical History:   Procedure Laterality Date     BIOPSY CERVICAL, LOCAL EXCISION, SINGLE/MULTIPLE       ENDOMETRIAL BIOPSY      Negative     OTHER SURGICAL HISTORY      Basal cell skin cancer     OTHER SURGICAL HISTORY  2012    Radiofrequency ablation right greater saphenous vein     OVARIAN CYST REMOVAL      lysis of adhesions     STRABISMUS SURGERY  2015     Family History   Problem Relation Age of Onset     Cerebrovascular Disease Mother      Hypertension Sister      Hypertension Brother         Social History     Socioeconomic History     Marital status:      Spouse name: Not on file     Number of children: Not on file     Years of education: Not on file     Highest education level: Not on file   Occupational History     Not on file   Tobacco Use     Smoking status: Never Smoker     Smokeless tobacco: Never Used   Substance and Sexual Activity     Alcohol use: No     Drug use: No     Sexual activity: Not on file   Other Topics Concern     Not on file   Social History Narrative    Retired teacher  , [place], 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     Social Determinants of Health     Financial Resource Strain: Not on file   Food Insecurity: Not on file    Transportation Needs: Not on file   Physical Activity: Not on file   Stress: Not on file   Social Connections: Not on file   Intimate Partner Violence: Not on file   Housing Stability: Not on file           Medications  Allergies   Current Outpatient Medications   Medication Sig Dispense Refill     acetaminophen (TYLENOL) 325 MG tablet Take 325 mg by mouth 3 times daily as needed       amLODIPine (NORVASC) 5 MG tablet Take 1 tablet (5 mg) by mouth daily 90 tablet 3     aspirin (ASA) 81 MG EC tablet Take 81 mg by mouth daily       Calcium Carb-Ergocalciferol 500-200 MG-UNIT TABS Take 1 tablet by mouth daily       cyanocobalamin (VITAMIN B-12) 1000 MCG tablet Take 1 tablet (1,000 mcg) by mouth daily       diazepam (VALIUM) 5 MG tablet TAKE 1 TABLET(5 MG) BY MOUTH DAILY AS NEEDED FOR ANXIETY 60 tablet 0     fluticasone (FLONASE) 50 MCG/ACT nasal spray Spray 2 sprays in nostril 1 spray each nostril in the AM, 2 sprays in the PM       gabapentin (NEURONTIN) 300 MG capsule Take 1 capsule (300 mg) by mouth 3 times daily 270 capsule 3     hydrochlorothiazide (HYDRODIURIL) 25 MG tablet TAKE 1 TABLET(25 MG) BY MOUTH DAILY 90 tablet 3     irbesartan (AVAPRO) 300 MG tablet Take 300 mg by mouth daily        metoprolol succinate ER (TOPROL XL) 50 MG 24 hr tablet Take 1 tablet (50 mg) by mouth daily       omeprazole (PRILOSEC) 20 MG DR capsule TAKE 1 CAPSULE BY MOUTH TWICE DAILY 180 capsule 3     simvastatin (ZOCOR) 10 MG tablet Take 1 tablet (10 mg) by mouth every 24 hours 90 tablet 3     traZODone (DESYREL) 50 MG tablet Take 1 tablet (50 mg) by mouth At Bedtime 90 tablet 3     Wheat Dextrin (BENEFIBER PO) Take by mouth daily Once daily       olmesartan (BENICAR) 40 MG tablet Take 40 mg by mouth daily. (Patient not taking: No sig reported)       No Known Allergies       Lab Results    Chemistry/lipid CBC Cardiac Enzymes/BNP/TSH/INR   Recent Labs   Lab Test 12/23/21  1007   CHOL 168   HDL 77   LDL 74   TRIG 86     Recent Labs    Lab Test 12/23/21  1007 11/16/20  1416 10/24/19  0909   LDL 74 86 78     Recent Labs   Lab Test 12/23/21  1007   *   POTASSIUM 3.6   CHLORIDE 96*   CO2 28   GLC 84   BUN 7*   CR 0.70   GFRESTIMATED 87   EBONY 9.5     Recent Labs   Lab Test 12/23/21  1007 06/07/21  1343 05/31/21  1938   CR 0.70 0.68 0.66     No results for input(s): A1C in the last 66039 hours.       Recent Labs   Lab Test 12/23/21  1007   WBC 6.5   HGB 12.7   HCT 37.8   MCV 89        Recent Labs   Lab Test 12/23/21  1007 05/31/21  1938 11/11/20  1018   HGB 12.7 12.3 13.1    Recent Labs   Lab Test 05/31/21 1938   TROPONINI <0.01     No results for input(s): BNP, NTBNPI, NTBNP in the last 57901 hours.  Recent Labs   Lab Test 05/31/21 1938   TSH 1.63     Recent Labs   Lab Test 07/03/19  1208   INR 0.96          Medications  Allergies   Current Outpatient Medications   Medication Sig Dispense Refill     acetaminophen (TYLENOL) 325 MG tablet Take 325 mg by mouth 3 times daily as needed       amLODIPine (NORVASC) 5 MG tablet Take 1 tablet (5 mg) by mouth daily 90 tablet 3     aspirin (ASA) 81 MG EC tablet Take 81 mg by mouth daily       Calcium Carb-Ergocalciferol 500-200 MG-UNIT TABS Take 1 tablet by mouth daily       cyanocobalamin (VITAMIN B-12) 1000 MCG tablet Take 1 tablet (1,000 mcg) by mouth daily       diazepam (VALIUM) 5 MG tablet TAKE 1 TABLET(5 MG) BY MOUTH DAILY AS NEEDED FOR ANXIETY 60 tablet 0     fluticasone (FLONASE) 50 MCG/ACT nasal spray Spray 2 sprays in nostril 1 spray each nostril in the AM, 2 sprays in the PM       gabapentin (NEURONTIN) 300 MG capsule Take 1 capsule (300 mg) by mouth 3 times daily 270 capsule 3     hydrochlorothiazide (HYDRODIURIL) 25 MG tablet TAKE 1 TABLET(25 MG) BY MOUTH DAILY 90 tablet 3     irbesartan (AVAPRO) 300 MG tablet Take 300 mg by mouth daily        metoprolol succinate ER (TOPROL XL) 50 MG 24 hr tablet Take 1 tablet (50 mg) by mouth daily       omeprazole (PRILOSEC) 20 MG DR capsule TAKE  1 CAPSULE BY MOUTH TWICE DAILY 180 capsule 3     simvastatin (ZOCOR) 10 MG tablet Take 1 tablet (10 mg) by mouth every 24 hours 90 tablet 3     traZODone (DESYREL) 50 MG tablet Take 1 tablet (50 mg) by mouth At Bedtime 90 tablet 3     Wheat Dextrin (BENEFIBER PO) Take by mouth daily Once daily       olmesartan (BENICAR) 40 MG tablet Take 40 mg by mouth daily. (Patient not taking: No sig reported)        No Known Allergies     Lab Results   Lab Results   Component Value Date     12/23/2021    CO2 28 12/23/2021    BUN 7 12/23/2021     Lab Results   Component Value Date    WBC 6.5 12/23/2021    HGB 12.7 12/23/2021    HCT 37.8 12/23/2021    MCV 89 12/23/2021     12/23/2021     Lab Results   Component Value Date    CHOL 168 12/23/2021    TRIG 86 12/23/2021    HDL 77 12/23/2021     Lab Results   Component Value Date    INR 0.96 07/03/2019     No results found for: BNP  Lab Results   Component Value Date    TROPONINI <0.01 05/31/2021     Lab Results   Component Value Date    TSH 1.63 05/31/2021         Medical History  Surgical History   Past Medical History:   Diagnosis Date     Abnormal Pap smear of cervix     ASCUS with negative biopsy     Acute low back pain without sciatica 05/18/2020     Allergic rhinitis      Anxiety, generalized      Burning sensation of feet      Cervical polyp      Chest pain     secondary to gerd     Chronic abdominal pain     Negative CT scan and colonoscopy, musculoskeletal etiology suspected     Chronic radicular low back pain     MICHEL after evaluation by Dr. Bradley     Chronic sinusitis 10/18/2016     Frequent headaches      GERD (gastroesophageal reflux disease)     Noncardiac chest pain     Hearing loss in left ear 10/20/2017     Herpes zoster 01/01/2006     Hyperlipidemia      Hypertension      IBS (irritable bowel syndrome)      Insomnia      Osteoarthritis      Osteopenia     DEXA 2014 T score -2.0 stable, DEXA December 2017 T score -1.6 left femoral neck stable.  DEXA January  2022 -2.1 L1 L4 and -1.8 bilateral femoral neck     Overactive bladder 10/18/2016     Palpitations 06/07/2021    Symptoms suspicious for atrial fibrillation.  Echocardiogram June 2021 with normal left ventricular systolic function with mild left atrial enlargement and mild AI and MR     Peptic ulcer disease 01/01/2006    w/ gastric ulcer     Personal history of colonic polyps      colonoscopy January 2014 normal.  Colonoscopy January 2019 with multiple polyps, repeat in 3 years     Postmenopausal bleeding 12/23/2021     Screening     Negative for AAA CT scan 2013     Skin cancer, basal cell 10/20/2017     Urge incontinence of urine 06/07/2021     Vitamin B12 deficiency (non anemic)      Weight gain 03/12/2021      Past Surgical History:   Procedure Laterality Date     BIOPSY CERVICAL, LOCAL EXCISION, SINGLE/MULTIPLE       ENDOMETRIAL BIOPSY      Negative     OTHER SURGICAL HISTORY      Basal cell skin cancer     OTHER SURGICAL HISTORY  2012    Radiofrequency ablation right greater saphenous vein     OVARIAN CYST REMOVAL      lysis of adhesions     STRABISMUS SURGERY  2015             Thank you for allowing me to participate in the care of your patient.      Sincerely,     George Miek MD     [facility]  cc:   Andre Ayala PA-C  [address]  [place] [address]

## 2022-08-26 NOTE — TELEPHONE ENCOUNTER
Prior Authorization Approval    Authorization Effective Date: 8/22/2022  Authorization Expiration Date: 12/25/2022  Medication: diazepam (VALIUM) 5 MG tablet  Approved Dose/Quantity:    Reference #:     Insurance Company: [employer] - Phone [phone] Fax [phone]  Expected CoPay:       CoPay Card Available:      Foundation Assistance Needed:    Which Pharmacy is filling the prescription (Not needed for infusion/clinic administered): [id number] - [place], [place] - [address] [facility] [address]  Pharmacy Notified: Yes  Patient Notified: Yes

## 2022-09-02 ENCOUNTER — HOSPITAL ENCOUNTER (OUTPATIENT)
Dept: CARDIOLOGY | Facility: CLINIC | Age: 82
Discharge: HOME | End: 2022-09-02
Attending: INTERNAL MEDICINE | Admitting: INTERNAL MEDICINE
Payer: MEDICARE

## 2022-09-02 DIAGNOSIS — R00.2 PALPITATIONS: ICD-10-CM

## 2022-09-02 DIAGNOSIS — I49.9 IRREGULAR HEART BEAT: ICD-10-CM

## 2022-09-02 PROCEDURE — 93270 REMOTE 30 DAY ECG REV/REPORT: CPT

## 2022-09-04 DIAGNOSIS — I10 PRIMARY HYPERTENSION: Primary | ICD-10-CM

## 2022-09-06 RX ORDER — IRBESARTAN 300 MG/1
TABLET ORAL
Qty: 90 TABLET | Refills: 0 | Status: SHIPPED | OUTPATIENT
Start: 2022-09-06 | End: 2022-12-05

## 2022-09-29 ENCOUNTER — TELEPHONE (OUTPATIENT)
Dept: CARDIOLOGY | Facility: CLINIC | Age: 82
End: 2022-09-29

## 2022-09-29 DIAGNOSIS — I49.9 IRREGULAR HEART BEAT: Primary | ICD-10-CM

## 2022-09-29 DIAGNOSIS — I48.91 A-FIB (H): ICD-10-CM

## 2022-09-29 NOTE — TELEPHONE ENCOUNTER
Dr. Mike,  Please review Sulema DENNISON Notify.  Follow up pending results. Enrollment ending 10/1/76648.  Any new orders or recommendations?  Thank you - Lizabeth          ---------------------------------------------------------------------

## 2022-09-29 NOTE — TELEPHONE ENCOUNTER
----- Message from MANNY Calvo sent at 9/29/2022  7:24 AM CDT -----  Regarding: Heart Monitor Notification  Good morning!    There is an MD Notify for Dr. Mike's review in the [employer] folder on [employer].    Thank you and have a great day!    Jamil Vazquez MS CEP  Exercise Physiologist

## 2022-09-29 NOTE — TELEPHONE ENCOUNTER
Left detailed message with my direct number to call for recommendations.  --------------------------------------------  George Mike MD  You 37 minutes ago (12:34 PM)     FR    Strips reviewed and looks like atrial fibrillation though heart rate reasonably well controlled.  If she is comfortable with it, lets have her stop aspirin and instead initiate apixaban 5 mg twice daily.  lets also arrange for Florence to be seen in the Atrial Fibrillation clinic by one of our NPs.  Thanks.

## 2022-10-03 PROCEDURE — 93272 ECG/REVIEW INTERPRET ONLY: CPT | Performed by: INTERNAL MEDICINE

## 2022-10-03 NOTE — TELEPHONE ENCOUNTER
Florence returning your call regarding results- she states she is unable to find the number you left her.

## 2022-10-03 NOTE — TELEPHONE ENCOUNTER
Patient called into my direct number. Reviewed monitor findings and recommendations. Patient verbalized understanding and is agreeable with plan. She will stop aspirin and start Eliquis tomorrow. Rx for Eliquis sent to patients preferred pharmacy and coupon faxed. Order placed for Afib clinic and message sent to scheduling to arrange.

## 2022-10-04 ENCOUNTER — TELEPHONE (OUTPATIENT)
Dept: INTERNAL MEDICINE | Facility: CLINIC | Age: 82
End: 2022-10-04

## 2022-10-04 DIAGNOSIS — I48.0 PAROXYSMAL ATRIAL FIBRILLATION (H): Primary | ICD-10-CM

## 2022-10-04 NOTE — TELEPHONE ENCOUNTER
Patient was diagnosed with atrial fibrillation and she is at increased risk for cardioembolic stroke without taking anticoagulation.  I do agree with the recommendation to start Eliquis.  It is the best option.  As far as the cost, I do think it is worth spending $5000 per year to avoid having a stroke.  I think she is worth it :) the alternative would be warfarin/Coumadin but does require frequent INR monitoring and adjustments of dose and has been shown not to be as effective or as safe as Eliquis.  There is increased bleeding risk.

## 2022-10-04 NOTE — TELEPHONE ENCOUNTER
"Incoming Call:    Pt reporting was on halter monitor for a month.   Cardiologist suggested pt be on Eliquis.   \"It is it really worth it to spend $5000/year to be on this medication.\"   Pt has not started medication  Pt seeking a second opinion from Dr. Ho, is it necessary to take this medication or anything at that matter.       Message will be routed to provider for review/advise.      Dipika PARIS RN  [facility]    "

## 2022-10-04 NOTE — TELEPHONE ENCOUNTER
[facility] Call Center    Phone Message    May a detailed message be left on voicemail: yes     Reason for Call: Other: Patient called to inform care team that the coupon for eliquis does not work with her insurance.     Action Taken: Other: Cardiology    Travel Screening: Not Applicable

## 2022-10-04 NOTE — TELEPHONE ENCOUNTER
Patient called back to my direct line. Reviewed with patient again recommendations for Eliquis and informed a coupon has been faxed to her pharmacy.   Patient has not met her deductible for the year so will be paying more out of pocket. Offered her an application for the [facility] to see if she can get Eliquis at a reduced price.   Patient and her  verbalized understanding and will  the rx today. Encouraged to return the call for further questions or concerns. Understanding verbalized.

## 2022-10-04 NOTE — TELEPHONE ENCOUNTER
Patient did call back.     We discussed Dr. Ho's message below.   She states her  has been on Warfarin for years, she is used to the INR checks with him.     She did state, however, that she spoke with their pharmacy and insurance, and they could get the cost for Eliquis down to just $43/month. She thinks this is reasonable for them and is going to move forward with trying the Eliquis. She states that if for any reason they are unable to afford the Eliquis, she will reach out to make the switch to Warfarin.     Routing as FYI.     Thank you, Nydia KWONG

## 2022-10-04 NOTE — TELEPHONE ENCOUNTER
Call placed to patient after receiving VM on my direct line. Left detailed message with my direct number to call.

## 2022-10-04 NOTE — TELEPHONE ENCOUNTER
Outgoing Call:  No answer  Please see notes below    Dipika Krishnan RN contacted Rene on 10/04/22 and left a message. If patient calls back please route to any available RN to relay the following message   Per Dr. Ho : Patient was diagnosed with atrial fibrillation and she is at increased risk for cardioembolic stroke without taking anticoagulation.  I do agree with the recommendation to start Eliquis.  It is the best option.  As far as the cost, I do think it is worth spending $5000 per year to avoid having a stroke.  I think she is worth it :) the alternative would be warfarin/Coumadin but does require frequent INR monitoring and adjustments of dose and has been shown not to be as effective or as safe as Eliquis.  There is increased bleeding risk    Dipika PARIS RN  [facility]

## 2022-10-05 NOTE — TELEPHONE ENCOUNTER
Called patient to discuss cost of Eliquis. She did  only a 2 week supply yesterday and started the Eliquis this morning. She will stay on Eliquis and talk with her medicare  about cost. Encouraged Florence to  the 30 day supply of Eliquis within the next week and call back if she is not able to  due to the burden of cost. Florence verbalized understanding and is thankful for the call.

## 2022-10-13 PROBLEM — I48.0 PAROXYSMAL ATRIAL FIBRILLATION (H): Status: ACTIVE | Noted: 2022-10-13

## 2022-10-13 RX ORDER — WARFARIN SODIUM 2.5 MG/1
TABLET ORAL
Qty: 60 TABLET | Refills: 11 | Status: SHIPPED | OUTPATIENT
Start: 2022-10-13 | End: 2023-12-12

## 2022-10-14 ENCOUNTER — TELEPHONE (OUTPATIENT)
Dept: ANTICOAGULATION | Facility: CLINIC | Age: 82
End: 2022-10-14

## 2022-10-14 DIAGNOSIS — I48.0 PAROXYSMAL ATRIAL FIBRILLATION (H): Primary | ICD-10-CM

## 2022-10-14 NOTE — TELEPHONE ENCOUNTER
Spoke with Flornece and she has enough Eliquis to get through 10/18. She will start the warfarin as prescribed by Dr Ho. INR appointment scheduled for 10/24. They are going on a trip next week for a few days. Her  has been on warfarin for a long time and checks INR at home. She would like to do this also when qualify's. She does not feel she needs warfarin education.

## 2022-10-14 NOTE — TELEPHONE ENCOUNTER
"Informed pt of message per Dr Ho:    \"Inform Rene that I have sent a prescription for warfarin to her pharmacy and that she will be enrolled in the anticoagulation clinic.  She should discontinue Eliquis in the day following discontinuation and she should start warfarin as directed on bottle instructions.  They are 2.5 mg tablets.  I want her to take 3 tablets on the first day, then 2 tablets on the second day and then 1 tablet daily thereafter.  However, she will need her INR checked on day 3 and anticoagulation clinic will contact her to make these arrangements.\"     Got verbal clarification from Dr Ho that \"She should discontinue Eliquis and on the day following, she should start warfarin as directed on bottle instructions. \"    Patient stated that she will be going out of town next Wed-Sun. She was wondering if she should continue Eliquis until gone and start Warfarin or how should she do that.    Per Dr Ho, she can make arrangement so that she will be back on town to be able to get her INR check on day 3 as instructed. Patient stated that she only have enough of the Eliquis to get her through next Wednesday so her day 3 of Warfarin will end up on a weekend  (Saturday). Writer advised pt to  Warfarin from pharmacy today and start it tomorrow so that her Day 3 will be Monday (before her out of town trip). If the anticoagulation clinic does not reach out to her today or by Monday for arrangement, pt was advised to call back to clinic on Monday and writer could assist pt with making sure she gets her INR checked as instructed. Patient verbalized understanding.    Jim Plunkett, HARMEETN, RN  [facility]          "

## 2022-10-24 ENCOUNTER — LAB (OUTPATIENT)
Dept: LAB | Facility: CLINIC | Age: 82
End: 2022-10-24
Payer: COMMERCIAL

## 2022-10-24 ENCOUNTER — ANTICOAGULATION THERAPY VISIT (OUTPATIENT)
Dept: ANTICOAGULATION | Facility: CLINIC | Age: 82
End: 2022-10-24

## 2022-10-24 DIAGNOSIS — I48.0 PAROXYSMAL ATRIAL FIBRILLATION (H): Primary | ICD-10-CM

## 2022-10-24 DIAGNOSIS — I48.0 PAROXYSMAL ATRIAL FIBRILLATION (H): ICD-10-CM

## 2022-10-24 LAB — INR BLD: 1.6 (ref 0.9–1.1)

## 2022-10-24 PROCEDURE — 85610 PROTHROMBIN TIME: CPT

## 2022-10-24 PROCEDURE — 36416 COLLJ CAPILLARY BLOOD SPEC: CPT

## 2022-10-24 NOTE — PROGRESS NOTES
ANTICOAGULATION MANAGEMENT     Rene Matthews 82 year old female is on warfarin with subtherapeutic INR result. (Goal INR 2.0-3.0)    Recent labs: (last 7 days)     10/24/22  1045   INR 1.6*       ASSESSMENT       Source(s): Chart Review, Patient/Caregiver Call and Template       Warfarin doses taken: Warfarin taken as instructed    Diet: No new diet changes identified    New illness, injury, or hospitalization: No    Medication/supplement changes: None noted    Signs or symptoms of bleeding or clotting: No    Previous INR: n/a    Additional findings: New start on day 5 of warfarin  She finished her Eliquis last week.    She also wants home monitor as soon as she can. Her  has one and it works well for him.       PLAN     Recommended plan for no diet, medication or health factor changes affecting INR     Dosing Instructions: Increase your warfarin dose (7.1% change) with next INR in 3 days       Summary  As of 10/24/2022    Full warfarin instructions:  3.75 mg every Mon; 2.5 mg all other days; Starting 10/24/2022   Next INR check:  10/27/2022             Telephone call with Florence who verbalizes understanding and agrees to plan    Lab visit scheduled    Education provided:     Please call back if any changes to your diet, medications or how you've been taking warfarin    Goal range and lab monitoring: goal range and significance of current result, Importance of therapeutic range and Importance of following up at instructed interval    Plan made per [facility] anticoagulation protocol    Martina Rodgers RN  Anticoagulation Clinic  10/24/2022    _______________________________________________________________________     Anticoagulation Episode Summary     Current INR goal:  2.0-3.0   TTR:  --   Target end date:  Indefinite   Send INR reminders to:  [facility] [employer]    Indications    Paroxysmal atrial fibrillation (H) [I48.0]           Comments:           Anticoagulation Care Providers     Provider Role Specialty  Phone number    Kevin Ho MD Referring Internal Medicine [phone]

## 2022-10-27 ENCOUNTER — ANTICOAGULATION THERAPY VISIT (OUTPATIENT)
Dept: ANTICOAGULATION | Facility: CLINIC | Age: 82
End: 2022-10-27

## 2022-10-27 ENCOUNTER — LAB (OUTPATIENT)
Dept: LAB | Facility: CLINIC | Age: 82
End: 2022-10-27
Payer: COMMERCIAL

## 2022-10-27 DIAGNOSIS — I48.0 PAROXYSMAL ATRIAL FIBRILLATION (H): ICD-10-CM

## 2022-10-27 DIAGNOSIS — I48.0 PAROXYSMAL ATRIAL FIBRILLATION (H): Primary | ICD-10-CM

## 2022-10-27 LAB — INR BLD: 1.7 (ref 0.9–1.1)

## 2022-10-27 PROCEDURE — 85610 PROTHROMBIN TIME: CPT | Mod: QW

## 2022-10-27 PROCEDURE — 36416 COLLJ CAPILLARY BLOOD SPEC: CPT

## 2022-10-27 NOTE — PROGRESS NOTES
ANTICOAGULATION MANAGEMENT     Rene Matthews 82 year old female is on warfarin with subtherapeutic INR result. (Goal INR 2.0-3.0)    Recent labs: (last 7 days)     10/27/22  1415   INR 1.7*       ASSESSMENT       Source(s): Chart Review and Patient/Caregiver Call       Warfarin doses taken: Warfarin taken as instructed    Diet: No new diet changes identified    New illness, injury, or hospitalization: No    Medication/supplement changes: None noted    Signs or symptoms of bleeding or clotting: No    Previous INR: Subtherapeutic    Additional findings: New start on day 8 of warfarin       PLAN     Recommended plan for no diet, medication or health factor changes affecting INR     Dosing Instructions: Increase your warfarin dose (20% change) with next INR next Weds       Summary  As of 10/27/2022    Full warfarin instructions:  2.5 mg every Mon, Wed, Sat; 3.75 mg all other days; Starting 10/27/2022   Next INR check:  11/2/2022             Telephone call with Florence who agrees to plan and repeated back plan correctly  Sent [employer] [facility] message with dosing and follow up instructions    Check at provider office visit    Education provided:     Contact [phone] with any changes, questions or concerns.     Plan made with [facility] Pharmacist Yoselin East, RN  Anticoagulation Clinic  10/27/2022    _______________________________________________________________________     Anticoagulation Episode Summary     Current INR goal:  2.0-3.0   TTR:  0.0 % (3 d)   Target end date:  Indefinite   Send INR reminders to:  [facility] [employer]    Indications    Paroxysmal atrial fibrillation (H) [I48.0]           Comments:           Anticoagulation Care Providers     Provider Role Specialty Phone number    Kevin Ho MD Referring Internal Medicine [phone]

## 2022-11-02 ENCOUNTER — ANTICOAGULATION THERAPY VISIT (OUTPATIENT)
Dept: ANTICOAGULATION | Facility: CLINIC | Age: 82
End: 2022-11-02

## 2022-11-02 ENCOUNTER — OFFICE VISIT (OUTPATIENT)
Dept: CARDIOLOGY | Facility: CLINIC | Age: 82
End: 2022-11-02
Attending: INTERNAL MEDICINE
Payer: COMMERCIAL

## 2022-11-02 VITALS
DIASTOLIC BLOOD PRESSURE: 63 MMHG | SYSTOLIC BLOOD PRESSURE: 136 MMHG | RESPIRATION RATE: 16 BRPM | WEIGHT: 134 LBS | OXYGEN SATURATION: 98 % | BODY MASS INDEX: 22.65 KG/M2 | HEART RATE: 55 BPM

## 2022-11-02 DIAGNOSIS — R00.2 PALPITATIONS: Primary | ICD-10-CM

## 2022-11-02 DIAGNOSIS — I48.0 PAROXYSMAL ATRIAL FIBRILLATION (H): Primary | ICD-10-CM

## 2022-11-02 DIAGNOSIS — I35.1 MILD AORTIC INSUFFICIENCY: ICD-10-CM

## 2022-11-02 DIAGNOSIS — I10 PRIMARY HYPERTENSION: ICD-10-CM

## 2022-11-02 DIAGNOSIS — I49.9 IRREGULAR HEART BEAT: ICD-10-CM

## 2022-11-02 DIAGNOSIS — I48.0 PAROXYSMAL ATRIAL FIBRILLATION (H): ICD-10-CM

## 2022-11-02 LAB — INR POINT OF CARE: 2.7 (ref 0.9–1.1)

## 2022-11-02 PROCEDURE — 36416 COLLJ CAPILLARY BLOOD SPEC: CPT | Performed by: NURSE PRACTITIONER

## 2022-11-02 PROCEDURE — 99214 OFFICE O/P EST MOD 30 MIN: CPT | Performed by: NURSE PRACTITIONER

## 2022-11-02 PROCEDURE — 85610 PROTHROMBIN TIME: CPT | Performed by: NURSE PRACTITIONER

## 2022-11-02 NOTE — LETTER
11/2/2022    Kevin Ho MD  [address] [facility] [address]    RE: Rene Matthews       Dear Colleague,     I had the pleasure of seeing Rene Matthews in the [facility].    Thank you, Dr. Mike, for asking the [facility] team to see Ms. Rene Matthews to evaluate paroxysmal atrial fibrillation.    Assessment/Recommendations     Assessment/Plan:    Diagnoses and all orders for this visit:  Palpitations and diagnosed with Paroxysmal atrial fibrillation (H).  She states she has had 3 episodes of irregular fast heart rate so far this year.  They have never lasted long.  She did not feel any better with decreasing metoprolol succinate from 100 to 50 mg every day.  Her heart rates have improved and she is running mid 50s to mid 60s at rest.  She has a tendency for low heart rates.  She is very active for her age playing pickle ball and working out in the gym.  She is complaining of less energy.  She is seeing Dr. Ho in 2 months and to discuss fatigue with him.  With infrequent episodes I do not recommend antiarrhythmic start.  I did offer her the option of decreasing metoprolol from 50 to 25 mg to see if this would help her to feel better but cautioned that it could cause more palpitations.  This would have to be evaluated with change in medication.  She declined change.  She appears to be a candidate for rhythm control and if antiarrhythmic needed I would consider flecainide or amiodarone.  Sinus bradycardia -no bradycardic symptoms.  No bradycardia seen on symptom monitor.  Primary hypertension and blood pressure at goal.  Mild aortic insufficiency and follows with Dr. Mike in our clinic.  Noted to be mild on echo in June 2021      [id number] score of 4 and on warfarin and INR therapeutic for the first time since start.  She will be called by the anticoagulation service with dosing instructions and next INR.  See HPI for more details.  Follow up in clinic  with Dr. Mike in 1 year unless palpitations more frequent and then see him sooner.     History of Present Illness/Subjective     Rene Matthews is a very pleasant 82 year old female who comes in today for EP consult for paroxysmal atrial fibrillation noted on symptom monitor, which is a new diagnosis for her.  Rene Matthews has a known history of hypertension, hyperlipidemia, mild aortic insufficiency and osteopenia.  She feels weak and nauseated at times since starting on anticoagulation.  She was initially on Eliquis for a few weeks and then switched to warfarin.  Her  is on warfarin.  I discussed food interactions with warfarin.  She does not drink alcohol.  She wants to do home INR testing.  I have encouraged her to stay on the INR service for a few more weeks until her dose is stable.  She will need to talk to Dr. Ho as insurance needs to cover the testing strips.  She can use the same meter as her .  INR 2.7 today.  She has had only 3 episodes similar to her atrial fib on September 27.  Her  has atrial fib and has had cardioversions.    Cardiographics (reviewed):  June 2021 echo showed:  Narrative & Impression    No previous study for comparison.    Left ventricle ejection fraction is normal. The calculated left ventricular ejection fraction is 66%.    Normal left ventricular size and systolic function.    Normal right ventricular size and systolic function.    Left atrial volume is mildly increased.    Mild aortic regurgitation.    Mild mitral regurgitation.  Cardiac testing personally reviewed:  September 2022 4 weeks symptom monitor showed:      Cardiac Electrophysiology  Cardiac event monitor report     Cardiac event monitoring from 9/2/2022 to 10/2/2022 (monitored duration 28d 7h 55m).  Baseline rhythm was sinus rhythm 51bpm.    Reported heart rate range 42 (9/8/2022 02:07am) to 120bpm, average 62bpm.  1 symptom triggers (other) correlated to sinus rhythm 47bpm with first degree  AV block.  Automated recordings included one instance of paroxysmal atrial fibrillation (9/27/2022 19:12), sinus rhythm with first degree AV block, isolated PACs, isolated PVCs.  There were no pauses noted.  The atrial fibrillation episode is not further characterized on this monitoring modality.  Supraventricular and ventricular ectopic beat frequency are not reported on this monitoring modality.                Problem List:  Patient Active Problem List   Diagnosis     Primary hypertension     Hyperlipidemia     IBS (irritable bowel syndrome)     Osteoarthritis     Allergic rhinitis     Peptic ulcer disease     Anxiety, generalized     Insomnia     Chronic abdominal pain     GERD (gastroesophageal reflux disease)     Chronic headaches     Overactive bladder     Hearing loss in left ear     Skin cancer, basal cell     Personal history of colonic polyps     Palpitations     Urge incontinence of urine     Liver cyst     Atrophy of vagina     Flatulence symptom     Chronic radicular low back pain     Burning sensation of feet     Vitamin B12 deficiency (non anemic)     Osteopenia     Benign neoplasm of ascending colon     Paroxysmal atrial fibrillation (H)     Mild aortic insufficiency     Revi  e  Physical Examination Review of Systems   w fystems  LMP  (LMP Unknown)   There is no height or weight on file to calculate BMI.  Wt Readings from Last 3 Encounters:   08/25/22 62.4 kg (137 lb 9.6 oz)   07/29/22 62.1 kg (137 lb)   12/23/21 62.9 kg (138 lb 9.6 oz)     General Appearance:   Alert, well-appearing and in no acute distress.   HEENT: Atraumatic, normocephalic.  No scleral icterus, normal conjunctivae; mucous membranes pink and moist.     Chest: Chest symmetric, spine straight.   Lungs:   Respirations unlabored.   Cardiovascular:   Normal JVD, no edema.       Extremities: No cyanosis or clubbing   Musculoskeletal: Moves all extremities   Skin: Warm, dry, intact.    Neurologic: Mood and affect are appropriate, alert  and oriented to person, place, time, and situation     ROS: 10 point ROS neg other than the symptoms noted above in the HPI.     Medical History  Surgical History Family History Social History     Past Medical History:   Diagnosis Date     Abnormal Pap smear of cervix     ASCUS with negative biopsy     Acute low back pain without sciatica 05/18/2020     Allergic rhinitis      Anxiety, generalized      Burning sensation of feet      Cervical polyp      Chest pain     secondary to gerd     Chronic abdominal pain     Negative CT scan and colonoscopy, musculoskeletal etiology suspected     Chronic radicular low back pain     MICHEL after evaluation by Dr. Bradley     Chronic sinusitis 10/18/2016     Frequent headaches      GERD (gastroesophageal reflux disease)     Noncardiac chest pain     Hearing loss in left ear 10/20/2017     Herpes zoster 01/01/2006     Hyperlipidemia      Hypertension      IBS (irritable bowel syndrome)      Insomnia      Osteoarthritis      Osteopenia     DEXA 2014 T score -2.0 stable, DEXA December 2017 T score -1.6 left femoral neck stable.  DEXA January 2022 -2.1 L1 L4 and -1.8 bilateral femoral neck     Overactive bladder 10/18/2016     Palpitations 06/07/2021    Symptoms suspicious for atrial fibrillation.  Echocardiogram June 2021 with normal left ventricular systolic function with mild left atrial enlargement and mild AI and MR     Peptic ulcer disease 01/01/2006    w/ gastric ulcer     Personal history of colonic polyps      colonoscopy January 2014 normal.  Colonoscopy January 2019 with multiple polyps, repeat in 3 years     Postmenopausal bleeding 12/23/2021     Screening     Negative for AAA CT scan 2013     Skin cancer, basal cell 10/20/2017     Urge incontinence of urine 06/07/2021     Vitamin B12 deficiency (non anemic)      Weight gain 03/12/2021    Past Surgical History:   Procedure Laterality Date     BIOPSY CERVICAL, LOCAL EXCISION, SINGLE/MULTIPLE       ENDOMETRIAL BIOPSY       Negative     OTHER SURGICAL HISTORY      Basal cell skin cancer     OTHER SURGICAL HISTORY  2012    Radiofrequency ablation right greater saphenous vein     OVARIAN CYST REMOVAL      lysis of adhesions     STRABISMUS SURGERY  2015    Family History   Problem Relation Age of Onset     Cerebrovascular Disease Mother      Hypertension Sister      Hypertension Brother     History   Smoking Status     Never   Smokeless Tobacco     Never     Social History    Substance and Sexual Activity      Alcohol use: No       Medications  Allergies     Current Outpatient Medications   Medication Sig Dispense Refill     acetaminophen (TYLENOL) 325 MG tablet Take 325 mg by mouth 3 times daily as needed       amLODIPine (NORVASC) 2.5 MG tablet Take 1 tablet (2.5 mg) by mouth 2 times daily 180 tablet 1     Calcium Carb-Ergocalciferol 500-200 MG-UNIT TABS Take 1 tablet by mouth daily       cyanocobalamin (VITAMIN B-12) 1000 MCG tablet Take 1 tablet (1,000 mcg) by mouth daily       diazepam (VALIUM) 5 MG tablet TAKE 1 TABLET(5 MG) BY MOUTH DAILY AS NEEDED FOR ANXIETY 60 tablet 0     fluticasone (FLONASE) 50 MCG/ACT nasal spray Spray 2 sprays in nostril 1 spray each nostril in the AM, 2 sprays in the PM       gabapentin (NEURONTIN) 300 MG capsule Take 1 capsule (300 mg) by mouth 3 times daily 270 capsule 3     hydrochlorothiazide (HYDRODIURIL) 25 MG tablet TAKE 1 TABLET(25 MG) BY MOUTH DAILY 90 tablet 3     irbesartan (AVAPRO) 300 MG tablet TAKE 1 TABLET(300 MG) BY MOUTH DAILY 90 tablet 0     metoprolol succinate ER (TOPROL XL) 50 MG 24 hr tablet Take 1 tablet (50 mg) by mouth daily       olmesartan (BENICAR) 40 MG tablet Take 40 mg by mouth daily. (Patient not taking: No sig reported)       omeprazole (PRILOSEC) 20 MG DR capsule TAKE 1 CAPSULE BY MOUTH TWICE DAILY 180 capsule 3     simvastatin (ZOCOR) 10 MG tablet Take 1 tablet (10 mg) by mouth every 24 hours 90 tablet 3     traZODone (DESYREL) 50 MG tablet Take 1 tablet (50 mg) by mouth  At Bedtime 90 tablet 3     warfarin ANTICOAGULANT (COUMADIN) 2.5 MG tablet 3 tablets on day 1 then 2 tablets on day 2 then 1 tablet daily thereafter and dose will be adjusted per INR. 60 tablet 11     Wheat Dextrin (BENEFIBER PO) Take by mouth daily Once daily        No Known Allergies   Medical, surgical, family, social history, and medications were all reviewed and updated as necessary.   Lab Results    Chemistry/lipid CBC Cardiac Enzymes/BNP/TSH/INR   Recent Labs   Lab Test 12/23/21  1007   CHOL 168   HDL 77   LDL 74   TRIG 86     Recent Labs   Lab Test 12/23/21  1007 11/16/20  1416 10/24/19  0909   LDL 74 86 78     Recent Labs   Lab Test 12/23/21  1007   *   POTASSIUM 3.6   CHLORIDE 96*   CO2 28   GLC 84   BUN 7*   CR 0.70   GFRESTIMATED 87   EBONY 9.5     Recent Labs   Lab Test 12/23/21  1007 06/07/21  1343 05/31/21  1938   CR 0.70 0.68 0.66     No results for input(s): A1C in the last 70401 hours.       Recent Labs   Lab Test 12/23/21  1007   WBC 6.5   HGB 12.7   HCT 37.8   MCV 89        Recent Labs   Lab Test 12/23/21  1007 05/31/21  1938 11/11/20  1018   HGB 12.7 12.3 13.1    Recent Labs   Lab Test 05/31/21 1938   TROPONINI <0.01     No results for input(s): BNP, NTBNPI, NTBNP in the last 54420 hours.  Recent Labs   Lab Test 05/31/21  1938   TSH 1.63     Recent Labs   Lab Test 10/27/22  1415 10/24/22  1045 07/03/19  1208   INR 1.7* 1.6* 0.96          Total Time- 30 minutes spent on date of encounter doing chart review, history and exam, documentation and further activities as noted above.  This note has been dictated using voice recognition software. Any grammatical, typographical, or context distortions are unintentional and inherent to the software.      Thank you for allowing me to participate in the care of your patient.      Sincerely,     MACARIO Washburn [facility]  cc:   George Mike MD  [address] [place] [address]  [address]  [place] [address]

## 2022-11-02 NOTE — PROGRESS NOTES
Thank you, Dr. Mike, for asking the [facility] team to see Ms. Rene Matthews to evaluate paroxysmal atrial fibrillation.    Assessment/Recommendations     Assessment/Plan:    Diagnoses and all orders for this visit:  Palpitations and diagnosed with Paroxysmal atrial fibrillation (H).  She states she has had 3 episodes of irregular fast heart rate so far this year.  They have never lasted long.  She did not feel any better with decreasing metoprolol succinate from 100 to 50 mg every day.  Her heart rates have improved and she is running mid 50s to mid 60s at rest.  She has a tendency for low heart rates.  She is very active for her age playing [employer] ball and working out in the gym.  She is complaining of less energy.  She is seeing Dr. Ho in 2 months and to discuss fatigue with him.  With infrequent episodes I do not recommend antiarrhythmic start.  I did offer her the option of decreasing metoprolol from 50 to 25 mg to see if this would help her to feel better but cautioned that it could cause more palpitations.  This would have to be evaluated with change in medication.  She declined change.  She appears to be a candidate for rhythm control and if antiarrhythmic needed I would consider flecainide or amiodarone.  Sinus bradycardia -no bradycardic symptoms.  No bradycardia seen on symptom monitor.  Primary hypertension and blood pressure at goal.  Mild aortic insufficiency and follows with Dr. Mike in our clinic.  Noted to be mild on echo in June 2021      [id number] score of 4 and on warfarin and INR therapeutic for the first time since start.  She will be called by the anticoagulation service with dosing instructions and next INR.  See HPI for more details.  Follow up in clinic with Dr. Mike in 1 year unless palpitations more frequent and then see him sooner.     History of Present Illness/Subjective     Rene Matthews is a very pleasant 82 year old female who comes in today for  EP consult for paroxysmal atrial fibrillation noted on symptom monitor, which is a new diagnosis for her.  Rene Matthews has a known history of hypertension, hyperlipidemia, mild aortic insufficiency and osteopenia.  She feels weak and nauseated at times since starting on anticoagulation.  She was initially on Eliquis for a few weeks and then switched to warfarin.  Her  is on warfarin.  I discussed food interactions with warfarin.  She does not drink alcohol.  She wants to do home INR testing.  I have encouraged her to stay on the INR service for a few more weeks until her dose is stable.  She will need to talk to Dr. Ho as insurance needs to cover the testing strips.  She can use the same meter as her .  INR 2.7 today.  She has had only 3 episodes similar to her atrial fib on September 27.  Her  has atrial fib and has had cardioversions.    Cardiographics (reviewed):  June 2021 echo showed:  Narrative & Impression    No previous study for comparison.    Left ventricle ejection fraction is normal. The calculated left ventricular ejection fraction is 66%.    Normal left ventricular size and systolic function.    Normal right ventricular size and systolic function.    Left atrial volume is mildly increased.    Mild aortic regurgitation.    Mild mitral regurgitation.  Cardiac testing personally reviewed:  September 2022 4 weeks symptom monitor showed:      Cardiac Electrophysiology  Cardiac event monitor report     Cardiac event monitoring from 9/2/2022 to 10/2/2022 (monitored duration 28d 7h 55m).  Baseline rhythm was sinus rhythm 51bpm.    Reported heart rate range 42 (9/8/2022 02:07am) to 120bpm, average 62bpm.  1 symptom triggers (other) correlated to sinus rhythm 47bpm with first degree AV block.  Automated recordings included one instance of paroxysmal atrial fibrillation (9/27/2022 19:12), sinus rhythm with first degree AV block, isolated PACs, isolated PVCs.  There were no pauses  noted.  The atrial fibrillation episode is not further characterized on this monitoring modality.  Supraventricular and ventricular ectopic beat frequency are not reported on this monitoring modality.                Problem List:  Patient Active Problem List   Diagnosis     Primary hypertension     Hyperlipidemia     IBS (irritable bowel syndrome)     Osteoarthritis     Allergic rhinitis     Peptic ulcer disease     Anxiety, generalized     Insomnia     Chronic abdominal pain     GERD (gastroesophageal reflux disease)     Chronic headaches     Overactive bladder     Hearing loss in left ear     Skin cancer, basal cell     Personal history of colonic polyps     Palpitations     Urge incontinence of urine     Liver cyst     Atrophy of vagina     Flatulence symptom     Chronic radicular low back pain     Burning sensation of feet     Vitamin B12 deficiency (non anemic)     Osteopenia     Benign neoplasm of ascending colon     Paroxysmal atrial fibrillation (H)     Mild aortic insufficiency     Revi  e  Physical Examination Review of Systems   w fystems  LMP  (LMP Unknown)   There is no height or weight on file to calculate BMI.  Wt Readings from Last 3 Encounters:   08/25/22 62.4 kg (137 lb 9.6 oz)   07/29/22 62.1 kg (137 lb)   12/23/21 62.9 kg (138 lb 9.6 oz)     General Appearance:   Alert, well-appearing and in no acute distress.   HEENT: Atraumatic, normocephalic.  No scleral icterus, normal conjunctivae; mucous membranes pink and moist.     Chest: Chest symmetric, spine straight.   Lungs:   Respirations unlabored.   Cardiovascular:   Normal JVD, no edema.       Extremities: No cyanosis or clubbing   Musculoskeletal: Moves all extremities   Skin: Warm, dry, intact.    Neurologic: Mood and affect are appropriate, alert and oriented to person, place, time, and situation     ROS: 10 point ROS neg other than the symptoms noted above in the HPI.     Medical History  Surgical History Family History Social History      Past Medical History:   Diagnosis Date     Abnormal Pap smear of cervix     ASCUS with negative biopsy     Acute low back pain without sciatica 05/18/2020     Allergic rhinitis      Anxiety, generalized      Burning sensation of feet      Cervical polyp      Chest pain     secondary to gerd     Chronic abdominal pain     Negative CT scan and colonoscopy, musculoskeletal etiology suspected     Chronic radicular low back pain     MICHEL after evaluation by Dr. Bradley     Chronic sinusitis 10/18/2016     Frequent headaches      GERD (gastroesophageal reflux disease)     Noncardiac chest pain     Hearing loss in left ear 10/20/2017     Herpes zoster 01/01/2006     Hyperlipidemia      Hypertension      IBS (irritable bowel syndrome)      Insomnia      Osteoarthritis      Osteopenia     DEXA 2014 T score -2.0 stable, DEXA December 2017 T score -1.6 left femoral neck stable.  DEXA January 2022 -2.1 L1 L4 and -1.8 bilateral femoral neck     Overactive bladder 10/18/2016     Palpitations 06/07/2021    Symptoms suspicious for atrial fibrillation.  Echocardiogram June 2021 with normal left ventricular systolic function with mild left atrial enlargement and mild AI and MR     Peptic ulcer disease 01/01/2006    w/ gastric ulcer     Personal history of colonic polyps      colonoscopy January 2014 normal.  Colonoscopy January 2019 with multiple polyps, repeat in 3 years     Postmenopausal bleeding 12/23/2021     Screening     Negative for AAA CT scan 2013     Skin cancer, basal cell 10/20/2017     Urge incontinence of urine 06/07/2021     Vitamin B12 deficiency (non anemic)      Weight gain 03/12/2021    Past Surgical History:   Procedure Laterality Date     BIOPSY CERVICAL, LOCAL EXCISION, SINGLE/MULTIPLE       ENDOMETRIAL BIOPSY      Negative     OTHER SURGICAL HISTORY      Basal cell skin cancer     OTHER SURGICAL HISTORY  2012    Radiofrequency ablation right greater saphenous vein     OVARIAN CYST REMOVAL      lysis of  adhesions     STRABISMUS SURGERY  2015    Family History   Problem Relation Age of Onset     Cerebrovascular Disease Mother      Hypertension Sister      Hypertension Brother     History   Smoking Status     Never   Smokeless Tobacco     Never     Social History    Substance and Sexual Activity      Alcohol use: No       Medications  Allergies     Current Outpatient Medications   Medication Sig Dispense Refill     acetaminophen (TYLENOL) 325 MG tablet Take 325 mg by mouth 3 times daily as needed       amLODIPine (NORVASC) 2.5 MG tablet Take 1 tablet (2.5 mg) by mouth 2 times daily 180 tablet 1     Calcium Carb-Ergocalciferol 500-200 MG-UNIT TABS Take 1 tablet by mouth daily       cyanocobalamin (VITAMIN B-12) 1000 MCG tablet Take 1 tablet (1,000 mcg) by mouth daily       diazepam (VALIUM) 5 MG tablet TAKE 1 TABLET(5 MG) BY MOUTH DAILY AS NEEDED FOR ANXIETY 60 tablet 0     fluticasone (FLONASE) 50 MCG/ACT nasal spray Spray 2 sprays in nostril 1 spray each nostril in the AM, 2 sprays in the PM       gabapentin (NEURONTIN) 300 MG capsule Take 1 capsule (300 mg) by mouth 3 times daily 270 capsule 3     hydrochlorothiazide (HYDRODIURIL) 25 MG tablet TAKE 1 TABLET(25 MG) BY MOUTH DAILY 90 tablet 3     irbesartan (AVAPRO) 300 MG tablet TAKE 1 TABLET(300 MG) BY MOUTH DAILY 90 tablet 0     metoprolol succinate ER (TOPROL XL) 50 MG 24 hr tablet Take 1 tablet (50 mg) by mouth daily       olmesartan (BENICAR) 40 MG tablet Take 40 mg by mouth daily. (Patient not taking: No sig reported)       omeprazole (PRILOSEC) 20 MG DR capsule TAKE 1 CAPSULE BY MOUTH TWICE DAILY 180 capsule 3     simvastatin (ZOCOR) 10 MG tablet Take 1 tablet (10 mg) by mouth every 24 hours 90 tablet 3     traZODone (DESYREL) 50 MG tablet Take 1 tablet (50 mg) by mouth At Bedtime 90 tablet 3     warfarin ANTICOAGULANT (COUMADIN) 2.5 MG tablet 3 tablets on day 1 then 2 tablets on day 2 then 1 tablet daily thereafter and dose will be adjusted per INR. 60  tablet 11     Wheat Dextrin (BENEFIBER PO) Take by mouth daily Once daily        No Known Allergies   Medical, surgical, family, social history, and medications were all reviewed and updated as necessary.   Lab Results    Chemistry/lipid CBC Cardiac Enzymes/BNP/TSH/INR   Recent Labs   Lab Test 12/23/21  1007   CHOL 168   HDL 77   LDL 74   TRIG 86     Recent Labs   Lab Test 12/23/21  1007 11/16/20  1416 10/24/19  0909   LDL 74 86 78     Recent Labs   Lab Test 12/23/21  1007   *   POTASSIUM 3.6   CHLORIDE 96*   CO2 28   GLC 84   BUN 7*   CR 0.70   GFRESTIMATED 87   EBONY 9.5     Recent Labs   Lab Test 12/23/21  1007 06/07/21  1343 05/31/21  1938   CR 0.70 0.68 0.66     No results for input(s): A1C in the last 77157 hours.       Recent Labs   Lab Test 12/23/21  1007   WBC 6.5   HGB 12.7   HCT 37.8   MCV 89        Recent Labs   Lab Test 12/23/21  1007 05/31/21  1938 11/11/20  1018   HGB 12.7 12.3 13.1    Recent Labs   Lab Test 05/31/21 1938   TROPONINI <0.01     No results for input(s): BNP, NTBNPI, NTBNP in the last 26318 hours.  Recent Labs   Lab Test 05/31/21  1938   TSH 1.63     Recent Labs   Lab Test 10/27/22  1415 10/24/22  1045 07/03/19  1208   INR 1.7* 1.6* 0.96          Total Time- 30 minutes spent on date of encounter doing chart review, history and exam, documentation and further activities as noted above.  This note has been dictated using voice recognition software. Any grammatical, typographical, or context distortions are unintentional and inherent to the software.

## 2022-11-02 NOTE — PROGRESS NOTES
ANTICOAGULATION MANAGEMENT     Rene Matthews 82 year old female is on warfarin with therapeutic INR result. (Goal INR 2.0-3.0)    Recent labs: (last 7 days)     11/02/22  1303   INR 2.7*       ASSESSMENT       Source(s): Chart Review and Patient/Caregiver Call       Warfarin doses taken: Warfarin taken as instructed    Diet: No new diet changes identified    New illness, injury, or hospitalization: No    Medication/supplement changes: None noted    Signs or symptoms of bleeding or clotting: No    Previous INR: Subtherapeutic    Additional findings: wants to do home monitor as soon as she is able       PLAN     Recommended plan for no diet, medication or health factor changes affecting INR     Dosing Instructions: Continue your current warfarin dose with next INR in 5 days       Summary  As of 11/2/2022    Full warfarin instructions:  2.5 mg every Mon, Wed, Sat; 3.75 mg all other days; Starting 11/2/2022   Next INR check:  11/7/2022             Telephone call with Florence who verbalizes understanding and agrees to plan    Lab visit scheduled    Education provided:     Please call back if any changes to your diet, medications or how you've been taking warfarin    Goal range and lab monitoring: goal range and significance of current result and Importance of therapeutic range    Plan made per [facility] anticoagulation protocol    Martina Rodgers RN  Anticoagulation Clinic  11/2/2022    _______________________________________________________________________     Anticoagulation Episode Summary     Current INR goal:  2.0-3.0   TTR:  45.8 % (1.3 wk)   Target end date:  Indefinite   Send INR reminders to:  [facility] [employer]    Indications    Paroxysmal atrial fibrillation (H) [I48.0]           Comments:           Anticoagulation Care Providers     Provider Role Specialty Phone number    Kevin Ho MD Referring Internal Medicine [phone]

## 2022-11-02 NOTE — PATIENT INSTRUCTIONS
Rene Matthews,    It was a pleasure to see you today at the [facility].     My recommendations after this visit include:    No changes in medications.    To followup with Dr. Mike in 1 year and see him sooner if palpitations are frequent.      My contact information:  Jesus Chong, [place]  After Hours or Scheduling  [phone]  My Nurse---Luda Garrett [phone]

## 2022-11-07 ENCOUNTER — ANTICOAGULATION THERAPY VISIT (OUTPATIENT)
Dept: ANTICOAGULATION | Facility: CLINIC | Age: 82
End: 2022-11-07

## 2022-11-07 ENCOUNTER — LAB (OUTPATIENT)
Dept: LAB | Facility: CLINIC | Age: 82
End: 2022-11-07
Payer: COMMERCIAL

## 2022-11-07 DIAGNOSIS — I48.0 PAROXYSMAL ATRIAL FIBRILLATION (H): Primary | ICD-10-CM

## 2022-11-07 DIAGNOSIS — I48.0 PAROXYSMAL ATRIAL FIBRILLATION (H): ICD-10-CM

## 2022-11-07 LAB — INR BLD: 2.7 (ref 0.9–1.1)

## 2022-11-07 PROCEDURE — 85610 PROTHROMBIN TIME: CPT

## 2022-11-07 PROCEDURE — 36415 COLL VENOUS BLD VENIPUNCTURE: CPT

## 2022-11-07 NOTE — PROGRESS NOTES
ANTICOAGULATION MANAGEMENT     Rene Matthews 82 year old female is on warfarin with therapeutic INR result. (Goal INR 2.0-3.0)    Recent labs: (last 7 days)     11/07/22  1023   INR 2.7*       ASSESSMENT       Source(s): Chart Review and Patient/Caregiver Call       Warfarin doses taken: Warfarin taken as instructed    Diet: No new diet changes identified    New illness, injury, or hospitalization: No    Medication/supplement changes: None noted    Signs or symptoms of bleeding or clotting: No    Previous INR: Therapeutic last visit; previously outside of goal range    Additional findings: None       PLAN     Recommended plan for no diet, medication or health factor changes affecting INR     Dosing Instructions: Continue your current warfarin dose with next INR in 1 week       Summary  As of 11/7/2022    Full warfarin instructions:  2.5 mg every Mon, Wed, Sat; 3.75 mg all other days; Starting 11/7/2022   Next INR check:  11/14/2022             Telephone call with Florence who verbalizes understanding and agrees to plan    Lab visit scheduled    Education provided:     Goal range and lab monitoring: goal range and significance of current result and frequency of lab work when starting warfarin and importance of following up when instructed (extends after stability established)    Information on home INR monitoring    Contact [phone] with any changes, questions or concerns.     Plan made per [facility] anticoagulation protocol    Flavia Cheney RN  Anticoagulation Clinic  11/7/2022    _______________________________________________________________________     Anticoagulation Episode Summary     Current INR goal:  2.0-3.0   TTR:  64.7 % (1.9 wk)   Target end date:  Indefinite   Send INR reminders to:  [facility] [employer]    Indications    Paroxysmal atrial fibrillation (H) [I48.0]           Comments:           Anticoagulation Care Providers     Provider Role Specialty Phone number    Kevin Ho MD Referring Internal  Medicine [phone]

## 2022-11-15 ENCOUNTER — ANTICOAGULATION THERAPY VISIT (OUTPATIENT)
Dept: ANTICOAGULATION | Facility: CLINIC | Age: 82
End: 2022-11-15

## 2022-11-15 ENCOUNTER — LAB (OUTPATIENT)
Dept: LAB | Facility: CLINIC | Age: 82
End: 2022-11-15
Payer: COMMERCIAL

## 2022-11-15 ENCOUNTER — TELEPHONE (OUTPATIENT)
Dept: INTERNAL MEDICINE | Facility: CLINIC | Age: 82
End: 2022-11-15

## 2022-11-15 DIAGNOSIS — I48.0 PAROXYSMAL ATRIAL FIBRILLATION (H): ICD-10-CM

## 2022-11-15 DIAGNOSIS — I48.0 PAROXYSMAL ATRIAL FIBRILLATION (H): Primary | ICD-10-CM

## 2022-11-15 LAB — INR BLD: 2.3 (ref 0.9–1.1)

## 2022-11-15 PROCEDURE — 36416 COLLJ CAPILLARY BLOOD SPEC: CPT

## 2022-11-15 PROCEDURE — 85610 PROTHROMBIN TIME: CPT | Mod: QW

## 2022-11-15 NOTE — PROGRESS NOTES
ANTICOAGULATION MANAGEMENT     Rene Matthews 82 year old female is on warfarin with therapeutic INR result. (Goal INR 2.0-3.0)    Recent labs: (last 7 days)     11/15/22  1031   INR 2.3*       ASSESSMENT       Source(s): Chart Review and Patient/Caregiver Call       Warfarin doses taken: Warfarin taken as instructed    Diet: No new diet changes identified  She may have more greens during the Holiday    New illness, injury, or hospitalization: No    Medication/supplement changes: None noted    Signs or symptoms of bleeding or clotting: No    Previous INR: Therapeutic last 2(+) visits    Additional findings: None       PLAN     Recommended plan for no diet, medication or health factor changes affecting INR     Dosing Instructions: Continue your current warfarin dose with next INR in 2 weeks       Summary  As of 11/15/2022    Full warfarin instructions:  2.5 mg every Mon, Wed, Sat; 3.75 mg all other days; Starting 11/15/2022   Next INR check:  11/29/2022             Telephone call with Florence who verbalizes understanding and agrees to plan    Lab visit scheduled    Education provided:     Please call back if any changes to your diet, medications or how you've been taking warfarin    Goal range and lab monitoring: goal range and significance of current result and Importance of therapeutic range    Dietary considerations: importance of consistent vitamin K intake    Plan made per [facility] anticoagulation protocol    Martina Rodgers RN  Anticoagulation Clinic  11/15/2022    _______________________________________________________________________     Anticoagulation Episode Summary     Current INR goal:  2.0-3.0   TTR:  77.6 % (3 wk)   Target end date:  Indefinite   Send INR reminders to:  [facility] [employer]    Indications    Paroxysmal atrial fibrillation (H) [I48.0]           Comments:           Anticoagulation Care Providers     Provider Role Specialty Phone number    Kevin Ho MD Referring Internal Medicine  [phone]

## 2022-11-15 NOTE — TELEPHONE ENCOUNTER
Reason for call:  INR   Who is calling? Patient    Phone number: [phone]    Fax number: n/a    Name of caller: Rene Matthews    INR Value: 2.3    Are there any other concerns: Yes: Patient is calling INR nurse back  Route this INR message to [facility] # [id number]    Phone number to reach patient:  Cell number on file:    Telephone Information:   Mobile [phone]       Best Time:  anytime    Can we leave a detailed message on this number?  YES    Travel screening: Not Applicable

## 2022-11-17 ENCOUNTER — NURSE TRIAGE (OUTPATIENT)
Dept: NURSING | Facility: CLINIC | Age: 82
End: 2022-11-17

## 2022-11-17 NOTE — TELEPHONE ENCOUNTER
Patient is calling and is currently taking blood thinner Warfarin  for over one month and patient feels that she is lethargic and nauseated in the morning.  Switched to taking the blood thinner with supper, with food.  Patient was taking them before bed.  Patient states that she plays pickle ball and goes to a club and now she cannot do this.  Patient wishes to speak with Kevin Victoria regarding medication.  Please phone Florence within two hours.      Reason for Disposition    Nursing judgment    Additional Information    Negative: Nursing judgment    Protocols used: INFORMATION ONLY CALL - NO TRIAGE-A-OH

## 2022-11-18 ENCOUNTER — OFFICE VISIT (OUTPATIENT)
Dept: INTERNAL MEDICINE | Facility: CLINIC | Age: 82
End: 2022-11-18
Payer: COMMERCIAL

## 2022-11-18 VITALS
RESPIRATION RATE: 18 BRPM | HEART RATE: 58 BPM | TEMPERATURE: 96.7 F | DIASTOLIC BLOOD PRESSURE: 70 MMHG | SYSTOLIC BLOOD PRESSURE: 120 MMHG | OXYGEN SATURATION: 98 % | BODY MASS INDEX: 22.66 KG/M2 | HEIGHT: 65 IN | WEIGHT: 136 LBS

## 2022-11-18 DIAGNOSIS — R53.82 CHRONIC FATIGUE: ICD-10-CM

## 2022-11-18 DIAGNOSIS — I48.0 PAROXYSMAL ATRIAL FIBRILLATION (H): Primary | ICD-10-CM

## 2022-11-18 DIAGNOSIS — F41.1 ANXIETY, GENERALIZED: ICD-10-CM

## 2022-11-18 DIAGNOSIS — R11.0 NAUSEA: ICD-10-CM

## 2022-11-18 DIAGNOSIS — I10 PRIMARY HYPERTENSION: ICD-10-CM

## 2022-11-18 LAB
ALBUMIN SERPL BCG-MCNC: 4.5 G/DL (ref 3.5–5.2)
ALP SERPL-CCNC: 61 U/L (ref 35–104)
ALT SERPL W P-5'-P-CCNC: 12 U/L (ref 10–35)
ANION GAP SERPL CALCULATED.3IONS-SCNC: 12 MMOL/L (ref 7–15)
AST SERPL W P-5'-P-CCNC: 22 U/L (ref 10–35)
BILIRUB SERPL-MCNC: 0.5 MG/DL
BUN SERPL-MCNC: 11.8 MG/DL (ref 8–23)
CALCIUM SERPL-MCNC: 9.3 MG/DL (ref 8.8–10.2)
CHLORIDE SERPL-SCNC: 91 MMOL/L (ref 98–107)
CREAT SERPL-MCNC: 0.63 MG/DL (ref 0.51–0.95)
DEPRECATED HCO3 PLAS-SCNC: 27 MMOL/L (ref 22–29)
ERYTHROCYTE [DISTWIDTH] IN BLOOD BY AUTOMATED COUNT: 12.7 % (ref 10–15)
GFR SERPL CREATININE-BSD FRML MDRD: 89 ML/MIN/1.73M2
GLUCOSE SERPL-MCNC: 86 MG/DL (ref 70–99)
HCT VFR BLD AUTO: 36.7 % (ref 35–47)
HGB BLD-MCNC: 12.5 G/DL (ref 11.7–15.7)
MCH RBC QN AUTO: 30.2 PG (ref 26.5–33)
MCHC RBC AUTO-ENTMCNC: 34.1 G/DL (ref 31.5–36.5)
MCV RBC AUTO: 89 FL (ref 78–100)
PLATELET # BLD AUTO: 230 10E3/UL (ref 150–450)
POTASSIUM SERPL-SCNC: 3.6 MMOL/L (ref 3.4–5.3)
PROT SERPL-MCNC: 7.3 G/DL (ref 6.4–8.3)
RBC # BLD AUTO: 4.14 10E6/UL (ref 3.8–5.2)
SODIUM SERPL-SCNC: 130 MMOL/L (ref 136–145)
TSH SERPL DL<=0.005 MIU/L-ACNC: 0.96 UIU/ML (ref 0.3–4.2)
WBC # BLD AUTO: 5.2 10E3/UL (ref 4–11)

## 2022-11-18 PROCEDURE — 85027 COMPLETE CBC AUTOMATED: CPT | Performed by: INTERNAL MEDICINE

## 2022-11-18 PROCEDURE — 80053 COMPREHEN METABOLIC PANEL: CPT | Performed by: INTERNAL MEDICINE

## 2022-11-18 PROCEDURE — 99214 OFFICE O/P EST MOD 30 MIN: CPT | Performed by: INTERNAL MEDICINE

## 2022-11-18 PROCEDURE — 84443 ASSAY THYROID STIM HORMONE: CPT | Performed by: INTERNAL MEDICINE

## 2022-11-18 PROCEDURE — 36415 COLL VENOUS BLD VENIPUNCTURE: CPT | Performed by: INTERNAL MEDICINE

## 2022-11-18 RX ORDER — ESCITALOPRAM OXALATE 5 MG/1
5 TABLET ORAL DAILY
Qty: 90 TABLET | Refills: 3 | Status: SHIPPED | OUTPATIENT
Start: 2022-11-18 | End: 2023-01-02

## 2022-11-18 RX ORDER — AMLODIPINE BESYLATE 2.5 MG/1
2.5 TABLET ORAL DAILY
Qty: 180 TABLET | Refills: 1
Start: 2022-11-18 | End: 2023-05-23

## 2022-11-18 ASSESSMENT — ENCOUNTER SYMPTOMS: FATIGUE: 1

## 2022-11-18 NOTE — PATIENT INSTRUCTIONS
Cut back on your amlodipine to only 2.5 mg every morning.    If your symptoms persist beyond the next 2 weeks, you may need to discontinue warfarin and start Eliquis as we discussed today

## 2022-11-18 NOTE — PROGRESS NOTES
Assessment & Plan     Paroxysmal atrial fibrillation (H)  Diagnosed with paroxysmal atrial fibrillation after experiencing palpitations and abnormal event monitor.  Was started on Eliquis but switched to warfarin because of cost.  However now may be experiencing side effect of warfarin as discussed below.  - apixaban ANTICOAGULANT (ELIQUIS ANTICOAGULANT) 5 MG tablet; Take 1 tablet (5 mg) by mouth 2 times daily    Anxiety, generalized  Increasing anxiety needing diazepam more frequently averaging every 3 days.  We did discussion warning her of the risks of using this much diazepam at her age.  We need to find a better way of managing her anxiety.  I am recommending a low-dose of Lexapro 5 mg daily.  We discussed potential side effects  - escitalopram (LEXAPRO) 5 MG tablet; Take 1 tablet (5 mg) by mouth daily        Chronic fatigue and nausea since switching to warfarin.  She describes some vague symptoms of feeling nausea in the morning after breakfast and feeling less energy.  However, she is able to still exercise and has good exercise tolerance.  She noticed these symptoms after switching from Eliquis to warfarin.  However, her amlodipine was also increased from 2.5 up to 5 mg daily.  We discussed that there may be other causes for the above symptoms.  However, she does not have any abdominal pain or loss of appetite.  No change in bowel movements.  She takes omeprazole 20 mg twice daily.  We will check labs looking for any other contributing factor.  If this is all normal, may be side effect of one of the above medications.  I would first suggest cutting back to only 2.5 mg of amlodipine as this seems the more likely culprit for any side effects.  If still no improvement after 2 weeks, retrying Eliquis instead of warfarin is advised.  Prescription provided if she needs to make that change.  - CBC with platelets; Future  - Comprehensive metabolic panel (BMP + Alb, Alk Phos, ALT, AST, Total. Bili, TP); Future  -  "TSH with free T4 reflex; Future  - CBC with platelets  - Comprehensive metabolic panel (BMP + Alb, Alk Phos, ALT, AST, Total. Bili, TP)  - TSH with free T4 reflex    Primary hypertension  Blood pressure looks very well controlled and it seems reasonable to cut back on her amlodipine to only 2.5 mg which was her previous dose in case this is causing the above side effects                   Return in about 2 months (around 1/18/2023) for Annual wellness visit.    Kevin Ho MD  [facility]    Lucy Henriquez is a 82 year old, presenting for the following health issues: See assessment plan for details  Fatigue (Fatigue in the am, nausea since starting Warfarin)      Fatigue  Associated symptoms include fatigue.   History of Present Illness       Reason for visit:  Fatigue, nausea  Symptom onset:  More than a month  Symptoms include:  Fatigue in am, nausea  Symptom intensity:  Moderate  Symptom progression:  Staying the same  Had these symptoms before:  No    She eats 4 or more servings of fruits and vegetables daily.She consumes 2 sweetened beverage(s) daily.She exercises with enough effort to increase her heart rate 30 to 60 minutes per day.  She exercises with enough effort to increase her heart rate 6 days per week.   She is taking medications regularly.         Review of Systems   Constitutional: Positive for fatigue.      No abdominal pain      Objective    /70 (BP Location: Right arm, Patient Position: Sitting)   Pulse 58   Temp (!) 96.7  F (35.9  C)   Resp 18   Ht 1.638 m (5' 4.5\")   Wt 61.7 kg (136 lb)   LMP  (LMP Unknown)   SpO2 98%   BMI 22.98 kg/m    Body mass index is 22.98 kg/m .  Physical Exam   Well-appearing elderly woman  Lungs clear bilaterally no rales or wheezes  Neck without lymphadenopathy or thyromegaly  Heart regular rate and rhythm  Abdomen soft without hepatosplenomegaly masses or tenderness        "

## 2022-11-30 ENCOUNTER — LAB (OUTPATIENT)
Dept: LAB | Facility: CLINIC | Age: 82
End: 2022-11-30
Payer: COMMERCIAL

## 2022-11-30 ENCOUNTER — ANTICOAGULATION THERAPY VISIT (OUTPATIENT)
Dept: ANTICOAGULATION | Facility: CLINIC | Age: 82
End: 2022-11-30

## 2022-11-30 DIAGNOSIS — I48.0 PAROXYSMAL ATRIAL FIBRILLATION (H): Primary | ICD-10-CM

## 2022-11-30 DIAGNOSIS — I48.0 PAROXYSMAL ATRIAL FIBRILLATION (H): ICD-10-CM

## 2022-11-30 LAB — INR BLD: 2 (ref 0.9–1.1)

## 2022-11-30 PROCEDURE — 36416 COLLJ CAPILLARY BLOOD SPEC: CPT

## 2022-11-30 PROCEDURE — 85610 PROTHROMBIN TIME: CPT | Mod: QW

## 2022-11-30 NOTE — PROGRESS NOTES
ANTICOAGULATION MANAGEMENT     Rene Matthews 82 year old female is on warfarin with therapeutic INR result. (Goal INR 2.0-3.0)    Recent labs: (last 7 days)     11/30/22  1034   INR 2.0*       ASSESSMENT       Source(s): Chart Review and Patient/Caregiver Call       Warfarin doses taken: Warfarin taken as instructed    Diet: Increased greens/vitamin K in diet; plans to resume previous intake    New illness, injury, or hospitalization: Yes: she has felt tired, run down and nauseated off and on. Seen provider and discussed.     Medication/supplement changes: lexapro started on 11/18 not expected to affect INR, but may increase risk of bleeding    Norvasc dose decreased on 11/18 No interaction anticipated    Signs or symptoms of bleeding or clotting: No    Previous INR: Therapeutic last 2(+) visits    Additional findings: symtpoms she is having possible side effects of warfarin but not sure. They discussed at office visit to adjust and add other medicatons. If not any better she will transition back to Eliquis.        PLAN     Recommended plan for temporary change(s) and ongoing change(s) affecting INR     Dosing Instructions: Continue your current warfarin dose with next INR in 2 weeks       Summary  As of 11/30/2022    Full warfarin instructions:  2.5 mg every Mon, Wed, Sat; 3.75 mg all other days; Starting 11/30/2022   Next INR check:  12/14/2022             Telephone call with Florence who verbalizes understanding and agrees to plan    Patient offered & declined to schedule next visit    Education provided:     Please call back if any changes to your diet, medications or how you've been taking warfarin    Goal range and lab monitoring: goal range and significance of current result and Importance of therapeutic range    Plan made per [facility] anticoagulation protocol    Martina Rodgers, RN  Anticoagulation Clinic  11/30/2022    _______________________________________________________________________     Anticoagulation  Episode Summary     Current INR goal:  2.0-3.0   TTR:  86.7 % (1.2 mo)   Target end date:  Indefinite   Send INR reminders to:  [facility]    Indications    Paroxysmal atrial fibrillation (H) [I48.0]           Comments:           Anticoagulation Care Providers     Provider Role Specialty Phone number    Kevin Ho MD Referring Internal Medicine [phone]

## 2022-12-04 DIAGNOSIS — I10 ESSENTIAL HYPERTENSION: ICD-10-CM

## 2022-12-05 DIAGNOSIS — I10 PRIMARY HYPERTENSION: ICD-10-CM

## 2022-12-05 RX ORDER — HYDROCHLOROTHIAZIDE 25 MG/1
TABLET ORAL
Qty: 90 TABLET | Refills: 3 | Status: SHIPPED | OUTPATIENT
Start: 2022-12-05 | End: 2023-01-03

## 2022-12-05 NOTE — TELEPHONE ENCOUNTER
Refill Request  Medication name: Pending Prescriptions:                       Disp   Refills    irbesartan (AVAPRO) 300 MG tablet         90 tab*0            Sig: Take 1 tablet (300 mg) by mouth daily    Who prescribed the medication: Ayala  Last refill on medication: 9/6/2022  Requested Pharmacy: [facility]  Last appointment with PCP: 11/18/2022  Next appointment: Not due

## 2022-12-05 NOTE — TELEPHONE ENCOUNTER
"Routing refill request to provider for review/approval because:  Na 130  Last Written Prescription Date:  12/2/2022  Last Fill Quantity: 90,  # refills: 3   Last office visit provider:  11/18/2022     Requested Prescriptions   Pending Prescriptions Disp Refills     hydrochlorothiazide (HYDRODIURIL) 25 MG tablet [Pharmacy Med Name: HYDROCHLOROTHIAZIDE 25MG TABLETS] 90 tablet 3     Sig: TAKE 1 TABLET(25 MG) BY MOUTH DAILY       Diuretics (Including Combos) Protocol Failed - 12/4/2022  5:30 AM        Failed - Normal serum sodium on file in past 12 months     Recent Labs   Lab Test 11/18/22  1144   *              Passed - Blood pressure under 140/90 in past 12 months     BP Readings from Last 3 Encounters:   11/18/22 120/70   11/02/22 136/63   08/25/22 (!) 154/66                 Passed - Recent (12 mo) or future (30 days) visit within the authorizing provider's specialty     Patient has had an office visit with the authorizing provider or a provider within the authorizing providers department within the previous 12 mos or has a future within next 30 days. See \"Patient Info\" tab in inbasket, or \"Choose Columns\" in Meds & Orders section of the refill encounter.              Passed - Medication is active on med list        Passed - Patient is age 18 or older        Passed - No active pregancy on record        Passed - Normal serum creatinine on file in past 12 months     Recent Labs   Lab Test 11/18/22  1144   CR 0.63              Passed - Normal serum potassium on file in past 12 months     Recent Labs   Lab Test 11/18/22  1144   POTASSIUM 3.6                    Passed - No positive pregnancy test in past 12 months             Lizabeth Reyes RN 12/05/22 12:56 PM  "

## 2022-12-06 RX ORDER — IRBESARTAN 300 MG/1
300 TABLET ORAL DAILY
Qty: 90 TABLET | Refills: 3 | Status: SHIPPED | OUTPATIENT
Start: 2022-12-06 | End: 2023-11-29

## 2022-12-07 NOTE — TELEPHONE ENCOUNTER
"Last Written Prescription Date:  9/6/22  Last Fill Quantity: 90,  # refills: 0   Last office visit provider:  11/18/22     Requested Prescriptions   Pending Prescriptions Disp Refills     irbesartan (AVAPRO) 300 MG tablet 90 tablet 0     Sig: Take 1 tablet (300 mg) by mouth daily       Angiotensin-II Receptors Passed - 12/5/2022  3:45 PM        Passed - Last blood pressure under 140/90 in past 12 months     BP Readings from Last 3 Encounters:   11/18/22 120/70   11/02/22 136/63   08/25/22 (!) 154/66                 Passed - Recent (12 mo) or future (30 days) visit within the authorizing provider's specialty     Patient has had an office visit with the authorizing provider or a provider within the authorizing providers department within the previous 12 mos or has a future within next 30 days. See \"Patient Info\" tab in inbasket, or \"Choose Columns\" in Meds & Orders section of the refill encounter.              Passed - Medication is active on med list        Passed - Patient is age 18 or older        Passed - No active pregnancy on record        Passed - Normal serum creatinine on file in past 12 months     Recent Labs   Lab Test 11/18/22  1144   CR 0.63       Ok to refill medication if creatinine is low          Passed - Normal serum potassium on file in past 12 months     Recent Labs   Lab Test 11/18/22  1144   POTASSIUM 3.6                    Passed - No positive pregnancy test in past 12 months             Elba Bowman, RN 12/06/22 8:03 PM  "

## 2022-12-09 ENCOUNTER — TELEPHONE (OUTPATIENT)
Dept: CARDIOLOGY | Facility: CLINIC | Age: 82
End: 2022-12-09

## 2022-12-09 NOTE — TELEPHONE ENCOUNTER
[facility] Call Center    Phone Message    May a detailed message be left on voicemail: yes     Reason for Call: Symptoms or Concerns     If patient has red-flag symptoms, warm transfer to triage line    Current symptom or concern: headaches in the Evening, and Nausea in the morning and is super tired or lethargic and back to her normal self in the afternoon.  Changed medication from Eliquis to Warfrin.      Symptoms have been present for:  3-4 day(s)    Has patient previously been seen for this? No        Action Taken: Other: Cardio    Travel Screening: Not Applicable

## 2022-12-09 NOTE — TELEPHONE ENCOUNTER
PC back to pt  Pt has been having PMD manage her AC  Review of pts chart, Dr Ho switched pt off Eliquis to warfarin due to c/o weakness and nausea   He advised if she was still having symptoms above after 2 wks could switch back to Eliquis  She also was started on Lexapro  Pt again calling reporting weakness and nausea on warfarin  Pt has a follow-up visit on 1/2/23 with PMD again  Advised pt to contact PMD to review above as he has been involved with these medication changes and ongoing c/o symptoms  She notes that Eliquis is very expensive and is unsure if she wants to switch back  Discussed with pt to make sure she is taking medication with food and full glass of water  She wants to wait until follow-up on 1/2 to further discuss and see if symptoms improve, pt was advised to call PMD office sooner if needed  Pt verbalized understanding, has no further questions or concerns at this time, and has our contact information if needed.  12/9/2022 11:24 AM  Serena Hull RN

## 2022-12-09 NOTE — CONFIDENTIAL NOTE
PC back to pt  Pt has been having PMD manage her AC  Review of pts chart, Dr Ho switched pt off Eliquis to warfarin due to c/o weakness and nausea  She also was started on Lexapro  Pt reporting that she is having headaches

## 2022-12-19 ENCOUNTER — LAB (OUTPATIENT)
Dept: LAB | Facility: CLINIC | Age: 82
End: 2022-12-19
Payer: COMMERCIAL

## 2022-12-19 ENCOUNTER — ANTICOAGULATION THERAPY VISIT (OUTPATIENT)
Dept: ANTICOAGULATION | Facility: CLINIC | Age: 82
End: 2022-12-19

## 2022-12-19 DIAGNOSIS — I48.0 PAROXYSMAL ATRIAL FIBRILLATION (H): ICD-10-CM

## 2022-12-19 DIAGNOSIS — I48.0 PAROXYSMAL ATRIAL FIBRILLATION (H): Primary | ICD-10-CM

## 2022-12-19 LAB — INR BLD: 2 (ref 0.9–1.1)

## 2022-12-19 PROCEDURE — 36416 COLLJ CAPILLARY BLOOD SPEC: CPT

## 2022-12-19 PROCEDURE — 85610 PROTHROMBIN TIME: CPT | Mod: QW

## 2022-12-19 NOTE — PROGRESS NOTES
ANTICOAGULATION MANAGEMENT     Rene Matthews 82 year old female is on warfarin with therapeutic INR result. (Goal INR 2.0-3.0)    Recent labs: (last 7 days)     12/19/22  1020   INR 2.0*       ASSESSMENT       Source(s): Chart Review and Patient/Caregiver Call       Warfarin doses taken: Warfarin taken as instructed    Diet: She maybe eating more greens with the Holiday    New illness, injury, or hospitalization: Yes: still having the headaches and some nausea.    Medication/supplement changes: None noted    Signs or symptoms of bleeding or clotting: No    Previous INR: Therapeutic last 2(+) visits    Additional findings: will discuss warfarin and side effects at office visit on 1/2. She is going to try and take warfarin in AM to see if this helps the nausea and headaches.        PLAN     Recommended plan for no diet, medication or health factor changes affecting INR     Dosing Instructions: Continue your current warfarin dose with next INR in 2 weeks       Summary  As of 12/19/2022    Full warfarin instructions:  2.5 mg every Mon, Wed, Sat; 3.75 mg all other days; Starting 12/19/2022   Next INR check:  1/2/2023             Telephone call with Florence who verbalizes understanding and agrees to plan    Check at provider office visit    Education provided:     Please call back if any changes to your diet, medications or how you've been taking warfarin    Goal range and lab monitoring: goal range and significance of current result and Importance of therapeutic range    Plan made per [facility] anticoagulation protocol    Martina Rodgers RN  Anticoagulation Clinic  12/19/2022    _______________________________________________________________________     Anticoagulation Episode Summary     Current INR goal:  2.0-3.0   TTR:  91.2 % (1.8 mo)   Target end date:  Indefinite   Send INR reminders to:  [facility]    Indications    Paroxysmal atrial fibrillation (H) [I48.0]           Comments:           Anticoagulation Care  Providers     Provider Role Specialty Phone number    Kevin Ho MD Referring Internal Medicine [phone]

## 2023-01-02 ENCOUNTER — ANTICOAGULATION THERAPY VISIT (OUTPATIENT)
Dept: ANTICOAGULATION | Facility: CLINIC | Age: 83
End: 2023-01-02

## 2023-01-02 ENCOUNTER — OFFICE VISIT (OUTPATIENT)
Dept: INTERNAL MEDICINE | Facility: CLINIC | Age: 83
End: 2023-01-02
Payer: COMMERCIAL

## 2023-01-02 VITALS
BODY MASS INDEX: 22.77 KG/M2 | HEART RATE: 60 BPM | WEIGHT: 133.4 LBS | SYSTOLIC BLOOD PRESSURE: 122 MMHG | TEMPERATURE: 97.8 F | DIASTOLIC BLOOD PRESSURE: 58 MMHG | HEIGHT: 64 IN | OXYGEN SATURATION: 99 % | RESPIRATION RATE: 16 BRPM

## 2023-01-02 DIAGNOSIS — I10 PRIMARY HYPERTENSION: ICD-10-CM

## 2023-01-02 DIAGNOSIS — K58.9 IRRITABLE BOWEL SYNDROME, UNSPECIFIED TYPE: ICD-10-CM

## 2023-01-02 DIAGNOSIS — R51.9 CHRONIC NONINTRACTABLE HEADACHE, UNSPECIFIED HEADACHE TYPE: ICD-10-CM

## 2023-01-02 DIAGNOSIS — I48.0 PAROXYSMAL ATRIAL FIBRILLATION (H): ICD-10-CM

## 2023-01-02 DIAGNOSIS — J30.9 ALLERGIC RHINITIS, UNSPECIFIED SEASONALITY, UNSPECIFIED TRIGGER: ICD-10-CM

## 2023-01-02 DIAGNOSIS — Z86.0100 PERSONAL HISTORY OF COLONIC POLYPS: ICD-10-CM

## 2023-01-02 DIAGNOSIS — F41.1 ANXIETY, GENERALIZED: ICD-10-CM

## 2023-01-02 DIAGNOSIS — Z00.00 ENCOUNTER FOR MEDICARE ANNUAL WELLNESS EXAM: Primary | ICD-10-CM

## 2023-01-02 DIAGNOSIS — K21.9 GASTROESOPHAGEAL REFLUX DISEASE WITHOUT ESOPHAGITIS: ICD-10-CM

## 2023-01-02 DIAGNOSIS — I48.0 PAROXYSMAL ATRIAL FIBRILLATION (H): Primary | ICD-10-CM

## 2023-01-02 DIAGNOSIS — Z51.81 ENCOUNTER FOR THERAPEUTIC DRUG MONITORING: ICD-10-CM

## 2023-01-02 DIAGNOSIS — E78.5 HYPERLIPIDEMIA, UNSPECIFIED HYPERLIPIDEMIA TYPE: ICD-10-CM

## 2023-01-02 DIAGNOSIS — Z12.11 COLON CANCER SCREENING: ICD-10-CM

## 2023-01-02 DIAGNOSIS — F51.01 PRIMARY INSOMNIA: ICD-10-CM

## 2023-01-02 DIAGNOSIS — M85.89 OSTEOPENIA OF MULTIPLE SITES: ICD-10-CM

## 2023-01-02 DIAGNOSIS — E53.8 VITAMIN B12 DEFICIENCY (NON ANEMIC): ICD-10-CM

## 2023-01-02 DIAGNOSIS — M15.0 PRIMARY OSTEOARTHRITIS INVOLVING MULTIPLE JOINTS: ICD-10-CM

## 2023-01-02 DIAGNOSIS — C44.91 SKIN CANCER, BASAL CELL: ICD-10-CM

## 2023-01-02 DIAGNOSIS — G89.29 CHRONIC NONINTRACTABLE HEADACHE, UNSPECIFIED HEADACHE TYPE: ICD-10-CM

## 2023-01-02 DIAGNOSIS — N32.81 OVERACTIVE BLADDER: ICD-10-CM

## 2023-01-02 PROBLEM — I35.1 MILD AORTIC INSUFFICIENCY: Status: RESOLVED | Noted: 2022-11-02 | Resolved: 2023-01-02

## 2023-01-02 PROBLEM — R11.0 NAUSEA: Status: RESOLVED | Noted: 2022-11-18 | Resolved: 2023-01-02

## 2023-01-02 LAB
ALBUMIN UR-MCNC: NEGATIVE MG/DL
ANION GAP SERPL CALCULATED.3IONS-SCNC: 14 MMOL/L (ref 7–15)
APPEARANCE UR: CLEAR
BILIRUB UR QL STRIP: NEGATIVE
BUN SERPL-MCNC: 8 MG/DL (ref 8–23)
CALCIUM SERPL-MCNC: 9.4 MG/DL (ref 8.8–10.2)
CHLORIDE SERPL-SCNC: 92 MMOL/L (ref 98–107)
CHOLEST SERPL-MCNC: 164 MG/DL
COLOR UR AUTO: YELLOW
CREAT SERPL-MCNC: 0.58 MG/DL (ref 0.51–0.95)
DEPRECATED CALCIDIOL+CALCIFEROL SERPL-MC: 37 UG/L (ref 20–75)
DEPRECATED HCO3 PLAS-SCNC: 25 MMOL/L (ref 22–29)
GFR SERPL CREATININE-BSD FRML MDRD: >90 ML/MIN/1.73M2
GLUCOSE SERPL-MCNC: 96 MG/DL (ref 70–99)
GLUCOSE UR STRIP-MCNC: NEGATIVE MG/DL
HDLC SERPL-MCNC: 74 MG/DL
HGB UR QL STRIP: NEGATIVE
INR BLD: 2.1 (ref 0.9–1.1)
KETONES UR STRIP-MCNC: NEGATIVE MG/DL
LDLC SERPL CALC-MCNC: 66 MG/DL
LEUKOCYTE ESTERASE UR QL STRIP: ABNORMAL
MAGNESIUM SERPL-MCNC: 2.1 MG/DL (ref 1.7–2.3)
NITRATE UR QL: NEGATIVE
NONHDLC SERPL-MCNC: 90 MG/DL
PH UR STRIP: 8.5 [PH] (ref 5–8)
POTASSIUM SERPL-SCNC: 3.4 MMOL/L (ref 3.4–5.3)
RBC #/AREA URNS AUTO: NORMAL /HPF
SODIUM SERPL-SCNC: 131 MMOL/L (ref 136–145)
SP GR UR STRIP: 1.01 (ref 1–1.03)
TRIGL SERPL-MCNC: 120 MG/DL
UROBILINOGEN UR STRIP-ACNC: 0.2 E.U./DL
VIT B12 SERPL-MCNC: 1376 PG/ML (ref 232–1245)
WBC #/AREA URNS AUTO: NORMAL /HPF

## 2023-01-02 PROCEDURE — 81001 URINALYSIS AUTO W/SCOPE: CPT | Performed by: INTERNAL MEDICINE

## 2023-01-02 PROCEDURE — 82607 VITAMIN B-12: CPT | Performed by: INTERNAL MEDICINE

## 2023-01-02 PROCEDURE — 99214 OFFICE O/P EST MOD 30 MIN: CPT | Mod: 25 | Performed by: INTERNAL MEDICINE

## 2023-01-02 PROCEDURE — 36415 COLL VENOUS BLD VENIPUNCTURE: CPT | Performed by: INTERNAL MEDICINE

## 2023-01-02 PROCEDURE — 82306 VITAMIN D 25 HYDROXY: CPT | Performed by: INTERNAL MEDICINE

## 2023-01-02 PROCEDURE — 36416 COLLJ CAPILLARY BLOOD SPEC: CPT | Performed by: INTERNAL MEDICINE

## 2023-01-02 PROCEDURE — 80048 BASIC METABOLIC PNL TOTAL CA: CPT | Performed by: INTERNAL MEDICINE

## 2023-01-02 PROCEDURE — 83735 ASSAY OF MAGNESIUM: CPT | Performed by: INTERNAL MEDICINE

## 2023-01-02 PROCEDURE — 80061 LIPID PANEL: CPT | Performed by: INTERNAL MEDICINE

## 2023-01-02 PROCEDURE — 85610 PROTHROMBIN TIME: CPT | Performed by: INTERNAL MEDICINE

## 2023-01-02 PROCEDURE — G0439 PPPS, SUBSEQ VISIT: HCPCS | Performed by: INTERNAL MEDICINE

## 2023-01-02 RX ORDER — DIAZEPAM 5 MG
TABLET ORAL
Qty: 20 TABLET | Refills: 0 | Status: SHIPPED | OUTPATIENT
Start: 2023-01-02 | End: 2023-12-19

## 2023-01-02 RX ORDER — ESCITALOPRAM OXALATE 10 MG/1
10 TABLET ORAL DAILY
Qty: 90 TABLET | Refills: 3 | Status: SHIPPED | OUTPATIENT
Start: 2023-01-02 | End: 2023-12-26

## 2023-01-02 ASSESSMENT — ENCOUNTER SYMPTOMS
SORE THROAT: 0
HEMATOCHEZIA: 0
DYSURIA: 0
JOINT SWELLING: 0
PALPITATIONS: 0
NERVOUS/ANXIOUS: 0
DIARRHEA: 1
WEAKNESS: 1
HEARTBURN: 1
FREQUENCY: 0
SHORTNESS OF BREATH: 0
NAUSEA: 0
COUGH: 0
EYE PAIN: 0
CHILLS: 0
HEADACHES: 1
BREAST MASS: 0
HEMATURIA: 0
ABDOMINAL PAIN: 0
CONSTIPATION: 0
PARESTHESIAS: 0
FEVER: 0
ARTHRALGIAS: 0
DIZZINESS: 0

## 2023-01-02 ASSESSMENT — ACTIVITIES OF DAILY LIVING (ADL): CURRENT_FUNCTION: MONEY MANAGEMENT REQUIRES ASSISTANCE

## 2023-01-02 NOTE — PATIENT INSTRUCTIONS
Annual flu shot every fall    DEXA should be repeated in early 2024 for osteoporosis screening    Annual mammogram is recommended.  Please schedule    Continue to see your eye doctor every year    Annual visit to your dermatologist for total-body skin exam.  Have the lesion on the right side of your face evaluated if not resolving over the next 1 to 2 weeks.    Cologuard is being ordered for colon cancer screening as an alternative to repeating colonoscopy    Minimize use of diazepam      Patient Education   Personalized Prevention Plan  You are due for the preventive services outlined below.  Your care team is available to assist you in scheduling these services.  If you have already completed any of these items, please share that information with your care team to update in your medical record.  There are no preventive care reminders to display for this patient.

## 2023-01-02 NOTE — PROGRESS NOTES
ANTICOAGULATION MANAGEMENT     Rene Matthews 82 year old female is on warfarin with therapeutic INR result. (Goal INR 2.0-3.0)    Recent labs: (last 7 days)     01/02/23  1120   INR 2.1*       ASSESSMENT       Source(s): Chart Review and Patient/Caregiver Call       Warfarin doses taken: Warfarin taken as instructed    Diet: No new diet changes identified    New illness, injury, or hospitalization: No    Medication/supplement changes: Lexapro dose increased on 1/2/23 subsequent INRs may increased. Closer INR monitoring recommended.    Signs or symptoms of bleeding or clotting: No    Previous INR: Therapeutic last 2(+) visits    Additional findings: None       PLAN     Recommended plan for ongoing change(s) affecting INR     Dosing Instructions: Continue your current warfarin dose with next INR in 2-3 weeks       Summary  As of 1/2/2023    Full warfarin instructions:  2.5 mg every Mon, Wed, Sat; 3.75 mg all other days   Next INR check:  1/23/2023             Telephone call with Florence who verbalizes understanding and agrees to plan    Florence will call to make INR appt later on.    Education provided:     Goal range and lab monitoring: goal range and significance of current result and Importance of following up at instructed interval    Interaction IS anticipated between warfarin and increase in Lexapro dose    Contact [phone] with any changes, questions or concerns.     Plan made per [facility] anticoagulation protocol    Flavia Cheney RN  Anticoagulation Clinic  1/2/2023    _______________________________________________________________________     Anticoagulation Episode Summary     Current INR goal:  2.0-3.0   TTR:  93.0 % (2.3 mo)   Target end date:  Indefinite   Send INR reminders to:  [facility]    Indications    Paroxysmal atrial fibrillation (H) [I48.0]           Comments:           Anticoagulation Care Providers     Provider Role Specialty Phone number    Kevin Ho MD Referring Internal  Medicine [phone]

## 2023-01-02 NOTE — PROGRESS NOTES
"SUBJECTIVE:   Florence is a 82 year old who presents for Preventive Visit.      AZR-64-rere-old woman here for annual wellness visit and to follow-up multiple chronic medical problems including hypertension, paroxysmal atrial fibrillation, generalized anxiety, insomnia, osteopenia, skin cancer, chronic headaches, GERD, and several other concerns.  See assessment and plan for details.    Patient has been advised of split billing requirements and indicates understanding: Yes  Are you in the first 12 months of your Medicare coverage?  No    Healthy Habits:     In general, how would you rate your overall health?  Good    Frequency of exercise:  6-7 days/week    Duration of exercise:  30-45 minutes    Do you usually eat at least 4 servings of fruit and vegetables a day, include whole grains    & fiber and avoid regularly eating high fat or \"junk\" foods?  Yes    Ability to successfully perform activities of daily living:  Money management requires assistance    Home Safety:  No safety concerns identified    Hearing Impairment:  Difficulty following a conversation in a noisy restaurant or crowded room and need to ask people to speak up or repeat themselves    In the past 6 months, have you been bothered by leaking of urine?  No    In general, how would you rate your overall mental or emotional health?  Good      PHQ-2 Total Score: 0    Additional concerns today:  Yes      Have you ever done Advance Care Planning? (For example, a Health Directive, POLST, or a discussion with a medical provider or your loved ones about your wishes): Yes, patient states has an Advance Care Planning document and will bring a copy to the clinic.       Fall risk  Fallen 2 or more times in the past year?: No  Any fall with injury in the past year?: No    Cognitive Screening   1) Repeat 3 items (Leader, Season, Table)    2) Clock draw: NORMAL  3) 3 item recall: Recalls 3 objects  Results: 3 items recalled: COGNITIVE IMPAIRMENT LESS " LIKELY    Mini-CogTM Copyright AZRA Ferreira. Licensed by the author for use in [facility]; reprinted with permission (somicha@[facility]). All rights reserved.      Do you have sleep apnea, excessive snoring or daytime drowsiness?: no    Reviewed and updated as needed this visit by clinical staff   Tobacco  Allergies               Reviewed and updated as needed this visit by Provider                 Social History     Tobacco Use     Smoking status: Never     Smokeless tobacco: Never   Substance Use Topics     Alcohol use: No         Alcohol Use 1/2/2023   Prescreen: >3 drinks/day or >7 drinks/week? Not Applicable               Current providers sharing in care for this patient include:   Patient Care Team:  Kevin Ho MD as PCP - General  Kevin Ho MD as Assigned PCP  Yelena Chong APRN CNP as Assigned Heart and Vascular Provider    The following health maintenance items are reviewed in Epic and correct as of today:  Health Maintenance   Topic Date Due     MEDICARE ANNUAL WELLNESS VISIT  12/23/2022     ANNUAL REVIEW OF HM ORDERS  11/18/2023     FALL RISK ASSESSMENT  01/02/2024     LIPID  12/23/2026     ADVANCE CARE PLANNING  12/23/2026     DTAP/TDAP/TD IMMUNIZATION (5 - Td or Tdap) 01/21/2032     DEXA  01/12/2037     PHQ-2 (once per calendar year)  Completed     INFLUENZA VACCINE  Completed     Pneumococcal Vaccine: 65+ Years  Completed     ZOSTER IMMUNIZATION  Completed     COVID-19 Vaccine  Completed     IPV IMMUNIZATION  Aged Out     MENINGITIS IMMUNIZATION  Aged Out     Lab work is in process        Pertinent mammograms are reviewed under the imaging tab.    Review of Systems   Constitutional: Negative for chills and fever.   HENT: Positive for hearing loss. Negative for congestion, ear pain and sore throat.    Eyes: Negative for pain and visual disturbance.   Respiratory: Negative for cough and shortness of breath.    Cardiovascular: Negative for chest pain, palpitations and  "peripheral edema.   Gastrointestinal: Positive for diarrhea and heartburn. Negative for abdominal pain, constipation, hematochezia and nausea.   Breasts:  Negative for tenderness, breast mass and discharge.   Genitourinary: Positive for urgency. Negative for dysuria, frequency, genital sores, hematuria, pelvic pain, vaginal bleeding and vaginal discharge.   Musculoskeletal: Negative for arthralgias and joint swelling.   Skin: Negative for rash.   Neurological: Positive for weakness and headaches. Negative for dizziness and paresthesias.   Psychiatric/Behavioral: Negative for mood changes. The patient is not nervous/anxious.          OBJECTIVE:   /58 (BP Location: Right arm, Patient Position: Sitting, Cuff Size: Adult Regular)   Pulse 60   Temp 97.8  F (36.6  C) (Oral)   Resp 16   Ht 1.626 m (5' 4\")   Wt 60.5 kg (133 lb 6.4 oz)   LMP  (LMP Unknown)   SpO2 99%   BMI 22.90 kg/m   Estimated body mass index is 22.9 kg/m  as calculated from the following:    Height as of this encounter: 1.626 m (5' 4\").    Weight as of this encounter: 60.5 kg (133 lb 6.4 oz).  Physical Exam  EYES: Eyelids, conjunctiva, and sclera were normal. Pupils were normal. Cornea, iris, and lens were normal bilaterally.  HEAD, EARS, NOSE, MOUTH, AND THROAT: Head and face were normal. TMs and external auditory canals are normal  NECK: Neck appearance was normal. There were no neck masses and the thyroid was not enlarged and no nodules are felt.  No lymphadenopathy.  RESPIRATORY: Breathing pattern was normal and the chest moved symmetrically.   Lung sounds were normal and there were no rales or wheezes.  CARDIOVASCULAR: Heart rate and rhythm were normal.  S1 and S2 were normal and there were no extra sounds or murmurs. Peripheral pulses in arms and legs were normal.  There was no peripheral edema.  No carotid bruits.  BREAST: No palpable masses or tenderness.  No axillary nodes.  GASTROINTESTINAL:  Bowel sounds were present.   Palpation " detected no tenderness, mass, or enlarged organs.   MUSCULOSKELETAL: Skeletal configuration was normal and muscle mass was normal for age. Joint appearance was overall normal.  LYMPHATIC: There were no enlarged nodes.  SKIN/HAIR/NAILS: Skin color was normal.   Nonspecific lesion right cheek presumably from recent injury with scab formation.  She will monitor.  NEUROLOGIC: The patient was alert and oriented to person, place, time, and circumstance. Speech was normal. Cranial nerves were normal. Motor strength was normal for age. The patient was normally coordinated.  Sensation intact.  PSYCHIATRIC:  Mood and affect were normal and the patient had normal recent and remote memory. The patient's judgment and insight were normal.        ASSESSMENT / PLAN:   1. Encounter for Medicare annual wellness exam  Immunizations are reviewed and everything is up-to-date.  She has a living will.  Non-smoker.  She does not use alcohol.  Regular exercise and good diet habits to maintain a healthy weight discussed.  Due for a colonoscopy with history of multiple polyps removed 2019 but will proceed with Cologuard as alternative.  Recommending annual mammogram for breast cancer screening.   Last DEXA was early 2022 and will be repeated in 1 year. Dementia and depression screening completed.  Recommending annual eye exam.  Recommending seeing a dentist every 6 months.  Skin exam performed and recommending regular use of sunblock.  She sees a dermatologist every year.  Will screen for diabetes with fasting glucose.      2. Primary hypertension  Excellent blood pressure control with Toprol-XL, Avapro, HCTZ and lower dose of amlodipine currently 2.5 mg daily.  - Basic metabolic panel  (Ca, Cl, CO2, Creat, Gluc, K, Na, BUN); Future  - UA Macro with Reflex to Micro and Culture - lab collect; Future    3. Paroxysmal atrial fibrillation (H)  Diagnosed with paroxysmal atrial fibrillation after experiencing palpitations with abnormal event  monitor.  She has transition from Eliquis to warfarin and is getting her INR regularly monitored.  Tolerating the medication better since changing timing of dose.  She continues on Toprol-XL.  Asymptomatic.  She may be a candidate for watchman procedure and she can discuss further with cardiology.    4. Anxiety, generalized  We will try to get better control of her generalized anxiety.  Started Lexapro 5 mg daily at her last visit and tolerating without side effects.  I am recommending that she increase to 10 mg daily.  She needs to continue to taper off of diazepam given its potential risks as she is getting older including increasing risk for falling.  She will use sparingly.  I am sending a small supply to her pharmacy.  - diazepam (VALIUM) 5 MG tablet; TAKE 1 TABLET(5 MG) BY MOUTH DAILY AS NEEDED FOR ANXIETY Strength: 5 mg  Dispense: 20 tablet; Refill: 0  - escitalopram (LEXAPRO) 10 MG tablet; Take 1 tablet (10 mg) by mouth daily  Dispense: 90 tablet; Refill: 3    5. Osteopenia of multiple sites  DEXA in early 2022 demonstrating worsening osteopenia.  She is trying to get better calcium with supplements and diet and we will plan to repeat DEXA in 1 year.  She continues weightbearing exercise.  - Vitamin D deficiency screening; Future    6. Primary insomnia  She is using trazodone to help insomnia    7. Skin cancer, basal cell  She is seeing her dermatologist regularly.  Nonspecific lesion right side of face looks like abrasion with scab and developed just in the last week.  She will monitor to make sure it resolves.    8. Vitamin B12 deficiency (non anemic)  Found to have low normal B12 level last year associated with burning sensation in her feet.  She is now taking vitamin B12 replacement and I will recheck level.  - Vitamin B12; Future    9. Chronic nonintractable headache, unspecified headache type  Headaches are generally under control.  She continues gabapentin.    10. Overactive bladder  Stable    11.  Irritable bowel syndrome, unspecified type  IBS symptoms persist but are manageable    12. Hyperlipidemia, unspecified hyperlipidemia type  Recheck a lipid profile taking simvastatin  - Lipid Profile (Chol, Trig, HDL, LDL calc); Future    13. Gastroesophageal reflux disease without esophagitis  Reflux well controlled with omeprazole twice daily.  No dysphagia    14. Primary osteoarthritis involving multiple joints  Arthritis symptoms manageable    15. Allergic rhinitis, unspecified seasonality, unspecified trigger  Using Flonase for allergies    16. Personal history of colonic polyps  Due for colonoscopy with multiple polyps removed 2019.  See below    17. Encounter for therapeutic drug monitoring  Monitor electrolytes while on diuretic.  Sodium was low normal  - Magnesium; Future    18. Colon cancer screening    - [email or web address]([employer]); Future              She reports that she has never smoked. She has never used smokeless tobacco.      Appropriate preventive services were discussed with this patient, including applicable screening as appropriate for cardiovascular disease, diabetes, osteopenia/osteoporosis, and glaucoma.  As appropriate for age/gender, discussed screening for colorectal cancer, prostate cancer, breast cancer, and cervical cancer. Checklist reviewing preventive services available has been given to the patient.    Reviewed patients plan of care and provided an AVS. The Basic Care Plan (routine screening as documented in Health Maintenance) for Rene meets the Care Plan requirement. This Care Plan has been established and reviewed with the Patient.          Kevin Ho MD  [facility]    Identified Health Risks:

## 2023-01-03 ENCOUNTER — TELEPHONE (OUTPATIENT)
Dept: INTERNAL MEDICINE | Facility: CLINIC | Age: 83
End: 2023-01-03

## 2023-01-03 DIAGNOSIS — Z12.11 COLON CANCER SCREENING: ICD-10-CM

## 2023-01-03 DIAGNOSIS — I10 ESSENTIAL HYPERTENSION: ICD-10-CM

## 2023-01-03 RX ORDER — HYDROCHLOROTHIAZIDE 25 MG/1
25 TABLET ORAL EVERY OTHER DAY
Qty: 90 TABLET | Refills: 3
Start: 2023-01-03 | End: 2023-03-07

## 2023-01-05 NOTE — TELEPHONE ENCOUNTER
Prior Authorization Approval    Authorization Effective Date: 1/5/2023  Authorization Expiration Date: 5/5/2023  Medication: diazepam 5mg  Approved Dose/Quantity:    Reference #:     Insurance Company: [employer] - Phone [phone] Fax [phone]  Expected CoPay:       CoPay Card Available:      Foundation Assistance Needed:    Which Pharmacy is filling the prescription (Not needed for infusion/clinic administered): [id number] - [place], [place] - [address] [facility] [address]  Pharmacy Notified: Yes  Patient Notified: Comment:  Pharmacy will notify patient

## 2023-01-05 NOTE — TELEPHONE ENCOUNTER
Central Prior Authorization Team   Phone: [phone]    PA Initiation    Medication: diazepam 5mg  Insurance Company: [employer] - Phone [phone] Fax [phone]  Pharmacy Filling the Rx: [facility] #[id number] - [place], [place] - [address] AT [facility] [address]  Filling Pharmacy Phone: [phone]  Filling Pharmacy Fax:    Start Date: 1/5/2023

## 2023-01-06 ENCOUNTER — TELEPHONE (OUTPATIENT)
Dept: CARDIOLOGY | Facility: CLINIC | Age: 83
End: 2023-01-06

## 2023-01-06 NOTE — TELEPHONE ENCOUNTER
[facility] Call Center    Phone Message    May a detailed message be left on voicemail: yes     Reason for Call: Other: Pt called in to see if sh would be a good canidate for the watchman . Please call Pt back to discuss further.     Action Taken: Other: cardio    Travel Screening: Not Applicable     Thank you!  [facility]

## 2023-01-11 NOTE — TELEPHONE ENCOUNTER
Return call to patient - informed her of Dr. Mike's response / recommendations - patient verbalized understanding and reported that she is hoping to obtain home INR monitoring machine for convenience - patient agreed to follow-up with AF NP 11/2023, sooner if needed.  mg

## 2023-01-11 NOTE — TELEPHONE ENCOUNTER
Msg rec'd 1-11-23 @ 1222:  George Mike MD Fleischhacker, Kelly, ELENITA    It would appear that she does not have instability/falling issues.  In addition, would not appear to have bleeding issues in the like.  Therefore, likely would not be a candidate for left atrial appendage occlusion device placement.  If she would like to discuss further in person, could probably follow-up and visit with Jesus Chong who she has seen in the past.  Thanks.      ----- Message -----   From: Lizabeth Gordon RN   Sent: 1/6/2023  11:46 AM CST   To: George Mike MD   Subject: Watchman                                         Dr. Mike,   Please see message below.   Any recommendations?   Thank you,   Lizabeth

## 2023-01-18 ENCOUNTER — TRANSFERRED RECORDS (OUTPATIENT)
Dept: HEALTH INFORMATION MANAGEMENT | Facility: CLINIC | Age: 83
End: 2023-01-18

## 2023-01-20 PROBLEM — Z86.0100 PERSONAL HISTORY OF COLONIC POLYPS: Status: ACTIVE | Noted: 2023-01-20

## 2023-01-20 LAB — NONINV COLON CA DNA+OCC BLD SCRN STL QL: NEGATIVE

## 2023-01-23 ENCOUNTER — LAB (OUTPATIENT)
Dept: LAB | Facility: CLINIC | Age: 83
End: 2023-01-23
Payer: COMMERCIAL

## 2023-01-23 ENCOUNTER — DOCUMENTATION ONLY (OUTPATIENT)
Dept: ANTICOAGULATION | Facility: CLINIC | Age: 83
End: 2023-01-23

## 2023-01-23 ENCOUNTER — ANTICOAGULATION THERAPY VISIT (OUTPATIENT)
Dept: ANTICOAGULATION | Facility: CLINIC | Age: 83
End: 2023-01-23

## 2023-01-23 DIAGNOSIS — I48.0 PAROXYSMAL ATRIAL FIBRILLATION (H): ICD-10-CM

## 2023-01-23 DIAGNOSIS — I48.0 PAROXYSMAL ATRIAL FIBRILLATION (H): Primary | ICD-10-CM

## 2023-01-23 LAB — INR BLD: 1.8 (ref 0.9–1.1)

## 2023-01-23 PROCEDURE — 36415 COLL VENOUS BLD VENIPUNCTURE: CPT

## 2023-01-23 PROCEDURE — 85610 PROTHROMBIN TIME: CPT | Mod: QW

## 2023-01-23 NOTE — PROGRESS NOTES
Anticoagulation Management    Discussed INR home monitoring program with Rene Matthews reviewing:      [employer] requirements: >= 3 months of anticoagulation therapy, indication for chronic anticoagulation and order from provider    Required testing frequency (q1-2 weeks)    Home meters, testing supplies, meter training, and reporting of INR results done through an outside company. Patient would be contacted by home monitoring company to review insurance coverage with home monitoring company prior to enrolling.    [employer] would continue to receive and manage INR results.    Home monitoring application may take several weeks and must continue to follow up with recommended INR monitoring in clinic until receives monitor and training completed.     Home monitoring terms reviewed with patient      Patient agrees to frequency of testing as directed by referring provider ( weekly or biweekly) Yes    Testing to be performed during business hours of [facility] Yes    Patient agrees they have the skill (or a designated caregiver) necessary to perform the self test Yes    Patient agrees to report all INR results to INR home monitoring company Yes    Patient agrees to have additional INR test in clinic if a home result is critical Yes    Patient agrees to use a [employer] approved service provider and device for home monitoring Yes    [facility]    Referring provider: Dr Ho    Referring providers Clinic Fax number [phone]    Rene Matthews is interested home INR monitoring and requests order be submitted.

## 2023-01-23 NOTE — PROGRESS NOTES
ANTICOAGULATION MANAGEMENT     Rene Matthews 82 year old female is on warfarin with subtherapeutic INR result. (Goal INR 2.0-3.0)    Recent labs: (last 7 days)     01/23/23  1050   INR 1.8*       ASSESSMENT       Source(s): Chart Review and Patient/Caregiver Call       Warfarin doses taken: Warfarin taken as instructed    Diet: Increased greens/vitamin K in diet; ongoing change    New illness, injury, or hospitalization: No    Medication/supplement changes: None noted    Signs or symptoms of bleeding or clotting: No    Previous INR: Therapeutic last 2(+) visits    Additional findings: home monitor application started.        PLAN     Recommended plan for ongoing change(s) affecting INR     Dosing Instructions: Increase your warfarin dose (5.6% change) with next INR in 2 weeks       Summary  As of 1/23/2023    Full warfarin instructions:  2.5 mg every Wed, Sat; 3.75 mg all other days   Next INR check:  2/6/2023             Telephone call with Florence who verbalizes understanding and agrees to plan and who agrees to plan and repeated back plan correctly    Lab visit scheduled    Education provided:     Please call back if any changes to your diet, medications or how you've been taking warfarin    Goal range and lab monitoring: goal range and significance of current result and Importance of therapeutic range    Information on home INR monitoring    Plan made per [facility] anticoagulation protocol    Martina Rodgers, RN  Anticoagulation Clinic  1/23/2023    _______________________________________________________________________     Anticoagulation Episode Summary     Current INR goal:  2.0-3.0   TTR:  79.2 % (3 mo)   Target end date:  Indefinite   Send INR reminders to:  [employer]    Indications    Paroxysmal atrial fibrillation (H) [I48.0]           Comments:           Anticoagulation Care Providers     Provider Role Specialty Phone number    Kevin Ho MD Referring Internal Medicine [phone]

## 2023-01-28 DIAGNOSIS — M15.0 PRIMARY OSTEOARTHRITIS INVOLVING MULTIPLE JOINTS: ICD-10-CM

## 2023-01-28 DIAGNOSIS — R51.9 CHRONIC NONINTRACTABLE HEADACHE, UNSPECIFIED HEADACHE TYPE: ICD-10-CM

## 2023-01-28 DIAGNOSIS — G89.29 CHRONIC NONINTRACTABLE HEADACHE, UNSPECIFIED HEADACHE TYPE: ICD-10-CM

## 2023-01-30 RX ORDER — GABAPENTIN 300 MG/1
CAPSULE ORAL
Qty: 270 CAPSULE | Refills: 3 | Status: SHIPPED | OUTPATIENT
Start: 2023-01-30 | End: 2024-02-21

## 2023-01-31 DIAGNOSIS — I10 HYPERTENSION, UNSPECIFIED TYPE: ICD-10-CM

## 2023-02-01 ENCOUNTER — TELEPHONE (OUTPATIENT)
Dept: CARDIOLOGY | Facility: CLINIC | Age: 83
End: 2023-02-01
Payer: COMMERCIAL

## 2023-02-01 DIAGNOSIS — I10 HYPERTENSION, UNSPECIFIED TYPE: ICD-10-CM

## 2023-02-01 RX ORDER — METOPROLOL SUCCINATE 50 MG/1
50 TABLET, EXTENDED RELEASE ORAL DAILY
Qty: 90 TABLET | Refills: 3 | Status: SHIPPED | OUTPATIENT
Start: 2023-02-01 | End: 2024-01-24

## 2023-02-01 RX ORDER — METOPROLOL SUCCINATE 50 MG/1
50 TABLET, EXTENDED RELEASE ORAL DAILY
OUTPATIENT
Start: 2023-02-01

## 2023-02-01 NOTE — TELEPHONE ENCOUNTER
[facility] Call Center    Phone Message    May a detailed message be left on voicemail: yes     Reason for Call: Medication Refill Request    Has the patient contacted the pharmacy for the refill? Yes Pharmacy need a new Rx per pt. Pharmacy is unable to refill.    metoprolol succinate ER (TOPROL XL) 50 MG 24 hr tablet 50 mg, DAILY         EditCancel Reorder       Summary: Take 1 tablet (50 mg) by mouth daily, No Print Out   Dose, Route, Frequency: 50 mg, Oral, DAILY  Start: 8/25/2022  Ord/Sold: 8/25/2022 (O)  Report  Adh:   Taking:   Long-term:   Pharmacy: [facility] #[id number] - [place], [place] - [address] AT [facility] [address]  Med Dose History       Patient Sig: Take 1 tablet (50 mg) by mouth daily       Ordered on: 8/25/2022       Authorized by: JOSÉ MIGUEL AC       Prior Authorization: Request PA        Patient has two weeks left.    [facility] #[id number] - [place], [place] - [address] AT [facility] [address]    Action Taken: Other: Cardiology    Travel Screening: Not Applicable

## 2023-02-01 NOTE — TELEPHONE ENCOUNTER
Refill request Refused - Duplicate    Metoprolol 50mg was refilled on 2/1/2022 - Disp 90, Ref 3            Brooke Dexter RN  02/01/23 2:54 PM  [facility] Nurse Advisor

## 2023-02-06 ENCOUNTER — LAB (OUTPATIENT)
Dept: LAB | Facility: CLINIC | Age: 83
End: 2023-02-06
Payer: COMMERCIAL

## 2023-02-06 ENCOUNTER — ANTICOAGULATION THERAPY VISIT (OUTPATIENT)
Dept: ANTICOAGULATION | Facility: CLINIC | Age: 83
End: 2023-02-06

## 2023-02-06 DIAGNOSIS — I48.0 PAROXYSMAL ATRIAL FIBRILLATION (H): ICD-10-CM

## 2023-02-06 DIAGNOSIS — I48.0 PAROXYSMAL ATRIAL FIBRILLATION (H): Primary | ICD-10-CM

## 2023-02-06 LAB — INR BLD: 1.6 (ref 0.9–1.1)

## 2023-02-06 PROCEDURE — 85610 PROTHROMBIN TIME: CPT

## 2023-02-06 PROCEDURE — 36416 COLLJ CAPILLARY BLOOD SPEC: CPT

## 2023-02-06 NOTE — PROGRESS NOTES
ANTICOAGULATION MANAGEMENT     Rene Matthews 82 year old female is on warfarin with subtherapeutic INR result. (Goal INR 2.0-3.0)    Recent labs: (last 7 days)     02/06/23  1002   INR 1.6*       ASSESSMENT       Source(s): Chart Review, Patient/Caregiver Call and Template       Warfarin doses taken: Warfarin taken as instructed and Template incorrect; verbally confirmed home dose with Florence     Diet: Increased greens/vitamin K in diet; ongoing change    New illness, injury, or hospitalization: No    Medication/supplement changes: None noted    Signs or symptoms of bleeding or clotting: No    Previous INR: Subtherapeutic    Additional findings: None       PLAN     Recommended plan for ongoing change(s) affecting INR     Dosing Instructions: booster dose then Increase your warfarin dose (10.5% change) with next INR in 1-2 weeks       Summary  As of 2/6/2023    Full warfarin instructions:  2/6: 5 mg; Otherwise 3.75 mg every day   Next INR check:  2/20/2023             Telephone call with Florence who verbalizes understanding and agrees to plan and who agrees to plan and repeated back plan correctly    Lab visit scheduled    Education provided:     Please call back if any changes to your diet, medications or how you've been taking warfarin    Goal range and lab monitoring: goal range and significance of current result and Importance of therapeutic range    Plan made per [facility] anticoagulation protocol    Martina Rodgers RN  Anticoagulation Clinic  2/6/2023    _______________________________________________________________________     Anticoagulation Episode Summary     Current INR goal:  2.0-3.0   TTR:  68.7 % (3.5 mo)   Target end date:  Indefinite   Send INR reminders to:  [employer]    Indications    Paroxysmal atrial fibrillation (H) [I48.0]           Comments:           Anticoagulation Care Providers     Provider Role Specialty Phone number    Kevin Ho MD Referring Internal Medicine [phone]

## 2023-02-20 ENCOUNTER — LAB (OUTPATIENT)
Dept: LAB | Facility: CLINIC | Age: 83
End: 2023-02-20
Payer: COMMERCIAL

## 2023-02-20 ENCOUNTER — TELEPHONE (OUTPATIENT)
Dept: INTERNAL MEDICINE | Facility: CLINIC | Age: 83
End: 2023-02-20

## 2023-02-20 ENCOUNTER — ANTICOAGULATION THERAPY VISIT (OUTPATIENT)
Dept: ANTICOAGULATION | Facility: CLINIC | Age: 83
End: 2023-02-20

## 2023-02-20 DIAGNOSIS — I48.0 PAROXYSMAL ATRIAL FIBRILLATION (H): Primary | ICD-10-CM

## 2023-02-20 DIAGNOSIS — I48.0 PAROXYSMAL ATRIAL FIBRILLATION (H): ICD-10-CM

## 2023-02-20 LAB — INR BLD: 2 (ref 0.9–1.1)

## 2023-02-20 PROCEDURE — 85610 PROTHROMBIN TIME: CPT | Mod: QW

## 2023-02-20 PROCEDURE — 36416 COLLJ CAPILLARY BLOOD SPEC: CPT

## 2023-02-20 NOTE — PROGRESS NOTES
ANTICOAGULATION MANAGEMENT     Rene Matthews 82 year old female is on warfarin with therapeutic INR result. (Goal INR 2.0-3.0)    Recent labs: (last 7 days)     02/20/23  1053   INR 2.0*       ASSESSMENT       Source(s): Chart Review    Previous INR was Subtherapeutic    Medication, diet, health changes since last INR chart reviewed; none identified           PLAN     Recommended plan for no diet, medication or health factor changes affecting INR     Dosing Instructions: Continue your current warfarin dose with next INR in 2-3 weeks       Summary  As of 2/20/2023    Full warfarin instructions:  3.75 mg every day   Next INR check:  3/13/2023             Detailed voice message left for Florence with dosing instructions and follow up date.     Contact [phone] to schedule and with any changes, questions or concerns.  Or with home INR machine if she gets prior to next INR.    Education provided:     Please call back if any changes to your diet, medications or how you've been taking warfarin    Plan made per [facility] anticoagulation protocol    Martina Rodgers RN  Anticoagulation Clinic  2/20/2023    _______________________________________________________________________     Anticoagulation Episode Summary     Current INR goal:  2.0-3.0   TTR:  60.6 % (4 mo)   Target end date:  Indefinite   Send INR reminders to:  [employer]    Indications    Paroxysmal atrial fibrillation (H) [I48.0]           Comments:           Anticoagulation Care Providers     Provider Role Specialty Phone number    Kevin Ho MD Referring Internal Medicine [phone]

## 2023-02-20 NOTE — TELEPHONE ENCOUNTER
Reason for Call:  INR    Who is calling?  Rene Matthews     Phone number:  [phone]    Fax number:  NA     Name of caller: Rene Matthews    INR Value:  2.0    Are there any other concerns:  Yes: wants to know what her dosage of warfrain should be     Can we leave a detailed message on this number? YES    Phone number patient can be reached at: Home number on file [phone] (home)      Call taken on 2/20/2023 at 4:07 PM by Natty Osuna

## 2023-02-20 NOTE — TELEPHONE ENCOUNTER
Spoke with Rene and she did not listen to her messages. Discussed to continue her same warfarin dose 3.75 mg daily. She did get a call from home monitor company and will call them back tomorrow. She is thinking she will be then checking INR with that in the next couple of weeks.

## 2023-03-07 DIAGNOSIS — I10 ESSENTIAL HYPERTENSION: ICD-10-CM

## 2023-03-07 RX ORDER — HYDROCHLOROTHIAZIDE 25 MG/1
25 TABLET ORAL EVERY OTHER DAY
Qty: 90 TABLET | Refills: 3 | Status: SHIPPED | OUTPATIENT
Start: 2023-03-07 | End: 2024-05-21

## 2023-03-07 NOTE — TELEPHONE ENCOUNTER
Pt states she tried to refill her hydrochlorothiazide and the pharmacy stated she had 0 refills. Pt was under the understanding that in Jan of 2023 she had it refilled for the year.    There was no pharmacy on current order. Reorder placed with current pharmacy

## 2023-03-07 NOTE — TELEPHONE ENCOUNTER
"Routing refill request to provider for review/approval because:  Labs out of range:  Na    Last Written Prescription Date:  1/3/23 (did not e prescribe)  Last Fill Quantity: 90,  # refills: 3   Last office visit provider:  1/2/23    Requested Prescriptions   Pending Prescriptions Disp Refills     hydrochlorothiazide (HYDRODIURIL) 25 MG tablet 90 tablet 3     Sig: Take 1 tablet (25 mg) by mouth every other day       Diuretics (Including Combos) Protocol Failed - 3/7/2023 11:34 AM        Failed - Normal serum sodium on file in past 12 months     Recent Labs   Lab Test 01/02/23  1120   *              Passed - Blood pressure under 140/90 in past 12 months     BP Readings from Last 3 Encounters:   01/02/23 122/58   11/18/22 120/70   11/02/22 136/63                 Passed - Recent (12 mo) or future (30 days) visit within the authorizing provider's specialty     Patient has had an office visit with the authorizing provider or a provider within the authorizing providers department within the previous 12 mos or has a future within next 30 days. See \"Patient Info\" tab in inbasket, or \"Choose Columns\" in Meds & Orders section of the refill encounter.              Passed - Medication is active on med list        Passed - Patient is age 18 or older        Passed - No active pregancy on record        Passed - Normal serum creatinine on file in past 12 months     Recent Labs   Lab Test 01/02/23  1120   CR 0.58              Passed - Normal serum potassium on file in past 12 months     Recent Labs   Lab Test 01/02/23  1120   POTASSIUM 3.4                    Passed - No positive pregnancy test in past 12 months             Bailey Soliman RN 03/07/23 11:34 AM  "

## 2023-03-07 NOTE — TELEPHONE ENCOUNTER
Patient called in with questions about her Hydrochlorothiazide 25 MG medication.     Please call to discuss- [phone]

## 2023-03-10 ENCOUNTER — TELEPHONE (OUTPATIENT)
Dept: INTERNAL MEDICINE | Facility: CLINIC | Age: 83
End: 2023-03-10
Payer: COMMERCIAL

## 2023-03-10 NOTE — TELEPHONE ENCOUNTER
Pt inquiring about HTCZ  The following provided to pt  Per Dr. Ho:Normal kidney function.  No diabetes.  Your sodium level remains in the low normal range and your potassium is becoming borderline low.  This is all related to your use of hydrochlorothiazide.  This medication can also cause you to feel lightheaded if it is causing you to be mildly dehydrated.  You may not need 25 mg every day.  As your blood pressure looks well controlled, cutting HCTZ down to 12.5 mg daily or taking your current 25 mg dose every other day is recommended.    Pt verbalized understanding and had no further questions or concerns    Dipika PARIS RN  [facility]

## 2023-03-13 ENCOUNTER — ANTICOAGULATION THERAPY VISIT (OUTPATIENT)
Dept: ANTICOAGULATION | Facility: CLINIC | Age: 83
End: 2023-03-13
Payer: COMMERCIAL

## 2023-03-13 DIAGNOSIS — I48.0 PAROXYSMAL ATRIAL FIBRILLATION (H): Primary | ICD-10-CM

## 2023-03-13 LAB — INR HOME MONITORING: 1.7 (ref 2–3)

## 2023-03-13 NOTE — PROGRESS NOTES
ANTICOAGULATION MANAGEMENT     Rene Matthews 82 year old female is on warfarin with subtherapeutic INR result. (Goal INR 2.0-3.0)    Recent labs: (last 7 days)     03/13/23  0000   INR 1.7*       ASSESSMENT       Source(s): Chart Review and Patient/Caregiver Call       Warfarin doses taken: Warfarin taken as instructed    Diet: No new diet changes identified    New illness, injury, or hospitalization: No    Medication/supplement changes: None noted    Signs or symptoms of bleeding or clotting: No    Previous INR: Therapeutic last visit; previously outside of goal range    Additional findings: None         PLAN     Recommended plan for no diet, medication or health factor changes affecting INR     Dosing Instructions: Increase your warfarin dose (9.5% change) with next INR in 1 week       Summary  As of 3/13/2023    Full warfarin instructions:  5 mg every Mon, Thu; 3.75 mg all other days   Next INR check:  3/20/2023             Telephone call with Florence who verbalizes understanding and agrees to plan    Patient to recheck with home meter    Education provided:     Please call back if any changes to your diet, medications or how you've been taking warfarin    Goal range and lab monitoring: goal range and significance of current result and Importance of therapeutic range    Information on home INR monitoring    Plan made per [facility] anticoagulation protocol    Martina Rodgers, RN  Anticoagulation Clinic  3/13/2023    _______________________________________________________________________     Anticoagulation Episode Summary     Current INR goal:  2.0-3.0   TTR:  51.7 % (4.6 mo)   Target end date:  Indefinite   Send INR reminders to:  [facility]    Indications    Paroxysmal atrial fibrillation (H) [I48.0]           Comments:           Anticoagulation Care Providers     Provider Role Specialty Phone number    Kevin Ho MD Referring Internal Medicine [phone]

## 2023-03-13 NOTE — PROGRESS NOTES
"ANTICOAGULATION  MANAGEMENT    Rene Matthews received home monitor and has submitted first result.  Reviewed home monitoring program management:      INR testing:    Ordered INR testing frequency is:  1-2 weeks. She will be checking weekly    INR should be tested Monday-Friday prior to 2 pm and submitted directly to home monitoring company on day of testing    INR test and monitor review at [facility] required annually for technique observation and quality check.     Venous INR drawn in clinic/lab required if INR > 8    Patient will be discharged from home monitoring if they do not meet requirements of home monitoring company or in clinic check.       Management and Communication with [facility]:    Patient will be called regarding all out of range INRS on the business day result is received for assessment and dosing instructions. If no call received by 4 PM; please contact [facility] at: [phone]    [facility] typically does not call patient for therapeutic results. Patient is to continue current warfarin maintenance dose and continue testing INR at ordered frequency.     Patient must call and notify [facility] if new medication started, dose missed, s/sx of bleeding or clotting or upcoming procedure    Patient will receive a check in call at least once every 12 weeks if INRs remain in range.      Florence verbalizes understanding and agrees to INR testing and manage by exception communication plan    [facility] Maintenance Modifier: \"Anticoagulation: Home Monitoring\" added to chart    Martina Rodgers RN                "

## 2023-03-20 ENCOUNTER — ANTICOAGULATION THERAPY VISIT (OUTPATIENT)
Dept: ANTICOAGULATION | Facility: CLINIC | Age: 83
End: 2023-03-20
Payer: COMMERCIAL

## 2023-03-20 LAB — INR HOME MONITORING: 1.9 (ref 2–3)

## 2023-03-20 NOTE — PROGRESS NOTES
ANTICOAGULATION MANAGEMENT     Rene Matthews 82 year old female is on warfarin with subtherapeutic INR result. (Goal INR 2.0-3.0)    Recent labs: (last 7 days)       03/20/23  0000   INR 1.9*       ASSESSMENT       Source(s): Chart Review and Patient/Caregiver Call       Warfarin doses taken: Warfarin taken as instructed    Diet: Greens intake over previous week, Not consistent with greens intake from week to week    New illness, injury, or hospitalization: No    Medication/supplement changes: None noted    Signs or symptoms of bleeding or clotting: No    Previous INR: Subtherapeutic    Additional findings: None         PLAN     Recommended plan for ongoing change(s) affecting INR     Dosing Instructions: Increase  maintenance dose 8.7% with next INR in 1 week.     Summary  As of 3/20/2023    Full warfarin instructions:  3.75 mg every Mon, Wed, Fri; 5 mg all other days   Next INR check:  3/27/2023             Telephone call with Florence who verbalizes understanding and agrees to plan    Patient to recheck with home meter    Education provided:     Please call back if any changes to your diet, medications or how you've been taking warfarin    Dietary considerations: importance of consistent vitamin K intake    Plan made per [facility] anticoagulation protocol    Yasmine Norris RN  Anticoagulation Clinic  3/20/2023    _______________________________________________________________________     Anticoagulation Episode Summary     Current INR goal:  2.0-3.0   TTR:  49.2 % (4.9 mo)   Target end date:  Indefinite   Send INR reminders to:  [facility] MONIT[facility]    Indications    Paroxysmal atrial fibrillation (H) [I48.0]           Comments:           Anticoagulation Care Providers     Provider Role Specialty Phone number    Kevin Ho MD Referring Internal Medicine [phone]

## 2023-04-03 ENCOUNTER — ANTICOAGULATION THERAPY VISIT (OUTPATIENT)
Dept: ANTICOAGULATION | Facility: CLINIC | Age: 83
End: 2023-04-03
Payer: COMMERCIAL

## 2023-04-03 DIAGNOSIS — I48.0 PAROXYSMAL ATRIAL FIBRILLATION (H): Primary | ICD-10-CM

## 2023-04-03 LAB — INR HOME MONITORING: 2.6 (ref 2–3)

## 2023-04-03 NOTE — PROGRESS NOTES
ANTICOAGULATION MANAGEMENT     Rene Matthews 82 year old female is on warfarin with therapeutic INR result. (Goal INR 2.0-3.0)    Recent labs: (last 7 days)     04/03/23  0000   INR 2.6       ASSESSMENT       Source(s): Chart Review and Patient/Caregiver Call       Warfarin doses taken: Warfarin taken as instructed    Diet: No new diet changes identified    New illness, injury, or hospitalization: No    Medication/supplement changes: None noted    Signs or symptoms of bleeding or clotting: No    Previous INR: Subtherapeutic    Additional findings: None         PLAN     Recommended plan for no diet, medication or health factor changes affecting INR     Dosing Instructions: Continue your current warfarin dose with next INR in 2 weeks       Summary  As of 4/3/2023    Full warfarin instructions:  3.75 mg every Mon, Wed, Fri; 5 mg all other days   Next INR check:  4/17/2023             Telephone call with Florence who verbalizes understanding and agrees to plan    Patient to recheck with home meter    Education provided:     Goal range and lab monitoring: goal range and significance of current result, Importance of therapeutic range and Importance of following up at instructed interval    Contact [phone]  with any changes, questions or concerns.     Plan made per [facility] anticoagulation protocol    Lisha Herbert RN  Anticoagulation Clinic  4/3/2023    _______________________________________________________________________     Anticoagulation Episode Summary     Current INR goal:  2.0-3.0   TTR:  52.4 % (5.3 mo)   Target end date:  Indefinite   Send INR reminders to:  [facility] MONIT[facility]    Indications    Paroxysmal atrial fibrillation (H) [I48.0]           Comments:           Anticoagulation Care Providers     Provider Role Specialty Phone number    Kevin Ho MD Referring Internal Medicine [phone]

## 2023-04-17 ENCOUNTER — ANTICOAGULATION THERAPY VISIT (OUTPATIENT)
Dept: ANTICOAGULATION | Facility: CLINIC | Age: 83
End: 2023-04-17
Payer: COMMERCIAL

## 2023-04-17 DIAGNOSIS — I48.0 PAROXYSMAL ATRIAL FIBRILLATION (H): Primary | ICD-10-CM

## 2023-04-17 LAB — INR HOME MONITORING: 3.1 (ref 2–3)

## 2023-04-17 NOTE — PROGRESS NOTES
ANTICOAGULATION MANAGEMENT     Rene Matthews 82 year old female is on warfarin with supratherapeutic INR result. (Goal INR 2.0-3.0)    Recent labs: (last 7 days)     04/17/23  0000   INR 3.1*       ASSESSMENT       Source(s): Chart Review and Patient/Caregiver Call       Warfarin doses taken: Warfarin taken as instructed    Diet: Decreased greens/vitamin K in diet; plans to resume previous intake    Medication/supplement changes: None noted    New illness, injury, or hospitalization: No    Signs or symptoms of bleeding or clotting: No    Previous INR: Therapeutic last visit; previously outside of goal range    Additional findings: None         PLAN     Recommended plan for temporary change(s) affecting INR     Dosing Instructions: Continue your current warfarin dose with next INR in 2 weeks       Summary  As of 4/17/2023    Full warfarin instructions:  3.75 mg every Mon, Wed, Fri; 5 mg all other days   Next INR check:  5/1/2023             Telephone call with Florence who verbalizes understanding and agrees to plan    Patient to recheck with home meter    Education provided:     Dietary considerations: importance of consistent vitamin K intake    Plan made per [facility] anticoagulation protocol    Ale Mirza, RN  Anticoagulation Clinic  4/17/2023    _______________________________________________________________________     Anticoagulation Episode Summary     Current INR goal:  2.0-3.0   TTR:  54.6 % (5.8 mo)   Target end date:  Indefinite   Send INR reminders to:  [facility]    Indications    Paroxysmal atrial fibrillation (H) [I48.0]           Comments:           Anticoagulation Care Providers     Provider Role Specialty Phone number    Kevin Ho MD Referring Internal Medicine [phone]

## 2023-04-28 RX ORDER — AMLODIPINE BESYLATE 2.5 MG/1
TABLET ORAL
Qty: 180 TABLET | Refills: 1 | OUTPATIENT
Start: 2023-04-28

## 2023-05-01 ENCOUNTER — ANTICOAGULATION THERAPY VISIT (OUTPATIENT)
Dept: ANTICOAGULATION | Facility: CLINIC | Age: 83
End: 2023-05-01
Payer: COMMERCIAL

## 2023-05-01 DIAGNOSIS — I48.0 PAROXYSMAL ATRIAL FIBRILLATION (H): Primary | ICD-10-CM

## 2023-05-01 LAB — INR HOME MONITORING: 2.2 (ref 2–3)

## 2023-05-01 NOTE — PROGRESS NOTES
ANTICOAGULATION MANAGEMENT     Rene Matthews 82 year old female is on warfarin with therapeutic INR result. (Goal INR 2.0-3.0)    Recent labs: (last 7 days)     05/01/23  0000   INR 2.2       ASSESSMENT       Source(s): Chart Review    Previous INR was Supratherapeutic    Medication, diet, health changes since last INR chart reviewed; none identified             PLAN     Recommended plan for no diet, medication or health factor changes affecting INR     Dosing Instructions: Continue your current warfarin dose with next INR in 2 weeks       Summary  As of 5/1/2023    Full warfarin instructions:  3.75 mg every Mon, Wed, Fri; 5 mg all other days   Next INR check:               Detailed voice message left for Florence with dosing instructions and follow up date.     Patient to recheck with home meter    Education provided:     Please call back if any changes to your diet, medications or how you've been taking warfarin    Plan made per [facility] anticoagulation protocol    Rosa Phillip, RN  Anticoagulation Clinic  5/1/2023    _______________________________________________________________________     Anticoagulation Episode Summary     Current INR goal:  2.0-3.0   TTR:  57.1 % (6.3 mo)   Target end date:  Indefinite   [facility]    Indications    Paroxysmal atrial fibrillation (H) [I48.0]           Comments:           Anticoagulation Care Providers     Provider Role Specialty Phone number    Kevin Ho MD Referring Internal Medicine [phone]

## 2023-05-22 ENCOUNTER — ANTICOAGULATION THERAPY VISIT (OUTPATIENT)
Dept: ANTICOAGULATION | Facility: CLINIC | Age: 83
End: 2023-05-22
Payer: COMMERCIAL

## 2023-05-22 LAB — INR HOME MONITORING: 2.7 (ref 2–3)

## 2023-05-22 NOTE — PROGRESS NOTES
ANTICOAGULATION MANAGEMENT     Rene Matthews 82 year old female is on warfarin with therapeutic INR result. (Goal INR 2.0-3.0)    Recent labs: (last 7 days)     05/22/23  0000   INR 2.7       ASSESSMENT       Source(s): Chart Review and Patient/Caregiver Call       Warfarin doses taken: Warfarin taken as instructed    Diet: No new diet changes identified    Medication/supplement changes: None noted    New illness, injury, or hospitalization: No    Signs or symptoms of bleeding or clotting: No    Previous result: Therapeutic last visit; previously outside of goal range    Additional findings: Pt was out of strips for her home meter. She waited until they arrived. She is back on track she says.         PLAN       Dosing Instructions: Continue your current warfarin dose with next INR in 2 weeks       Summary  As of 5/22/2023    Full warfarin instructions:  3.75 mg every Mon, Wed, Fri; 5 mg all other days   Next INR check:  6/5/2023             Telephone call with Florence who agrees to plan and repeated back plan correctly    Patient to recheck with home meter    Education provided:     Contact [phone]  with any changes, questions or concerns.     Plan made per [facility] anticoagulation protocol    Yue Casas RN  Anticoagulation Clinic  5/22/2023    _______________________________________________________________________     Anticoagulation Episode Summary     Current INR goal:  2.0-3.0   TTR:  61.4 % (7 mo)   Target end date:  Indefinite   Send INR reminders to:  [facility] MONITOR[facility]    Indications    Paroxysmal atrial fibrillation (H) [I48.0]           Comments:           Anticoagulation Care Providers     Provider Role Specialty Phone number    Kevin Ho MD Referring Internal Medicine [phone]

## 2023-05-23 ENCOUNTER — TELEPHONE (OUTPATIENT)
Dept: CARDIOLOGY | Facility: CLINIC | Age: 83
End: 2023-05-23
Payer: COMMERCIAL

## 2023-05-23 DIAGNOSIS — I10 HYPERTENSION, UNSPECIFIED TYPE: Primary | ICD-10-CM

## 2023-05-23 RX ORDER — AMLODIPINE BESYLATE 2.5 MG/1
2.5 TABLET ORAL DAILY
Qty: 180 TABLET | Refills: 1 | Status: SHIPPED | OUTPATIENT
Start: 2023-05-23 | End: 2024-05-21

## 2023-05-23 NOTE — TELEPHONE ENCOUNTER
[facility] Call Center    Phone Message    May a detailed message be left on voicemail: yes     Reason for Call: Medication Refill Request    Has the patient contacted the pharmacy for the refill? Yes   Name of medication being requested: amLODIPine (NORVASC)  Provider who prescribed the medication: lenore  Pharmacy: [id number] - [place], [place] - [address] AT [facility] [address]    Date medication is needed: asap   Patient only has 3 pills lefts, please advise.      Action Taken: Other: cardiology    Travel Screening: Not Applicable   Thank you!  [facility]

## 2023-06-01 ENCOUNTER — TRANSFERRED RECORDS (OUTPATIENT)
Dept: HEALTH INFORMATION MANAGEMENT | Facility: CLINIC | Age: 83
End: 2023-06-01
Payer: COMMERCIAL

## 2023-06-02 DIAGNOSIS — G47.00 INSOMNIA, UNSPECIFIED TYPE: ICD-10-CM

## 2023-06-02 RX ORDER — TRAZODONE HYDROCHLORIDE 50 MG/1
TABLET, FILM COATED ORAL
Qty: 90 TABLET | Refills: 2 | Status: SHIPPED | OUTPATIENT
Start: 2023-06-02 | End: 2024-02-27

## 2023-06-02 NOTE — TELEPHONE ENCOUNTER
"Last Written Prescription Date:  6/9/22  Last Fill Quantity: 90,  # refills: 3   Last office visit provider:  1/2/23     Requested Prescriptions   Pending Prescriptions Disp Refills     traZODone (DESYREL) 50 MG tablet [Pharmacy Med Name: TRAZODONE 50MG TABLETS] 90 tablet 3     Sig: TAKE 1 TABLET(50 MG) BY MOUTH AT BEDTIME       Serotonin Modulators Passed - 6/2/2023  2:19 PM        Passed - Recent (12 mo) or future (30 days) visit within the authorizing provider's specialty     Patient has had an office visit with the authorizing provider or a provider within the authorizing providers department within the previous 12 mos or has a future within next 30 days. See \"Patient Info\" tab in inbasket, or \"Choose Columns\" in Meds & Orders section of the refill encounter.              Passed - Medication is active on med list        Passed - Patient is age 18 or older        Passed - No active pregnancy on record        Passed - No positive pregnancy test in past 12 months             ESME DESAI RN 06/02/23 2:19 PM  "

## 2023-06-05 ENCOUNTER — ANTICOAGULATION THERAPY VISIT (OUTPATIENT)
Dept: ANTICOAGULATION | Facility: CLINIC | Age: 83
End: 2023-06-05
Payer: COMMERCIAL

## 2023-06-05 LAB — INR HOME MONITORING: 2.7 (ref 2–3)

## 2023-06-05 NOTE — PROGRESS NOTES
ANTICOAGULATION MANAGEMENT     Rene Matthews 82 year old female is on warfarin with therapeutic INR result. (Goal INR 2.0-3.0)    Recent labs: (last 7 days)     06/05/23  0000   INR 2.7       ASSESSMENT       Source(s): Chart Review and Patient/Caregiver Call       Warfarin doses taken: Warfarin taken as instructed    Diet: No new diet changes identified    Medication/supplement changes: None noted    New illness, injury, or hospitalization: No    Signs or symptoms of bleeding or clotting: No    Previous result: Therapeutic last 2(+) visits    Additional findings: None         PLAN     Recommended plan for no diet, medication or health factor changes affecting INR     Dosing Instructions: Continue your current warfarin dose with next INR in 2 weeks       Summary  As of 6/5/2023    Full warfarin instructions:  3.75 mg every Mon, Wed, Fri; 5 mg all other days   Next INR check:  6/19/2023             Telephone call with Florence who agrees to plan and repeated back plan correctly    Patient to recheck with home meter    Education provided:     Please call back if any changes to your diet, medications or how you've been taking warfarin    Goal range and lab monitoring: goal range and significance of current result and Importance of therapeutic range    Plan made per [facility] anticoagulation protocol    Yasmine Norris, RN  Anticoagulation Clinic  6/5/2023    _______________________________________________________________________     Anticoagulation Episode Summary     Current INR goal:  2.0-3.0   TTR:  63.8 % (7.4 mo)   Target end date:  Indefinite   Send INR reminders to:  [facility] MONITOR[facility]    Indications    Paroxysmal atrial fibrillation (H) [I48.0]           Comments:  [employer]         Anticoagulation Care Providers     Provider Role Specialty Phone number    Kevin Ho MD Referring Internal Medicine [phone]

## 2023-06-19 ENCOUNTER — ANTICOAGULATION THERAPY VISIT (OUTPATIENT)
Dept: ANTICOAGULATION | Facility: CLINIC | Age: 83
End: 2023-06-19
Payer: COMMERCIAL

## 2023-06-19 DIAGNOSIS — I48.0 PAROXYSMAL ATRIAL FIBRILLATION (H): Primary | ICD-10-CM

## 2023-06-19 LAB — INR HOME MONITORING: 3.1 (ref 2–3)

## 2023-06-19 NOTE — PROGRESS NOTES
ANTICOAGULATION MANAGEMENT     Rene Matthews 82 year old female is on warfarin with supratherapeutic INR result. (Goal INR 2.0-3.0)    Recent labs: (last 7 days)     06/19/23  0000   INR 3.1*       ASSESSMENT       Source(s): Chart Review and Patient/Caregiver Call       Warfarin doses taken: Warfarin taken as instructed    Diet: Decreased greens/vitamin K in diet; plans to resume previous intake    Medication/supplement changes: None noted    New illness, injury, or hospitalization: No    Signs or symptoms of bleeding or clotting: No    Previous result: Therapeutic last 2(+) visits    Additional findings: None         PLAN     Recommended plan for temporary change(s) affecting INR     Dosing Instructions: partial hold then continue your current warfarin dose with next INR in 2 weeks       Summary  As of 6/19/2023    Full warfarin instructions:  6/20: 3.75 mg; Otherwise 3.75 mg every Mon, Wed, Fri; 5 mg all other days   Next INR check:  7/3/2023             Telephone call with Florence who agrees to plan and repeated back plan correctly    Patient to recheck with home meter    Education provided:     Please call back if any changes to your diet, medications or how you've been taking warfarin    Symptom monitoring: monitoring for bleeding signs and symptoms and monitoring for clotting signs and symptoms    Contact [phone]  with any changes, questions or concerns.     Plan made per [facility] anticoagulation protocol    Lisha Cruz RN  Anticoagulation Clinic  6/19/2023    _______________________________________________________________________     Anticoagulation Episode Summary     Current INR goal:  2.0-3.0   TTR:  64.5 % (7.9 mo)   Target end date:  Indefinite   Send INR reminders to:  [facility]    Indications    Paroxysmal atrial fibrillation (H) [I48.0]           Comments:  [employer]         Anticoagulation Care Providers     Provider Role Specialty Phone number    Kevin Ho MD Referring Internal  Medicine [phone]

## 2023-07-03 ENCOUNTER — ANTICOAGULATION THERAPY VISIT (OUTPATIENT)
Dept: ANTICOAGULATION | Facility: CLINIC | Age: 83
End: 2023-07-03
Payer: COMMERCIAL

## 2023-07-03 DIAGNOSIS — I48.0 PAROXYSMAL ATRIAL FIBRILLATION (H): Primary | ICD-10-CM

## 2023-07-03 LAB — INR HOME MONITORING: 3.1 (ref 2–3)

## 2023-07-03 NOTE — PROGRESS NOTES
ANTICOAGULATION MANAGEMENT     Rene Matthews 82 year old female is on warfarin with supratherapeutic INR result. (Goal INR 2.0-3.0)    Recent labs: (last 7 days)     07/03/23  0000   INR 3.1*       ASSESSMENT       Source(s): Chart Review and Patient/Caregiver Call       Warfarin doses taken: Warfarin taken as instructed    Diet: No new diet changes identified    Medication/supplement changes: None noted    New illness, injury, or hospitalization: No    Signs or symptoms of bleeding or clotting: No    Previous result: Supratherapeutic    Additional findings: None         PLAN     Recommended plan for no diet, medication or health factor changes affecting INR     Dosing Instructions: decrease your warfarin dose (4% change) with next INR in 2 weeks       Summary  As of 7/3/2023    Full warfarin instructions:  5 mg every Sun, Tue, Thu; 3.75 mg all other days   Next INR check:  7/17/2023             Telephone call with Florence who agrees to plan and repeated back plan correctly    Patient to recheck with home meter    Education provided:     None required    Plan made per [facility] anticoagulation protocol    Ale Mirza RN  Anticoagulation Clinic  7/3/2023    _______________________________________________________________________     Anticoagulation Episode Summary     Current INR goal:  2.0-3.0   TTR:  60.9 % (8.4 mo)   Target end date:  Indefinite   Send INR reminders to:  [facility] MONITORING    Indications    Paroxysmal atrial fibrillation (H) [I48.0]           Comments:  [place]         Anticoagulation Care Providers     Provider Role Specialty Phone number    Kevin Ho MD Referring Internal Medicine [phone]

## 2023-07-10 ENCOUNTER — ANTICOAGULATION THERAPY VISIT (OUTPATIENT)
Dept: ANTICOAGULATION | Facility: CLINIC | Age: 83
End: 2023-07-10
Payer: COMMERCIAL

## 2023-07-10 DIAGNOSIS — I48.0 PAROXYSMAL ATRIAL FIBRILLATION (H): Primary | ICD-10-CM

## 2023-07-10 LAB — INR HOME MONITORING: 3.6 (ref 2–3)

## 2023-07-10 NOTE — PROGRESS NOTES
"ANTICOAGULATION MANAGEMENT     Rene Matthews 82 year old female is on warfarin with supratherapeutic INR result. (Goal INR 2.0-3.0)    Recent labs: (last 7 days)     07/10/23  0000   INR 3.6*       ASSESSMENT       Source(s): Chart Review and Patient/Caregiver Call       Warfarin doses taken: Warfarin taken as instructed    Diet: No new diet changes identified    Medication/supplement changes: Keflex for 10 days starting today, this can increase INR.    New illness, injury, or hospitalization: Yes: patient reports she was seen urgent care today for symptoms of urinary tract infection. Patient reports that the symptoms for this infection are unlike previous urinary tract infections she has had. Patient reports feeling \"zap\" like feelings that go to her hands and feet that were keeping her up at night on top of urinary frequency. Infection can increase INR.    Signs or symptoms of bleeding or clotting: No    Previous result: Therapeutic last 2(+) visits    Additional findings: Last two INRs were high, maintenance dose was decreased on 7/3/23. If INR is high again on 7/14/23, may need to consider reducing maintenance dose again.         PLAN     Recommended plan for temporary change(s) affecting INR     Dosing Instructions: hold dose then continue your current warfarin dose with next INR in 1 week (patient already took warfarin today,      Summary  As of 7/10/2023    Full warfarin instructions:  7/11: Hold; Otherwise 5 mg every Sun, Tue, Thu; 3.75 mg all other days   Next INR check:  7/14/2023             Telephone call with Florence who agrees to plan and repeated back plan correctly    Patient to recheck with home meter    Education provided:     Please call back if any changes to your diet, medications or how you've been taking warfarin    Symptom monitoring: monitoring for bleeding signs and symptoms and monitoring for clotting signs and symptoms    Contact [phone]  with any changes, questions or concerns. "     Plan made per [facility] anticoagulation protocol    Lisha Cruz RN  Anticoagulation Clinic  7/10/2023    _______________________________________________________________________     Anticoagulation Episode Summary     Current INR goal:  2.0-3.0   TTR:  59.3 % (8.6 mo)   Target end date:  Indefinite   Send INR reminders to:  [facility]    Indications    Paroxysmal atrial fibrillation (H) [I48.0]           Comments:  Rohits         Anticoagulation Care Providers     Provider Role Specialty Phone number    Kevin Ho MD Referring Internal Medicine [phone]

## 2023-07-14 ENCOUNTER — ANTICOAGULATION THERAPY VISIT (OUTPATIENT)
Dept: ANTICOAGULATION | Facility: CLINIC | Age: 83
End: 2023-07-14
Payer: COMMERCIAL

## 2023-07-14 ENCOUNTER — TELEPHONE (OUTPATIENT)
Dept: INTERNAL MEDICINE | Facility: CLINIC | Age: 83
End: 2023-07-14
Payer: COMMERCIAL

## 2023-07-14 DIAGNOSIS — I48.0 PAROXYSMAL ATRIAL FIBRILLATION (H): Primary | ICD-10-CM

## 2023-07-14 LAB — INR HOME MONITORING: 1.5 (ref 2–3)

## 2023-07-14 NOTE — TELEPHONE ENCOUNTER
Patient Returning Call    Reason for call:  Pt called back and said that she forgot to tell the nurse that she has been on antibiotics for the past week and will finish the meds on 7/19/23. She wonders if that could effect her INR. Please call the pt back with questions     Information relayed to patient:      Patient has additional questions:  No      Could we send this information to you in [place] or would you prefer to receive a phone call?:   Patient would prefer a phone call   Okay to leave a detailed message?: Yes at Home number on file [phone] (home)

## 2023-07-14 NOTE — PROGRESS NOTES
ANTICOAGULATION MANAGEMENT     Rene Matthews 82 year old female is on warfarin with subtherapeutic INR result. (Goal INR 2.0-3.0)    Recent labs: (last 7 days)     07/14/23  0000   INR 1.5*       ASSESSMENT       Source(s): Chart Review and Patient/Caregiver Call       Warfarin doses taken: Warfarin taken as instructed    Diet: No new diet changes identified    Medication/supplement changes: None noted    New illness, injury, or hospitalization: No    Signs or symptoms of bleeding or clotting: No    Previous result: Supratherapeutic    Additional findings: None         PLAN     Recommended plan for no diet, medication or health factor changes affecting INR     Dosing Instructions: booster dose then continue your current warfarin dose with next INR in 3 days       Summary  As of 7/14/2023    Full warfarin instructions:  7/14: 5 mg; Otherwise 5 mg every Sun, Tue, Thu; 3.75 mg all other days   Next INR check:  7/17/2023             Telephone call with Florence who verbalizes understanding and agrees to plan    Patient to recheck with home meter    Education provided:     Please call back if any changes to your diet, medications or how you've been taking warfarin    Goal range and lab monitoring: goal range and significance of current result, Importance of therapeutic range and Importance of following up at instructed interval    Symptom monitoring: monitoring for clotting signs and symptoms and monitoring for stroke signs and symptoms    Plan made per [facility]agulation protocol    Josiane Quintero, RN  Anticoagulation Clinic  7/14/2023    _______________________________________________________________________     Anticoagulation Episode Summary     Current INR goal:  2.0-3.0   TTR:  59.1 % (8.7 mo)   Target end date:  Indefinite   Send INR reminders to:  [facility]    Indications    Paroxysmal atrial fibrillation (H) [I48.0]           Comments:  Acelis         Anticoagulation Care Providers     Provider  Role Specialty Phone number    Kevin Ho MD Referring Internal Medicine [phone]

## 2023-07-14 NOTE — PROGRESS NOTES
Pt calls back and states she has been on cephalexin 500 mg BID x10 days since Monday 7/10 (7/10-7/20)  We could normally expect to see the INR increase with this med.   Will stay on same dose that was advised and recheck as advised  Josinae Quintero RN

## 2023-07-17 ENCOUNTER — ANTICOAGULATION THERAPY VISIT (OUTPATIENT)
Dept: ANTICOAGULATION | Facility: CLINIC | Age: 83
End: 2023-07-17
Payer: COMMERCIAL

## 2023-07-17 DIAGNOSIS — I48.0 PAROXYSMAL ATRIAL FIBRILLATION (H): Primary | ICD-10-CM

## 2023-07-17 LAB — INR HOME MONITORING: 2.3 (ref 2–3)

## 2023-07-17 NOTE — PROGRESS NOTES
ANTICOAGULATION MANAGEMENT     Rene Matthews 82 year old female is on warfarin with therapeutic INR result. (Goal INR 2.0-3.0)    Recent labs: (last 7 days)     07/17/23  0000   INR 2.3       ASSESSMENT       Source(s): Chart Review and Patient/Caregiver Call       Warfarin doses taken: Warfarin taken as instructed    Diet: No new diet changes identified    Medication/supplement changes: Keflex till 7/20/2023    New illness, injury, or hospitalization: No    Signs or symptoms of bleeding or clotting: No    Previous result: Subtherapeutic    Additional findings: Upcoming surgery/procedure Moh's surgery scheduled for beginning of August         PLAN     Recommended plan for no diet, medication or health factor changes affecting INR     Dosing Instructions: Continue your current warfarin dose with next INR in 1 week       Summary  As of 7/17/2023    Full warfarin instructions:  5 mg every Sun, Tue, Thu; 3.75 mg all other days   Next INR check:  7/24/2023             Telephone call with Florence who agrees to plan and repeated back plan correctly    Patient to recheck with home meter    Education provided:     None required    Plan made per [facility] protocol    Ale Mirza, RN  Anticoagulation Clinic  7/17/2023    _______________________________________________________________________     Anticoagulation Episode Summary     Current INR goal:  2.0-3.0   TTR:  58.8 % (8.8 mo)   Target end date:  Indefinite   Send INR reminders to:  [facility]    Indications    Paroxysmal atrial fibrillation (H) [I48.0]           Comments:  Acetracis         Anticoagulation Care Providers     Provider Role Specialty Phone number    Kevin Ho MD Referring Internal Medicine [phone]

## 2023-07-25 ENCOUNTER — MYC MEDICAL ADVICE (OUTPATIENT)
Dept: ANTICOAGULATION | Facility: CLINIC | Age: 83
End: 2023-07-25
Payer: COMMERCIAL

## 2023-07-25 ENCOUNTER — TELEPHONE (OUTPATIENT)
Dept: ANTICOAGULATION | Facility: CLINIC | Age: 83
End: 2023-07-25
Payer: COMMERCIAL

## 2023-07-25 NOTE — TELEPHONE ENCOUNTER
ANTICOAGULATION     Rene Matthews is overdue for INR check.       My chart message sent    Rukhsana Estes RN

## 2023-07-31 ENCOUNTER — ANTICOAGULATION THERAPY VISIT (OUTPATIENT)
Dept: ANTICOAGULATION | Facility: CLINIC | Age: 83
End: 2023-07-31
Payer: COMMERCIAL

## 2023-07-31 ENCOUNTER — TELEPHONE (OUTPATIENT)
Dept: INTERNAL MEDICINE | Facility: CLINIC | Age: 83
End: 2023-07-31
Payer: COMMERCIAL

## 2023-07-31 DIAGNOSIS — I48.0 PAROXYSMAL ATRIAL FIBRILLATION (H): Primary | ICD-10-CM

## 2023-07-31 LAB — INR HOME MONITORING: 2.1 (ref 2–3)

## 2023-07-31 NOTE — PROGRESS NOTES
ANTICOAGULATION MANAGEMENT     Rene Matthews 82 year old female is on warfarin with therapeutic INR result. (Goal INR 2.0-3.0)    Recent labs: (last 7 days)     07/31/23  0000   INR 2.1     ASSESSMENT       Source(s): Chart Review and Patient/Caregiver Call       Juan Jose completed on 7/20/23    Patient reports eating more greens this week. No other changes.    Surgery on face on 8/2/23, no change to warfarin dosing.         PLAN     Recommended plan for no diet, medication or health factor changes affecting INR     Dosing Instructions: Continue your current warfarin dose with next INR in 2 weeks       Summary  As of 7/31/2023    Full warfarin instructions:  5 mg every Sun, Tue, Thu; 3.75 mg all other days   Next INR check:  8/14/2023             Detailed voice message left for Florence with dosing instructions and follow up date.     Patient to recheck with home meter    Education provided:     Please call back if any changes to your diet, medications or how you've been taking warfarin    Plan made per [facility]agulation protocol    Aleksandra Gardner, RN  Anticoagulation Clinic  7/31/2023    _______________________________________________________________________     Anticoagulation Episode Summary     Current INR goal:  2.0-3.0   TTR:  60.9 % (9.3 mo)   Target end date:  Indefinite   Send INR reminders to:  [facility]    Indications    Paroxysmal atrial fibrillation (H) [I48.0]           Comments:  Acelis         Anticoagulation Care Providers     Provider Role Specialty Phone number    Kevin Ho MD Referring Internal Medicine [phone]

## 2023-07-31 NOTE — TELEPHONE ENCOUNTER
General Call      Reason for Call:  returning call from inr nurse    What are your questions or concerns:  discuss dosing and inr resutls    Date of last appointment with provider: n/a    Could we send this information to you in [employer] or would you prefer to receive a phone call?:   Patient would prefer a phone call   Okay to leave a detailed message?: Yes at Cell number on file:    Telephone Information:   Mobile [phone]

## 2023-08-01 NOTE — TELEPHONE ENCOUNTER
INR completed 7/31. This mychart is from 7 days ago.  Gretta Nazario, RN  Anticoagulation Nurse - [facility], [place]

## 2023-08-09 ENCOUNTER — TRANSFERRED RECORDS (OUTPATIENT)
Dept: HEALTH INFORMATION MANAGEMENT | Facility: CLINIC | Age: 83
End: 2023-08-09
Payer: COMMERCIAL

## 2023-08-14 ENCOUNTER — ANTICOAGULATION THERAPY VISIT (OUTPATIENT)
Dept: ANTICOAGULATION | Facility: CLINIC | Age: 83
End: 2023-08-14
Payer: COMMERCIAL

## 2023-08-14 DIAGNOSIS — I48.0 PAROXYSMAL ATRIAL FIBRILLATION (H): Primary | ICD-10-CM

## 2023-08-14 LAB — INR HOME MONITORING: 2.6 (ref 2–3)

## 2023-08-14 NOTE — PROGRESS NOTES
ANTICOAGULATION MANAGEMENT     Rene Matthews 82 year old female is on warfarin with therapeutic INR result. (Goal INR 2.0-3.0)    Recent labs: (last 7 days)     08/14/23  0000   INR 2.6       ASSESSMENT     Source(s): Warfarin taken as instructed     Warfarin doses taken: Warfarin taken as instructed  Diet: No new diet changes identified  Medication/supplement changes: None noted  New illness, injury, or hospitalization: No  Signs or symptoms of bleeding or clotting: No  Previous result: Therapeutic last 2(+) visits  Additional findings: None       PLAN     Recommended plan for no diet, medication or health factor changes affecting INR     Dosing Instructions: Continue your current warfarin dose with next INR in 2 weeks       Summary  As of 8/14/2023      Full warfarin instructions:  5 mg every Sun, Tue, Thu; 3.75 mg all other days   Next INR check:  8/28/2023               Telephone call with Florence who verbalizes understanding and agrees to plan    Patient to recheck with home meter    Education provided:   Contact [phone]  with any changes, questions or concerns.     Plan made per [facility]agulation protocol    Lisha Herbert RN  Anticoagulation Clinic  8/14/2023    _______________________________________________________________________     Anticoagulation Episode Summary       Current INR goal:  2.0-3.0   TTR:  62.8 % (9.8 mo)   Target end date:  Indefinite   Send INR reminders to:  [facility]    Indications    Paroxysmal atrial fibrillation (H) [I48.0]             Comments:  Casys             Anticoagulation Care Providers       Provider Role Specialty Phone number    Kevin Ho MD Referring Internal Medicine [phone]

## 2023-08-28 ENCOUNTER — DOCUMENTATION ONLY (OUTPATIENT)
Dept: ANTICOAGULATION | Facility: CLINIC | Age: 83
End: 2023-08-28
Payer: COMMERCIAL

## 2023-08-28 ENCOUNTER — ANTICOAGULATION THERAPY VISIT (OUTPATIENT)
Dept: ANTICOAGULATION | Facility: CLINIC | Age: 83
End: 2023-08-28
Payer: COMMERCIAL

## 2023-08-28 DIAGNOSIS — I48.0 PAROXYSMAL ATRIAL FIBRILLATION (H): Primary | ICD-10-CM

## 2023-08-28 LAB — INR HOME MONITORING: 3.1 (ref 2–3)

## 2023-08-28 NOTE — PROGRESS NOTES
ANTICOAGULATION CLINIC REFERRAL RENEWAL REQUEST       An annual renewal order is required for all patients referred to [facility].?  Please review and sign the pended referral order for Rene Matthews.       ANTICOAGULATION SUMMARY      Warfarin indication(s)   Atrial Fibrillation    Mechanical heart valve present?  NO       Current goal range   INR: 2.0-3.0     Goal appropriate for indication? Goal INR 2-3, standard for indication(s) above     Time in Therapeutic Range (TTR)  (Goal > 60%) 63.6%       Office visit with referring provider's group within last year yes on 1/2/2023       Lisha Herbert RN  [facility]

## 2023-09-11 ENCOUNTER — ANTICOAGULATION THERAPY VISIT (OUTPATIENT)
Dept: ANTICOAGULATION | Facility: CLINIC | Age: 83
End: 2023-09-11
Payer: COMMERCIAL

## 2023-09-11 DIAGNOSIS — I48.0 PAROXYSMAL ATRIAL FIBRILLATION (H): Primary | ICD-10-CM

## 2023-09-11 LAB — INR HOME MONITORING: 2.9 (ref 2–3)
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
## 2023-09-25 ENCOUNTER — ANTICOAGULATION THERAPY VISIT (OUTPATIENT)
Dept: ANTICOAGULATION | Facility: CLINIC | Age: 83
End: 2023-09-25
Payer: COMMERCIAL

## 2023-09-25 DIAGNOSIS — I48.0 PAROXYSMAL ATRIAL FIBRILLATION (H): Primary | ICD-10-CM

## 2023-09-25 LAB — INR HOME MONITORING: 2.6 (ref 2–3)

## 2023-09-25 NOTE — PROGRESS NOTES
ANTICOAGULATION MANAGEMENT     Rene Matthews 83 year old female is on warfarin with therapeutic INR result. (Goal INR 2.0-3.0)    Recent labs: (last 7 days)     09/25/23  0000   INR 2.6       ASSESSMENT     Source(s): Chart Review and Patient/Caregiver Call     Warfarin doses taken: Warfarin taken as instructed  Diet: No new diet changes identified  Medication/supplement changes: None noted  New illness, injury, or hospitalization: No  Signs or symptoms of bleeding or clotting: No  Previous result: Therapeutic last 2(+) visits  Additional findings: None       PLAN     Recommended plan for no diet, medication or health factor changes affecting INR     Dosing Instructions: Continue your current warfarin dose with next INR in 2 weeks       Summary  As of 9/25/2023      Full warfarin instructions:  5 mg every Sun, Tue, Thu; 3.75 mg all other days   Next INR check:  10/9/2023               Telephone call with Florence 's spouse Nate who verbalizes understanding and agrees to plan    Patient to recheck with home meter    Education provided:   Contact [phone]  with any changes, questions or concerns.     Plan made per [facility] anticoagulation protocol    Lisha Herbert RN  Anticoagulation Clinic  9/25/2023    _______________________________________________________________________     Anticoagulation Episode Summary       Current INR goal:  2.0-3.0   TTR:  64.5 % (11.2 mo)   Target end date:  Indefinite   [facility]    Indications    Paroxysmal atrial fibrillation (H) [I48.0]             Comments:  Cassy             Anticoagulation Care Providers       Provider Role Specialty Phone number    Kevin Ho MD Referring Internal Medicine [phone]

## 2023-09-28 ENCOUNTER — ALLIED HEALTH/NURSE VISIT (OUTPATIENT)
Dept: FAMILY MEDICINE | Facility: CLINIC | Age: 83
End: 2023-09-28
Payer: COMMERCIAL

## 2023-09-28 DIAGNOSIS — Z23 NEED FOR VACCINATION: Primary | ICD-10-CM

## 2023-09-28 PROCEDURE — 91320 SARSCV2 VAC 30MCG TRS-SUC IM: CPT

## 2023-09-28 PROCEDURE — 90480 ADMN SARSCOV2 VAC 1/ONLY CMP: CPT

## 2023-09-28 PROCEDURE — 99207 PR NO CHARGE NURSE ONLY: CPT

## 2023-09-28 NOTE — PROGRESS NOTES
Rene Matthews presents for her annual Covid vaccine. Consent form is reviewed and immunization given without incident. See immunization history for documentation details.     Candis Ahumada CSS

## 2023-10-09 ENCOUNTER — ANTICOAGULATION THERAPY VISIT (OUTPATIENT)
Dept: ANTICOAGULATION | Facility: CLINIC | Age: 83
End: 2023-10-09
Payer: COMMERCIAL

## 2023-10-09 DIAGNOSIS — I48.0 PAROXYSMAL ATRIAL FIBRILLATION (H): Primary | ICD-10-CM

## 2023-10-09 LAB — INR HOME MONITORING: 2.7 (ref 2–3)

## 2023-10-09 NOTE — PROGRESS NOTES
ANTICOAGULATION MANAGEMENT     Rene Matthews 83 year old female is on warfarin with therapeutic INR result. (Goal INR 2.0-3.0)    Recent labs: (last 7 days)     10/09/23  0000   INR 2.7       ASSESSMENT     Source(s): Chart Review and Patient/Caregiver Call     Warfarin doses taken: Warfarin taken as instructed  Diet: No new diet changes identified  Medication/supplement changes: None noted  New illness, injury, or hospitalization: No  Signs or symptoms of bleeding or clotting: No  Previous result: Therapeutic last 2(+) visits  Additional findings: None       PLAN     Recommended plan for no diet, medication or health factor changes affecting INR     Dosing Instructions: Continue your current warfarin dose with next INR in 2 weeks       Summary  As of 10/9/2023      Full warfarin instructions:  5 mg every Sun, Tue, Thu; 3.75 mg all other days   Next INR check:  10/23/2023               Telephone call with Florence who verbalizes understanding and agrees to plan    Patient to recheck with home meter    Education provided:   Please call back if any changes to your diet, medications or how you've been taking warfarin    Plan made per [facility] anticoagulation protocol    Aleksandra Gardner RN  Anticoagulation Clinic  10/9/2023    _______________________________________________________________________     Anticoagulation Episode Summary       Current INR goal:  2.0-3.0   TTR:  65.9 % (11.6 mo)   Target end date:  Indefinite   [facility]    Indications    Paroxysmal atrial fibrillation (H) [I48.0]             Comments:  Cassy             Anticoagulation Care Providers       Provider Role Specialty Phone number    Kevin Ho MD Referring Internal Medicine [phone]

## 2023-10-23 ENCOUNTER — ANTICOAGULATION THERAPY VISIT (OUTPATIENT)
Dept: ANTICOAGULATION | Facility: CLINIC | Age: 83
End: 2023-10-23
Payer: COMMERCIAL

## 2023-10-23 DIAGNOSIS — I48.0 PAROXYSMAL ATRIAL FIBRILLATION (H): Primary | ICD-10-CM

## 2023-10-23 LAB — INR HOME MONITORING: 2.6 (ref 2–3)

## 2023-10-23 NOTE — PROGRESS NOTES
ANTICOAGULATION MANAGEMENT     Rene Matthews 83 year old female is on warfarin with therapeutic INR result. (Goal INR 2.0-3.0)    Recent labs: (last 7 days)     10/23/23  0000   INR 2.6       ASSESSMENT     Source(s): Chart Review and Patient/Caregiver Call     Warfarin doses taken: Warfarin taken as instructed  Diet: No new diet changes identified  Medication/supplement changes: None noted  New illness, injury, or hospitalization: No  Signs or symptoms of bleeding or clotting: No  Previous result: Therapeutic last 2(+) visits  Additional findings: None       PLAN     Recommended plan for no diet, medication or health factor changes affecting INR     Dosing Instructions: Continue your current warfarin dose with next INR in 2 weeks       Summary  As of 10/23/2023      Full warfarin instructions:  5 mg every Sun, Tue, Thu; 3.75 mg all other days   Next INR check:  11/6/2023               Telephone call with Florence who verbalizes understanding and agrees to plan    Patient to recheck with home meter    Education provided:   Please call back if any changes to your diet, medications or how you've been taking warfarin    Plan made per [facility] anticoagulation protocol    Aleksandra Gardner RN  Anticoagulation Clinic  10/23/2023    _______________________________________________________________________     Anticoagulation Episode Summary       Current INR goal:  2.0-3.0   TTR:  67.2% (1 y)   Target end date:  Indefinite   Send INR reminders to:  [facility]IT[facility]    Indications    Paroxysmal atrial fibrillation (H) [I48.0]             Comments:  Cassy             Anticoagulation Care Providers       Provider Role Specialty Phone number    Kevin Ho MD Referring Internal Medicine [phone]

## 2023-10-31 ENCOUNTER — NURSE TRIAGE (OUTPATIENT)
Dept: CARDIOLOGY | Facility: CLINIC | Age: 83
End: 2023-10-31
Payer: COMMERCIAL

## 2023-10-31 DIAGNOSIS — I48.0 PAROXYSMAL ATRIAL FIBRILLATION (H): Primary | ICD-10-CM

## 2023-10-31 DIAGNOSIS — R00.2 PALPITATIONS: ICD-10-CM

## 2023-10-31 DIAGNOSIS — I49.9 IRREGULAR HEART BEAT: ICD-10-CM

## 2023-10-31 NOTE — TELEPHONE ENCOUNTER
Dr. Mike,  Please see nurse triage below.  Follow up planned 11/2023 with EP JENNA.  Any new orders or recommendations?  Thank you,  Lizabeth      Message sent to scheduling to arrange follow up as ordered/planned.

## 2023-10-31 NOTE — TELEPHONE ENCOUNTER
"Received a call from the call center to discuss A fib.  Spoke to Florence, who says she has been having \"a little bit of A fib\". She says her HR has been jumping around. She says right now it is 60 bpm, but was 50 bpm before. She says she felt a little bit faint just taking 4-5 steps, but is better now, and is able to walk safely. She says she checks her HR using her fingers and counting the carotid pulse for 1 minute using her watch.   Asked to check VS using BP monitor. /71  bpm. She says she feels \"ok\" now. She says she has taken metoprolol and warfarin today.   Advised a message will be sent to [facility] for follow up.  Advised if she becomes faint again, it is recommended to go to the nearest ED for an evaluation. She verbalized agreement and understanding.       Additional Information   Negative: Heart beating very rapidly (e.g., > 140 / minute) and present now  (Exception: During exercise.)   Negative: Dizziness, lightheadedness, or weakness    Protocols used: Heart Rate and Heartbeat [id number]  1. DESCRIPTION: \"Please describe your heart rate or heartbeat that you are having\" (e.g., fast/slow, regular/irregular, skipped or extra beats, \"palpitations\") irregular  2. ONSET: \"When did it start?\" (Minutes, hours or days) today before calling  3. DURATION: \"How long does it last\" (e.g., seconds, minutes, hours)  4. PATTERN \"Does it come and go, or has it been constant since it started?\" \"Does it get worse with exertion?\" \"Are you feeling it now?\" Having it now  5. TAP: \"Using your hand, can you tap out what you are feeling on a chair or table in front of you, so that I can hear?\" (Note: not all patients can do this)  6. HEART RATE: \"Can you tell me your heart rate?\" \"How many beats in 15 seconds?\" (Note: not all patients can do this) currently 115 bpm  7. RECURRENT SYMPTOM: \"Have you ever had this before?\" If Yes, ask: \"When was the last time?\" and \"What happened that time?\"  8. CAUSE: " "\"What do you think is causing the palpitations?\"  9. CARDIAC HISTORY: \"Do you have any history of heart disease?\" (e.g., heart attack, angina, bypass surgery, angioplasty, arrhythmia) PAF  10. OTHER SYMPTOMS: \"Do you have any other symptoms?\" (e.g., dizziness, chest pain, sweating, difficulty breathing) was dizzy previously, but is \"ok\" currently  11. PREGNANCY: \"Is there any chance you are pregnant?\" \"When was your last menstrual period?\"  "

## 2023-11-02 NOTE — TELEPHONE ENCOUNTER
Reached out to patient with recommendation for 24 hour Holter. Patient verbalized understanding and call transferred to scheduling to arrange.  Follow up with Bailey 12/19/23 as already scheduled.  ---------------------------------------   George Mike MD  You2 minutes ago (11:35 AM)     FR  Lets arrange for her to have a 24-hour Holter monitor performed today on only assess palpitations, but also heart rate and continue with plan for EP JENNA visit.  Thanks!

## 2023-11-03 ENCOUNTER — HOSPITAL ENCOUNTER (OUTPATIENT)
Dept: CARDIOLOGY | Facility: CLINIC | Age: 83
Discharge: HOME | End: 2023-11-03
Attending: INTERNAL MEDICINE | Admitting: INTERNAL MEDICINE
Payer: MEDICARE

## 2023-11-03 DIAGNOSIS — I49.9 IRREGULAR HEART BEAT: ICD-10-CM

## 2023-11-03 DIAGNOSIS — I48.0 PAROXYSMAL ATRIAL FIBRILLATION (H): ICD-10-CM

## 2023-11-03 PROCEDURE — 93225 XTRNL ECG REC<48 HRS REC: CPT

## 2023-11-06 ENCOUNTER — ANTICOAGULATION THERAPY VISIT (OUTPATIENT)
Dept: ANTICOAGULATION | Facility: CLINIC | Age: 83
End: 2023-11-06
Payer: COMMERCIAL

## 2023-11-06 DIAGNOSIS — I48.0 PAROXYSMAL ATRIAL FIBRILLATION (H): Primary | ICD-10-CM

## 2023-11-06 LAB — INR HOME MONITORING: 3.6 (ref 2–3)

## 2023-11-06 PROCEDURE — 93227 XTRNL ECG REC<48 HR R&I: CPT | Performed by: INTERNAL MEDICINE

## 2023-11-06 NOTE — PROGRESS NOTES
ANTICOAGULATION MANAGEMENT     Rene Matthews 83 year old female is on warfarin with supratherapeutic INR result. (Goal INR 2.0-3.0)    Recent labs: (last 7 days)     11/06/23  0000   INR 3.6*       ASSESSMENT     Source(s): Chart Review and Patient/Caregiver Call     Warfarin doses taken: Warfarin taken as instructed  Diet: Increased greens/vitamin K in diet; ongoing change  Medication/supplement changes: None noted  New illness, injury, or hospitalization: Yes: was on holter monitor Friday to sat for afib, feels weak in the morning still awaiting call from Cardiology regarding Holter monitor result. Advised to seek medical attention for worsening of symptoms  Signs or symptoms of bleeding or clotting: No  Previous result: Therapeutic last 2(+) visits  Additional findings: Patient reports that she already took her warfarin dose this morning.       PLAN     Recommended plan for temporary change(s) affecting INR     Dosing Instructions: partial hold then continue your current warfarin dose with next INR in 1 week       Summary  As of 11/6/2023      Full warfarin instructions:  11/7: 2.5 mg; Otherwise 5 mg every Sun, Tue, Thu; 3.75 mg all other days   Next INR check:  11/13/2023               Telephone call with Florence who verbalizes understanding and agrees to plan and who agrees to plan and repeated back plan correctly    Patient to recheck with home meter    Education provided:   Symptom monitoring: when to seek medical attention/emergency care    Plan made per [facility] anticoagulation protocol    Lisha Herbert RN  Anticoagulation Clinic  11/6/2023    _______________________________________________________________________     Anticoagulation Episode Summary       Current INR goal:  2.0-3.0   TTR:  66.4% (1 y)   Target end date:  Indefinite   Send INR reminders to:  [facility]    Indications    Paroxysmal atrial fibrillation (H) [I48.0]             Comments:  Cassy             Anticoagulation Care Providers        Provider Role Specialty Phone number    Kevin Ho MD Referring Internal Medicine [phone]

## 2023-11-13 ENCOUNTER — ANTICOAGULATION THERAPY VISIT (OUTPATIENT)
Dept: ANTICOAGULATION | Facility: CLINIC | Age: 83
End: 2023-11-13
Payer: COMMERCIAL

## 2023-11-13 DIAGNOSIS — I48.0 PAROXYSMAL ATRIAL FIBRILLATION (H): Primary | ICD-10-CM

## 2023-11-13 LAB — INR HOME MONITORING: 3.5 (ref 2–3)

## 2023-11-13 NOTE — PROGRESS NOTES
ANTICOAGULATION MANAGEMENT     Rene Matthews 83 year old female is on warfarin with supratherapeutic INR result. (Goal INR 2.0-3.0)    Recent labs: (last 7 days)     11/13/23  0000   INR 3.5*       ASSESSMENT     Source(s): Chart Review and Patient/Caregiver Call     Warfarin doses taken: Warfarin taken as instructed  Diet: No new diet changes identified  Medication/supplement changes: None noted  New illness, injury, or hospitalization: No  Signs or symptoms of bleeding or clotting: No  Previous result: Supratherapeutic  Additional findings: None       PLAN     Recommended plan for no diet, medication or health factor changes affecting INR     Dosing Instructions:  11/13/23 partial hold, then decrease maintenance dose 8.3% with next INR in 1 week  with next INR in 1 week       Summary  As of 11/13/2023      Full warfarin instructions:  11/14: 2.5 mg; Otherwise 5 mg every Thu; 3.75 mg all other days   Next INR check:  11/20/2023               Telephone call with Florence who agrees to plan and repeated back plan correctly    Patient to recheck with home meter    Education provided:   Please call back if any changes to your diet, medications or how you've been taking warfarin  Goal range and lab monitoring: goal range and significance of current result    Plan made per [facility] anticoagulation protocol    Yasmine Norris RN  Anticoagulation Clinic  11/13/2023    _______________________________________________________________________     Anticoagulation Episode Summary       Current INR goal:  2.0-3.0   TTR:  64.5% (1 y)   Target end date:  Indefinite   Send INR reminders to:  [facility]    Indications    Paroxysmal atrial fibrillation (H) [I48.0]             Comments:  Acelis             Anticoagulation Care Providers       Provider Role Specialty Phone number    Kevin Ho MD Referring Internal Medicine [phone]

## 2023-11-20 ENCOUNTER — ANTICOAGULATION THERAPY VISIT (OUTPATIENT)
Dept: ANTICOAGULATION | Facility: CLINIC | Age: 83
End: 2023-11-20
Payer: COMMERCIAL

## 2023-11-20 DIAGNOSIS — I48.0 PAROXYSMAL ATRIAL FIBRILLATION (H): Primary | ICD-10-CM

## 2023-11-20 LAB — INR HOME MONITORING: 3.5 (ref 2–3)

## 2023-11-20 NOTE — PROGRESS NOTES
ANTICOAGULATION MANAGEMENT     Rene Matthews 83 year old female is on warfarin with supratherapeutic INR result. (Goal INR 2.0-3.0)    Recent labs: (last 7 days)     11/20/23  0000   INR 3.5*       ASSESSMENT     Source(s): Chart Review and Patient/Caregiver Call     Warfarin doses taken: Warfarin taken as instructed  Diet: No new diet changes identified  Medication/supplement changes: None noted  New illness, injury, or hospitalization: No  Signs or symptoms of bleeding or clotting: No  Previous result: Supratherapeutic  Additional findings: None       PLAN     Recommended plan for no diet, medication or health factor changes affecting INR     Dosing Instructions: decrease your warfarin dose (9% change) with next INR in 1 week       Summary  As of 11/20/2023      Full warfarin instructions:  2.5 mg every Tue; 3.75 mg all other days   Next INR check:  11/27/2023               Telephone call with Florence who agrees to plan and repeated back plan correctly    Patient to recheck with home meter    Education provided:   Please call back if any changes to your diet, medications or how you've been taking warfarin    Plan made per [facility] anticoagulation protocol    Gretta Nazario, RN  Anticoagulation Clinic  11/20/2023    _______________________________________________________________________     Anticoagulation Episode Summary       Current INR goal:  2.0-3.0   TTR:  62.6% (1 y)   Target end date:  Indefinite   Send INR reminders to:  [facility]    Indications    Paroxysmal atrial fibrillation (H) [I48.0]             Comments:  Aces             Anticoagulation Care Providers       Provider Role Specialty Phone number    Kevin Ho MD Referring Internal Medicine [phone]

## 2023-11-27 ENCOUNTER — ANTICOAGULATION THERAPY VISIT (OUTPATIENT)
Dept: ANTICOAGULATION | Facility: CLINIC | Age: 83
End: 2023-11-27
Payer: COMMERCIAL

## 2023-11-27 DIAGNOSIS — I48.0 PAROXYSMAL ATRIAL FIBRILLATION (H): Primary | ICD-10-CM

## 2023-11-27 LAB — INR HOME MONITORING: 3.3 (ref 2–3)

## 2023-11-27 NOTE — PROGRESS NOTES
ANTICOAGULATION MANAGEMENT     Rene Matthews 83 year old female is on warfarin with supratherapeutic INR result. (Goal INR 2.0-3.0)    Recent labs: (last 7 days)     11/27/23  0000   INR 3.3*       ASSESSMENT     Source(s): Chart Review and Patient/Caregiver Call     Warfarin doses taken: Warfarin taken as instructed  Diet: Decreased greens/vitamin K in diet; plans to resume previous intake  Medication/supplement changes: None noted  New illness, injury, or hospitalization: No  Signs or symptoms of bleeding or clotting: No  Previous result: Supratherapeutic  Additional findings: None       PLAN     Recommended plan for temporary change(s) affecting INR     Dosing Instructions: decrease your warfarin dose (5% change) with next INR in 2 weeks       Summary  As of 11/27/2023      Full warfarin instructions:  2.5 mg every Mon, Thu; 3.75 mg all other days   Next INR check:  12/11/2023               Telephone call with Florence who verbalizes understanding and agrees to plan and who agrees to plan and repeated back plan correctly    Patient to recheck with home meter    Education provided:   Contact [phone]  with any changes, questions or concerns.     Plan made per [facility] anticoagulation protocol    Lisha Herbert RN  Anticoagulation Clinic  11/27/2023    _______________________________________________________________________     Anticoagulation Episode Summary       Current INR goal:  2.0-3.0   TTR:  60.7% (1 y)   Target end date:  Indefinite   Send INR reminders to:  [facility]    Indications    Paroxysmal atrial fibrillation (H) [I48.0]             Comments:  Acelis             Anticoagulation Care Providers       Provider Role Specialty Phone number    Kevin Ho MD Referring Internal Medicine [phone]

## 2023-11-29 DIAGNOSIS — I10 PRIMARY HYPERTENSION: ICD-10-CM

## 2023-11-29 RX ORDER — IRBESARTAN 300 MG/1
300 TABLET ORAL DAILY
Qty: 90 TABLET | Refills: 0 | Status: SHIPPED | OUTPATIENT
Start: 2023-11-29 | End: 2024-02-22

## 2023-12-11 ENCOUNTER — ANTICOAGULATION THERAPY VISIT (OUTPATIENT)
Dept: ANTICOAGULATION | Facility: CLINIC | Age: 83
End: 2023-12-11
Payer: COMMERCIAL

## 2023-12-11 DIAGNOSIS — I48.0 PAROXYSMAL ATRIAL FIBRILLATION (H): Primary | ICD-10-CM

## 2023-12-11 LAB — INR HOME MONITORING: 1.9 (ref 2–3)

## 2023-12-11 NOTE — PROGRESS NOTES
ANTICOAGULATION MANAGEMENT     Rene Matthews 83 year old female is on warfarin with subtherapeutic INR result. (Goal INR 2.0-3.0)    Recent labs: (last 7 days)     12/11/23  0000   INR 1.9*       ASSESSMENT     Source(s): Chart Review and Patient/Caregiver Call     Warfarin doses taken: Warfarin taken as instructed  Diet: Increased greens/vitamin K in diet; plans to resume previous intake  Medication/supplement changes: None noted  New illness, injury, or hospitalization: No  Signs or symptoms of bleeding or clotting: No  Previous result: Supratherapeutic  Additional findings: Patient will monitor greens intake over the next 2 week to establish an long term goal for servings.        PLAN     Recommended plan for temporary change(s) affecting INR     Dosing Instructions:  12/11/23: boost, then continue current maintenance dose  with next INR in 12  days( due to holiday will check sooner than 2 weeks)    Summary  As of 12/11/2023      Full warfarin instructions:  12/11: 3.75 mg; Otherwise 2.5 mg every Mon, Thu; 3.75 mg all other days   Next INR check:  12/22/2023               Telephone call with Florence who agrees to plan and repeated back plan correctly    Patient to recheck with home meter    Education provided:   Please call back if any changes to your diet, medications or how you've been taking warfarin  Goal range and lab monitoring: goal range and significance of current result  Dietary considerations: importance of consistent vitamin K intake and impact of vitamin K foods on INR    Plan made per [facility] anticoagulation protocol    Yasmine Norris RN  Anticoagulation Clinic  12/11/2023    _______________________________________________________________________     Anticoagulation Episode Summary       Current INR goal:  2.0-3.0   TTR:  59.6% (1 y)   Target end date:  Indefinite   Send INR reminders to:  [facility]    Indications    Paroxysmal atrial fibrillation (H) [I48.0]             Comments:  Cassy              Anticoagulation Care Providers       Provider Role Specialty Phone number    Kevin Ho MD Referring Internal Medicine [phone]

## 2023-12-12 DIAGNOSIS — I48.0 PAROXYSMAL ATRIAL FIBRILLATION (H): ICD-10-CM

## 2023-12-12 RX ORDER — WARFARIN SODIUM 2.5 MG/1
TABLET ORAL
Qty: 60 TABLET | Refills: 11 | Status: SHIPPED | OUTPATIENT
Start: 2023-12-12 | End: 2024-01-26

## 2023-12-13 DIAGNOSIS — K21.9 GASTROESOPHAGEAL REFLUX DISEASE WITHOUT ESOPHAGITIS: ICD-10-CM

## 2023-12-19 ENCOUNTER — PREP FOR PROCEDURE (OUTPATIENT)
Dept: CARDIOLOGY | Facility: CLINIC | Age: 83
End: 2023-12-19

## 2023-12-19 ENCOUNTER — TELEPHONE (OUTPATIENT)
Dept: CARDIOLOGY | Facility: CLINIC | Age: 83
End: 2023-12-19

## 2023-12-19 ENCOUNTER — OFFICE VISIT (OUTPATIENT)
Dept: CARDIOLOGY | Facility: CLINIC | Age: 83
End: 2023-12-19
Payer: COMMERCIAL

## 2023-12-19 VITALS
HEIGHT: 64 IN | DIASTOLIC BLOOD PRESSURE: 60 MMHG | OXYGEN SATURATION: 98 % | SYSTOLIC BLOOD PRESSURE: 118 MMHG | RESPIRATION RATE: 16 BRPM | HEART RATE: 77 BPM | WEIGHT: 134.6 LBS | BODY MASS INDEX: 22.98 KG/M2

## 2023-12-19 DIAGNOSIS — I10 PRIMARY HYPERTENSION: ICD-10-CM

## 2023-12-19 DIAGNOSIS — I48.19 PERSISTENT ATRIAL FIBRILLATION (H): Primary | ICD-10-CM

## 2023-12-19 DIAGNOSIS — R00.1 SINUS BRADYCARDIA: ICD-10-CM

## 2023-12-19 PROCEDURE — 99215 OFFICE O/P EST HI 40 MIN: CPT | Performed by: NURSE PRACTITIONER

## 2023-12-19 RX ORDER — LIDOCAINE 40 MG/G
CREAM TOPICAL
Status: CANCELLED | OUTPATIENT
Start: 2023-12-19

## 2023-12-19 NOTE — PROGRESS NOTES
[facility]  Cardiac Electrophysiology  [address]  [place] [address]   Office: [phone]  Fax: [phone]     HEART CARE ELECTROPHYSIOLOGY FOLLOW UP    Primary Care: Kevin Ho MD      Assessment/Recommendations     Persistent atrial fibrillation: Diagnosed via MCT 9/2022 following development of fluttering sensation and weakness.  Persistent AF with controlled ventricular response since approximately 10/31/2023, symptomatic with shortness of breath with exertion, palpitations and decreased activity tolerance.  We discussed the pathophysiology and natural progression of atrial fibrillation, subsequent management options including antiarrhythmic drug therapy and sideration of catheter ablation.  We briefly discussed catheter ablation in further detail including risks and benefits, recovery and expected prognosis.  She is a good candidate for ablation due to underlying conduction disease.  For the short-term, plan for DCCV with subsequent initiation of antiarrhythmic drug therapy    [id number] score of 4 for age >75, gender, hypertension; HAS-BLED score of 2 for age >65 and labile INRs.  She was briefly on Eliquis which was cost prohibitive for her, now on warfarin.  INR 1.9     Hypertension: controlled on current regimen    Sinus bradycardia    Plan:   -Continue warfarin as ordered for stroke prophylaxis  -DCCV after 4 weeks therapeutic INR's  -After cardioversion, start sotalol 60mg BID, EKG after 5th dose  -Follow-up with EP MD after cardioversion to discuss catheter ablation     History of Present Illness/Subjective    Rene Matthews is a 83 year old female with past medical history significant for persistent atrial fibrillation, sinus bradycardia, hypertension, hyperlipidemia, IBS, GERD.  She developed transient fluttering, weakness and nausea around 8/2022, with MCT demonstrating paroxysmal AF, sinus rhythm/sinus bradycardia with first-degree AV block and  atrial ectopy.  She was treated with metoprolol and started on warfarin.  She developed irregular heartbeat and elevated heart rates around 10/31/2023, with 24-hour Holter showing continuous AF with controlled ventricular response.    She endorses shortness of breath with exertion and rare palpitations.  She plays pickle ball regularly and has to rest more.  She also uses a recumbent bike at home for exercise.  She denies chest discomfort, lightheadedness/dizziness, pedal edema, or syncope. Her  has required multiple cardioversions and ablation.      Data Review     Arrhythmia hx:   Dx/date: Transient symptoms 2022, paroxysmal AF by MCT 2022, persistent 2023  Sx: palpitations  [id number]/OAC: 4 (age >75, female gender, hypertension); warfarin  Prior / current AV petra blocking agents, AAD: Toprol  DCCV: N/A  Ablation: N/A    EK2022: SB 48 bpm, first-degree AV block, QRS 88 ms, QT/QTc 470/490 ms  Personally reviewed.     TTE:     No previous study for comparison.    Left ventricle ejection fraction is normal. The calculated left ventricular ejection fraction is 66%.    Normal left ventricular size and systolic function.    Normal right ventricular size and systolic function.    Left atrial volume is mildly increased.    Mild aortic regurgitation.    Mild mitral regurgitation.    OT, DENISHA:   24-hour Holter 2023.  Continuous AF average 80 bpm, range  bpm.  No pauses.  <1% PVCs.  No symptoms recorded    28-day DENISHA beginning 2022.  Baseline rhythm sinus/sinus bradycardia, first-degree AV block, atrial ectopy, 51 bpm.  Single episode of paroxysmal AF.  Symptom triggers correlating to sinus rhythm 47 bpm with first-degree AV block.  No pauses.    I have reviewed and updated the patient's past medical history, allergy list and medication list.          Physical Examination   Vitals: /60 (BP Location: Left arm, Patient Position: Sitting, Cuff Size: Adult Regular)   Pulse  "77   Resp 16   Ht 1.626 m (5' 4\")   Wt 61.1 kg (134 lb 9.6 oz)   LMP  (LMP Unknown)   SpO2 98%   BMI 23.10 kg/m      BMI= Body mass index is 23.1 kg/m .    Wt Readings from Last 3 Encounters:   12/19/23 61.1 kg (134 lb 9.6 oz)   01/02/23 60.5 kg (133 lb 6.4 oz)   11/18/22 61.7 kg (136 lb)       General   Appearance:   Alert and oriented, in no acute distress.    HEENT:  Normocephalic and atraumatic. Conjunctiva and sclera are clear. Moist oral mucosa.    Neck: No JVP, carotid bruit or obvious thyromegaly.   Lungs:   Respirations unlabored. Clear bilaterally with no rales, rhonchi, or wheezes.     Cardiovascular:   Rhythm is irregular. S1 and S2 are normal. No significant murmur is present. Lower extremities demonstrate no significant edema. Posterior tibial pulses are intact bilaterally.   Extremities: No cyanosis or clubbing   Skin: Skin is warm, dry, and otherwise intact.   Neurologic: Gait not assessed. Mood and affect appropriate.           Medical History  Surgical History Family History Social History   Past Medical History:   Diagnosis Date    Abnormal Pap smear of cervix     ASCUS with negative biopsy    Acute low back pain without sciatica 05/18/2020    Allergic rhinitis     Anxiety, generalized     Burning sensation of feet     Cervical polyp     Chest pain     secondary to gerd    Chronic abdominal pain     Negative CT scan and colonoscopy, musculoskeletal etiology suspected    Chronic radicular low back pain     MICHEL after evaluation by Dr. Bradley    Chronic sinusitis 10/18/2016    Frequent headaches     GERD (gastroesophageal reflux disease)     Noncardiac chest pain    Hearing loss in left ear 10/20/2017    Herpes zoster 01/01/2006    Hyperlipidemia     Hypertension     IBS (irritable bowel syndrome)     Insomnia     Mild aortic insufficiency 11/02/2022    Nausea 11/18/2022    Osteoarthritis     Osteopenia     DEXA 2014 T score -2.0 stable, DEXA December 2017 T score -1.6 left femoral neck stable.  " DEXA January 2022 -2.1 L1 L4 and -1.8 bilateral femoral neck    Overactive bladder 10/18/2016    Palpitations 06/07/2021    Symptoms suspicious for atrial fibrillation.  Echocardiogram June 2021 with normal left ventricular systolic function with mild left atrial enlargement and mild AI and MR    Peptic ulcer disease 01/01/2006    w/ gastric ulcer    Personal history of colonic polyps     colonoscopy January 2014 normal.  Colonoscopy January 2019 with multiple polyps, repeat in 3 years.  Cologuard neg  January 2023    Postmenopausal bleeding 12/23/2021    Screening     Negative for AAA CT scan 2013    Skin cancer, basal cell 10/20/2017    Urge incontinence of urine 06/07/2021    Vitamin B12 deficiency (non anemic)     Weight gain 03/12/2021    Past Surgical History:   Procedure Laterality Date    BIOPSY CERVICAL, LOCAL EXCISION, SINGLE/MULTIPLE      ENDOMETRIAL BIOPSY      Negative    OTHER SURGICAL HISTORY      Basal cell skin cancer    OTHER SURGICAL HISTORY  2012    Radiofrequency ablation right greater saphenous vein    OVARIAN CYST REMOVAL      lysis of adhesions    STRABISMUS SURGERY  2015    Family History   Problem Relation Age of Onset    Cerebrovascular Disease Mother     Hypertension Sister     Hypertension Brother     Social History     Socioeconomic History    Marital status:      Spouse name: Not on file    Number of children: Not on file    Years of education: Not on file    Highest education level: Not on file   Occupational History    Not on file   Tobacco Use    Smoking status: Never    Smokeless tobacco: Never   Vaping Use    Vaping Use: Never used   Substance and Sexual Activity    Alcohol use: No    Drug use: No    Sexual activity: Not on file   Other Topics Concern    Not on file   Social History Narrative    Retired teacher  , [place], 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     Social Determinants of Health     Financial Resource Strain: Not on file   Food Insecurity: Not on file    Transportation Needs: Not on file   Physical Activity: Not on file   Stress: Not on file   Social Connections: Not on file   Interpersonal Safety: Not on file   Housing Stability: Not on file          Medications  Allergies   Scheduled Meds:  Current Outpatient Medications   Medication Sig Dispense Refill    acetaminophen (TYLENOL) 325 MG tablet Take 325 mg by mouth 3 times daily as needed      amLODIPine (NORVASC) 2.5 MG tablet Take 1 tablet (2.5 mg) by mouth daily 180 tablet 1    Calcium Carb-Ergocalciferol 500-200 MG-UNIT TABS Take 1 tablet by mouth 2 times daily      cyanocobalamin (VITAMIN B-12) 1000 MCG tablet Take 1 tablet (1,000 mcg) by mouth daily      escitalopram (LEXAPRO) 10 MG tablet Take 1 tablet (10 mg) by mouth daily 90 tablet 3    fluticasone (FLONASE) 50 MCG/ACT nasal spray Spray 2 sprays in nostril 1 spray each nostril in the AM, 2 sprays in the PM      gabapentin (NEURONTIN) 300 MG capsule TAKE 1 CAPSULE(300 MG) BY MOUTH THREE TIMES DAILY 270 capsule 3    hydrochlorothiazide (HYDRODIURIL) 25 MG tablet Take 1 tablet (25 mg) by mouth every other day 90 tablet 3    irbesartan (AVAPRO) 300 MG tablet TAKE 1 TABLET(300 MG) BY MOUTH DAILY 90 tablet 0    metoprolol succinate ER (TOPROL XL) 50 MG 24 hr tablet Take 1 tablet (50 mg) by mouth daily 90 tablet 3    omeprazole (PRILOSEC) 20 MG DR capsule TAKE 1 CAPSULE BY MOUTH TWICE DAILY 180 capsule 3    simvastatin (ZOCOR) 10 MG tablet TAKE 1 TABLET(10 MG) BY MOUTH EVERY 24 HOURS 90 tablet 3    traZODone (DESYREL) 50 MG tablet TAKE 1 TABLET(50 MG) BY MOUTH AT BEDTIME 90 tablet 2    warfarin ANTICOAGULANT (COUMADIN) 2.5 MG tablet TAKE 3 TABLETS BY MOUTH ON DAY 1, THEN 2 TABLETS ON DAY 2, THEN 1 TABLET DAILY THERAFTER AND DOSE WILL BE ADJUSTED PER INR 60 tablet 11    Wheat Dextrin (BENEFIBER PO) Take by mouth daily Once daily      diazepam (VALIUM) 5 MG tablet TAKE 1 TABLET(5 MG) BY MOUTH DAILY AS NEEDED FOR ANXIETY Strength: 5 mg 20 tablet 0    No Known  Allergies      Lab Results    Chemistry/lipid CBC Cardiac Enzymes/BNP/TSH/INR   Lab Results   Component Value Date    CHOL 164 2023    HDL 74 2023    TRIG 120 2023    BUN 8.0 2023     (L) 2023    CO2 25 2023    Lab Results   Component Value Date    WBC 5.2 2022    HGB 12.5 2022    HCT 36.7 2022    MCV 89 2022     2022    @RESUFAST(BMP,CBC,BNP,TSH,  INR)@      42 minutes spent reviewing prior records (including documentation, laboratory studies, cardiac testing/imaging), history and physical exam, planning, and subsequent documentation.     This note has been dictated using voice recognition software. Any grammatical, typographical, or context distortions are unintentional and inherent to the software.    Bailey Amador, CNP  Clinical Cardiac Electrophysiology  [facility]  Clinic and [phone]  Fax: [phone]  Electrophysiology Nurses: [phone]

## 2023-12-19 NOTE — LETTER
12/19/2023    Kevin Ho MD  [address] [facility] [address]    RE: Rene Matthews       Dear Colleague,     I had the pleasure of seeing Rene Matthews in the [facility].     [facility]  Cardiac Electrophysiology  [address]  [place] [address]   Office: [phone]  Fax: [phone]     HEART CARE ELECTROPHYSIOLOGY FOLLOW UP    Primary Care: Kevin Ho MD      Assessment/Recommendations     Persistent atrial fibrillation: Diagnosed via MCT 9/2022 following development of fluttering sensation and weakness.  Persistent AF with controlled ventricular response since approximately 10/31/2023, symptomatic with shortness of breath with exertion, palpitations and decreased activity tolerance.  We discussed the pathophysiology and natural progression of atrial fibrillation, subsequent management options including antiarrhythmic drug therapy and sideration of catheter ablation.  We briefly discussed catheter ablation in further detail including risks and benefits, recovery and expected prognosis.  She is a good candidate for ablation due to underlying conduction disease.  For the short-term, plan for DCCV with subsequent initiation of antiarrhythmic drug therapy    [id number] score of 4 for age >75, gender, hypertension; HAS-BLED score of 2 for age >65 and labile INRs.  She was briefly on Eliquis which was cost prohibitive for her, now on warfarin.  INR 1.9     Hypertension: controlled on current regimen    Sinus bradycardia    Plan:   -Continue warfarin as ordered for stroke prophylaxis  -DCCV after 4 weeks therapeutic INR's  -After cardioversion, start sotalol 60mg BID, EKG after 5th dose  -Follow-up with MANNY DENNISON after cardioversion to discuss catheter ablation     History of Present Illness/Subjective    Rene Matthews is a 83 year old female with past medical history significant for persistent atrial fibrillation, sinus bradycardia,  hypertension, hyperlipidemia, IBS, GERD.  She developed transient fluttering, weakness and nausea around 2022, with MCT demonstrating paroxysmal AF, sinus rhythm/sinus bradycardia with first-degree AV block and atrial ectopy.  She was treated with metoprolol and started on warfarin.  She developed irregular heartbeat and elevated heart rates around 10/31/2023, with 24-hour Holter showing continuous AF with controlled ventricular response.    She endorses shortness of breath with exertion and rare palpitations.  She plays pickle ball regularly and has to rest more.  She also uses a recumbent bike at home for exercise.  She denies chest discomfort, lightheadedness/dizziness, pedal edema, or syncope. Her  has required multiple cardioversions and ablation.      Data Review     Arrhythmia hx:   Dx/date: Transient symptoms 2022, paroxysmal AF by MCT 2022, persistent 2023  Sx: palpitations  [id number]/OAC: 4 (age >75, female gender, hypertension); warfarin  Prior / current AV petra blocking agents, AAD: Toprol  DCCV: N/A  Ablation: N/A    EK2022: SB 48 bpm, first-degree AV block, QRS 88 ms, QT/QTc 470/490 ms  Personally reviewed.     TTE:     No previous study for comparison.    Left ventricle ejection fraction is normal. The calculated left ventricular ejection fraction is 66%.    Normal left ventricular size and systolic function.    Normal right ventricular size and systolic function.    Left atrial volume is mildly increased.    Mild aortic regurgitation.    Mild mitral regurgitation.    OT, DENISHA:   24-hour Holter 2023.  Continuous AF average 80 bpm, range  bpm.  No pauses.  <1% PVCs.  No symptoms recorded    28-day DENISHA beginning 2022.  Baseline rhythm sinus/sinus bradycardia, first-degree AV block, atrial ectopy, 51 bpm.  Single episode of paroxysmal AF.  Symptom triggers correlating to sinus rhythm 47 bpm with first-degree AV block.  No pauses.    I have reviewed and  "updated the patient's past medical history, allergy list and medication list.          Physical Examination   Vitals: /60 (BP Location: Left arm, Patient Position: Sitting, Cuff Size: Adult Regular)   Pulse 77   Resp 16   Ht 1.626 m (5' 4\")   Wt 61.1 kg (134 lb 9.6 oz)   LMP  (LMP Unknown)   SpO2 98%   BMI 23.10 kg/m      BMI= Body mass index is 23.1 kg/m .    Wt Readings from Last 3 Encounters:   12/19/23 61.1 kg (134 lb 9.6 oz)   01/02/23 60.5 kg (133 lb 6.4 oz)   11/18/22 61.7 kg (136 lb)       General   Appearance:   Alert and oriented, in no acute distress.    HEENT:  Normocephalic and atraumatic. Conjunctiva and sclera are clear. Moist oral mucosa.    Neck: No JVP, carotid bruit or obvious thyromegaly.   Lungs:   Respirations unlabored. Clear bilaterally with no rales, rhonchi, or wheezes.     Cardiovascular:   Rhythm is irregular. S1 and S2 are normal. No significant murmur is present. Lower extremities demonstrate no significant edema. Posterior tibial pulses are intact bilaterally.   Extremities: No cyanosis or clubbing   Skin: Skin is warm, dry, and otherwise intact.   Neurologic: Gait not assessed. Mood and affect appropriate.           Medical History  Surgical History Family History Social History   Past Medical History:   Diagnosis Date    Abnormal Pap smear of cervix     ASCUS with negative biopsy    Acute low back pain without sciatica 05/18/2020    Allergic rhinitis     Anxiety, generalized     Burning sensation of feet     Cervical polyp     Chest pain     secondary to gerd    Chronic abdominal pain     Negative CT scan and colonoscopy, musculoskeletal etiology suspected    Chronic radicular low back pain     MICHEL after evaluation by Dr. Bradley    Chronic sinusitis 10/18/2016    Frequent headaches     GERD (gastroesophageal reflux disease)     Noncardiac chest pain    Hearing loss in left ear 10/20/2017    Herpes zoster 01/01/2006    Hyperlipidemia     Hypertension     IBS (irritable " bowel syndrome)     Insomnia     Mild aortic insufficiency 11/02/2022    Nausea 11/18/2022    Osteoarthritis     Osteopenia     DEXA 2014 T score -2.0 stable, DEXA December 2017 T score -1.6 left femoral neck stable.  DEXA January 2022 -2.1 L1 L4 and -1.8 bilateral femoral neck    Overactive bladder 10/18/2016    Palpitations 06/07/2021    Symptoms suspicious for atrial fibrillation.  Echocardiogram June 2021 with normal left ventricular systolic function with mild left atrial enlargement and mild AI and MR    Peptic ulcer disease 01/01/2006    w/ gastric ulcer    Personal history of colonic polyps     colonoscopy January 2014 normal.  Colonoscopy January 2019 with multiple polyps, repeat in 3 years.  Cologuard neg  January 2023    Postmenopausal bleeding 12/23/2021    Screening     Negative for AAA CT scan 2013    Skin cancer, basal cell 10/20/2017    Urge incontinence of urine 06/07/2021    Vitamin B12 deficiency (non anemic)     Weight gain 03/12/2021    Past Surgical History:   Procedure Laterality Date    BIOPSY CERVICAL, LOCAL EXCISION, SINGLE/MULTIPLE      ENDOMETRIAL BIOPSY      Negative    OTHER SURGICAL HISTORY      Basal cell skin cancer    OTHER SURGICAL HISTORY  2012    Radiofrequency ablation right greater saphenous vein    OVARIAN CYST REMOVAL      lysis of adhesions    STRABISMUS SURGERY  2015    Family History   Problem Relation Age of Onset    Cerebrovascular Disease Mother     Hypertension Sister     Hypertension Brother     Social History     Socioeconomic History    Marital status:      Spouse name: Not on file    Number of children: Not on file    Years of education: Not on file    Highest education level: Not on file   Occupational History    Not on file   Tobacco Use    Smoking status: Never    Smokeless tobacco: Never   Vaping Use    Vaping Use: Never used   Substance and Sexual Activity    Alcohol use: No    Drug use: No    Sexual activity: Not on file   Other Topics Concern    Not  on file   Social History Narrative    Retired teacher  , [place], 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     Social Determinants of Health     Financial Resource Strain: Not on file   Food Insecurity: Not on file   Transportation Needs: Not on file   Physical Activity: Not on file   Stress: Not on file   Social Connections: Not on file   Interpersonal Safety: Not on file   Housing Stability: Not on file          Medications  Allergies   Scheduled Meds:  Current Outpatient Medications   Medication Sig Dispense Refill    acetaminophen (TYLENOL) 325 MG tablet Take 325 mg by mouth 3 times daily as needed      amLODIPine (NORVASC) 2.5 MG tablet Take 1 tablet (2.5 mg) by mouth daily 180 tablet 1    Calcium Carb-Ergocalciferol 500-200 MG-UNIT TABS Take 1 tablet by mouth 2 times daily      cyanocobalamin (VITAMIN B-12) 1000 MCG tablet Take 1 tablet (1,000 mcg) by mouth daily      escitalopram (LEXAPRO) 10 MG tablet Take 1 tablet (10 mg) by mouth daily 90 tablet 3    fluticasone (FLONASE) 50 MCG/ACT nasal spray Spray 2 sprays in nostril 1 spray each nostril in the AM, 2 sprays in the PM      gabapentin (NEURONTIN) 300 MG capsule TAKE 1 CAPSULE(300 MG) BY MOUTH THREE TIMES DAILY 270 capsule 3    hydrochlorothiazide (HYDRODIURIL) 25 MG tablet Take 1 tablet (25 mg) by mouth every other day 90 tablet 3    irbesartan (AVAPRO) 300 MG tablet TAKE 1 TABLET(300 MG) BY MOUTH DAILY 90 tablet 0    metoprolol succinate ER (TOPROL XL) 50 MG 24 hr tablet Take 1 tablet (50 mg) by mouth daily 90 tablet 3    omeprazole (PRILOSEC) 20 MG DR capsule TAKE 1 CAPSULE BY MOUTH TWICE DAILY 180 capsule 3    simvastatin (ZOCOR) 10 MG tablet TAKE 1 TABLET(10 MG) BY MOUTH EVERY 24 HOURS 90 tablet 3    traZODone (DESYREL) 50 MG tablet TAKE 1 TABLET(50 MG) BY MOUTH AT BEDTIME 90 tablet 2    warfarin ANTICOAGULANT (COUMADIN) 2.5 MG tablet TAKE 3 TABLETS BY MOUTH ON DAY 1, THEN 2 TABLETS ON DAY 2, THEN 1 TABLET DAILY THERAFTER AND DOSE WILL BE ADJUSTED PER INR  60 tablet 11    Wheat Dextrin (BENEFIBER PO) Take by mouth daily Once daily      diazepam (VALIUM) 5 MG tablet TAKE 1 TABLET(5 MG) BY MOUTH DAILY AS NEEDED FOR ANXIETY Strength: 5 mg 20 tablet 0    No Known Allergies      Lab Results    Chemistry/lipid CBC Cardiac Enzymes/BNP/TSH/INR   Lab Results   Component Value Date    CHOL 164 2023    HDL 74 2023    TRIG 120 2023    BUN 8.0 2023     (L) 2023    CO2 25 2023    Lab Results   Component Value Date    WBC 5.2 2022    HGB 12.5 2022    HCT 36.7 2022    MCV 89 2022     2022    @RESUFAST(BMP,CBC,BNP,TSH,  INR)@      42 minutes spent reviewing prior records (including documentation, laboratory studies, cardiac testing/imaging), history and physical exam, planning, and subsequent documentation.     This note has been dictated using voice recognition software. Any grammatical, typographical, or context distortions are unintentional and inherent to the software.    Bailey Amador CNP  Clinical Cardiac Electrophysiology  [facility]  Clinic and [phone]  Fax: [phone]  Electrophysiology Nurses: [phone]    Thank you for allowing me to participate in the care of your patient.      Sincerely,     MACARIO ALMANZA CNP   [facility]  cc: No referring provider defined for this encounter.

## 2023-12-19 NOTE — Clinical Note
DCCV ordered today. Her INR was 1.9 on 12/11 and unfortunately DOAC cost prohibitive.  Hold metoprolol x 1 dose the evening prior to or morning of cardioversion.  After cardioversion, discontinue metoprolol and start sotalol 60mg BID, EKG after 5th dose. Follow-up with EP MD thereafter to discuss ablation. Thank you!

## 2023-12-19 NOTE — PATIENT INSTRUCTIONS
Rene Matthews,    It was a pleasure to see you today at the [facility].     My recommendations after this visit include:  -Continue current medications including warfarin and metoprolol  -My nurse will call you about cardioversion for further medication instructions and to schedule    Instructions for the day of cardioversion:  -This is an outpatient procedure done at [facility].  Plan on being at [facility] for several hours.  -Do not eat or drink anything for 8 hours before your procedure. This includes gum and/or hard candies.  -You need weekly INRs until you have 4 weeks where your INR is 2 or over in order to get a cardioversion.  -You will need a  to drive you home after the procedure due to receiving sedating medications.     Bailey Amador CNP  Clinical Cardiac Electrophysiology  [facility]  Clinic and [phone]  Fax: [phone]  Electrophysiology Nurses: [phone]

## 2023-12-19 NOTE — TELEPHONE ENCOUNTER
Bailey Amador, MACARIO CNP  [facility] Ep Support [facility]  DCCV ordered today. Her INR was 1.9 on 12/11 and unfortunately DOAC cost prohibitive.  Hold metoprolol x 1 dose the evening prior to or morning of cardioversion.  After cardioversion, discontinue metoprolol and start sotalol 60mg BID, EKG after 5th dose. Follow-up with EP MD thereafter to discuss ablation. Thank you!

## 2023-12-19 NOTE — TELEPHONE ENCOUNTER
Noted, will forward a message to [facility] AC team to manage weekly INRs prior to scheduling.    Anticoagulation Management Group Message  We would like to make you aware we are preparing Rene Matthews who is on warfarin/Coumadin for an upcoming electrophysiology procedure.  Rene Matthews will be having a Cardioversion procedure.    Procedure date: not yet scheduled, please let us know once pt has had therapeutic INRs as directed below and we will get her scheduled.  INR management prior to procedure: Will need weekly INRs for 3 weeks (21 consecutive days), for a total of 4 INRs > 2  INR goal for the day of procedure: 2-3  INR management post procedure: Will need weekly INRs for 4 weeks with goal INRs > 2, then pt can return to routine monthly INRs  Bridging required prior to the procedure: No  Notification of INR results: Please send INR results to Electrophysiology Nurse Clinicians through [employer] via [facility] EP Support Pool  Warfarin/Coumain management and INR goals per  Bailey Amador NP    Thank you,  Jesusita Beck RN

## 2023-12-22 ENCOUNTER — ANTICOAGULATION THERAPY VISIT (OUTPATIENT)
Dept: ANTICOAGULATION | Facility: CLINIC | Age: 83
End: 2023-12-22
Payer: COMMERCIAL

## 2023-12-22 DIAGNOSIS — I48.0 PAROXYSMAL ATRIAL FIBRILLATION (H): Primary | ICD-10-CM

## 2023-12-22 DIAGNOSIS — F41.1 ANXIETY, GENERALIZED: ICD-10-CM

## 2023-12-22 LAB — INR HOME MONITORING: 1.7 (ref 2–3)

## 2023-12-22 NOTE — PROGRESS NOTES
ANTICOAGULATION MANAGEMENT     Rene Matthews 83 year old female is on warfarin with subtherapeutic INR result. (Goal INR 2.0-3.0)    Recent labs: (last 7 days)     12/22/23  0000   INR 1.7*       ASSESSMENT     Source(s): Chart Review and Patient/Caregiver Call     Warfarin doses taken: Warfarin taken as instructed  Diet: No new diet changes identified. Patient reports she has had her normal baseline intake of greens this week.  Medication/supplement changes: None noted  New illness, injury, or hospitalization: No  Signs or symptoms of bleeding or clotting: No  Previous result: Subtherapeutic  Additional findings: Patient will be having a cardioversion in the future (not currently scheduled). Prior to scheduling, patient must have x3 weekly INRs that are greater than 2. Please see telephone encounter from 12/19/23.       PLAN     Recommended plan for ongoing change(s) affecting INR     Dosing Instructions: booster dose then Increase your warfarin dose (5% change) with next INR in 1 week       Summary  As of 12/22/2023      Full warfarin instructions:  12/22: 5 mg; Otherwise 2.5 mg every Thu; 3.75 mg all other days   Next INR check:  12/29/2023               Telephone call with Florence who agrees to plan and repeated back plan correctly    Patient to recheck with home meter    Education provided:   Please call back if any changes to your diet, medications or how you've been taking warfarin  Symptom monitoring: monitoring for bleeding signs and symptoms, monitoring for clotting signs and symptoms, monitoring for stroke signs and symptoms, and when to seek medical attention/emergency care  Contact [phone]  with any changes, questions or concerns.     Plan made per [facility] anticoagulation protocol    Lisha Cruz RN  Anticoagulation Clinic  12/22/2023    _______________________________________________________________________     Anticoagulation Episode Summary       Current INR goal:  2.0-3.0   TTR:  56.6% (1 y)   Target  end date:  Indefinite   Send INR reminders to:  [facility]    Indications    Paroxysmal atrial fibrillation (H) [I48.0]             Comments:  Acelis             Anticoagulation Care Providers       Provider Role Specialty Phone number    Kevin Ho MD Referring Internal Medicine [phone]

## 2023-12-26 RX ORDER — ESCITALOPRAM OXALATE 10 MG/1
10 TABLET ORAL DAILY
Qty: 90 TABLET | Refills: 3 | Status: SHIPPED | OUTPATIENT
Start: 2023-12-26

## 2023-12-29 ENCOUNTER — ANTICOAGULATION THERAPY VISIT (OUTPATIENT)
Dept: ANTICOAGULATION | Facility: CLINIC | Age: 83
End: 2023-12-29
Payer: COMMERCIAL

## 2023-12-29 DIAGNOSIS — I48.0 PAROXYSMAL ATRIAL FIBRILLATION (H): Primary | ICD-10-CM

## 2023-12-29 LAB — INR HOME MONITORING: 2.1 (ref 2–3)

## 2023-12-29 NOTE — PROGRESS NOTES
ANTICOAGULATION MANAGEMENT     Rene Matthews 83 year old female is on warfarin with therapeutic INR result. (Goal INR 2.0-3.0)    Recent labs: (last 7 days)     12/29/23  0000   INR 2.1       ASSESSMENT     Source(s): Chart Review and Patient/Caregiver Call     Warfarin doses taken: Warfarin taken as instructed  Diet: No new diet changes identified  Medication/supplement changes: None noted  New illness, injury, or hospitalization: No  Signs or symptoms of bleeding or clotting: No  Previous result: Subtherapeutic  Additional findings: Patient will be having a cardioversion in the future (not currently scheduled). Prior to scheduling, patient must have x3 weekly INRs that are greater than 2. Please see telephone encounter from 12/19/23.        PLAN     Recommended plan for no diet, medication or health factor changes affecting INR     Dosing Instructions: Increase your warfarin dose (5% change) with next INR in 1 week   - I chose to increase dose slightly with cardioversion that is coming up. I didn't want to take the chance of INR dropping below 2.0     Summary  As of 12/29/2023      Full warfarin instructions:  3.75 mg every day   Next INR check:  1/5/2024               Telephone call with Florence who verbalizes understanding and agrees to plan    Patient to recheck with home meter    Education provided:   Please call back if any changes to your diet, medications or how you've been taking warfarin    Plan made per [facility] anticoagulation protocol      Josiane Quintero, RN  Anticoagulation Clinic  12/29/2023    _______________________________________________________________________     Anticoagulation Episode Summary       Current INR goal:  2.0-3.0   TTR:  55.1% (1 y)   Target end date:  Indefinite   Send INR reminders to:  [facility]    Indications    Paroxysmal atrial fibrillation (H) [I48.0]             Comments:  Cassy             Anticoagulation Care Providers       Provider Role Specialty Phone  number    Kevin Ho MD [facility] Internal Medicine [phone]

## 2024-01-05 ENCOUNTER — ANTICOAGULATION THERAPY VISIT (OUTPATIENT)
Dept: ANTICOAGULATION | Facility: CLINIC | Age: 84
End: 2024-01-05
Payer: COMMERCIAL

## 2024-01-05 DIAGNOSIS — I48.0 PAROXYSMAL ATRIAL FIBRILLATION (H): Primary | ICD-10-CM

## 2024-01-05 LAB — INR HOME MONITORING: 2.3 (ref 2–3)

## 2024-01-05 NOTE — PROGRESS NOTES
ANTICOAGULATION MANAGEMENT     Rene Matthews 83 year old female is on warfarin with therapeutic INR result. (Goal INR 2.0-3.0)    Recent labs: (last 7 days)     01/05/24  0000   INR 2.3       ASSESSMENT     Source(s): Chart Review and Patient/Caregiver Call     Warfarin doses taken: Warfarin taken as instructed  Diet: No new diet changes identified-patient asked about pomegranate.  Reviewed micromedex and there is an interaction that can increase warfarin plasma concentrations and increased risk of bleeding.  Advised not to have this until after cardioversion.  Medication/supplement changes: None noted  New illness, injury, or hospitalization: No  Signs or symptoms of bleeding or clotting: No  Previous result: Therapeutic last visit; previously outside of goal range  Additional findings: Upcoming surgery/procedure Cardioversion to be scheduled once patient has had 3 INR's in range.       PLAN     Recommended plan for no diet, medication or health factor changes affecting INR     Dosing Instructions: Continue your current warfarin dose with next INR in 1 week       Summary  As of 1/5/2024      Full warfarin instructions:  3.75 mg every day   Next INR check:  1/12/2024               Telephone call with Florence who verbalizes understanding and agrees to plan    Patient to recheck with home meter    Education provided:   Please call back if any changes to your diet, medications or how you've been taking warfarin    Plan made per [facility] anticoagulation protocol    Rukhsana Estes RN  Anticoagulation Clinic  1/5/2024    _______________________________________________________________________     Anticoagulation Episode Summary       Current INR goal:  2.0-3.0   TTR:  55.1% (1 y)   Target end date:  Indefinite   Send INR reminders to:  [facility]    Indications    Paroxysmal atrial fibrillation (H) [I48.0]             Comments:  Rohits             Anticoagulation Care Providers       Provider Role Specialty  Phone number    Kevin Ho MD Referring Internal Medicine [phone]

## 2024-01-09 ENCOUNTER — OFFICE VISIT (OUTPATIENT)
Dept: INTERNAL MEDICINE | Facility: CLINIC | Age: 84
End: 2024-01-09
Payer: COMMERCIAL

## 2024-01-09 VITALS
HEART RATE: 78 BPM | OXYGEN SATURATION: 98 % | HEIGHT: 64 IN | SYSTOLIC BLOOD PRESSURE: 120 MMHG | RESPIRATION RATE: 16 BRPM | WEIGHT: 133 LBS | TEMPERATURE: 97.6 F | BODY MASS INDEX: 22.71 KG/M2 | DIASTOLIC BLOOD PRESSURE: 66 MMHG

## 2024-01-09 DIAGNOSIS — N39.41 URGE INCONTINENCE OF URINE: ICD-10-CM

## 2024-01-09 DIAGNOSIS — F41.1 ANXIETY, GENERALIZED: ICD-10-CM

## 2024-01-09 DIAGNOSIS — E78.5 HYPERLIPIDEMIA, UNSPECIFIED HYPERLIPIDEMIA TYPE: ICD-10-CM

## 2024-01-09 DIAGNOSIS — G89.29 CHRONIC NONINTRACTABLE HEADACHE, UNSPECIFIED HEADACHE TYPE: ICD-10-CM

## 2024-01-09 DIAGNOSIS — Z00.00 ENCOUNTER FOR MEDICARE ANNUAL WELLNESS EXAM: Primary | ICD-10-CM

## 2024-01-09 DIAGNOSIS — Z86.0100 PERSONAL HISTORY OF COLONIC POLYPS: ICD-10-CM

## 2024-01-09 DIAGNOSIS — F51.01 PRIMARY INSOMNIA: ICD-10-CM

## 2024-01-09 DIAGNOSIS — C44.91 SKIN CANCER, BASAL CELL: ICD-10-CM

## 2024-01-09 DIAGNOSIS — R51.9 CHRONIC NONINTRACTABLE HEADACHE, UNSPECIFIED HEADACHE TYPE: ICD-10-CM

## 2024-01-09 DIAGNOSIS — I10 PRIMARY HYPERTENSION: ICD-10-CM

## 2024-01-09 DIAGNOSIS — R20.8 BURNING SENSATION OF FEET: ICD-10-CM

## 2024-01-09 DIAGNOSIS — N32.81 OVERACTIVE BLADDER: ICD-10-CM

## 2024-01-09 DIAGNOSIS — Z78.0 POSTMENOPAUSAL STATUS: ICD-10-CM

## 2024-01-09 DIAGNOSIS — I48.0 PAROXYSMAL ATRIAL FIBRILLATION (H): ICD-10-CM

## 2024-01-09 DIAGNOSIS — M85.89 OSTEOPENIA OF MULTIPLE SITES: ICD-10-CM

## 2024-01-09 DIAGNOSIS — K27.9 PEPTIC ULCER DISEASE: ICD-10-CM

## 2024-01-09 DIAGNOSIS — K21.9 GASTROESOPHAGEAL REFLUX DISEASE WITHOUT ESOPHAGITIS: ICD-10-CM

## 2024-01-09 DIAGNOSIS — E53.8 VITAMIN B12 DEFICIENCY (NON ANEMIC): ICD-10-CM

## 2024-01-09 DIAGNOSIS — K58.9 IRRITABLE BOWEL SYNDROME, UNSPECIFIED TYPE: ICD-10-CM

## 2024-01-09 DIAGNOSIS — Z51.81 ENCOUNTER FOR THERAPEUTIC DRUG MONITORING: ICD-10-CM

## 2024-01-09 DIAGNOSIS — E55.9 VITAMIN D DEFICIENCY: ICD-10-CM

## 2024-01-09 PROBLEM — D12.2 BENIGN NEOPLASM OF ASCENDING COLON: Status: RESOLVED | Noted: 2022-02-21 | Resolved: 2024-01-09

## 2024-01-09 LAB
ALBUMIN SERPL BCG-MCNC: 4.6 G/DL (ref 3.5–5.2)
ALBUMIN UR-MCNC: NEGATIVE MG/DL
ALP SERPL-CCNC: 66 U/L (ref 40–150)
ALT SERPL W P-5'-P-CCNC: 12 U/L (ref 0–50)
ANION GAP SERPL CALCULATED.3IONS-SCNC: 11 MMOL/L (ref 7–15)
APPEARANCE UR: ABNORMAL
AST SERPL W P-5'-P-CCNC: 23 U/L (ref 0–45)
BACTERIA #/AREA URNS HPF: ABNORMAL /HPF
BILIRUB SERPL-MCNC: 0.4 MG/DL
BILIRUB UR QL STRIP: NEGATIVE
BUN SERPL-MCNC: 10.3 MG/DL (ref 8–23)
CALCIUM SERPL-MCNC: 9.3 MG/DL (ref 8.8–10.2)
CHLORIDE SERPL-SCNC: 100 MMOL/L (ref 98–107)
CHOLEST SERPL-MCNC: 163 MG/DL
COLOR UR AUTO: YELLOW
CREAT SERPL-MCNC: 0.64 MG/DL (ref 0.51–0.95)
DEPRECATED HCO3 PLAS-SCNC: 27 MMOL/L (ref 22–29)
EGFRCR SERPLBLD CKD-EPI 2021: 88 ML/MIN/1.73M2
ERYTHROCYTE [DISTWIDTH] IN BLOOD BY AUTOMATED COUNT: 13.1 % (ref 10–15)
FASTING STATUS PATIENT QL REPORTED: NORMAL
GLUCOSE SERPL-MCNC: 96 MG/DL (ref 70–99)
GLUCOSE UR STRIP-MCNC: NEGATIVE MG/DL
HCT VFR BLD AUTO: 36.8 % (ref 35–47)
HDLC SERPL-MCNC: 71 MG/DL
HGB BLD-MCNC: 12.2 G/DL (ref 11.7–15.7)
HGB UR QL STRIP: ABNORMAL
KETONES UR STRIP-MCNC: NEGATIVE MG/DL
LDLC SERPL CALC-MCNC: 75 MG/DL
LEUKOCYTE ESTERASE UR QL STRIP: ABNORMAL
MAGNESIUM SERPL-MCNC: 2.4 MG/DL (ref 1.7–2.3)
MCH RBC QN AUTO: 28.6 PG (ref 26.5–33)
MCHC RBC AUTO-ENTMCNC: 33.2 G/DL (ref 31.5–36.5)
MCV RBC AUTO: 86 FL (ref 78–100)
NITRATE UR QL: POSITIVE
NONHDLC SERPL-MCNC: 92 MG/DL
PH UR STRIP: 7.5 [PH] (ref 5–8)
PLATELET # BLD AUTO: 219 10E3/UL (ref 150–450)
POTASSIUM SERPL-SCNC: 3.8 MMOL/L (ref 3.4–5.3)
PROT SERPL-MCNC: 7.9 G/DL (ref 6.4–8.3)
RBC # BLD AUTO: 4.27 10E6/UL (ref 3.8–5.2)
RBC #/AREA URNS AUTO: ABNORMAL /HPF
SODIUM SERPL-SCNC: 138 MMOL/L (ref 135–145)
SP GR UR STRIP: 1.02 (ref 1–1.03)
SQUAMOUS #/AREA URNS AUTO: ABNORMAL /LPF
TRIGL SERPL-MCNC: 84 MG/DL
UROBILINOGEN UR STRIP-ACNC: 0.2 E.U./DL
VIT D+METAB SERPL-MCNC: 27 NG/ML (ref 20–50)
WBC # BLD AUTO: 5.3 10E3/UL (ref 4–11)
WBC #/AREA URNS AUTO: ABNORMAL /HPF
WBC CLUMPS #/AREA URNS HPF: PRESENT /HPF

## 2024-01-09 PROCEDURE — 80053 COMPREHEN METABOLIC PANEL: CPT | Performed by: INTERNAL MEDICINE

## 2024-01-09 PROCEDURE — 83735 ASSAY OF MAGNESIUM: CPT | Performed by: INTERNAL MEDICINE

## 2024-01-09 PROCEDURE — 36415 COLL VENOUS BLD VENIPUNCTURE: CPT | Performed by: INTERNAL MEDICINE

## 2024-01-09 PROCEDURE — G0439 PPPS, SUBSEQ VISIT: HCPCS | Performed by: INTERNAL MEDICINE

## 2024-01-09 PROCEDURE — 87186 SC STD MICRODIL/AGAR DIL: CPT | Performed by: INTERNAL MEDICINE

## 2024-01-09 PROCEDURE — 87086 URINE CULTURE/COLONY COUNT: CPT | Performed by: INTERNAL MEDICINE

## 2024-01-09 PROCEDURE — 80061 LIPID PANEL: CPT | Performed by: INTERNAL MEDICINE

## 2024-01-09 PROCEDURE — 82306 VITAMIN D 25 HYDROXY: CPT | Performed by: INTERNAL MEDICINE

## 2024-01-09 PROCEDURE — 85027 COMPLETE CBC AUTOMATED: CPT | Performed by: INTERNAL MEDICINE

## 2024-01-09 PROCEDURE — 99214 OFFICE O/P EST MOD 30 MIN: CPT | Mod: 25 | Performed by: INTERNAL MEDICINE

## 2024-01-09 PROCEDURE — 81001 URINALYSIS AUTO W/SCOPE: CPT | Performed by: INTERNAL MEDICINE

## 2024-01-09 RX ORDER — VITAMIN B COMPLEX
1 TABLET ORAL DAILY
COMMUNITY
Start: 2024-01-09

## 2024-01-09 RX ORDER — RESPIRATORY SYNCYTIAL VIRUS VACCINE 120MCG/0.5
0.5 KIT INTRAMUSCULAR ONCE
Qty: 1 EACH | Refills: 0 | Status: CANCELLED | OUTPATIENT
Start: 2024-01-09 | End: 2024-01-09

## 2024-01-09 ASSESSMENT — ENCOUNTER SYMPTOMS
CONSTIPATION: 1
CHILLS: 0
FEVER: 0
SHORTNESS OF BREATH: 1
ARTHRALGIAS: 0
SORE THROAT: 0
EYE PAIN: 0
JOINT SWELLING: 0
WEAKNESS: 0
NERVOUS/ANXIOUS: 0
PARESTHESIAS: 0
NAUSEA: 0
HEADACHES: 0
PALPITATIONS: 0
ABDOMINAL PAIN: 0
HEMATOCHEZIA: 0
FREQUENCY: 0
HEMATURIA: 0
COUGH: 0
DIARRHEA: 0
HEARTBURN: 0
MYALGIAS: 0
DIZZINESS: 0
BREAST MASS: 0
DYSURIA: 0

## 2024-01-09 ASSESSMENT — ACTIVITIES OF DAILY LIVING (ADL): CURRENT_FUNCTION: NO ASSISTANCE NEEDED

## 2024-01-09 NOTE — PATIENT INSTRUCTIONS
Annual flu shot every fall     Annual mammogram due later this month     DEXA will be scheduled for osteoporosis screening     Annual eye exam     Continue to see your dermatologist every year     Patient Education   Personalized Prevention Plan  You are due for the preventive services outlined below.  Your care team is available to assist you in scheduling these services.  If you have already completed any of these items, please share that information with your care team to update in your medical record.  There are no preventive care reminders to display for this patient.    Bladder Training: Care Instructions  Your Care Instructions     Bladder training is used to treat urge incontinence and stress incontinence. Urge incontinence means that the need to urinate comes on so fast that you can't get to a toilet in time. Stress incontinence means that you leak urine because of pressure on your bladder. For example, it may happen when you laugh, cough, or lift something heavy.  Bladder training can increase how long you can wait before you have to urinate. It can also help your bladder hold more urine. And it can give you better control over the urge to urinate.  It is important to remember that bladder training takes a few weeks to a few months to make a difference. You may not see results right away, but don't give up.  Follow-up care is a key part of your treatment and safety. Be sure to make and go to all appointments, and call your doctor if you are having problems. It's also a good idea to know your test results and keep a list of the medicines you take.  How can you care for yourself at home?  Work with your doctor to come up with a bladder training program that is right for you. You may use one or more of the following methods.  Delayed urination  In the beginning, try to keep from urinating for 5 minutes after you first feel the need to go.  While you wait, take deep, slow breaths to relax. Kegel exercises can  "also help you delay the need to go to the bathroom.  After some practice, when you can easily wait 5 minutes to urinate, try to wait 10 minutes before you urinate.  Slowly increase the waiting period until you are able to control when you have to urinate.  Scheduled urination  Empty your bladder when you first wake up in the morning.  Schedule times throughout the day when you will urinate.  Start by going to the bathroom every hour, even if you don't need to go.  Slowly increase the time between trips to the bathroom.  When you have found a schedule that works well for you, keep doing it.  If you wake up during the night and have to urinate, do it. Apply your schedule to waking hours only.  Kegel exercises  These tighten and strengthen pelvic muscles, which can help you control the flow of urine. (If doing these exercises causes pain, stop doing them and talk with your doctor.) To do Kegel exercises:  Squeeze your muscles as if you were trying not to pass gas. Or squeeze your muscles as if you were stopping the flow of urine. Your belly, legs, and buttocks shouldn't move.  Hold the squeeze for 3 seconds, then relax for 5 to 10 seconds.  Start with 3 seconds, then add 1 second each week until you are able to squeeze for 10 seconds.  Repeat the exercise 10 times a session. Do 3 to 8 sessions a day.  When should you call for help?  Watch closely for changes in your health, and be sure to contact your doctor if:    Your incontinence is getting worse.     You do not get better as expected.   Where can you learn more?  Go to [email or web address]/patiented  Enter V684 in the search box to learn more about \"Bladder Training: Care Instructions.\"  Current as of: February 28, 2023               Content Version: 13.8    8380-7654 [employer].   Care instructions adapted under license by your healthcare professional. If you have questions about a medical condition or this instruction, always ask your " "healthcare professional. [employer], [facility] disclaims any warranty or liability for your use of this information.      Kegel Exercises: Care Instructions  Overview     Kegel exercises strengthen muscles around the bladder. These muscles control the flow of urine. Kegel exercises are sometime called \"pelvic floor\" exercises. They can help prevent urine leakage and keep the pelvic organs in place.  Kegel exercises can strengthen pelvic muscles that have been weakened by age, pregnancy, childbirth, and surgery. They may help prevent or treat urine leakage.  You do Kegel exercises by squeezing your pelvic floor muscles. You will likely need to do these exercises for several weeks to get better.  Follow-up care is a key part of your treatment and safety. Be sure to make and go to all appointments, and call your doctor if you are having problems. It's also a good idea to know your test results and keep a list of the medicines you take.  How can you care for yourself at home?  To do Kegel exercises:  Squeeze your muscles as if you were trying not to pass gas. Or squeeze your muscles as if you were stopping the flow of urine. Your belly, legs, and buttocks shouldn't move.  Hold the squeeze for 3 seconds, then relax for 5 to 10 seconds.  Start with 3 seconds, then add 1 second each week until you are able to squeeze for 10 seconds.  Repeat the exercise 10 times a session. Do 3 to 8 sessions a day.  When learning what muscles to squeeze, you can try stopping the flow of urine a few times. But don't make it a practice to do Kegels while urinating.  If doing these exercises causes pain, stop doing them and talk with your doctor. Sometimes people have pelvic floor muscles that are too tight. In these cases, doing Kegel exercises may cause more problems.  Check with your doctor if you don't notice a difference after trying these exercises for several weeks. Your doctor may suggest getting help from a physical therapist or " "recommend other treatment.  Where can you learn more?  Go to https://www.[employer].net/patiented  Enter Q681 in the search box to learn more about \"Kegel Exercises: Care Instructions.\"  Current as of: February 28, 2023               Content Version: 13.8    8316-4417 [employer].   Care instructions adapted under license by your healthcare professional. If you have questions about a medical condition or this instruction, always ask your healthcare professional. [employer] disclaims any warranty or liability for your use of this information.         Preventing Falls: Care Instructions  Injuries and health problems such as trouble walking or poor eyesight can increase your risk of falling. So can some medicines. But there are things you can do to help prevent falls. You can exercise to get stronger. You can also arrange your home to make it safer.    Talk to your doctor about the medicines you take. Ask if any of them increase the risk of falls and whether they can be changed or stopped.   Try to exercise regularly. It can help improve your strength and balance. This can help lower your risk of falling.     Practice fall safety and prevention.    Wear low-heeled shoes that fit well and give your feet good support. Talk to your doctor if you have foot problems that make this hard.  Carry a cellphone or wear a medical alert device that you can use to call for help.  Use stepladders instead of chairs to reach high objects. Don't climb if you're at risk for falls. Ask for help, if needed.  Wear the correct eyeglasses, if you need them.    Make your home safer.    Remove rugs, cords, clutter, and furniture from walkways.  Keep your house well lit. Use night-lights in hallways and bathrooms.  Install and use sturdy handrails on stairways.  Wear nonskid footwear, even inside. Don't walk barefoot or in socks without shoes.    Be safe outside.    Use handrails, curb cuts, and ramps whenever " "possible.  Keep your hands free by using a shoulder bag or backpack.  Try to walk in well-lit areas. Watch out for uneven ground, changes in pavement, and debris.  Be careful in the winter. Walk on the grass or gravel when sidewalks are slippery. Use de-icer on steps and walkways. Add non-slip devices to shoes.    Put grab bars and nonskid mats in your shower or tub and near the toilet. Try to use a shower chair or bath bench when bathing.   Get into a tub or shower by putting in your weaker leg first. Get out with your strong side first. Have a phone or medical alert device in the bathroom with you.   Where can you learn more?  Go to https://www.[employer].net/patiented  Enter G117 in the search box to learn more about \"Preventing Falls: Care Instructions.\"  Current as of: July 18, 2023               Content Version: 13.8    5341-4536 [employer].   Care instructions adapted under license by your healthcare professional. If you have questions about a medical condition or this instruction, always ask your healthcare professional. [employer] disclaims any warranty or liability for your use of this information.      "

## 2024-01-09 NOTE — H&P (VIEW-ONLY)
"SUBJECTIVE:   Florence is a 83 year old, presenting for the followin-year-old woman here for her annual wellness visit and to follow-up medical problems including hypertension, paroxysmal atrial fibrillation, anxiety, skin cancer, and multiple other issues.  See assessment and plan for details.      Physical (AWV fasting)        2023     9:15 AM   Additional Questions   Roomed by        Are you in the first 12 months of your Medicare coverage?  No    Healthy Habits:     In general, how would you rate your overall health?  Good    Frequency of exercise:  6-7 days/week    Duration of exercise:  30-45 minutes    Do you usually eat at least 4 servings of fruit and vegetables a day, include whole grains    & fiber and avoid regularly eating high fat or \"junk\" foods?  Yes    Taking medications regularly:  Yes    Medication side effects:  None    Ability to successfully perform activities of daily living:  No assistance needed    Home Safety:  No safety concerns identified    Hearing Impairment:  Need to ask people to speak up or repeat themselves and difficulty understanding soft or whispered speech    In the past 6 months, have you been bothered by leaking of urine? Yes    In general, how would you rate your overall mental or emotional health?  Good    Additional concerns today:  No      Today's PHQ-2 Score:       2024     9:04 AM   PHQ-2 (  [employer])   Q1: Little interest or pleasure in doing things 0   Q2: Feeling down, depressed or hopeless 0   PHQ-2 Score 0   Q1: Little interest or pleasure in doing things Not at all   Q2: Feeling down, depressed or hopeless Not at all   PHQ-2 Score 0           Have you ever done Advance Care Planning? (For example, a Health Directive, POLST, or a discussion with a medical provider or your loved ones about your wishes): Yes, patient states has an Advance Care Planning document and will bring a copy to the clinic.       Fall risk  Fallen 2 or more times in the past " year?: No  Any fall with injury in the past year?: No    Cognitive Screening   1) Repeat 3 items (Leader, Season, Table)    2) Clock draw: NORMAL  3) 3 item recall: Recalls 3 objects  Results: 3 items recalled: COGNITIVE IMPAIRMENT LESS LIKELY    Mini-CogTM Copyright AZRA Ferreira. Licensed by the author for use in [facility]; reprinted with permission (chaparro@[facility]). All rights reserved.      Do you have sleep apnea, excessive snoring or daytime drowsiness? : no    Reviewed and updated as needed this visit by clinical staff   Tobacco  Allergies  Meds              Reviewed and updated as needed this visit by Provider                 Social History     Tobacco Use    Smoking status: Never     Passive exposure: Never    Smokeless tobacco: Never   Substance Use Topics    Alcohol use: No             1/9/2024     9:04 AM   Alcohol Use   Prescreen: >3 drinks/day or >7 drinks/week? No     Do you have a current opioid prescription? No  Do you use any other controlled substances or medications that are not prescribed by a provider? N/A              Current providers sharing in care for this patient include:   Patient Care Team:  Kevin Ho MD as PCP - General  Kevin Ho MD as Assigned PCP  George Mike MD as Assigned Heart and Vascular Provider    The following health maintenance items are reviewed in Epic and correct as of today:  Health Maintenance   Topic Date Due    ANNUAL REVIEW OF HM ORDERS  11/18/2023    MEDICARE ANNUAL WELLNESS VISIT  01/02/2024    FALL RISK ASSESSMENT  01/09/2025    ADVANCE CARE PLANNING  01/02/2028    DTAP/TDAP/TD IMMUNIZATION (5 - Td or Tdap) 01/21/2032    DEXA  01/12/2037    PHQ-2 (once per calendar year)  Completed    INFLUENZA VACCINE  Completed    Pneumococcal Vaccine: 65+ Years  Completed    ZOSTER IMMUNIZATION  Completed    RSV VACCINE (Pregnancy & 60+)  Completed    COVID-19 Vaccine  Completed    IPV IMMUNIZATION  Aged Out    HPV IMMUNIZATION  Aged  "Out    MENINGITIS IMMUNIZATION  Aged Out    RSV MONOCLONAL ANTIBODY  Aged Out     Lab work is in process        Pertinent mammograms are reviewed under the imaging tab.    Review of Systems   Constitutional:  Negative for chills and fever.   HENT:  Negative for congestion, ear pain, hearing loss and sore throat.    Eyes:  Negative for pain and visual disturbance.   Respiratory:  Positive for shortness of breath. Negative for cough.    Cardiovascular:  Negative for chest pain, palpitations and peripheral edema.   Gastrointestinal:  Positive for constipation. Negative for abdominal pain, diarrhea, heartburn, hematochezia and nausea.   Breasts:  Negative for tenderness, breast mass and discharge.   Genitourinary:  Positive for urgency. Negative for dysuria, frequency, genital sores, hematuria, pelvic pain, vaginal bleeding and vaginal discharge.   Musculoskeletal:  Negative for arthralgias, joint swelling and myalgias.   Skin:  Negative for rash.   Neurological:  Negative for dizziness, weakness, headaches and paresthesias.   Psychiatric/Behavioral:  Negative for mood changes. The patient is not nervous/anxious.          OBJECTIVE:   /66 (BP Location: Right arm, Patient Position: Sitting, Cuff Size: Adult Regular)   Pulse 78   Temp 97.6  F (36.4  C) (Oral)   Resp 16   Ht 1.626 m (5' 4\")   Wt 60.3 kg (133 lb)   LMP  (LMP Unknown)   SpO2 98%   Breastfeeding No   BMI 22.83 kg/m   Estimated body mass index is 22.83 kg/m  as calculated from the following:    Height as of this encounter: 1.626 m (5' 4\").    Weight as of this encounter: 60.3 kg (133 lb).  Physical Exam  EYES: Eyelids, conjunctiva, and sclera were normal. Pupils were normal. Cornea, iris, and lens were normal bilaterally.  HEAD, EARS, NOSE, MOUTH, AND THROAT: Head and face were normal. TMs and external auditory canals are normal.  Oropharynx normal  NECK: Neck appearance was normal. There were no neck masses and the thyroid was not enlarged and " no nodules are felt.  No lymphadenopathy.  RESPIRATORY: Breathing pattern was normal and the chest moved symmetrically.   Lung sounds were normal and there were no rales or wheezes.  CARDIOVASCULAR: Irregularly irregular with good rate control.  No murmurs.  Peripheral pulses in arms and legs were normal.  There was no peripheral edema.  No carotid bruits.  GASTROINTESTINAL:  Bowel sounds were present.   Palpation detected no tenderness, mass, or enlarged organs.   MUSCULOSKELETAL: Skeletal configuration was normal and muscle mass was normal for age. Joint appearance was overall normal.  LYMPHATIC: There were no enlarged nodes.  SKIN/HAIR/NAILS: Skin color was normal.  There were no concerning skin lesions.  NEUROLOGIC: The patient was alert and oriented to person, place, time, and circumstance. Speech was normal. Cranial nerves were normal. Motor strength was normal for age. The patient was normally coordinated.  Sensation intact.  PSYCHIATRIC:  Mood and affect were normal and the patient had normal recent and remote memory. The patient's judgment and insight were normal.         ASSESSMENT / PLAN:   1. Encounter for Medicare annual wellness exam  Immunizations are reviewed and everything is up-to-date.  She has a living well.  Non-smoker.  She does not use alcohol.  Regular exercise and good diet habits to maintain a healthy weight discussed.  Cologuard was negative in early 2023.  Recommending annual mammogram for breast cancer screening.   Last DEXA was early 2022 and should be repeated this year. Dementia and depression screening completed.  Recommending annual eye exam.  Recommending seeing a dentist every 6 months.  Skin exam performed and recommending regular use of sunblock.  She sees a dermatologist every year.   Will screen for diabetes with fasting glucose.      2. Paroxysmal atrial fibrillation (H)  She has been in persistent atrial fibrillation.  Plans for cardioversion.  She is on anticoagulation with  warfarin.  We discussed that she might be a good candidate for a Watchman procedure and she can discuss further with cardiology.  She is minimally symptomatic in atrial fibrillation noticing some increased shortness of breath with exertion.  A rate control strategy may be reasonable if cardioversion is not successful.  She continues on Toprol-XL 50 mg daily.    Addendum on January 19, 2024-this appointment can serve as her preop clearance for upcoming cardioversion under general anesthesia.  She has tolerated previous surgery and anesthesia without any significant complications and her risk is low.    3. Primary hypertension  Good blood pressure control with current medications including Toprol-XL, Avapro, HCTZ and amlodipine.  Tolerating medication without side effects.  Checking monitoring labs.  - CBC with platelets; Future  - Comprehensive metabolic panel (BMP + Alb, Alk Phos, ALT, AST, Total. Bili, TP); Future  - UA Macroscopic with reflex to Microscopic and Culture - Lab Collect; Future    4. Anxiety, generalized  Anxiety seems better controlled taking Lexapro 10 mg daily tolerating medication without side effects    5. Skin cancer, basal cell  She continues to see her dermatologist every year    6. Peptic ulcer disease  She is avoiding aspirin and continues on Prilosec 20 mg daily    7. Overactive bladder with urge incontinence  We discussed Kegel exercises and timed voiding    8. Urge incontinence of urine  See above    9. Osteopenia of multiple sites  Last DEXA 2022 with osteopenia.  Will make arrangements for a repeat DEXA this year.  She is try to maintain good calcium intake.  Rechecking vitamin D level.  - DX Hip/Pelvis/Spine; Future  - Vitamin D deficiency screening; Future    10. Gastroesophageal reflux disease without esophagitis  Reflux symptoms stable taking omeprazole    11. Hyperlipidemia, unspecified hyperlipidemia type  Rechecking a lipid profile on simvastatin 10 mg daily  - Lipid Profile  (Chol, Trig, HDL, LDL calc); Future    12. Vitamin B12 deficiency (non anemic)  She is on B12 replacement    13. Primary insomnia  Continues on trazodone at bedtime along with melatonin    14. Irritable bowel syndrome, unspecified type  We discussed continuing Benefiber    15. Chronic nonintractable headache, unspecified headache type  Headaches are well-controlled with gabapentin    16. Burning sensation of feet  Gabapentin may be providing some relief of burning sensation in feet.  She was asked about cutting back on the dose which is a consideration although I would be concerned about worsening symptoms    17. Personal history of colonic polyps  Cologuard was negative earlier in 2023    18. Postmenopausal status    - DX Hip/Pelvis/Spine; Future    19. Encounter for therapeutic drug monitoring  Monitor electrolytes while on hydrochlorothiazide  - Magnesium; Future     Patient has been advised of split billing requirements and indicates understanding: Yes        She reports that she has never smoked. She has never been exposed to tobacco smoke. She has never used smokeless tobacco.      Appropriate preventive services were discussed with this patient, including applicable screening as appropriate for fall prevention, nutrition, physical activity, Tobacco-use cessation, weight loss and cognition.  Checklist reviewing preventive services available has been given to the patient.    Reviewed patients plan of care and provided an AVS. The Basic Care Plan (routine screening as documented in Health Maintenance) for Rene meets the Care Plan requirement. This Care Plan has been established and reviewed with the Patient.          Kevin Ho MD  [facility]    Identified Health Risks:  I have reviewed Opioid Use Disorder and Substance Use Disorder risk factors and made any needed referrals. Information on urinary incontinence and treatment options given to patient.  She is at risk  for falling and has been provided with information to reduce the risk of falling at home.

## 2024-01-09 NOTE — PROGRESS NOTES
"SUBJECTIVE:   Florence is a 83 year old, presenting for the followin-year-old woman here for her annual wellness visit and to follow-up medical problems including hypertension, paroxysmal atrial fibrillation, anxiety, skin cancer, and multiple other issues.  See assessment and plan for details.      Physical (AWV fasting)        2023     9:15 AM   Additional Questions   Roomed by        Are you in the first 12 months of your Medicare coverage?  No    Healthy Habits:     In general, how would you rate your overall health?  Good    Frequency of exercise:  6-7 days/week    Duration of exercise:  30-45 minutes    Do you usually eat at least 4 servings of fruit and vegetables a day, include whole grains    & fiber and avoid regularly eating high fat or \"junk\" foods?  Yes    Taking medications regularly:  Yes    Medication side effects:  None    Ability to successfully perform activities of daily living:  No assistance needed    Home Safety:  No safety concerns identified    Hearing Impairment:  Need to ask people to speak up or repeat themselves and difficulty understanding soft or whispered speech    In the past 6 months, have you been bothered by leaking of urine? Yes    In general, how would you rate your overall mental or emotional health?  Good    Additional concerns today:  No      Today's PHQ-2 Score:       2024     9:04 AM   PHQ-2 (  [employer])   Q1: Little interest or pleasure in doing things 0   Q2: Feeling down, depressed or hopeless 0   PHQ-2 Score 0   Q1: Little interest or pleasure in doing things Not at all   Q2: Feeling down, depressed or hopeless Not at all   PHQ-2 Score 0           Have you ever done Advance Care Planning? (For example, a Health Directive, POLST, or a discussion with a medical provider or your loved ones about your wishes): Yes, patient states has an Advance Care Planning document and will bring a copy to the clinic.       Fall risk  Fallen 2 or more times in the past " year?: No  Any fall with injury in the past year?: No    Cognitive Screening   1) Repeat 3 items (Leader, Season, Table)    2) Clock draw: NORMAL  3) 3 item recall: Recalls 3 objects  Results: 3 items recalled: COGNITIVE IMPAIRMENT LESS LIKELY    Mini-CogTM Copyright AZRA Ferreira. Licensed by the author for use in [facility]; reprinted with permission (chaparro@[facility]). All rights reserved.      Do you have sleep apnea, excessive snoring or daytime drowsiness? : no    Reviewed and updated as needed this visit by clinical staff   Tobacco  Allergies  Meds              Reviewed and updated as needed this visit by Provider                 Social History     Tobacco Use    Smoking status: Never     Passive exposure: Never    Smokeless tobacco: Never   Substance Use Topics    Alcohol use: No             1/9/2024     9:04 AM   Alcohol Use   Prescreen: >3 drinks/day or >7 drinks/week? No     Do you have a current opioid prescription? No  Do you use any other controlled substances or medications that are not prescribed by a provider? N/A              Current providers sharing in care for this patient include:   Patient Care Team:  Kevin Ho MD as PCP - General  Kevin Ho MD as Assigned PCP  George Mike MD as Assigned Heart and Vascular Provider    The following health maintenance items are reviewed in Epic and correct as of today:  Health Maintenance   Topic Date Due    ANNUAL REVIEW OF HM ORDERS  11/18/2023    MEDICARE ANNUAL WELLNESS VISIT  01/02/2024    FALL RISK ASSESSMENT  01/09/2025    ADVANCE CARE PLANNING  01/02/2028    DTAP/TDAP/TD IMMUNIZATION (5 - Td or Tdap) 01/21/2032    DEXA  01/12/2037    PHQ-2 (once per calendar year)  Completed    INFLUENZA VACCINE  Completed    Pneumococcal Vaccine: 65+ Years  Completed    ZOSTER IMMUNIZATION  Completed    RSV VACCINE (Pregnancy & 60+)  Completed    COVID-19 Vaccine  Completed    IPV IMMUNIZATION  Aged Out    HPV IMMUNIZATION  Aged  "Out    MENINGITIS IMMUNIZATION  Aged Out    RSV MONOCLONAL ANTIBODY  Aged Out     Lab work is in process        Pertinent mammograms are reviewed under the imaging tab.    Review of Systems   Constitutional:  Negative for chills and fever.   HENT:  Negative for congestion, ear pain, hearing loss and sore throat.    Eyes:  Negative for pain and visual disturbance.   Respiratory:  Positive for shortness of breath. Negative for cough.    Cardiovascular:  Negative for chest pain, palpitations and peripheral edema.   Gastrointestinal:  Positive for constipation. Negative for abdominal pain, diarrhea, heartburn, hematochezia and nausea.   Breasts:  Negative for tenderness, breast mass and discharge.   Genitourinary:  Positive for urgency. Negative for dysuria, frequency, genital sores, hematuria, pelvic pain, vaginal bleeding and vaginal discharge.   Musculoskeletal:  Negative for arthralgias, joint swelling and myalgias.   Skin:  Negative for rash.   Neurological:  Negative for dizziness, weakness, headaches and paresthesias.   Psychiatric/Behavioral:  Negative for mood changes. The patient is not nervous/anxious.          OBJECTIVE:   /66 (BP Location: Right arm, Patient Position: Sitting, Cuff Size: Adult Regular)   Pulse 78   Temp 97.6  F (36.4  C) (Oral)   Resp 16   Ht 1.626 m (5' 4\")   Wt 60.3 kg (133 lb)   LMP  (LMP Unknown)   SpO2 98%   Breastfeeding No   BMI 22.83 kg/m   Estimated body mass index is 22.83 kg/m  as calculated from the following:    Height as of this encounter: 1.626 m (5' 4\").    Weight as of this encounter: 60.3 kg (133 lb).  Physical Exam  EYES: Eyelids, conjunctiva, and sclera were normal. Pupils were normal. Cornea, iris, and lens were normal bilaterally.  HEAD, EARS, NOSE, MOUTH, AND THROAT: Head and face were normal. TMs and external auditory canals are normal.  Oropharynx normal  NECK: Neck appearance was normal. There were no neck masses and the thyroid was not enlarged and " no nodules are felt.  No lymphadenopathy.  RESPIRATORY: Breathing pattern was normal and the chest moved symmetrically.   Lung sounds were normal and there were no rales or wheezes.  CARDIOVASCULAR: Irregularly irregular with good rate control.  No murmurs.  Peripheral pulses in arms and legs were normal.  There was no peripheral edema.  No carotid bruits.  GASTROINTESTINAL:  Bowel sounds were present.   Palpation detected no tenderness, mass, or enlarged organs.   MUSCULOSKELETAL: Skeletal configuration was normal and muscle mass was normal for age. Joint appearance was overall normal.  LYMPHATIC: There were no enlarged nodes.  SKIN/HAIR/NAILS: Skin color was normal.  There were no concerning skin lesions.  NEUROLOGIC: The patient was alert and oriented to person, place, time, and circumstance. Speech was normal. Cranial nerves were normal. Motor strength was normal for age. The patient was normally coordinated.  Sensation intact.  PSYCHIATRIC:  Mood and affect were normal and the patient had normal recent and remote memory. The patient's judgment and insight were normal.         ASSESSMENT / PLAN:   1. Encounter for Medicare annual wellness exam  Immunizations are reviewed and everything is up-to-date.  She has a living well.  Non-smoker.  She does not use alcohol.  Regular exercise and good diet habits to maintain a healthy weight discussed.  Cologuard was negative in early 2023.  Recommending annual mammogram for breast cancer screening.   Last DEXA was early 2022 and should be repeated this year. Dementia and depression screening completed.  Recommending annual eye exam.  Recommending seeing a dentist every 6 months.  Skin exam performed and recommending regular use of sunblock.  She sees a dermatologist every year.   Will screen for diabetes with fasting glucose.      2. Paroxysmal atrial fibrillation (H)  She has been in persistent atrial fibrillation.  Plans for cardioversion.  She is on anticoagulation with  warfarin.  We discussed that she might be a good candidate for a Watchman procedure and she can discuss further with cardiology.  She is minimally symptomatic in atrial fibrillation noticing some increased shortness of breath with exertion.  A rate control strategy may be reasonable if cardioversion is not successful.  She continues on Toprol-XL 50 mg daily.    Addendum on January 19, 2024-this appointment can serve as her preop clearance for upcoming cardioversion under general anesthesia.  She has tolerated previous surgery and anesthesia without any significant complications and her risk is low.    3. Primary hypertension  Good blood pressure control with current medications including Toprol-XL, Avapro, HCTZ and amlodipine.  Tolerating medication without side effects.  Checking monitoring labs.  - CBC with platelets; Future  - Comprehensive metabolic panel (BMP + Alb, Alk Phos, ALT, AST, Total. Bili, TP); Future  - UA Macroscopic with reflex to Microscopic and Culture - Lab Collect; Future    4. Anxiety, generalized  Anxiety seems better controlled taking Lexapro 10 mg daily tolerating medication without side effects    5. Skin cancer, basal cell  She continues to see her dermatologist every year    6. Peptic ulcer disease  She is avoiding aspirin and continues on Prilosec 20 mg daily    7. Overactive bladder with urge incontinence  We discussed Kegel exercises and timed voiding    8. Urge incontinence of urine  See above    9. Osteopenia of multiple sites  Last DEXA 2022 with osteopenia.  Will make arrangements for a repeat DEXA this year.  She is try to maintain good calcium intake.  Rechecking vitamin D level.  - DX Hip/Pelvis/Spine; Future  - Vitamin D deficiency screening; Future    10. Gastroesophageal reflux disease without esophagitis  Reflux symptoms stable taking omeprazole    11. Hyperlipidemia, unspecified hyperlipidemia type  Rechecking a lipid profile on simvastatin 10 mg daily  - Lipid Profile  (Chol, Trig, HDL, LDL calc); Future    12. Vitamin B12 deficiency (non anemic)  She is on B12 replacement    13. Primary insomnia  Continues on trazodone at bedtime along with melatonin    14. Irritable bowel syndrome, unspecified type  We discussed continuing Benefiber    15. Chronic nonintractable headache, unspecified headache type  Headaches are well-controlled with gabapentin    16. Burning sensation of feet  Gabapentin may be providing some relief of burning sensation in feet.  She was asked about cutting back on the dose which is a consideration although I would be concerned about worsening symptoms    17. Personal history of colonic polyps  Cologuard was negative earlier in 2023    18. Postmenopausal status    - DX Hip/Pelvis/Spine; Future    19. Encounter for therapeutic drug monitoring  Monitor electrolytes while on hydrochlorothiazide  - Magnesium; Future     Patient has been advised of split billing requirements and indicates understanding: Yes        She reports that she has never smoked. She has never been exposed to tobacco smoke. She has never used smokeless tobacco.      Appropriate preventive services were discussed with this patient, including applicable screening as appropriate for fall prevention, nutrition, physical activity, Tobacco-use cessation, weight loss and cognition.  Checklist reviewing preventive services available has been given to the patient.    Reviewed patients plan of care and provided an AVS. The Basic Care Plan (routine screening as documented in Health Maintenance) for Rene meets the Care Plan requirement. This Care Plan has been established and reviewed with the Patient.          Kevin Ho MD  [facility]    Identified Health Risks:  I have reviewed Opioid Use Disorder and Substance Use Disorder risk factors and made any needed referrals. Information on urinary incontinence and treatment options given to patient.  She is at risk  for falling and has been provided with information to reduce the risk of falling at home.

## 2024-01-10 ENCOUNTER — TELEPHONE (OUTPATIENT)
Dept: INTERNAL MEDICINE | Facility: CLINIC | Age: 84
End: 2024-01-10
Payer: COMMERCIAL

## 2024-01-10 LAB — BACTERIA UR CULT: ABNORMAL

## 2024-01-10 NOTE — TELEPHONE ENCOUNTER
"S-(situation): microscopic UA showed elevated WBC and WBC clumps, culture grew e coli. Call to patient to discuss, she denies UTI symptoms \"it's nothing like it usually is.\"     B-(background): per note, patient reported urgency at medicare exam yesterday, but denied other symptoms.    A-(assessment): Lacking symptoms, urgent appointment to evaluate not necessary.    R-(recommendations): Routing to PCP to inform that patient denies other symptoms. Please advise if further action needed. Thank you!    "

## 2024-01-10 NOTE — TELEPHONE ENCOUNTER
UTI - pt needs abx       Collected 1/9/2024 11:04 AM       Status: Preliminary result       Visible to patient: No (not released)       Dx: Primary hypertension    Specimen Information: Urine, Clean Catch   0 Result Notes  Culture >100,000 CFU/mL Escherichia coli Abnormal         Pharmacy: [facility] [place] [address]

## 2024-01-10 NOTE — TELEPHONE ENCOUNTER
If the patient has no symptoms of urinary tract infection, I would not recommend additional treatment.  If she does develop urinary tract infection symptoms, she is to get evaluated.

## 2024-01-10 NOTE — TELEPHONE ENCOUNTER
Contact patient her urinalysis at yesterday's physical showed possible urinary tract infection.  But symptoms of urine tract infection were not clearly discussed at the clinic visit.  Unclear if this is a contaminant or infection.    Patient will need to schedule a virtual appointment with available provider today or Dr. Ho tomorrow to discuss any urinary symptoms and to see if she has urinary tract infection.

## 2024-01-11 ENCOUNTER — TELEPHONE (OUTPATIENT)
Dept: ANTICOAGULATION | Facility: CLINIC | Age: 84
End: 2024-01-11
Payer: COMMERCIAL

## 2024-01-11 DIAGNOSIS — N30.00 ACUTE CYSTITIS WITHOUT HEMATURIA: Primary | ICD-10-CM

## 2024-01-11 RX ORDER — CEPHALEXIN 500 MG/1
500 CAPSULE ORAL 3 TIMES DAILY
Qty: 15 CAPSULE | Refills: 0 | Status: SHIPPED | OUTPATIENT
Start: 2024-01-11 | End: 2024-01-16

## 2024-01-11 NOTE — TELEPHONE ENCOUNTER
ANTICOAGULATION  MANAGEMENT     Interacting Medication Review    Interacting medication(s):  cephalexin  with warfarin.    Duration: 5 days (1/11 to 1/16)    Indication: UTI    New medication?: Yes, interaction may increase INR and risk of bleeding. Closer INR monitoring recommended.        PLAN     Patient was not contacted, she is scheduled to check INR tomorrow with her home monitor.    Will discuss warfarin interaction with new prescription tomorrow.  Note patient is also being prepped for cardioversion procedure per 1/5/24 Anticoagulation encounter.    Plan made per [facility] anticoagulation protocol    Lisha Herbert RN  Anticoagulation Clinic

## 2024-01-12 ENCOUNTER — ANTICOAGULATION THERAPY VISIT (OUTPATIENT)
Dept: ANTICOAGULATION | Facility: CLINIC | Age: 84
End: 2024-01-12
Payer: COMMERCIAL

## 2024-01-12 ENCOUNTER — TELEPHONE (OUTPATIENT)
Dept: INTERNAL MEDICINE | Facility: CLINIC | Age: 84
End: 2024-01-12
Payer: COMMERCIAL

## 2024-01-12 ENCOUNTER — NURSE TRIAGE (OUTPATIENT)
Dept: CARDIOLOGY | Facility: CLINIC | Age: 84
End: 2024-01-12

## 2024-01-12 DIAGNOSIS — I48.0 PAROXYSMAL ATRIAL FIBRILLATION (H): Primary | ICD-10-CM

## 2024-01-12 LAB
INR HOME MONITORING: 2.1 (ref 2–3)
INR HOME MONITORING: 2.1 (ref 2–3)

## 2024-01-12 NOTE — TELEPHONE ENCOUNTER
Patient Returning Call    Reason for call:  calling back     Information relayed to patient:  n/a    Patient has additional questions:  Yes    What are your questions/concerns:  other questions    Who does the patient want to speak with:  RN    Is an  needed?:  No      Could we send this information to you in [employer] or would you prefer to receive a phone call?:   Patient would prefer a phone call   Okay to leave a detailed message?: Yes at Cell number on file:    Telephone Information:   Mobile [phone]

## 2024-01-12 NOTE — PROGRESS NOTES
Pt called back.   Hesitant that INR will drop below 2.0. So I have advised 5 mg x1 then resume 3.75 mg.   She will call cardiologist to schedule a cardioversion  Josiane Quintero RN

## 2024-01-12 NOTE — TELEPHONE ENCOUNTER
"Received a call from the call center to discuss A fib.  Spoke to Florence, who says she has been in known A fib for a month or so and it really \"doesn't bother her that much\". She has not been monitoring her heart rates. She is wanting to speak to her team about when to schedule a cardioversion based on her INR results.  Advised a message will be sent to [place] [facility] for follow up.    1. REASON FOR CALL: \"What is the main reason for your call?\" or \"How can I best help you?\" Schedule a cardioversion  2. SYMPTOMS : \"Do you have any symptoms?\" Not bothered by the A fib  3. OTHER QUESTIONS: \"Do you have any other questions?\" Scheduling based on INR results  "

## 2024-01-19 ENCOUNTER — TELEPHONE (OUTPATIENT)
Dept: INTERNAL MEDICINE | Facility: CLINIC | Age: 84
End: 2024-01-19
Payer: COMMERCIAL

## 2024-01-19 ENCOUNTER — ANTICOAGULATION THERAPY VISIT (OUTPATIENT)
Dept: ANTICOAGULATION | Facility: CLINIC | Age: 84
End: 2024-01-19
Payer: COMMERCIAL

## 2024-01-19 ENCOUNTER — DOCUMENTATION ONLY (OUTPATIENT)
Dept: CARDIOLOGY | Facility: CLINIC | Age: 84
End: 2024-01-19
Payer: COMMERCIAL

## 2024-01-19 LAB — INR HOME MONITORING: 2.2 (ref 2–3)

## 2024-01-19 NOTE — TELEPHONE ENCOUNTER
Called Rene.  Her surgery is going to be at   [facility] so no need to send preop anywhre

## 2024-01-19 NOTE — PROGRESS NOTES
ANTICOAGULATION MANAGEMENT     Rene Matthews 83 year old female is on warfarin with therapeutic INR result. (Goal INR 2.0-3.0)    Recent labs: (last 7 days)     01/19/24  0000   INR 2.2       ASSESSMENT     Source(s): Chart Review and Patient/Caregiver Call     Warfarin doses taken: Warfarin taken as instructed  Diet: No new diet changes identified  Medication/supplement changes: None noted  New illness, injury, or hospitalization: No  Signs or symptoms of bleeding or clotting: No  Previous result: Therapeutic last 2(+) visits  Additional findings:  Cardioversion  1/29/24, patient requires INR's X 4 > 2 going into procedure date   Patient has future plans for a watchman       PLAN     Recommended plan for no diet, medication or health factor changes affecting INR     Dosing Instructions: Continue your current warfarin dose with next INR in 1 week       Summary  As of 1/19/2024      Full warfarin instructions:  3.75 mg every day   Next INR check:  1/26/2024               Telephone call with Florence who agrees to plan and repeated back plan correctly    Patient to recheck with home meter    Education provided:   Please call back if any changes to your diet, medications or how you've been taking warfarin  Goal range and lab monitoring: goal range and significance of current result    Plan made per [facility] anticoagulation protocol    Yasmine Norris RN  Anticoagulation Clinic  1/19/2024    _______________________________________________________________________     Anticoagulation Episode Summary       Current INR goal:  2.0-3.0   TTR:  57.7% (1 y)   Target end date:  Indefinite   Send INR reminders to:  [facility] HOME MONITORING    Indications    Paroxysmal atrial fibrillation (H) [I48.0]             Comments:  [facility]             Anticoagulation Care Providers       Provider Role Specialty Phone number    Kevin Ho MD Referring Internal Medicine [phone]

## 2024-01-19 NOTE — TELEPHONE ENCOUNTER
General Call    Contacts         Type Contact Phone/Fax    01/19/2024 11:19 AM CST Phone (Incoming) Florence Matthews (Self) [phone] (M)          Reason for Call:  Patient is having surgery on 02/07/2024 and is wondering if her annual wellness on 01/09/2024 can be addendum since she is having surgery with cardiology? Pt did not give me the game of surgery..     Could we send this information to you in [facility] or would you prefer to receive a phone call?:   Patient would prefer a phone call   Okay to leave a detailed message?: No at Cell number on file:    Telephone Information:   Mobile [phone]

## 2024-01-19 NOTE — PROGRESS NOTES
H&P  PMD: [x]  Received [] Card OV: []  Date:  Sent LM  []  Teach  []   Orders  I [x] P  [x]  Letter []   AC: Coumadin NP Med Review: CHANGES- Hold Metoprolol AM of, it takes in the PMD hold PM prior    DM Meds: None  GLP-1:None           1940  Home:[phone] (home) Cell:[phone] (mobile)  Emergency Contact: Nate Matthews   PCP: Kevin Ho, [phone]    +++Important patient information for CSC/Cath Lab staff : None+++    [facility] EP Cath Lab Procedure Order   Procedure: Cardioversion for AF  Ordering Provider: Bailey Walter NP  Date Ordered and Prepped: 1/19/2024 Serena Hull RN  Anticipated Case Duration:  Standard  Scheduling Needs/Timeframe:  Next Available  Scheduling Contact: Please contact pt to schedule  Cardiology Follow Up Apt s/p: Standard- EP JENNA @ 4-6 wks or previously scheduled General Card apt  Current Device/Device Co Needed for Procedure: None NoneNone  Pre-Procedural Testing needed: None  Anesthesia:  General    [facility] EP Cath Lab Prep   H&P:  Pt to schedule with PMD to complete  Pre-op Labs: K if pt taking diuretic medication or hx of low Potassium, Beta HcG if appropriate, and INR if on Warfarin will be ordered AM of procedure and Review of most recent labs, WEL for procedure  T&S Pre-Procedure Review: T&S is not required for DCCV/DFT Testing  Medical Records Pertinent for Procedure:  None  Iodinated Contrast Dye Allergies (Does not include Shellfish, Egg, and/or Iodine Allergy): NA  GLP-1 Protocol: If on Dulaglutide (Trulicity) (weekly)- Injection hold 7 days prior to procedure  , Exenatide extended release (Bydureon bcise) (weekly)- Injection hold 7 days prior to procedure, Exenatide (Byetta) (twice daily)- Oral Tablet hold day prior and morning of procedure and for Injection hold 7 days prior to procedure, Semaglutide (Ozempic) (weekly)- Injection and Oral hold 7 days prior to procedure, Liraglutide (Victoza, Saxenda) (daily)- Injection hold day prior and morning of  procedure    No Known Allergies    Current Outpatient Medications:     acetaminophen (TYLENOL) 325 MG tablet, Take 325 mg by mouth 3 times daily as needed, Disp: , Rfl:     amLODIPine (NORVASC) 2.5 MG tablet, Take 1 tablet (2.5 mg) by mouth daily, Disp: 180 tablet, Rfl: 1    Calcium Carb-Ergocalciferol 500-200 MG-UNIT TABS, Take 1 tablet by mouth 2 times daily, Disp: , Rfl:     cyanocobalamin (VITAMIN B-12) 1000 MCG tablet, Take 1 tablet (1,000 mcg) by mouth daily, Disp: , Rfl:     escitalopram (LEXAPRO) 10 MG tablet, TAKE 1 TABLET(10 MG) BY MOUTH DAILY, Disp: 90 tablet, Rfl: 3    fluticasone (FLONASE) 50 MCG/ACT nasal spray, Spray 2 sprays in nostril 1 spray each nostril in the AM, 2 sprays in the PM, Disp: , Rfl:     gabapentin (NEURONTIN) 300 MG capsule, TAKE 1 CAPSULE(300 MG) BY MOUTH THREE TIMES DAILY, Disp: 270 capsule, Rfl: 3    hydrochlorothiazide (HYDRODIURIL) 25 MG tablet, Take 1 tablet (25 mg) by mouth every other day, Disp: 90 tablet, Rfl: 3    irbesartan (AVAPRO) 300 MG tablet, TAKE 1 TABLET(300 MG) BY MOUTH DAILY, Disp: 90 tablet, Rfl: 0    metoprolol succinate ER (TOPROL XL) 50 MG 24 hr tablet, Take 1 tablet (50 mg) by mouth daily, Disp: 90 tablet, Rfl: 3    omeprazole (PRILOSEC) 20 MG DR capsule, TAKE 1 CAPSULE BY MOUTH TWICE DAILY, Disp: 180 capsule, Rfl: 3    simvastatin (ZOCOR) 10 MG tablet, TAKE 1 TABLET(10 MG) BY MOUTH EVERY 24 HOURS, Disp: 90 tablet, Rfl: 3    traZODone (DESYREL) 50 MG tablet, TAKE 1 TABLET(50 MG) BY MOUTH AT BEDTIME, Disp: 90 tablet, Rfl: 2    Vitamin D3 (CHOLECALCIFEROL) 25 mcg (1000 units) tablet, Take 1 tablet (25 mcg) by mouth daily, Disp: , Rfl:     warfarin ANTICOAGULANT (COUMADIN) 2.5 MG tablet, TAKE 3 TABLETS BY MOUTH ON DAY 1, THEN 2 TABLETS ON DAY 2, THEN 1 TABLET DAILY THERAFTER AND DOSE WILL BE ADJUSTED PER INR, Disp: 60 tablet, Rfl: 11    Wheat Dextrin (BENEFIBER PO), Take by mouth daily Once daily, Disp: , Rfl:     Documentation Date:1/19/2024 9:42 AM  Serena  ELENITA Hull     O-Z Flap Text: The defect edges were debeveled with a #15 scalpel blade.  Given the location of the defect, shape of the defect and the proximity to free margins an O-Z flap was deemed most appropriate.  Using a sterile surgical marker, an appropriate transposition flap was drawn incorporating the defect and placing the expected incisions within the relaxed skin tension lines where possible. The area thus outlined was incised deep to adipose tissue with a #15 scalpel blade.  The skin margins were undermined to an appropriate distance in all directions utilizing iris scissors.

## 2024-01-23 ENCOUNTER — TELEPHONE (OUTPATIENT)
Dept: CARDIOLOGY | Facility: CLINIC | Age: 84
End: 2024-01-23
Payer: COMMERCIAL

## 2024-01-23 DIAGNOSIS — I10 HYPERTENSION, UNSPECIFIED TYPE: ICD-10-CM

## 2024-01-23 NOTE — TELEPHONE ENCOUNTER
Pre-Procedure Education    Procedure: DCCV with Sharri David NP on 1/30/24 with arrival time 8:30 am      PT IS ON COUMADIN  INR'S IN CHART  PT WILL HAVE ANOTHER INR 1/26/2024 1/19=2.2  1/12=2.1  1/9=2.3  12/29=2.1    COVID: COVID policy- if pt develops COVID like symptoms prior to procedure, he/she would need to complete an at home with a rapid antigen COVID test 1-2 days prior to your procedure date. If COVID + pt is aware the procedure will need to be rescheduled, and to contact CV scheduling as soon as possible    Pre-Op H&P:  PT HAD HER WELLNESS YEARLY AT PMD IN OUR SYSTEM 1/9 HE WILL ADDENDUM NOTE SO THAT COULD BE USED FOR PRE OP - Available in Epic    Education:    PT HAS A  FOR PROCEDURE THAT WILL STAY WITH HER       ALL INSTRUCTIONS REVIEWED WITH PT AND     PT INSTRUCTED TO HOLD ANY VITAMINS, MINERALS CALCIUM IRON OR SUPPLEMENTS THE MORNING OF CV  PT INSTRUCTED NO GUM CHEWING MINTS OR CANDY THE MORNING OF CV  PT INSTRUCTED TO LEAVE JEWELRY AT HOME  PT INSTRUCTED TO BATHE OR SHOWER BEFORE COMING IN  PT IS ON COUMADIN  NO MEDICATION CHANGES MADE  PT HAS A POST CV FOLLOW UP WITH JAMESON 3/15     Contact:   Reviewed via phone with pt and spouse/caregiver  Pre-Procedure Instruction: NPO after midnight pre procedure, Defined NPO with pt, Remove all jewelry and leave all valuables at home, Shower prior to arrival, Sedation plan/orders, Transportation requirements and arrangements post procedure, Post-procedure follow up process, Post-procedure restrictions/expectations, and Pre-procedure letter sent- letter tab  Risks:      Medication:   Instructions regarding anticoagulants: Coumadin- To continue anticoagulation uninterrupted through their procedure    Instructions regarding antiarrhythmic medication: Beta Blocker; continue medication prior to procedure as prescribed    Instructions given to pt regarding diuretics medication: NA for DCCV    Instructions given to pt regarding DM/GLP-1 medication:    DM- None  GLP-1-  NONE    Instructions for medication, other than anticoagulants and antiarrhythmics listed above, given to pt: Take all medication AM of procedure with small sips of water     Important patient information for staff: None    1/23/2024 10:50 AM  Luda Garrett LPN

## 2024-01-24 DIAGNOSIS — I10 HYPERTENSION, UNSPECIFIED TYPE: Primary | ICD-10-CM

## 2024-01-24 DIAGNOSIS — I35.1 MILD AORTIC INSUFFICIENCY: ICD-10-CM

## 2024-01-24 DIAGNOSIS — I35.1 AORTIC VALVE INSUFFICIENCY, ETIOLOGY OF CARDIAC VALVE DISEASE UNSPECIFIED: ICD-10-CM

## 2024-01-24 DIAGNOSIS — R00.1 SINUS BRADYCARDIA: ICD-10-CM

## 2024-01-24 DIAGNOSIS — I48.0 PAROXYSMAL ATRIAL FIBRILLATION (H): ICD-10-CM

## 2024-01-24 RX ORDER — METOPROLOL SUCCINATE 50 MG/1
50 TABLET, EXTENDED RELEASE ORAL DAILY
Qty: 90 TABLET | Refills: 3 | Status: ON HOLD | OUTPATIENT
Start: 2024-01-24 | End: 2024-01-29

## 2024-01-26 ENCOUNTER — ANTICOAGULATION THERAPY VISIT (OUTPATIENT)
Dept: ANTICOAGULATION | Facility: CLINIC | Age: 84
End: 2024-01-26
Payer: COMMERCIAL

## 2024-01-26 DIAGNOSIS — I48.0 PAROXYSMAL ATRIAL FIBRILLATION (H): Primary | ICD-10-CM

## 2024-01-26 LAB — INR HOME MONITORING: 2.4 (ref 2–3)

## 2024-01-26 RX ORDER — WARFARIN SODIUM 2.5 MG/1
TABLET ORAL
Qty: 135 TABLET | Refills: 1 | Status: SHIPPED | OUTPATIENT
Start: 2024-01-26 | End: 2024-05-21

## 2024-01-26 NOTE — PROGRESS NOTES
ANTICOAGULATION MANAGEMENT     Rene Matthews 83 year old female is on warfarin with therapeutic INR result. (Goal INR 2.0-3.0)    Recent labs: (last 7 days)     01/26/24  0000   INR 2.4       ASSESSMENT     Source(s): Chart Review and Patient/Caregiver Call     Warfarin doses taken: Warfarin taken as instructed  Diet: No new diet changes identified  Medication/supplement changes: None noted  New illness, injury, or hospitalization: No  Signs or symptoms of bleeding or clotting: No  Previous result: Therapeutic last 2(+) visits  Additional findings: Refill needed today. Rene meets all criteria for refill (current [facility] referral, office visit with referring provider/group in last 1 year unless directed to return in 2 years in last referring provider visit note, lab monitoring up to date or not exceeding 2 weeks overdue). Rx instructions and quantity supplied updated to match patient's current dosing plan. Warfarin 90 day supply with 1 refill granted per [facility] protocol  Cardioversion scheduled 1/29/24.   Upcoming Cardioversion; weekly INR monitoring. To notify EP pool and cardiologist if INR < 2 if scheduled, INR > 3.3 within a week of ablation/PVI, or non-compliance with monitoring > 1 week.            PLAN     Recommended plan for no diet, medication or health factor changes affecting INR     Dosing Instructions: Continue your current warfarin dose with next INR in 1 week       Summary  As of 1/26/2024      Full warfarin instructions:  3.75 mg every day   Next INR check:  2/2/2024               Telephone call with Florence who verbalizes understanding and agrees to plan    Patient to recheck with home meter    Education provided:   Please call back if any changes to your diet, medications or how you've been taking warfarin  Goal range and lab monitoring: post cardioversion  Contact [phone]  with any changes, questions or concerns.     Plan made per [facility] anticoagulation protocol    Dior Gale RN  Anticoagulation  Clinic  1/26/2024    _______________________________________________________________________     Anticoagulation Episode Summary       Current INR goal:  2.0-3.0   TTR:  59.7% (1 y)   Target end date:  Indefinite   Send INR reminders to:  [facility]    Indications    Paroxysmal atrial fibrillation (H) [I48.0]             Comments:  Cassy             Anticoagulation Care Providers       Provider Role Specialty Phone number    Kevin Ho MD Referring Internal Medicine [phone]

## 2024-01-29 ENCOUNTER — HOSPITAL ENCOUNTER (OUTPATIENT)
Dept: CARDIOLOGY | Facility: HOSPITAL | Age: 84
Discharge: HOME | End: 2024-01-29
Attending: NURSE PRACTITIONER | Admitting: INTERNAL MEDICINE
Payer: MEDICARE

## 2024-01-29 ENCOUNTER — ANESTHESIA EVENT (OUTPATIENT)
Dept: CARDIOLOGY | Facility: HOSPITAL | Age: 84
End: 2024-01-29
Payer: MEDICARE

## 2024-01-29 ENCOUNTER — DOCUMENTATION ONLY (OUTPATIENT)
Dept: ANTICOAGULATION | Facility: CLINIC | Age: 84
End: 2024-01-29

## 2024-01-29 ENCOUNTER — ANESTHESIA (OUTPATIENT)
Dept: CARDIOLOGY | Facility: HOSPITAL | Age: 84
End: 2024-01-29
Payer: MEDICARE

## 2024-01-29 VITALS
HEIGHT: 64 IN | DIASTOLIC BLOOD PRESSURE: 70 MMHG | BODY MASS INDEX: 22.71 KG/M2 | OXYGEN SATURATION: 97 % | WEIGHT: 133 LBS | TEMPERATURE: 97.9 F | RESPIRATION RATE: 18 BRPM | SYSTOLIC BLOOD PRESSURE: 130 MMHG | HEART RATE: 52 BPM

## 2024-01-29 DIAGNOSIS — I48.19 PERSISTENT ATRIAL FIBRILLATION (H): ICD-10-CM

## 2024-01-29 LAB — INR POINT OF CARE: 2.4 (ref 0.9–1.1)

## 2024-01-29 PROCEDURE — 92960 CARDIOVERSION ELECTRIC EXT: CPT | Performed by: NURSE PRACTITIONER

## 2024-01-29 PROCEDURE — 93010 ELECTROCARDIOGRAM REPORT: CPT | Performed by: INTERNAL MEDICINE

## 2024-01-29 PROCEDURE — 85610 PROTHROMBIN TIME: CPT | Performed by: NURSE PRACTITIONER

## 2024-01-29 PROCEDURE — 93005 ELECTROCARDIOGRAM TRACING: CPT

## 2024-01-29 PROCEDURE — 370N000017 HC ANESTHESIA TECHNICAL FEE, PER MIN

## 2024-01-29 PROCEDURE — 92960 CARDIOVERSION ELECTRIC EXT: CPT

## 2024-01-29 PROCEDURE — 250N000009 HC RX 250: Performed by: ANESTHESIOLOGY

## 2024-01-29 RX ORDER — LIDOCAINE 40 MG/G
CREAM TOPICAL
Status: DISCONTINUED | OUTPATIENT
Start: 2024-01-29 | End: 2024-01-29 | Stop reason: HOSPADM

## 2024-01-29 RX ORDER — SOTALOL HYDROCHLORIDE 80 MG/1
40 TABLET ORAL 2 TIMES DAILY
Qty: 90 TABLET | Refills: 1 | Status: SHIPPED | OUTPATIENT
Start: 2024-01-29 | End: 2024-05-21

## 2024-01-29 RX ADMIN — METHOHEXITAL SODIUM 60 MG: 500 INJECTION, POWDER, LYOPHILIZED, FOR SOLUTION INTRAMUSCULAR; INTRAVENOUS; RECTAL at 09:41

## 2024-01-29 ASSESSMENT — ACTIVITIES OF DAILY LIVING (ADL)
ADLS_ACUITY_SCORE: 35
ADLS_ACUITY_SCORE: 35

## 2024-01-29 NOTE — PROCEDURES
[facility]    Procedure: Cardioversion External    Date/Time: 1/29/2024 10:04 AM    Performed by: Veronica Cotter APRN CNP  Authorized by: Bailey Amador APRN CNP      UNIVERSAL PROTOCOL   Site Marked: NA  Prior Images Obtained and Reviewed:  Yes  Required items: Required blood products, implants, devices and special equipment available    Patient identity confirmed:  Verbally with patient, arm band and provided demographic data  Patient was reevaluated immediately before administering moderate or deep sedation or anesthesia  Confirmation Checklist:  Patient's identity using two indicators, relevant allergies, procedure was appropriate and matched the consent or emergent situation and correct equipment/implants were available  Time out: Immediately prior to the procedure a time out was called    Universal Protocol: the Joint Commission Universal Protocol was followed       ANESTHESIA    Anesthesia was administered and monitored by anesthesiology.  See anesthesia documentation for details.    PROCEDURE DETAILS  Pre-procedure rhythm: atrial fibrillation  Patient position: patient was placed in a supine position  Chest area: chest area exposed  Electrodes: pads  Electrodes placed: anterior-posterior  Number of attempts: 1    Details of Attempts:  At 0943, administration of IV Brevital by MDA and confirmation of adequate sedation, she received a single synchronous shock at 200 J with prompt restoration of sinus rhythm in the 40s to 50s.  Post cardioversion EKG pending.  Post-procedure rhythm: normal sinus rhythm  Complications: no complications      PROCEDURE  Describe Procedure: Persistent atrial fibrillation with controlled ventricular response.  Symptoms consist of dyspnea on exertion.  She is on warfarin for stroke prophylaxis, INRs reviewed, consistently >2.0, 2.4 today.  Successful cardioversion to sinus rhythm  Due to extent of sinus bradycardia, will start sotalol 40 mg twice daily  and discontinue metoprolol.  EKG on Thursday for QT interval evaluation  Follow-up with EP MD in 3 to 4 weeks  Charged home 1 hour after cardioversion if fully awake and stable  Patient Tolerance:  Patient tolerated the procedure well with no immediate complications  Length of time physician/provider present for 1:1 monitoring during sedation: 10

## 2024-01-29 NOTE — ANESTHESIA CARE TRANSFER NOTE
Patient: Rene Matthews    Procedure: * No procedures listed *  Cardioversion External    Diagnosis: * No pre-op diagnosis entered *  Diagnosis Additional Information: No value filed.    Anesthesia Type:   No value filed.     Note:    Oropharynx: spontaneously breathing  Level of Consciousness: drowsy  Oxygen Supplementation: nasal cannula  Level of Supplemental Oxygen (L/min / FiO2): 4      Vital Signs Stable: post-procedure vital signs reviewed and stable  Report to RN Given: handoff report given  Patient transferred to: Cardiac Special Care          Vitals:  Vitals Value Taken Time   /83 01/29/24 0942   Temp     Pulse 51 01/29/24 0944   Resp 36 01/29/24 0944   SpO2 98 % 01/29/24 0944   Vitals shown include unfiled device data.    Electronically Signed By: MACARIO Carrillo CRNA  January 29, 2024  9:45 AM

## 2024-01-29 NOTE — ANESTHESIA POSTPROCEDURE EVALUATION
Patient: Rene Matthews    Procedure: * No procedures listed *  Cardioversion External    Anesthesia Type:  General    Note:  Disposition: Outpatient   Postop Pain Control: Uneventful            Sign Out: Well controlled pain   PONV: No   Neuro/Psych: Uneventful            Sign Out: Acceptable/Baseline neuro status   Airway/Respiratory: Uneventful            Sign Out: Acceptable/Baseline resp. status   CV/Hemodynamics: Uneventful            Sign Out: Acceptable CV status; No obvious hypovolemia; No obvious fluid overload   Other NRE: NONE   DID A NON-ROUTINE EVENT OCCUR? No           Last vitals:  Vitals:    01/29/24 1015 01/29/24 1030 01/29/24 1042   BP: (!) 142/72 (!) 148/70 130/70   Pulse: (!) 48 (!) 48 52   Resp: 27 19 18   Temp:      SpO2:          Electronically Signed By: Swati Marcus MD  January 29, 2024  2:46 PM

## 2024-01-29 NOTE — INTERVAL H&P NOTE
I have reviewed the surgical (or preoperative) H&P that is linked to this encounter, and examined the patient. There are no significant changes.  INRs reviewed.    Clinical Conditions Present on Arrival:  Clinically Significant Risk Factors Present on Admission                # Drug Induced Coagulation Defect: home medication list includes an anticoagulant medication

## 2024-01-29 NOTE — ANESTHESIA PREPROCEDURE EVALUATION
Anesthesia Pre-Procedure Evaluation    Patient: Rene Matthews   MRN: [id number] : 1940        Procedure : * No procedures listed *  Cardioversion External       Past Medical History:   Diagnosis Date    Abnormal Pap smear of cervix     ASCUS with negative biopsy    Acute low back pain without sciatica 2020    Allergic rhinitis     Anxiety, generalized     Benign neoplasm of ascending colon     Burning sensation of feet     Cervical polyp     Chest pain     secondary to gerd    Chronic abdominal pain     Negative CT scan and colonoscopy, musculoskeletal etiology suspected    Chronic radicular low back pain     MICHEL after evaluation by Dr. Bradley    Chronic sinusitis 10/18/2016    Frequent headaches     GERD (gastroesophageal reflux disease)     Noncardiac chest pain    Hearing loss in left ear 10/20/2017    Herpes zoster 2006    Hyperlipidemia     Hypertension     IBS (irritable bowel syndrome)     Insomnia     Mild aortic insufficiency 2022    Nausea 2022    Osteoarthritis     Osteopenia     DEXA  T score -2.0 stable, DEXA 2017 T score -1.6 left femoral neck stable.  DEXA 2022 -2.1 L1 L4 and -1.8 bilateral femoral neck    Overactive bladder 10/18/2016    Palpitations 2021    Symptoms suspicious for atrial fibrillation.  Echocardiogram 2021 with normal left ventricular systolic function with mild left atrial enlargement and mild AI and MR    Peptic ulcer disease 2006    w/ gastric ulcer    Personal history of colonic polyps     colonoscopy 2014 normal.  Colonoscopy 2019 with multiple polyps, repeat in 3 years.  Cologuard neg  2023    Postmenopausal bleeding 2021    Screening     Negative for AAA CT scan     Skin cancer, basal cell 10/20/2017    Urge incontinence of urine 2021    Vitamin B12 deficiency (non anemic)     Weight gain 2021      Past Surgical History:   Procedure Laterality Date    BIOPSY  CERVICAL, LOCAL EXCISION, SINGLE/MULTIPLE      ENDOMETRIAL BIOPSY      Negative    OTHER SURGICAL HISTORY      Basal cell skin cancer    OTHER SURGICAL HISTORY  2012    Radiofrequency ablation right greater saphenous vein    OVARIAN CYST REMOVAL      lysis of adhesions    STRABISMUS SURGERY  2015      No Known Allergies   Social History     Tobacco Use    Smoking status: Never     Passive exposure: Never    Smokeless tobacco: Never   Substance Use Topics    Alcohol use: No      Wt Readings from Last 1 Encounters:   01/29/24 60.3 kg (133 lb)        Anesthesia Evaluation   Pt has had prior anesthetic.     No history of anesthetic complications       ROS/MED HX  ENT/Pulmonary:       Neurologic:       Cardiovascular:     (+)  hypertension- -   -  - -                                      METS/Exercise Tolerance:     Hematologic:       Musculoskeletal:       GI/Hepatic:     (+) GERD,            liver disease,       Renal/Genitourinary:       Endo:       Psychiatric/Substance Use:       Infectious Disease:       Malignancy:       Other:            Physical Exam    Airway        Mallampati: II    Neck ROM: full     Respiratory Devices and Support         Dental       (+) Minor Abnormalities - some fillings, tiny chips      Cardiovascular          Rhythm and rate: irregular     Pulmonary   pulmonary exam normal                OUTSIDE LABS:  CBC:   Lab Results   Component Value Date    WBC 5.3 01/09/2024    WBC 5.2 11/18/2022    HGB 12.2 01/09/2024    HGB 12.5 11/18/2022    HCT 36.8 01/09/2024    HCT 36.7 11/18/2022     01/09/2024     11/18/2022     BMP:   Lab Results   Component Value Date     01/09/2024     (L) 01/02/2023    POTASSIUM 3.8 01/09/2024    POTASSIUM 3.4 01/02/2023    CHLORIDE 100 01/09/2024    CHLORIDE 92 (L) 01/02/2023    CO2 27 01/09/2024    CO2 25 01/02/2023    BUN 10.3 01/09/2024    BUN 8.0 01/02/2023    CR 0.64 01/09/2024    CR 0.58 01/02/2023    GLC 96 01/09/2024    GLC 96  "01/02/2023     COAGS:   Lab Results   Component Value Date    PTT 30 07/03/2019    INR 2.4 (A) 01/29/2024     POC: No results found for: \"BGM\", \"HCG\", \"HCGS\"  HEPATIC:   Lab Results   Component Value Date    ALBUMIN 4.6 01/09/2024    PROTTOTAL 7.9 01/09/2024    ALT 12 01/09/2024    AST 23 01/09/2024    ALKPHOS 66 01/09/2024    BILITOTAL 0.4 01/09/2024     OTHER:   Lab Results   Component Value Date    EBONY 9.3 01/09/2024    MAG 2.4 (H) 01/09/2024    LIPASE 28 09/16/2019    TSH 0.96 11/18/2022    SED 8 09/21/2020       Anesthesia Plan    ASA Status:  3       Anesthesia Type: General.     - Airway: Mask Only              Consents    Anesthesia Plan(s) and associated risks, benefits, and realistic alternatives discussed. Questions answered and patient/representative(s) expressed understanding.     - Discussed: Risks, Benefits and Alternatives for the PROCEDURE were discussed     - Discussed with:  Patient      - Extended Intubation/Ventilatory Support Discussed: No.      - Patient is DNR/DNI Status: No     Use of blood products discussed: No .     Postoperative Care            Comments:               Swati Marcus MD    I have reviewed the pertinent notes and labs in the chart from the past 30 days and (re)examined the patient.  Any updates or changes from those notes are reflected in this note.            # Drug Induced Coagulation Defect: home medication list includes an anticoagulant medication       "

## 2024-01-29 NOTE — DISCHARGE INSTRUCTIONS
Sedation: Care Instructions  Overview     Sedation is the use of medicine to help you feel relaxed and comfortable during a procedure. The medicine is usually given in a vein (by I.V.). It may be used with numbing medicines.  There are different levels of sedation. They range from being awake but relaxed to being completely unconscious. Which level you have will depend on the procedure and your needs. You will be watched closely by a doctor or nurse during sedation.  Common side effects from sedation include:  Feeling sleepy or tired. (Your doctors and nurses will make sure you aren't too sleepy to go home.)  Feeling dizzy or unsteady.  Follow-up care is a key part of your treatment and safety. Be sure to make and go to all appointments, and call your doctor if you are having problems. It's also a good idea to know your test results and keep a list of the medicines you take.  How can you care for yourself at home?  Activity    Don't do anything that requires attention to detail until you recover. This includes going to work or school, making important decisions, and signing any legal documents. It takes time for the medicine effects to completely wear off.     For at least 24 hours, do not drive or operate any machinery.     When you get home, make sure to rest until the anesthesia has worn off. Some people will feel drowsy or dizzy for up to a few hours after leaving the hospital.     Take your time and walk slowly. Sudden changes in position may cause nausea.     Rest when you feel tired. Getting enough sleep will help you recover.     If you have sleep apnea and you have a CPAP machine, be sure to use it.   Diet    Don't drink alcohol for 24 hours.     You can eat your normal diet, unless your doctor gives you other instructions. If your stomach is upset, try clear liquids and bland, low-fat foods like plain toast or rice.     Drink plenty of fluids (unless your doctor tells you not to).   When should you call  "for help?   Call 911 anytime you think you may need emergency care. For example, call if:    You have trouble breathing.     You passed out (lost consciousness).   Call your doctor now or seek immediate medical care if:    You have nausea or vomiting that gets worse or won't stop.     You have a fever.     You have a new or worse headache.     The medicine is not wearing off and you can't think clearly.   Watch closely for changes in your health, and be sure to contact your doctor if:    You do not get better as expected.   Where can you learn more?  Go to https://www.[employer].net/patiented  Enter G817 in the search box to learn more about \"Sedation: Care Instructions.\"  Current as of: June 25, 2023               Content Version: 13.8    3934-8058 [employer].   Care instructions adapted under license by your healthcare professional. If you have questions about a medical condition or this instruction, always ask your healthcare professional. [employer] disclaims any warranty or liability for your use of this information.    Cardioversion  Cardioversion is a procedure to restore your heart's normal rhythm from a fast or irregular rhythm (arrhythmia) in the top or bottom chambers of your heart. You may have the procedure in a hospital or surgery center. It's often done on an outpatient (same day) basis. During the procedure, your doctor will give you medication to keep you free from pain. Then the doctor gives you a brief electric shock. This helps your heartbeat become normal again. In most cases, you can go home within hours of the procedure.    Before Your Procedure  Tell your doctor what over-the-counter and prescription medications, herbs, and supplements you are taking.  Take medication as directed. Your doctor may prescribe anticoagulants (blood thinners), depending on your situation. They help prevent blood clots from forming.  Ask your doctor about the risks and benefits of " cardioversion.  Sign your consent form.  Don t eat or drink anything for 8  hours before your procedure.  Follow any other instructions your doctor gives you.   Arrange for an adult to drive you home after the procedure.     During Your Procedure  Your health care provider will place small pads (electrodes) on your chest to record your heartbeat at all times.  Your health care provider will place an intravenous (IV) line in your arm. This gives you medication (sedation) that keeps you free of pain. You ll feel sleepy.      After Your Procedure  Your health care provider will monitor you until you are fully awake. Then you ll be able to sit up, walk, and eat.  In most cases, you ll be able to go home after the sedation wears off. This usually takes a few hours.  For a few days, the skin on your chest may feel a little sore, like a mild sunburn.  DO NOT  drive or operate heavy machinery for 24 hours after the procedure.  The day after your procedure, try to take it easy. Take medication as directed.  Call your doctor if you notice skipped beats, a rapid heartbeat, or chest tightness. These may be signs that an irregular heartbeat has returned.

## 2024-01-29 NOTE — PROGRESS NOTES
ANTICOAGULATION  MANAGEMENT: Discharge Review    Rene Matthews chart reviewed for anticoagulation continuity of care    Outpatient surgery/procedure on 1/29/24 for Cardioversion.    Discharge disposition: Home    Results:    Recent labs: (last 7 days)     01/26/24  0000 01/29/24  0830   INR 2.4 2.4*     Anticoagulation inpatient management:     not applicable     Anticoagulation discharge instructions:     Warfarin dosing: home regimen continued   Bridging: No   INR goal change: No      Medication changes affecting anticoagulation: No    Additional factors affecting anticoagulation: No     PLAN     No adjustment to anticoagulation plan needed    Recommended follow up is scheduled    No adjustment to Anticoagulation Calendar was required    Aleksandra Gardner RN

## 2024-01-29 NOTE — PLAN OF CARE
Goal Outcome Evaluation:         Pt admitted for a cardioversion due to atrial fibrillation.  Pt converted to Sinus saman after 200J shock. Discharge instructions given to pt and  with good understanding. Pt will discharge home with  Nate.      Sindy Abdullahi RN

## 2024-01-29 NOTE — ADDENDUM NOTE
Addendum  created 01/29/24 1442 by Swati Marcus MD    Clinical Note Signed, Review and Sign - Ready for Procedure

## 2024-02-01 LAB
ATRIAL RATE - MUSE: 50 BPM
DIASTOLIC BLOOD PRESSURE - MUSE: NORMAL MMHG
INTERPRETATION ECG - MUSE: NORMAL
P AXIS - MUSE: 73 DEGREES
PR INTERVAL - MUSE: 274 MS
QRS DURATION - MUSE: 88 MS
QT - MUSE: 492 MS
QTC - MUSE: 448 MS
R AXIS - MUSE: 13 DEGREES
SYSTOLIC BLOOD PRESSURE - MUSE: NORMAL MMHG
T AXIS - MUSE: 194 DEGREES
VENTRICULAR RATE- MUSE: 50 BPM

## 2024-02-02 ENCOUNTER — ALLIED HEALTH/NURSE VISIT (OUTPATIENT)
Dept: CARDIOLOGY | Facility: CLINIC | Age: 84
End: 2024-02-02
Payer: COMMERCIAL

## 2024-02-02 ENCOUNTER — TELEPHONE (OUTPATIENT)
Dept: CARDIOLOGY | Facility: CLINIC | Age: 84
End: 2024-02-02

## 2024-02-02 ENCOUNTER — DOCUMENTATION ONLY (OUTPATIENT)
Dept: ANTICOAGULATION | Facility: CLINIC | Age: 84
End: 2024-02-02

## 2024-02-02 VITALS
HEART RATE: 46 BPM | RESPIRATION RATE: 16 BRPM | DIASTOLIC BLOOD PRESSURE: 60 MMHG | OXYGEN SATURATION: 96 % | SYSTOLIC BLOOD PRESSURE: 124 MMHG

## 2024-02-02 DIAGNOSIS — I48.19 PERSISTENT ATRIAL FIBRILLATION (H): ICD-10-CM

## 2024-02-02 DIAGNOSIS — I48.0 PAROXYSMAL ATRIAL FIBRILLATION (H): Primary | ICD-10-CM

## 2024-02-02 LAB — INR HOME MONITORING: 2.2 (ref 2–3)

## 2024-02-02 PROCEDURE — 99207 PR NO CHARGE NURSE ONLY: CPT

## 2024-02-02 PROCEDURE — 93000 ELECTROCARDIOGRAM COMPLETE: CPT | Mod: 77 | Performed by: INTERNAL MEDICINE

## 2024-02-02 NOTE — PROGRESS NOTES
ANTICOAGULATION  MANAGEMENT-Home Monitor Managed by Exception    Rene Matthews 83 year old female is on warfarin with therapeutic INR result. (Goal INR 2.0-3.0)    Recent labs: (last 7 days)     02/02/24  0000   INR 2.2       Previous INR was Therapeutic  Medication, diet, health changes since last INR:Yes: cardioversion on 1/29/24 needs INRs x4 weekly >2 , but not anticipated to affect INR  Contacted within the last 12 weeks by phone on 1/26/24  Last [facility] referral date: 08/28/2023      YESENIA López was NOT contacted regarding therapeutic result today per home monitoring policy manage by exception agreement.   Current warfarin dose is to be continued:     Summary  As of 2/2/2024      Full warfarin instructions:  3.75 mg every day   Next INR check:  2/9/2024             ?   Adele Romo RN  Anticoagulation Clinic  2/2/2024    _______________________________________________________________________     Anticoagulation Episode Summary       Current INR goal:  2.0-3.0   TTR:  61.6% (1 y)   Target end date:  Indefinite   Send INR reminders to:  [facility]    Indications    Paroxysmal atrial fibrillation (H) [I48.0]             Comments:  Cassy             Anticoagulation Care Providers       Provider Role Specialty Phone number    Kevin Ho MD Referring Internal Medicine [phone]

## 2024-02-02 NOTE — PROGRESS NOTES
Sharri David, NP    Looks great, continue with current plan/dosing of Sotalol. No further changes needed at this time.   Bailey is out of office until Wednesday.       LM with pt.    Adelaida Meng RN

## 2024-02-02 NOTE — PROGRESS NOTES
Bailey-    EKG Visit    Pt here for EKG, QTc check after starting 40mg Sotalol BID on 1/29/24    EKG shows SB with HR of 45 bpm, QT of 542 ms, and QTc of 468 ms  QTc within acceptable limits, pts EKG was compared to previous EKG in EMR, EKG is stable, and there has been minimal change in QTc  Vital signes were reviewed and are stable    Pt here for EKG post cardioversion and Sotalol start 40 mg BID. She confirmed she stopped Metoprolol. HR 45 on EKG, so writer went in to assess and discuss any symptoms. Florence reports that she feels great. She denies any dizziness or lightheadedness or fatigue. She is very active and went on a 10 mile bike ride yesterday and plays pickle ball most days. EKG was sent to EP team for review. Encounter routed as well. [place] [place]        Pt was instructed to continue current medication as prescribed, results would be sent to ordering provider for review, pt will be contacted with further changes if necessary, and pt verbalized understanding     Results sent to   Bailey Amador  for further review and recommendations if necessary  2/2/2024 11:21 AM  Serena Hull RN

## 2024-02-02 NOTE — TELEPHONE ENCOUNTER
[facility] Call Center    Phone Message    May a detailed message be left on voicemail: yes     Reason for Call: Other: Pt would like a call back.     Action Taken: Other: Cardiology    Travel Screening: Not Applicable    Thank you!  [facility]

## 2024-02-02 NOTE — TELEPHONE ENCOUNTER
Pt here for EKG post cardioversion and Sotalol start 40 mg BID. She confirmed she stopped Metoprolol. HR 45 on EKG, so writer went in to assess and discuss any symptoms. Florence reports that she feels great. She denies any dizziness or lightheadedness or fatigue. She is very active and went on a 10 mile bike ride yesterday and plays [employer] ball most days. EKG was sent to EP team for review. Encounter routed as well. -[facility] [place]

## 2024-02-05 LAB
ATRIAL RATE - MUSE: 45 BPM
DIASTOLIC BLOOD PRESSURE - MUSE: NORMAL MMHG
INTERPRETATION ECG - MUSE: NORMAL
P AXIS - MUSE: 72 DEGREES
PR INTERVAL - MUSE: 244 MS
QRS DURATION - MUSE: 90 MS
QT - MUSE: 542 MS
QTC - MUSE: 468 MS
R AXIS - MUSE: 10 DEGREES
SYSTOLIC BLOOD PRESSURE - MUSE: NORMAL MMHG
T AXIS - MUSE: 239 DEGREES
VENTRICULAR RATE- MUSE: 45 BPM

## 2024-02-05 NOTE — TELEPHONE ENCOUNTER
PC to patient to discuss questions. She wonders when her follow up appts are. Reviewed follow up appointments already scheduled.Patient will come into appt's as scheduled. No further questions at this time.  --------------------------------

## 2024-02-09 ENCOUNTER — DOCUMENTATION ONLY (OUTPATIENT)
Dept: ANTICOAGULATION | Facility: CLINIC | Age: 84
End: 2024-02-09
Payer: COMMERCIAL

## 2024-02-09 DIAGNOSIS — I48.0 PAROXYSMAL ATRIAL FIBRILLATION (H): Primary | ICD-10-CM

## 2024-02-09 LAB — INR HOME MONITORING: 2.3 (ref 2–3)

## 2024-02-09 NOTE — PROGRESS NOTES
ANTICOAGULATION  MANAGEMENT-Home Monitor Managed by Exception    Rene Matthews 83 year old female is on warfarin with therapeutic INR result. (Goal INR 2.0-3.0)    Recent labs: (last 7 days)     02/09/24  0000   INR 2.3       Previous INR was Therapeutic  Medication, diet, health changes since last INR: had cardioversion done on 1/29 and needs weekly INR's  >2 x 4 post procedure ( this is 2 of 4 result)  Contacted within the last 12 weeks by phone on 1/26/24  Last [facility] referral date: 08/28/2023      YESENIA López was NOT contacted regarding therapeutic result today per home monitoring policy manage by exception agreement.   Current warfarin dose is to be continued:     Summary  As of 2/9/2024      Full warfarin instructions:  3.75 mg every day   Next INR check:  2/16/2024             ?   Lisha Herbert RN  Anticoagulation Clinic  2/9/2024    _______________________________________________________________________     Anticoagulation Episode Summary       Current INR goal:  2.0-3.0   TTR:  63.5% (1 y)   Target end date:  Indefinite   Send INR reminders to:  [facility]    Indications    Paroxysmal atrial fibrillation (H) [I48.0]             Comments:  Cassy             Anticoagulation Care Providers       Provider Role Specialty Phone number    Kevin Ho MD Referring Internal Medicine [phone]

## 2024-02-19 ENCOUNTER — DOCUMENTATION ONLY (OUTPATIENT)
Dept: ANTICOAGULATION | Facility: CLINIC | Age: 84
End: 2024-02-19
Payer: COMMERCIAL

## 2024-02-19 DIAGNOSIS — I48.0 PAROXYSMAL ATRIAL FIBRILLATION (H): Primary | ICD-10-CM

## 2024-02-19 LAB — INR HOME MONITORING: 2.2 (ref 2–3)

## 2024-02-19 NOTE — PROGRESS NOTES
HEART CARE ENCOUNTER NOTE      [facility]  [phone]      Assessment/Recommendations   Assessment:   Persistent atrial fibrillation: First diagnosed via MCT 9/2022 with patient being symptomatic with fluttering and weakness.  Persistent AF with controlled rates since 10/31/2023.  Status post cardioversion 1/29/2024.  Patient follows with atrial fibrillation clinic who thought she would be a good candidate for ablation in the future due to underlying conduction disease.  Remains on warfarin for anticoagulation and sotalol 40 mg twice daily.  Patient denies any recurrence of symptoms since cardioversion.  Hypertension: On irbesartan 300 mg daily, amlodipine 2.5 mg daily, hydrochlorothiazide 25 mg daily.  Elevated in clinic today at 148/68.  Patient's recent readings at other visits have been well-controlled.  Will hold off on making any adjustments at this time and if elevated when patient is back in clinic in a month or if patient checks at home could consider increasing amlodipine.  Aortic insufficiency: Mild as noted on echo 6/15/2021 as well as mild mitral regurgitation.  Sinus bradycardia: Patient is asymptomatic denying lightheadedness or dizziness    Plan:   Continue current medications.   Follow-up with Dr. Sims as scheduled 3/26/2024         History of Present Illness/Subjective    HPI: Rene Matthews is a 83 year old female with PMHx of persistent atrial fibrillation status post cardioversion 1/29/2024, hypertension, mild aortic insufficiency, sinus bradycardia presents for follow-up.  Patient follows with atrial fibrillation clinic.      Patient denies any recurrence of her atrial fibrillation symptoms since cardioversion.  Notes occasionally getting winded going up the stairs but that this has been stable.  Patient denies any lightheadedness or dizziness with her bradycardia.  Denies exertional angina, palpitations, lower extremity edema.        Echocardiogram 6/15/2021  "results:  No previous study for comparison.    Left ventricle ejection fraction is normal. The calculated left ventricular ejection fraction is 66%.    Normal left ventricular size and systolic function.    Normal right ventricular size and systolic function.    Left atrial volume is mildly increased.    Mild aortic regurgitation.    Mild mitral regurgitation.       Holter monitor 11/3/2023:  Holter monitoring from 11/3/2023 to 11/4/2023 (duration 24hrs). Continuous atrial fibrillation, ventricular rates 60 to 134bpm, average 88bpm. There were no pauses of greater than 3 seconds. Rare premature ventricular contractions (<1%). No symptoms recorded. Diary entry for bike ride correlated to interval with atrial fibrillation and ventricular rates up to 134bpm. Electronically signed by Yola Sims MD     Physical Examination  Review of Systems   Vitals: BP (!) 148/68 (BP Location: Right arm, Patient Position: Sitting, Cuff Size: Adult Regular)   Pulse (!) 47   Resp 16   Ht 1.626 m (5' 4\")   Wt 60.2 kg (132 lb 11.2 oz)   LMP  (LMP Unknown)   SpO2 98%   BMI 22.78 kg/m    BMI= Body mass index is 22.78 kg/m .  Wt Readings from Last 3 Encounters:   02/20/24 60.2 kg (132 lb 11.2 oz)   01/29/24 60.3 kg (133 lb)   01/09/24 60.3 kg (133 lb)           ENT/Mouth: membranes moist, no oral lesions or bleeding gums.      EYES:  no scleral icterus, normal conjunctivae       Chest/Lungs:   lungs are clear to auscultation, no rales or wheezing,  equal chest wall expansion    Cardiovascular:   Regular. Normal first and second heart sounds with no murmurs, rubs, or gallops; the carotid, radial pulses are intact, Jugular venous pressure normal, no edema bilaterally        Extremities: no cyanosis or clubbing   Skin: no xanthelasma, warm.    Neurologic: no tremors     Psychiatric: alert and oriented x3, calm        Please refer above for cardiac ROS details.        Medical History  Surgical History Family History Social " History   Past Medical History:   Diagnosis Date    Abnormal Pap smear of cervix     ASCUS with negative biopsy    Acute low back pain without sciatica 05/18/2020    Allergic rhinitis     Anxiety, generalized     Benign neoplasm of ascending colon     Burning sensation of feet     Cervical polyp     Chest pain     secondary to gerd    Chronic abdominal pain     Negative CT scan and colonoscopy, musculoskeletal etiology suspected    Chronic radicular low back pain     MICHEL after evaluation by Dr. Bradley    Chronic sinusitis 10/18/2016    Frequent headaches     GERD (gastroesophageal reflux disease)     Noncardiac chest pain    Hearing loss in left ear 10/20/2017    Herpes zoster 01/01/2006    Hyperlipidemia     Hypertension     IBS (irritable bowel syndrome)     Insomnia     Mild aortic insufficiency 11/02/2022    Nausea 11/18/2022    Osteoarthritis     Osteopenia     DEXA 2014 T score -2.0 stable, DEXA December 2017 T score -1.6 left femoral neck stable.  DEXA January 2022 -2.1 L1 L4 and -1.8 bilateral femoral neck    Overactive bladder 10/18/2016    Palpitations 06/07/2021    Symptoms suspicious for atrial fibrillation.  Echocardiogram June 2021 with normal left ventricular systolic function with mild left atrial enlargement and mild AI and MR    Peptic ulcer disease 01/01/2006    w/ gastric ulcer    Personal history of colonic polyps     colonoscopy January 2014 normal.  Colonoscopy January 2019 with multiple polyps, repeat in 3 years.  Cologuard neg  January 2023    Postmenopausal bleeding 12/23/2021    Screening     Negative for AAA CT scan 2013    Skin cancer, basal cell 10/20/2017    Urge incontinence of urine 06/07/2021    Vitamin B12 deficiency (non anemic)     Weight gain 03/12/2021     Past Surgical History:   Procedure Laterality Date    BIOPSY CERVICAL, LOCAL EXCISION, SINGLE/MULTIPLE      ENDOMETRIAL BIOPSY      Negative    OTHER SURGICAL HISTORY      Basal cell skin cancer    OTHER SURGICAL HISTORY  2012     Radiofrequency ablation right greater saphenous vein    OVARIAN CYST REMOVAL      lysis of adhesions    STRABISMUS SURGERY  2015     Family History   Problem Relation Age of Onset    Cerebrovascular Disease Mother     Hypertension Sister     Hypertension Brother         Social History     Socioeconomic History    Marital status:      Spouse name: Not on file    Number of children: Not on file    Years of education: Not on file    Highest education level: Not on file   Occupational History    Not on file   Tobacco Use    Smoking status: Never     Passive exposure: Never    Smokeless tobacco: Never   Vaping Use    Vaping Use: Never used   Substance and Sexual Activity    Alcohol use: No    Drug use: No    Sexual activity: Not on file   Other Topics Concern    Not on file   Social History Narrative    Retired teacher  , [place], 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     Social Determinants of Health     Financial Resource Strain: Low Risk  (1/9/2024)    Financial Resource Strain     Within the past 12 months, have you or your family members you live with been unable to get utilities (heat, electricity) when it was really needed?: No   Food Insecurity: Low Risk  (1/9/2024)    Food Insecurity     Within the past 12 months, did you worry that your food would run out before you got money to buy more?: No     Within the past 12 months, did the food you bought just not last and you didn t have money to get more?: No   Transportation Needs: Low Risk  (1/9/2024)    Transportation Needs     Within the past 12 months, has lack of transportation kept you from medical appointments, getting your medicines, non-medical meetings or appointments, work, or from getting things that you need?: No   Physical Activity: Not on file   Stress: Not on file   Social Connections: Not on file   Interpersonal Safety: Low Risk  (1/9/2024)    Interpersonal Safety     Do you feel physically and emotionally safe where you currently live?: Yes      Within the past 12 months, have you been hit, slapped, kicked or otherwise physically hurt by someone?: No     Within the past 12 months, have you been humiliated or emotionally abused in other ways by your partner or ex-partner?: No   Housing Stability: Low Risk  (1/9/2024)    Housing Stability     Do you have housing? : Yes     Are you worried about losing your housing?: No           Medications  Allergies   Current Outpatient Medications   Medication Sig Dispense Refill    acetaminophen (TYLENOL) 325 MG tablet Take 325 mg by mouth 3 times daily as needed      amLODIPine (NORVASC) 2.5 MG tablet Take 1 tablet (2.5 mg) by mouth daily 180 tablet 1    Calcium Carb-Ergocalciferol 500-200 MG-UNIT TABS Take 1 tablet by mouth 2 times daily      cyanocobalamin (VITAMIN B-12) 1000 MCG tablet Take 1 tablet (1,000 mcg) by mouth daily      escitalopram (LEXAPRO) 10 MG tablet TAKE 1 TABLET(10 MG) BY MOUTH DAILY 90 tablet 3    fluticasone (FLONASE) 50 MCG/ACT nasal spray Spray 2 sprays in nostril 1 spray each nostril in the AM, 2 sprays in the PM      gabapentin (NEURONTIN) 300 MG capsule TAKE 1 CAPSULE(300 MG) BY MOUTH THREE TIMES DAILY 270 capsule 3    hydrochlorothiazide (HYDRODIURIL) 25 MG tablet Take 1 tablet (25 mg) by mouth every other day 90 tablet 3    irbesartan (AVAPRO) 300 MG tablet TAKE 1 TABLET(300 MG) BY MOUTH DAILY 90 tablet 0    omeprazole (PRILOSEC) 20 MG DR capsule TAKE 1 CAPSULE BY MOUTH TWICE DAILY 180 capsule 3    simvastatin (ZOCOR) 10 MG tablet TAKE 1 TABLET(10 MG) BY MOUTH EVERY 24 HOURS 90 tablet 3    sotalol (BETAPACE) 80 MG tablet Take 0.5 tablets (40 mg) by mouth 2 times daily 90 tablet 1    traZODone (DESYREL) 50 MG tablet TAKE 1 TABLET(50 MG) BY MOUTH AT BEDTIME 90 tablet 2    Vitamin D3 (CHOLECALCIFEROL) 25 mcg (1000 units) tablet Take 1 tablet (25 mcg) by mouth daily      warfarin ANTICOAGULANT (COUMADIN) 2.5 MG tablet Take by mouth 3.75 mg (2.5 mg x 1.5 tablet) every day OR as directed by  "INR clinic 135 tablet 1    Wheat Dextrin (BENEFIBER PO) Take by mouth daily Once daily       No Known Allergies       Lab Results    Chemistry/lipid CBC Cardiac Enzymes/BNP/TSH/INR   Recent Labs   Lab Test 01/09/24  1104   CHOL 163   HDL 71   LDL 75   TRIG 84     Recent Labs   Lab Test 01/09/24  1104 01/02/23  1120 12/23/21  1007   LDL 75 66 74     Recent Labs   Lab Test 01/09/24  1104      POTASSIUM 3.8   CHLORIDE 100   CO2 27   GLC 96   BUN 10.3   CR 0.64   GFRESTIMATED 88   EBONY 9.3     Recent Labs   Lab Test 01/09/24  1104 01/02/23  1120 11/18/22  1144   CR 0.64 0.58 0.63     No results for input(s): \"A1C\" in the last 47860 hours.       Recent Labs   Lab Test 01/09/24  1104   WBC 5.3   HGB 12.2   HCT 36.8   MCV 86        Recent Labs   Lab Test 01/09/24  1104 11/18/22  1144 12/23/21  1007   HGB 12.2 12.5 12.7    Recent Labs   Lab Test 05/31/21  1938   TROPONINI <0.01     No results for input(s): \"BNP\", \"NTBNPI\", \"NTBNP\" in the last 13580 hours.  Recent Labs   Lab Test 11/18/22  1144   TSH 0.96     Recent Labs   Lab Test 02/09/24  0000 02/02/24  0000 01/29/24  0830   INR 2.3 2.2 2.4*          Ariana Rios PA-C                                       "

## 2024-02-19 NOTE — PROGRESS NOTES
ANTICOAGULATION MANAGEMENT     Rene Matthews 83 year old female is on warfarin with therapeutic INR result. (Goal INR 2.0-3.0)    Recent labs: (last 7 days)     02/16/24  0000   INR 2.2       ASSESSMENT     Source(s): Chart Review and Patient/Caregiver Call     Warfarin doses taken: Warfarin taken as instructed  Diet: No new diet changes identified  Medication/supplement changes: None noted  New illness, injury, or hospitalization: No  Signs or symptoms of bleeding or clotting: No  Previous result: Therapeutic last 2(+) visits  Additional findings:  plan to have 1 serving of cranberry this week.         PLAN     Recommended plan for no diet, medication or health factor changes affecting INR     Dosing Instructions: Continue your current warfarin dose with next INR in 1 week       Summary  As of 2/19/2024      Full warfarin instructions:  3.75 mg every day   Next INR check:  2/23/2024               Telephone call with Florence who verbalizes understanding and agrees to plan    Patient to recheck with home meter    Education provided:   Please call back if any changes to your diet, medications or how you've been taking warfarin  Resume manage by exception with home monitor. Continue to submit INR results to home monitor company.You will only be called when your result is out of range. Please call and notify [facility] if new medication started, dose missed, signs or symptoms of bleeding or clotting, or a surgery/procedure is scheduled.  Contact [phone]  with any changes, questions or concerns.     Plan made per [facility] anticoagulation protocol    Dior Gale RN  Anticoagulation Clinic  2/19/2024    _______________________________________________________________________     Anticoagulation Episode Summary       Current INR goal:  2.0-3.0   TTR:  65.9% (1 y)   Target end date:  Indefinite   Send INR reminders to:  [facility]    Indications    Paroxysmal atrial fibrillation (H) [I48.0]             Comments:  Cassy              Anticoagulation Care Providers       Provider Role Specialty Phone number    Kevin Ho MD Referring Internal Medicine [phone]

## 2024-02-20 ENCOUNTER — OFFICE VISIT (OUTPATIENT)
Dept: CARDIOLOGY | Facility: CLINIC | Age: 84
End: 2024-02-20
Attending: INTERNAL MEDICINE
Payer: COMMERCIAL

## 2024-02-20 VITALS
WEIGHT: 132.7 LBS | OXYGEN SATURATION: 98 % | HEART RATE: 47 BPM | SYSTOLIC BLOOD PRESSURE: 148 MMHG | RESPIRATION RATE: 16 BRPM | HEIGHT: 64 IN | BODY MASS INDEX: 22.65 KG/M2 | DIASTOLIC BLOOD PRESSURE: 68 MMHG

## 2024-02-20 DIAGNOSIS — R00.1 SINUS BRADYCARDIA: ICD-10-CM

## 2024-02-20 DIAGNOSIS — I35.1 MILD AORTIC INSUFFICIENCY: ICD-10-CM

## 2024-02-20 DIAGNOSIS — I10 PRIMARY HYPERTENSION: ICD-10-CM

## 2024-02-20 DIAGNOSIS — I10 HYPERTENSION, UNSPECIFIED TYPE: ICD-10-CM

## 2024-02-20 DIAGNOSIS — I35.1 AORTIC VALVE INSUFFICIENCY, ETIOLOGY OF CARDIAC VALVE DISEASE UNSPECIFIED: ICD-10-CM

## 2024-02-20 DIAGNOSIS — I48.19 PERSISTENT ATRIAL FIBRILLATION (H): Primary | ICD-10-CM

## 2024-02-20 PROCEDURE — 99214 OFFICE O/P EST MOD 30 MIN: CPT | Performed by: STUDENT IN AN ORGANIZED HEALTH CARE EDUCATION/TRAINING PROGRAM

## 2024-02-20 NOTE — PATIENT INSTRUCTIONS
Rene Matthews,    It was a pleasure to see you today at the [facility].     My recommendations after this visit include:    - Continue current medications  - Follow up with Dr. Sims 3/26/24    - Please call [phone], if you have any questions or concerns      Ariana Rios PA-C

## 2024-02-20 NOTE — LETTER
2/20/2024    Kevin Ho MD  [address] [facility] [address]    RE: Rene Matthews       Dear Colleague,     I had the pleasure of seeing Rene Matthews in the [facility].    HEART CARE ENCOUNTER NOTE      [facility]  [phone]      Assessment/Recommendations   Assessment:   Persistent atrial fibrillation: First diagnosed via MCT 9/2022 with patient being symptomatic with fluttering and weakness.  Persistent AF with controlled rates since 10/31/2023.  Status post cardioversion 1/29/2024.  Patient follows with atrial fibrillation clinic who thought she would be a good candidate for ablation in the future due to underlying conduction disease.  Remains on warfarin for anticoagulation and sotalol 40 mg twice daily.  Patient denies any recurrence of symptoms since cardioversion.  Hypertension: On irbesartan 300 mg daily, amlodipine 2.5 mg daily, hydrochlorothiazide 25 mg daily.  Elevated in clinic today at 148/68.  Patient's recent readings at other visits have been well-controlled.  Will hold off on making any adjustments at this time and if elevated when patient is back in clinic in a month or if patient checks at home could consider increasing amlodipine.  Aortic insufficiency: Mild as noted on echo 6/15/2021 as well as mild mitral regurgitation.  Sinus bradycardia: Patient is asymptomatic denying lightheadedness or dizziness    Plan:   Continue current medications.   Follow-up with Dr. Sims as scheduled 3/26/2024         History of Present Illness/Subjective    HPI: Rene Matthews is a 83 year old female with PMHx of persistent atrial fibrillation status post cardioversion 1/29/2024, hypertension, mild aortic insufficiency, sinus bradycardia presents for follow-up.  Patient follows with atrial fibrillation clinic.      Patient denies any recurrence of her atrial fibrillation symptoms since cardioversion.  Notes occasionally getting winded going up the stairs but  "that this has been stable.  Patient denies any lightheadedness or dizziness with her bradycardia.  Denies exertional angina, palpitations, lower extremity edema.        Echocardiogram 6/15/2021 results:  No previous study for comparison.    Left ventricle ejection fraction is normal. The calculated left ventricular ejection fraction is 66%.    Normal left ventricular size and systolic function.    Normal right ventricular size and systolic function.    Left atrial volume is mildly increased.    Mild aortic regurgitation.    Mild mitral regurgitation.       Holter monitor 11/3/2023:  Holter monitoring from 11/3/2023 to 11/4/2023 (duration 24hrs). Continuous atrial fibrillation, ventricular rates 60 to 134bpm, average 88bpm. There were no pauses of greater than 3 seconds. Rare premature ventricular contractions (<1%). No symptoms recorded. Diary entry for bike ride correlated to interval with atrial fibrillation and ventricular rates up to 134bpm. Electronically signed by Yola Sims MD     Physical Examination  Review of Systems   Vitals: BP (!) 148/68 (BP Location: Right arm, Patient Position: Sitting, Cuff Size: Adult Regular)   Pulse (!) 47   Resp 16   Ht 1.626 m (5' 4\")   Wt 60.2 kg (132 lb 11.2 oz)   LMP  (LMP Unknown)   SpO2 98%   BMI 22.78 kg/m    BMI= Body mass index is 22.78 kg/m .  Wt Readings from Last 3 Encounters:   02/20/24 60.2 kg (132 lb 11.2 oz)   01/29/24 60.3 kg (133 lb)   01/09/24 60.3 kg (133 lb)           ENT/Mouth: membranes moist, no oral lesions or bleeding gums.      EYES:  no scleral icterus, normal conjunctivae       Chest/Lungs:   lungs are clear to auscultation, no rales or wheezing,  equal chest wall expansion    Cardiovascular:   Regular. Normal first and second heart sounds with no murmurs, rubs, or gallops; the carotid, radial pulses are intact, Jugular venous pressure normal, no edema bilaterally        Extremities: no cyanosis or clubbing   Skin: no xanthelasma, " warm.    Neurologic: no tremors     Psychiatric: alert and oriented x3, calm        Please refer above for cardiac ROS details.        Medical History  Surgical History Family History Social History   Past Medical History:   Diagnosis Date    Abnormal Pap smear of cervix     ASCUS with negative biopsy    Acute low back pain without sciatica 05/18/2020    Allergic rhinitis     Anxiety, generalized     Benign neoplasm of ascending colon     Burning sensation of feet     Cervical polyp     Chest pain     secondary to gerd    Chronic abdominal pain     Negative CT scan and colonoscopy, musculoskeletal etiology suspected    Chronic radicular low back pain     MICHEL after evaluation by Dr. Bradley    Chronic sinusitis 10/18/2016    Frequent headaches     GERD (gastroesophageal reflux disease)     Noncardiac chest pain    Hearing loss in left ear 10/20/2017    Herpes zoster 01/01/2006    Hyperlipidemia     Hypertension     IBS (irritable bowel syndrome)     Insomnia     Mild aortic insufficiency 11/02/2022    Nausea 11/18/2022    Osteoarthritis     Osteopenia     DEXA 2014 T score -2.0 stable, DEXA December 2017 T score -1.6 left femoral neck stable.  DEXA January 2022 -2.1 L1 L4 and -1.8 bilateral femoral neck    Overactive bladder 10/18/2016    Palpitations 06/07/2021    Symptoms suspicious for atrial fibrillation.  Echocardiogram June 2021 with normal left ventricular systolic function with mild left atrial enlargement and mild AI and MR    Peptic ulcer disease 01/01/2006    w/ gastric ulcer    Personal history of colonic polyps     colonoscopy January 2014 normal.  Colonoscopy January 2019 with multiple polyps, repeat in 3 years.  Cologuard neg  January 2023    Postmenopausal bleeding 12/23/2021    Screening     Negative for AAA CT scan 2013    Skin cancer, basal cell 10/20/2017    Urge incontinence of urine 06/07/2021    Vitamin B12 deficiency (non anemic)     Weight gain 03/12/2021     Past Surgical History:   Procedure  Laterality Date    BIOPSY CERVICAL, LOCAL EXCISION, SINGLE/MULTIPLE      ENDOMETRIAL BIOPSY      Negative    OTHER SURGICAL HISTORY      Basal cell skin cancer    OTHER SURGICAL HISTORY  2012    Radiofrequency ablation right greater saphenous vein    OVARIAN CYST REMOVAL      lysis of adhesions    STRABISMUS SURGERY  2015     Family History   Problem Relation Age of Onset    Cerebrovascular Disease Mother     Hypertension Sister     Hypertension Brother         Social History     Socioeconomic History    Marital status:      Spouse name: Not on file    Number of children: Not on file    Years of education: Not on file    Highest education level: Not on file   Occupational History    Not on file   Tobacco Use    Smoking status: Never     Passive exposure: Never    Smokeless tobacco: Never   Vaping Use    Vaping Use: Never used   Substance and Sexual Activity    Alcohol use: No    Drug use: No    Sexual activity: Not on file   Other Topics Concern    Not on file   Social History Narrative    Retired teacher  , [place], 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     Social Determinants of Health     Financial Resource Strain: Low Risk  (1/9/2024)    Financial Resource Strain     Within the past 12 months, have you or your family members you live with been unable to get utilities (heat, electricity) when it was really needed?: No   Food Insecurity: Low Risk  (1/9/2024)    Food Insecurity     Within the past 12 months, did you worry that your food would run out before you got money to buy more?: No     Within the past 12 months, did the food you bought just not last and you didn t have money to get more?: No   Transportation Needs: Low Risk  (1/9/2024)    Transportation Needs     Within the past 12 months, has lack of transportation kept you from medical appointments, getting your medicines, non-medical meetings or appointments, work, or from getting things that you need?: No   Physical Activity: Not on file   Stress:  Not on file   Social Connections: Not on file   Interpersonal Safety: Low Risk  (1/9/2024)    Interpersonal Safety     Do you feel physically and emotionally safe where you currently live?: Yes     Within the past 12 months, have you been hit, slapped, kicked or otherwise physically hurt by someone?: No     Within the past 12 months, have you been humiliated or emotionally abused in other ways by your partner or ex-partner?: No   Housing Stability: Low Risk  (1/9/2024)    Housing Stability     Do you have housing? : Yes     Are you worried about losing your housing?: No           Medications  Allergies   Current Outpatient Medications   Medication Sig Dispense Refill    acetaminophen (TYLENOL) 325 MG tablet Take 325 mg by mouth 3 times daily as needed      amLODIPine (NORVASC) 2.5 MG tablet Take 1 tablet (2.5 mg) by mouth daily 180 tablet 1    Calcium Carb-Ergocalciferol 500-200 MG-UNIT TABS Take 1 tablet by mouth 2 times daily      cyanocobalamin (VITAMIN B-12) 1000 MCG tablet Take 1 tablet (1,000 mcg) by mouth daily      escitalopram (LEXAPRO) 10 MG tablet TAKE 1 TABLET(10 MG) BY MOUTH DAILY 90 tablet 3    fluticasone (FLONASE) 50 MCG/ACT nasal spray Spray 2 sprays in nostril 1 spray each nostril in the AM, 2 sprays in the PM      gabapentin (NEURONTIN) 300 MG capsule TAKE 1 CAPSULE(300 MG) BY MOUTH THREE TIMES DAILY 270 capsule 3    hydrochlorothiazide (HYDRODIURIL) 25 MG tablet Take 1 tablet (25 mg) by mouth every other day 90 tablet 3    irbesartan (AVAPRO) 300 MG tablet TAKE 1 TABLET(300 MG) BY MOUTH DAILY 90 tablet 0    omeprazole (PRILOSEC) 20 MG DR capsule TAKE 1 CAPSULE BY MOUTH TWICE DAILY 180 capsule 3    simvastatin (ZOCOR) 10 MG tablet TAKE 1 TABLET(10 MG) BY MOUTH EVERY 24 HOURS 90 tablet 3    sotalol (BETAPACE) 80 MG tablet Take 0.5 tablets (40 mg) by mouth 2 times daily 90 tablet 1    traZODone (DESYREL) 50 MG tablet TAKE 1 TABLET(50 MG) BY MOUTH AT BEDTIME 90 tablet 2    Vitamin D3  "(CHOLECALCIFEROL) 25 mcg (1000 units) tablet Take 1 tablet (25 mcg) by mouth daily      warfarin ANTICOAGULANT (COUMADIN) 2.5 MG tablet Take by mouth 3.75 mg (2.5 mg x 1.5 tablet) every day OR as directed by INR clinic 135 tablet 1    Wheat Dextrin (BENEFIBER PO) Take by mouth daily Once daily       No Known Allergies       Lab Results    Chemistry/lipid CBC Cardiac Enzymes/BNP/TSH/INR   Recent Labs   Lab Test 01/09/24  1104   CHOL 163   HDL 71   LDL 75   TRIG 84     Recent Labs   Lab Test 01/09/24  1104 01/02/23  1120 12/23/21  1007   LDL 75 66 74     Recent Labs   Lab Test 01/09/24  1104      POTASSIUM 3.8   CHLORIDE 100   CO2 27   GLC 96   BUN 10.3   CR 0.64   GFRESTIMATED 88   EBONY 9.3     Recent Labs   Lab Test 01/09/24  1104 01/02/23  1120 11/18/22  1144   CR 0.64 0.58 0.63     No results for input(s): \"A1C\" in the last 59791 hours.       Recent Labs   Lab Test 01/09/24  1104   WBC 5.3   HGB 12.2   HCT 36.8   MCV 86        Recent Labs   Lab Test 01/09/24  1104 11/18/22  1144 12/23/21  1007   HGB 12.2 12.5 12.7    Recent Labs   Lab Test 05/31/21  1938   TROPONINI <0.01     No results for input(s): \"BNP\", \"NTBNPI\", \"NTBNP\" in the last 76469 hours.  Recent Labs   Lab Test 11/18/22  1144   TSH 0.96     Recent Labs   Lab Test 02/09/24  0000 02/02/24  0000 01/29/24  0830   INR 2.3 2.2 2.4*          Araina Rios PA-C      Thank you for allowing me to participate in the care of your patient.      Sincerely,   Ariana Rios PA-C     [facility]  cc:   George Mike MD  [address]  [place] [address]      "

## 2024-02-21 DIAGNOSIS — M15.0 PRIMARY OSTEOARTHRITIS INVOLVING MULTIPLE JOINTS: ICD-10-CM

## 2024-02-21 DIAGNOSIS — G89.29 CHRONIC NONINTRACTABLE HEADACHE, UNSPECIFIED HEADACHE TYPE: ICD-10-CM

## 2024-02-21 DIAGNOSIS — R51.9 CHRONIC NONINTRACTABLE HEADACHE, UNSPECIFIED HEADACHE TYPE: ICD-10-CM

## 2024-02-22 RX ORDER — IRBESARTAN 300 MG/1
300 TABLET ORAL DAILY
Qty: 90 TABLET | Refills: 3 | Status: SHIPPED | OUTPATIENT
Start: 2024-02-22

## 2024-02-22 RX ORDER — GABAPENTIN 300 MG/1
300 CAPSULE ORAL 3 TIMES DAILY
Qty: 270 CAPSULE | Refills: 3 | Status: SHIPPED | OUTPATIENT
Start: 2024-02-22

## 2024-02-23 ENCOUNTER — DOCUMENTATION ONLY (OUTPATIENT)
Dept: ANTICOAGULATION | Facility: CLINIC | Age: 84
End: 2024-02-23
Payer: COMMERCIAL

## 2024-02-23 DIAGNOSIS — I48.0 PAROXYSMAL ATRIAL FIBRILLATION (H): Primary | ICD-10-CM

## 2024-02-23 LAB — INR HOME MONITORING: 2.1 (ref 2–3)

## 2024-02-23 NOTE — PROGRESS NOTES
ANTICOAGULATION  MANAGEMENT-Home Monitor Managed by Exception    Rene Matthews 83 year old female is on warfarin with therapeutic INR result. (Goal INR 2.0-3.0)    Recent labs: (last 7 days)     02/23/24  0000   INR 2.1       Previous INR was Therapeutic  Medication, diet, health changes since last INR:chart reviewed; none identified  Contacted within the last 12 weeks by phone on 1/26/24  Last [facility] referral date: 08/28/2023      YESENIA     Rene was NOT contacted regarding therapeutic result today per home monitoring policy manage by exception agreement.   Current warfarin dose is to be continued:     Summary  As of 2/23/2024      Full warfarin instructions:  3.75 mg every day   Next INR check:  3/1/2024             ?   Dior Gale RN  Anticoagulation Clinic  2/23/2024    _______________________________________________________________________     Anticoagulation Episode Summary       Current INR goal:  2.0-3.0   TTR:  67.3% (1 y)   Target end date:  Indefinite   Send INR reminders to:  [facility] HOME MONITORING    Indications    Paroxysmal atrial fibrillation (H) [I48.0]             Comments:  Cassy             Anticoagulation Care Providers       Provider Role Specialty Phone number    Kevin Ho MD Referring Internal Medicine [phone]

## 2024-02-23 NOTE — PROGRESS NOTES
ANTICOAGULATION MANAGEMENT     Rene Matthews 83 year old female is on warfarin with therapeutic INR result. (Goal INR 2.0-3.0)    Recent labs: (last 7 days)     01/12/24  0000   INR 2.1  2.1       ASSESSMENT     Source(s): Chart Review  Previous INR was Therapeutic last 2(+) visits  Medication, diet, health changes since last INR chart reviewed; none identified    Keflex 5 days (1/11 to 1/16)     Cardioversion - TBD       I left a detailed voicemail with the orders reflected in flowsheet. I have also requested a call back if there have been any missed doses, concerns, illness, fever, or if there have been any changes in medications, activity level, or diet       PLAN     Recommended plan for temporary change(s) affecting INR     Dosing Instructions: Continue your current warfarin dose with next INR in 1 week       Summary  As of 1/12/2024      Full warfarin instructions:  3.75 mg every day   Next INR check:  1/19/2024               Detailed voice message left for Florence with dosing instructions and follow up date.     Patient to recheck with home meter    Education provided:   Please call back if any changes to your diet, medications or how you've been taking warfarin    Plan made per [facility] anticoagulation protocol    Josiane Quintero, RN  Anticoagulation Clinic  1/12/2024    _______________________________________________________________________     Anticoagulation Episode Summary       Current INR goal:  2.0-3.0   TTR:  55.8% (1 y)   Target end date:  Indefinite   Send INR reminders to:  [facility] MONITORING    Indications    Paroxysmal atrial fibrillation (H) [I48.0]             Comments:  Acelis             Anticoagulation Care Providers       Provider Role Specialty Phone number    Kevin Ho MD Referring Internal Medicine [phone]               OBSERVATION

## 2024-02-27 DIAGNOSIS — G47.00 INSOMNIA, UNSPECIFIED TYPE: ICD-10-CM

## 2024-02-27 RX ORDER — TRAZODONE HYDROCHLORIDE 50 MG/1
TABLET, FILM COATED ORAL
Qty: 90 TABLET | Refills: 2 | Status: SHIPPED | OUTPATIENT
Start: 2024-02-27

## 2024-03-01 ENCOUNTER — DOCUMENTATION ONLY (OUTPATIENT)
Dept: ANTICOAGULATION | Facility: CLINIC | Age: 84
End: 2024-03-01
Payer: COMMERCIAL

## 2024-03-01 DIAGNOSIS — I48.0 PAROXYSMAL ATRIAL FIBRILLATION (H): Primary | ICD-10-CM

## 2024-03-01 LAB — INR HOME MONITORING: 2.7 (ref 2–3)

## 2024-03-01 NOTE — PROGRESS NOTES
ANTICOAGULATION  MANAGEMENT-Home Monitor Managed by Exception    Rene Matthews 83 year old female is on warfarin with therapeutic INR result. (Goal INR 2.0-3.0)    Recent labs: (last 7 days)     03/01/24  0000   INR 2.7       Previous INR was Therapeutic  Medication, diet, health changes since last INR:chart reviewed; none identified  Contacted within the last 12 weeks by phone on 1-26-24  Last [facility] referral date: 08/28/2023  It looks like Florence has [employer] and does not need to be checking INR every week, rather every 2 weeks. However, post-cardioversion (1/29/24) needed weekly INRs through 2/23/24. Could now return to every other weekly checks so will call patient to notify.       PLAN     Rene was contacted regarding therapeutic result today since she no longer needs to check weekly now that it has been 4 weeks since her DCCV.   Current warfarin dose is to be continued: 3.75 mg daily with next INR in 2 weeks. Patient stated understanding and is in agreement with plan.  Summary  As of 3/1/2024      Full warfarin instructions:  3.75 mg every day   Next INR check:  3/15/2024             ?   Vera Olea, RN  Anticoagulation Clinic  3/1/2024    _______________________________________________________________________     Anticoagulation Episode Summary       Current INR goal:  2.0-3.0   TTR:  69.2% (1 y)   Target end date:  Indefinite   Send INR reminders to:  [facility] HOME MONITORING    Indications    Paroxysmal atrial fibrillation (H) [I48.0]             Comments:  Acelis             Anticoagulation Care Providers       Provider Role Specialty Phone number    Kevin Ho MD Referring Internal Medicine [phone]

## 2024-03-07 ENCOUNTER — TELEPHONE (OUTPATIENT)
Dept: INTERNAL MEDICINE | Facility: CLINIC | Age: 84
End: 2024-03-07
Payer: COMMERCIAL

## 2024-03-07 NOTE — TELEPHONE ENCOUNTER
Medication Question or Refill    What medication are you calling about (include dose and sig)?: Virtussin AC    Preferred Pharmacy:  [id number] - [place], [place] - [address] [facility] [address]  [address]  [place] [address]  Phone: [phone] Fax: [phone]      Controlled Substance Agreement on file:   CSA -- Patient Level:    CSA: None found at the patient level.       Who prescribed the medication?: Dr. Ho    Do you need a refill? Yes    Patient offered an appointment? Yes    Do you have any questions or concerns?  No, Patient said they have taken this medication before and it helped with her cough.      Could we send this information to you in [place] or would you prefer to receive a phone call?:   Patient would prefer a phone call   Okay to leave a detailed message?: Yes at Cell number on file:    Telephone Information:   Mobile [phone]

## 2024-03-07 NOTE — TELEPHONE ENCOUNTER
Last time this medication was found on med list was 9/15/2016. Patient states that the prescription is actually for her  and they got it over 4 years ago. They have not been feeling well all week, had been using over the counter medications. I told patient that they need to be seen prior to medication being prescribed.     Called patient to offer a virtual or in person visit. Also told patient that they could go to Urgent care if they wanted the be seen and see if a provider would fill a mediation. Pt made a appt for tomorrow, will cancel if they are feeling better.

## 2024-03-08 ENCOUNTER — TELEPHONE (OUTPATIENT)
Dept: ANTICOAGULATION | Facility: CLINIC | Age: 84
End: 2024-03-08

## 2024-03-08 ENCOUNTER — OFFICE VISIT (OUTPATIENT)
Dept: INTERNAL MEDICINE | Facility: CLINIC | Age: 84
End: 2024-03-08
Payer: COMMERCIAL

## 2024-03-08 VITALS
RESPIRATION RATE: 16 BRPM | OXYGEN SATURATION: 96 % | DIASTOLIC BLOOD PRESSURE: 70 MMHG | HEART RATE: 50 BPM | TEMPERATURE: 97.8 F | SYSTOLIC BLOOD PRESSURE: 144 MMHG | BODY MASS INDEX: 23.05 KG/M2 | HEIGHT: 64 IN | WEIGHT: 135 LBS

## 2024-03-08 DIAGNOSIS — R05.1 ACUTE COUGH: Primary | ICD-10-CM

## 2024-03-08 DIAGNOSIS — I48.0 PAROXYSMAL ATRIAL FIBRILLATION (H): Primary | ICD-10-CM

## 2024-03-08 LAB
FLUAV RNA SPEC QL NAA+PROBE: NEGATIVE
FLUBV RNA RESP QL NAA+PROBE: NEGATIVE
RSV RNA SPEC NAA+PROBE: NEGATIVE
SARS-COV-2 RNA RESP QL NAA+PROBE: NEGATIVE

## 2024-03-08 PROCEDURE — 87637 SARSCOV2&INF A&B&RSV AMP PRB: CPT | Performed by: INTERNAL MEDICINE

## 2024-03-08 PROCEDURE — 99213 OFFICE O/P EST LOW 20 MIN: CPT | Performed by: INTERNAL MEDICINE

## 2024-03-08 RX ORDER — CODEINE PHOSPHATE AND GUAIFENESIN 10; 100 MG/5ML; MG/5ML
1-2 SOLUTION ORAL EVERY 4 HOURS PRN
Qty: 180 ML | Refills: 0 | Status: SHIPPED | OUTPATIENT
Start: 2024-03-08 | End: 2024-06-07

## 2024-03-08 RX ORDER — AZITHROMYCIN 250 MG/1
TABLET, FILM COATED ORAL
Qty: 6 TABLET | Refills: 0 | Status: SHIPPED | OUTPATIENT
Start: 2024-03-08 | End: 2024-03-13

## 2024-03-08 ASSESSMENT — ENCOUNTER SYMPTOMS: COUGH: 1

## 2024-03-08 NOTE — PROGRESS NOTES
"  Assessment & Plan     Acute cough   83-year-old woman who has developed an acute cough over the past 5 days.  Her  is sick as well.  No history of asthma or COPD.  Exam with lungs that are clear.  O2 sats 96% on room air.  I am checking her for influenza, RSV and COVID.  If her viral studies are negative, she can start on a Z-Rafael.  We discussed interaction with warfarin and the need to check his INR on Monday or Tuesday next week.  Will also provide Robitussin with codeine.  - azithromycin (ZITHROMAX) 250 MG tablet; Take 2 tablets (500 mg) by mouth daily for 1 day, THEN 1 tablet (250 mg) daily for 4 days.  - Symptomatic Influenza A/B, RSV, & SARS-CoV2 PCR (COVID-19) Nasopharyngeal; Future  - guaiFENesin-codeine (ROBITUSSIN AC) 100-10 MG/5ML solution; Take 5-10 mLs by mouth every 4 hours as needed for cough  - Symptomatic Influenza A/B, RSV, & SARS-CoV2 PCR (COVID-19) Nasopharyngeal              Follow-up for annual physical in January 2025    Lucy Henriquez is a 83 year old, presenting for the following health issues:  Cough (All different color phlegm since last Saturday- home covid negative Tuesday)      3/8/2024     4:17 PM   Additional Questions   Roomed by Colin     Cough    History of Present Illness       Reason for visit:  Cough  Symptom onset:  3-7 days ago    She eats 4 or more servings of fruits and vegetables daily.She consumes 1 sweetened beverage(s) daily.She exercises with enough effort to increase her heart rate 30 to 60 minutes per day.  She exercises with enough effort to increase her heart rate 7 days per week.   She is taking medications regularly.             Review of Systems  Constitutional, HEENT, cardiovascular, pulmonary, gi and gu systems are negative, except as otherwise noted.      Objective    BP (!) 144/70 (BP Location: Right arm, Patient Position: Sitting)   Pulse 50   Temp 97.8  F (36.6  C)   Resp 16   Ht 1.626 m (5' 4\")   Wt 61.2 kg (135 lb)   LMP  (LMP Unknown) "   SpO2 96%   BMI 23.17 kg/m    Body mass index is 23.17 kg/m .  Physical Exam   Well-appearing elderly woman who is having no respiratory difficulty.  O2 sats of 96% on room air  No cervical lymphadenopathy  Lungs clear bilaterally          Signed Electronically by: Kevin Ho MD

## 2024-03-08 NOTE — TELEPHONE ENCOUNTER
"ANTICOAGULATION  MANAGEMENT     Interacting Medication Review    Interacting medication(s): Azithromycin (Zithromax, Z-lon) with warfarin.    Symptomatic Influenza A/B, RSV, & SARS-CoV2 PCR (COVID-19) Nasopharyngeal in process    Duration: 5 days (3/8 to 3/13)    Indication:  acute cough    New medication?: Yes, interaction may increase INR and risk of bleeding. Closer INR monitoring recommended.        PLAN     Continue your current warfarin dose with next INR in 3 days        Summary  As of 3/8/2024      Full warfarin instructions:  3.75 mg every day   Next INR check:  3/11/2024               Left detailed voice message for Florence with dosing instructions and follow up date.     New recheck date updated on anticoagulation calendar     [facility] priority set/moved to \"high\" for new major drug interaction    Plan made per [facility] anticoagulation protocol    Lisha Herbert RN  Anticoagulation Clinic  "

## 2024-03-08 NOTE — PATIENT INSTRUCTIONS
Make sure you check your INR on Monday or Tuesday of next week after being on the Zithromax Z-Rafael as there is potential interaction with your warfarin

## 2024-03-12 ENCOUNTER — ANTICOAGULATION THERAPY VISIT (OUTPATIENT)
Dept: ANTICOAGULATION | Facility: CLINIC | Age: 84
End: 2024-03-12
Payer: COMMERCIAL

## 2024-03-12 DIAGNOSIS — I48.0 PAROXYSMAL ATRIAL FIBRILLATION (H): Primary | ICD-10-CM

## 2024-03-12 LAB — INR HOME MONITORING: 2.4 (ref 2–3)

## 2024-03-12 NOTE — PROGRESS NOTES
ANTICOAGULATION MANAGEMENT     Rene Matthews 83 year old female is on warfarin with therapeutic INR result. (Goal INR 2.0-3.0)    Recent labs: (last 7 days)     03/12/24  0000   INR 2.4       ASSESSMENT     Source(s): Chart Review and Patient/Caregiver Call     Warfarin doses taken: Warfarin taken as instructed  Diet: No new diet changes identified  Medication/supplement changes:  Azithromycin 5 day course (dates: 3/9-3/13) INR still therapeutic  New illness, injury, or hospitalization: Yes: 3/8 OV for acute cough  Signs or symptoms of bleeding or clotting: No  Previous result: Therapeutic last 2(+) visits  Additional findings: None       PLAN     Recommended plan for temporary change(s) affecting INR     Dosing Instructions: Continue your current warfarin dose with next INR in 1 week       Summary  As of 3/12/2024      Full warfarin instructions:  3.75 mg every day   Next INR check:  3/19/2024               Telephone call with Florence who verbalizes understanding and agrees to plan and who agrees to plan and repeated back plan correctly    Patient to recheck with home meter    Education provided:   Interaction IS anticipated between warfarin and Azithromycin  Contact [phone]  with any changes, questions or concerns.     Plan made per [facility] anticoagulation protocol    Lisha Herbert RN  Anticoagulation Clinic  3/12/2024    _______________________________________________________________________     Anticoagulation Episode Summary       Current INR goal:  2.0-3.0   TTR:  72.3% (1 y)   Target end date:  Indefinite   Send INR reminders to:  [facility] MONITOR[facility]    Indications    Paroxysmal atrial fibrillation (H) [I48.0]             Comments:  Acetracis             Anticoagulation Care Providers       Provider Role Specialty Phone number    Kevin Ho MD Referring Internal Medicine [phone]

## 2024-03-19 ENCOUNTER — DOCUMENTATION ONLY (OUTPATIENT)
Dept: ANTICOAGULATION | Facility: CLINIC | Age: 84
End: 2024-03-19
Payer: COMMERCIAL

## 2024-03-19 DIAGNOSIS — I48.0 PAROXYSMAL ATRIAL FIBRILLATION (H): Primary | ICD-10-CM

## 2024-03-19 LAB — INR HOME MONITORING: 2.2 (ref 2–3)

## 2024-03-19 NOTE — PROGRESS NOTES
ANTICOAGULATION  MANAGEMENT-Home Monitor Managed by Exception    Rene Matthews 83 year old female is on warfarin with therapeutic INR result. (Goal INR 2.0-3.0)    Recent labs: (last 7 days)     03/19/24  0000   INR 2.2       Previous INR was Therapeutic  Medication, diet, health changes since last INR:chart reviewed; none identified  Contacted within the last 12 weeks by phone on 3/12/24  Last [facility] referral date: 08/28/2023      YESENIA     Rene was NOT contacted regarding therapeutic result today per home monitoring policy manage by exception agreement.   Current warfarin dose is to be continued:     Summary  As of 3/19/2024      Full warfarin instructions:  3.75 mg every day   Next INR check:  3/26/2024             ?   Aleksandra Gardner RN  Anticoagulation Clinic  3/19/2024    _______________________________________________________________________     Anticoagulation Episode Summary       Current INR goal:  2.0-3.0   TTR:  74.2% (1 y)   Target end date:  Indefinite   Send INR reminders to:  [facility]    Indications    Paroxysmal atrial fibrillation (H) [I48.0]             Comments:  EDNA Madera             Anticoagulation Care Providers       Provider Role Specialty Phone number    Kevin Ho MD Referring Internal Medicine [phone]

## 2024-03-26 ENCOUNTER — ANTICOAGULATION THERAPY VISIT (OUTPATIENT)
Dept: ANTICOAGULATION | Facility: CLINIC | Age: 84
End: 2024-03-26

## 2024-03-26 DIAGNOSIS — I48.19 PERSISTENT ATRIAL FIBRILLATION (H): Primary | ICD-10-CM

## 2024-03-26 LAB — INR HOME MONITORING: 1.9 (ref 2–3)

## 2024-03-26 NOTE — PROGRESS NOTES
ANTICOAGULATION MANAGEMENT     Rene Matthews 83 year old female is on warfarin with subtherapeutic INR result. (Goal INR 2.0-3.0)    Recent labs: (last 7 days)     03/26/24  0000   INR 1.9*       ASSESSMENT     Source(s): Chart Review and Patient/Caregiver Call     Warfarin doses taken: Warfarin taken as instructed  Diet: Increased greens/vitamin K in diet; plans to resume previous intake had lemoncello drink- does not usually have this as her routine.  Medication/supplement changes: None noted  New illness, injury, or hospitalization: No  Signs or symptoms of bleeding or clotting: No  Previous result: Therapeutic last 2(+) visits  Additional findings: No       PLAN     Recommended plan for temporary change(s) affecting INR     Dosing Instructions: booster dose then continue your current warfarin dose with next INR in 1 week       Summary  As of 3/26/2024      Full warfarin instructions:  3/26: 5 mg; Otherwise 3.75 mg every day   Next INR check:  4/2/2024               Telephone call with Florence who verbalizes understanding and agrees to plan    Patient to recheck with home meter    Education provided:   Goal range and lab monitoring: Importance of therapeutic range  Dietary considerations: impact of vitamin K foods on INR and vitamin K content of foods  Contact [phone]  with any changes, questions or concerns.     Plan made per [facility] anticoagulation protocol    Lisha Herbert RN  Anticoagulation Clinic  3/26/2024    _______________________________________________________________________     Anticoagulation Episode Summary       Current INR goal:  2.0-3.0   TTR:  74.1% (1 y)   Target end date:  Indefinite   Send INR reminders to:  [facility]    Indications    Persistent atrial fibrillation (H) [I48.19]             Comments:  EDNA Madera             Anticoagulation Care Providers       Provider Role Specialty Phone number    Kevin Ho MD Referring Internal Medicine [phone]

## 2024-04-02 ENCOUNTER — ANTICOAGULATION THERAPY VISIT (OUTPATIENT)
Dept: ANTICOAGULATION | Facility: CLINIC | Age: 84
End: 2024-04-02
Payer: COMMERCIAL

## 2024-04-02 DIAGNOSIS — I48.19 PERSISTENT ATRIAL FIBRILLATION (H): Primary | ICD-10-CM

## 2024-04-02 LAB — INR HOME MONITORING: 2.1 (ref 2–3)

## 2024-04-02 NOTE — PROGRESS NOTES
ANTICOAGULATION MANAGEMENT     Rene Matthews 83 year old female is on warfarin with therapeutic INR result. (Goal INR 2.0-3.0)    Recent labs: (last 7 days)     04/02/24  0000   INR 2.1       ASSESSMENT     Source(s): Chart Review and Patient/Caregiver Call     Warfarin doses taken: Warfarin taken as instructed  Diet:  Ate more greens but had 2 lemoncello drinks to help bring inr up  Medication/supplement changes: None noted  New illness, injury, or hospitalization: No  Signs or symptoms of bleeding or clotting: No  Previous result: Subtherapeutic  Additional findings: None       PLAN     Recommended plan for temporary change(s) affecting INR     Dosing Instructions: Continue your current warfarin dose with next INR in 1 week       Summary  As of 4/2/2024      Full warfarin instructions:  3.75 mg every day   Next INR check:  4/9/2024               Telephone call with Florence who verbalizes understanding and agrees to plan and who agrees to plan and repeated back plan correctly    Patient to recheck with home meter    Education provided:   Resume manage by exception with home monitor. Continue to submit INR results to home monitor company.You will only be called when your result is out of range. Please call and notify [facility] if new medication started, dose missed, signs or symptoms of bleeding or clotting, or a surgery/procedure is scheduled. Due for next call no later than: 7/1/24.  Contact [phone]  with any changes, questions or concerns.     Plan made per [facility] anticoagulation protocol    Lisha Herbert RN  Anticoagulation Clinic  4/2/2024    _______________________________________________________________________     Anticoagulation Episode Summary       Current INR goal:  2.0-3.0   TTR:  73.1% (1 y)   Target end date:  Indefinite   Send INR reminders to:  [facility]    Indications    Persistent atrial fibrillation (H) [I48.19]             Comments:  EDNA Madera             Anticoagulation Care Providers        Provider Role Specialty Phone number    Kevin Ho MD Referring Internal Medicine [phone]

## 2024-04-09 ENCOUNTER — DOCUMENTATION ONLY (OUTPATIENT)
Dept: ANTICOAGULATION | Facility: CLINIC | Age: 84
End: 2024-04-09
Payer: COMMERCIAL

## 2024-04-09 DIAGNOSIS — I48.19 PERSISTENT ATRIAL FIBRILLATION (H): Primary | ICD-10-CM

## 2024-04-09 LAB — INR HOME MONITORING: 2.6 (ref 2–3)

## 2024-04-09 NOTE — PROGRESS NOTES
ANTICOAGULATION  MANAGEMENT-Home Monitor Managed by Exception    Rene Matthews 83 year old female is on warfarin with therapeutic INR result. (Goal INR 2.0-3.0)    Recent labs: (last 7 days)     04/09/24  0000   INR 2.6       Previous INR was Therapeutic  Medication, diet, health changes since last INR:chart reviewed; none identified  Contacted within the last 12 weeks by phone on 4/2/24  Last [facility] referral date: 08/28/2023      PLAN     Rene was NOT contacted regarding therapeutic result today per home monitoring policy manage by exception agreement.   Current warfarin dose is to be continued:     Summary  As of 4/9/2024      Full warfarin instructions:  3.75 mg every day   Next INR check:  4/16/2024             ?   Gretta Nazario RN  Anticoagulation Clinic  4/9/2024    _______________________________________________________________________     Anticoagulation Episode Summary       Current INR goal:  2.0-3.0   TTR:  73.1% (1 y)   Target end date:  Indefinite   Send INR reminders to:  [facility]    Indications    Persistent atrial fibrillation (H) [I48.19]             Comments:  EDNA Madera             Anticoagulation Care Providers       Provider Role Specialty Phone number    Kevin Ho MD Referring Internal Medicine [phone]

## 2024-04-10 ENCOUNTER — OFFICE VISIT (OUTPATIENT)
Dept: CARDIOLOGY | Facility: CLINIC | Age: 84
End: 2024-04-10
Payer: COMMERCIAL

## 2024-04-10 VITALS
SYSTOLIC BLOOD PRESSURE: 154 MMHG | BODY MASS INDEX: 22.81 KG/M2 | WEIGHT: 133.6 LBS | HEART RATE: 42 BPM | DIASTOLIC BLOOD PRESSURE: 68 MMHG | HEIGHT: 64 IN | RESPIRATION RATE: 16 BRPM

## 2024-04-10 DIAGNOSIS — I48.19 PERSISTENT ATRIAL FIBRILLATION (H): Primary | ICD-10-CM

## 2024-04-10 DIAGNOSIS — I49.5 SINUS NODE DYSFUNCTION (H): ICD-10-CM

## 2024-04-10 PROCEDURE — 99214 OFFICE O/P EST MOD 30 MIN: CPT | Performed by: INTERNAL MEDICINE

## 2024-04-10 NOTE — PATIENT INSTRUCTIONS
[facility]  Cardiac Electrophysiology  [address]  [place] [address]   Office: [phone]  Fax: [phone]     Thank you for seeing us in clinic today - it is a pleasure to be a part of your care team.  Below is a summary of our plan from today's visit.       You have atrial fibrillation.  We reviewed atrial fibrillation, treatment options (ablation vs antiarrhythmic drug therapy), and long term stroke risk prevention (blood thinner vs Watchman).  You also have sinus node dysfunction, manifest as slow resting heart rates, not presently associated with symptoms.      You elected to continue current medical therapy for now, with possible future consideration for ablation vs pacemaker and escalated drug therapy pending time to atrial fibrillation recurrence  - continue sotalol 40mg twice daily for now  - continue warfarin - we will begin the proceed of looking into Watchman  - continue to monitor for symptoms of palpitations, shortness of breath, lightheadedness, fatigue and let us know should these occur     Please do not hesitate to be in touch with our office at [phone] with any questions that may arise.       Thank you for trusting us with your care,    Yola Sims MD  Clinical Cardiac Electrophysiology  [facility]  [address]  [place] [address]   Office: [phone]  Fax: [phone]        ATRIAL FIBRILLATION: Patient Information    What is atrial fibrillation?  Atrial fibrillation (AF, A-fib) is a common heart rhythm problem (arrhythmia) occurring within the upper chambers of the heart (the atria).  In normal rhythm, the upper and lower chambers of the heart are electrically driven to contract in a coordinated sequence.  In atrial fibrillation, the atria lose their ability to contract because of rapid and chaotic electrical activity.  The lower chambers of the heart (the ventricles) continue to pump blood  throughout the body, though with irregular and often faster rate due to the chaotic activity within the atria.        How do I know if I have atrial fibrillation?   Some people may feel their heart beating faster, harder, or irregularly while in atrial fibrillation.  Others may be lightheaded, fatigued, feel weak or tired or become more short of breath especially with activities.  Some patients have no symptoms at all.  Atrial fibrillation may be found due to an irregular pulse or on an electrocardiogram (ECG). Atrial fibrillation can start and stop on its own, and episodes can last from seconds to several months.      How common is atrial fibrillation?   An estimated 3-6 million people in the [place] have atrial fibrillation.  Atrial fibrillation is a common heart rhythm problem for older persons, affecting as estimated 12-15% of people over the age of 65 years of age.    What causes atrial fibrillation?   Age is the most important risk factor for atrial fibrillation.  Atrial fibrillation is more common in people with other heart disease, high blood pressure, diabetes, obesity, sleep apnea and in people who regularly consume alcohol.  Surgery, lung disease, or thyroid problems can lead to atrial fibrillation.  Atrial fibrillation has multiple possible causes, and in most cases a single cause cannot be found.  Atrial fibrillation is a progressive condition, usually starting with at an early stage with short and infrequent episodes.  In later stages of disease, more frequent and longer lasting episodes of atrial fibrillation occur, ultimately culminating in episodes which do not spontaneously terminate.  Generally, more enlargement and scarring within the upper chambers of the heart is observed as atrial fibrillation progresses from early to late-stage disease.    How is atrial fibrillation diagnosed and evaluated?    Because of its start-stop nature, atrial fibrillation can be challenging to diagnose.  Atrial  fibrillation is most commonly diagnosed via cardiac rhythm recordings - either an ECG or wearable cardiac rhythm monitor.  For patients with pacemakers, defibrillators or implantable loop recorders, atrial fibrillation may be recorded via these devices.  Recently, commercially available devices (eg. Apple Watch, Provasculon device, certain FitBit devices, others) can allow patients to take 30 second cardiac rhythm recordings which may document atrial fibrillation.  Once atrial fibrillation is diagnosed, additional tests include blood tests and an echocardiogram.  The echocardiogram uses ultrasound to look at your heart to assess your cardiac function and evaluate for other heart disease.  Additional evaluation may include CT or MRI studies.    Is atrial fibrillation dangerous?   Atrial fibrillation is not usually a life-threatening arrhythmia.  The most serious consequences of atrial fibrillation including stroke and worsening of overall cardiac function.  While in atrial fibrillation, the upper cardiac chambers do not contract normally, resulting in slower blood flow and increased risk of clot formation.  If this blood clot becomes detached from the heart a stroke can occur.  Unfortunately, stroke can be the first sign of atrial fibrillation for some people.  With a stroke, you may notice abnormal sensation, weakness on one side of the body or face, changes in your vision or speech.  If you have any of these signs, you should contact EMS and be evaluated in an emergency room as soon as possible.      How is atrial fibrillation treated?     Several treatment options exist for suppressing atrial fibrillation - however, it is not an easily curable arrhythmia.  The first goal in managing atrial fibrillation is to minimize stroke risk.  The second goal is to improve symptoms associated with atrial fibrillation.  Finally, in patients with reduced cardiac function, maintaining normal rhythm can help improve cardiac function.       Blood thinners are used to reduce the risk of stroke in patients with high estimated stroke risk related to atrial fibrillation.  For patients at higher risk of bleeding related to blood thinner use, implantable devices may be an option to reduce stroke risk without the need for long term blood thinner use.      Atrial fibrillation can be managed via two strategies: rate control and rhythm control.  In a rate control strategy, continued atrial fibrillation is accepted and medications (eg. beta-blockers or calcium channel blockers) are used to control the lower chamber rate.  In a rhythm control strategy, anti-arrhythmic medications or catheter ablation are used to maintain normal cardiac rhythm and slow disease progression by suppressing atrial fibrillation.  A procedure called a cardioversion, in which an electric shock is delivered through patches placed on the chest wall while under deep sedation, can be performed to temporarily restore normal cardiac rhythm, though does not address the chance of atrial fibrillation recurrence.  Treatments are more effective for earlier rather than later stage atrial fibrillation.  Lifestyle modifications (maintaining a healthy weight, aerobic exercise, diagnosing and treating sleep apnea, and minimizing alcohol intake) are important elements of atrial fibrillation rhythm control.     What is catheter ablation for atrial fibrillation?  Cardiac catheter ablation is a commonly performed, minimally invasive procedure performed by a cardiac electrophysiologist to treat many different cardiac rhythm abnormalities.  During catheter ablation, long, thin, flexible tubes are advanced into the heart via small sheaths inserted into the femoral veins and thermal energy (either heating or cooling) is applied within the heart to disrupt abnormal electrical activity.  Atrial fibrillation ablation is performed under general anesthesia, with procedures generally taking approximately 2-3  hours.  Patients are typically observed for 3-5 hours after the ablation, and in most cases can be discharged home the same day.  Atrial fibrillation ablation is associated with better outcomes (mortality, cardiovascular hospitalizations, atrial arrhythmia recurrences) compared to antiarrhythmic drug therapy.  However, atrial fibrillation recurrences are not uncommon, and repeat catheter ablation may be offered.  Your electrophysiology team can review atrial fibrillation ablation, anticipated success rates, risks, and recovery expectations with you.    What are anti-arrhythmic medications?  Anti-arrhythmic medications are specialized drugs which alter cardiac electrical functioning to suppress arrhythmias.  There are several anti-arrhythmic medications available, each with its own success rate and side effects.  Some anti-arrhythmic medications are less effective though safer to use, others are more effective though have serious potential toxicities.  Atrial fibrillation recurrences are common and may require dose adjustment or change in antiarrhythmic therapy.  Your electrophysiology team will carefully consider which medication would be the best and safest for your particular case.      Can I live a normal life?    The goal of atrial fibrillation management is for patients to live normal lives without being limited by symptoms related to atrial fibrillation.    Are any additional educational resources available?  There are a number of excellent atrial fibrillation education resources available to you online.  A few options you may wish to review include:  hrsonline.org/guide-atrial-fibrillation  afibmatters.org  getsmartaboutafib.com  stopaf.com    What comes next?    Consider your management options and let us know how we can help in your decision process.  Please take medications as they have been prescribed.  You should also get any tests that may have been ordered for you.      When to Call Your Doctor or seek  emergency care:  Call your doctor or seek emergency care if you have any significant changes with the following:  Weakness  Dizziness  Fainting  Fatigue  Shortness of breath  Chest pain with increased activity  If you are concerned that your heart rate is too fast or too slow  Bleeding that does not stop in 10 minutes  Coughing or throwing up blood  Bloody diarrhea or bleeding hemorrhoids  Dark-colored urine or black stool  Allergic reactions:  Rash  Itching  Swelling  Trouble breathing or swallowing      Please call the [facility] at [phone] if you have concerns about your symptoms, your medicines, or your follow-up appointments.

## 2024-04-10 NOTE — PROGRESS NOTES
[facility]  Cardiac Electrophysiology  [address]  [place] [address]   Office: [phone]  Fax: [phone]     Patient: Rene Matthews   : 1940       CHIEF COMPLAINT/REASON FOR VISIT  Persistent atrial fibrillation      Assessment/Recommendations   Rene Matthews is a 83 year old female with persistent atrial fibrillation, sinus bradycardia, HTN referred by Bailey Amador CNP for consultation regarding atrial fibrillation.    Stage 3B/Persistent atrial fibrillation - minimal symptomatic, co-existing sinus node dysfunction  [id number] 4  We reviewed atrial fibrillation physiology and considerations including managing stroke risk, rate control, cardioversion, antiarrhythmic drug therapy +/- pacemaker, and catheter ablation.  Antiarrhythmic drug options are limited given co-occurrence with SND.  We discussed atrial fibrillation ablation procedures, anticipated success rates, the potential need for re-do ablation vs addition of anti-arrhythmic drugs, procedural risks (including groin bleeding, tamponade, phrenic or esophageal injury, stroke, pulmonary vein stenosis) and recovery expectations.  She would prefer to continue current medical therapy for now, with further consideration for ablation vs PPM/escalated AAD pending time to AF recurrence  - continue sotalol 40mg twice daily for now  - continue warfarin - she is looking into percutaneous LAAO  - we discussed the ongoing importance of lifestyle modification (maintaining a healthy weight, aerobic activity, sleep apnea diagnosis and management, alcohol avoidance) as part of a long term strategy for atrial fibrillation management    Sinus node dysfunction - asymptomatic  - expectant management    Follow up: as            History of Present Illness   Rene Matthews is a 83 year old female with persistent atrial fibrillation, sinus bradycardia, HTN referred by Bailey Amador CNP for consultation regarding atrial  "fibrillation.    Mrs. Matthews's atrial fibrillation history is as summarized below:  Symptoms: palpitations  Symptom onset date: 8/2022  Diagnosis date: paroxysmal AF by MCT 9/2022, persistent 11/2/2023  Admissions/ER visits: none  Prior medical therapies: sotalol (1/29/2024-present)  Prior DCCVs: 1/29/2024  Prior ablations: none  Percutaneous left atrial appendage occlusion: none    She reports no symptomatic change after DCCV.  She is active with walking, biking, playing pickleball.  She denies exertional dyspnea, chest pain, syncope.  Her  has AF.       Physical Examination  Review of Systems   VITALS: BP (!) 154/68 (BP Location: Right arm, Patient Position: Sitting, Cuff Size: Adult Regular)   Pulse (!) 42   Resp 16   Ht 1.626 m (5' 4\")   Wt 60.6 kg (133 lb 9.6 oz)   LMP  (LMP Unknown)   BMI 22.93 kg/m    Wt Readings from Last 3 Encounters:   03/08/24 61.2 kg (135 lb)   02/20/24 60.2 kg (132 lb 11.2 oz)   01/29/24 60.3 kg (133 lb)     CONSTITUTIONAL: well nourished, comfortable, no distress  EYES:  Conjunctivae pink, sclerae clear.    E/N/T:  Oral mucosa pink  RESPIRATORY:  Respiratory effort is normal  CARDIOVASCULAR:  regular, bradycardic, normal S1 and S2  GASTROINTESTINAL:  Abdomen without masses or tenderness  EXTREMITIES:  No clubbing or cyanosis.    MUSCULOSKELETAL:  Overall grossly normal muscle strength  SKIN:  Overall, skin warm and dry, no lesions.  NEURO/PSYCH:  Oriented x 3 with normal affect.   Constitutional:  No weight loss or loss of appetite    Eyes:  No difficulty with vision, no double vision, no dry eyes  ENT:  No sore throat, difficulty swallowing; changes in hearing or tinnitus  Cardiovascular: As detailed above  Respiratory:  No cough  Musculoskeletal  No joint pain, muscle aches  Neurologic:  No syncope, lightheadedness, fainting spells   Hematologic: No easy bruising, excessive bleeding tendency   Gastrointestinal:  No jaundice, abdominal pain or abdominal " bloating  Genitourinary: No changes in urinary habits, no trouble urinating    Psychiatric: No anxiety or depression      Medical History  Surgical History   Past Medical History:   Diagnosis Date    Abnormal Pap smear of cervix     ASCUS with negative biopsy    Acute low back pain without sciatica 05/18/2020    Allergic rhinitis     Anxiety, generalized     Benign neoplasm of ascending colon     Burning sensation of feet     Cervical polyp     Chest pain     secondary to gerd    Chronic abdominal pain     Negative CT scan and colonoscopy, musculoskeletal etiology suspected    Chronic radicular low back pain     MICHEL after evaluation by Dr. Bradley    Chronic sinusitis 10/18/2016    Frequent headaches     GERD (gastroesophageal reflux disease)     Noncardiac chest pain    Hearing loss in left ear 10/20/2017    Herpes zoster 01/01/2006    Hyperlipidemia     Hypertension     IBS (irritable bowel syndrome)     Insomnia     Mild aortic insufficiency 11/02/2022    Nausea 11/18/2022    Osteoarthritis     Osteopenia     DEXA 2014 T score -2.0 stable, DEXA December 2017 T score -1.6 left femoral neck stable.  DEXA January 2022 -2.1 L1 L4 and -1.8 bilateral femoral neck    Overactive bladder 10/18/2016    Palpitations 06/07/2021    Symptoms suspicious for atrial fibrillation.  Echocardiogram June 2021 with normal left ventricular systolic function with mild left atrial enlargement and mild AI and MR    Peptic ulcer disease 01/01/2006    w/ gastric ulcer    Personal history of colonic polyps     colonoscopy January 2014 normal.  Colonoscopy January 2019 with multiple polyps, repeat in 3 years.  Cologuard neg  January 2023    Postmenopausal bleeding 12/23/2021    Screening     Negative for AAA CT scan 2013    Skin cancer, basal cell 10/20/2017    Urge incontinence of urine 06/07/2021    Vitamin B12 deficiency (non anemic)     Weight gain 03/12/2021    Past Surgical History:   Procedure Laterality Date    BIOPSY CERVICAL, LOCAL  EXCISION, SINGLE/MULTIPLE      ENDOMETRIAL BIOPSY      Negative    OTHER SURGICAL HISTORY      Basal cell skin cancer    OTHER SURGICAL HISTORY  2012    Radiofrequency ablation right greater saphenous vein    OVARIAN CYST REMOVAL      lysis of adhesions    STRABISMUS SURGERY  2015         Family History Social History   Family History   Problem Relation Age of Onset    Cerebrovascular Disease Mother     Hypertension Sister     Hypertension Brother         Social History     Tobacco Use    Smoking status: Never     Passive exposure: Never    Smokeless tobacco: Never   Vaping Use    Vaping status: Never Used   Substance Use Topics    Alcohol use: No    Drug use: No         Medications  Allergies     Current Outpatient Medications:     acetaminophen (TYLENOL) 325 MG tablet, Take 325 mg by mouth 3 times daily as needed, Disp: , Rfl:     amLODIPine (NORVASC) 2.5 MG tablet, Take 1 tablet (2.5 mg) by mouth daily, Disp: 180 tablet, Rfl: 1    Calcium Carb-Ergocalciferol 500-200 MG-UNIT TABS, Take 1 tablet by mouth 2 times daily, Disp: , Rfl:     cyanocobalamin (VITAMIN B-12) 1000 MCG tablet, Take 1 tablet (1,000 mcg) by mouth daily, Disp: , Rfl:     escitalopram (LEXAPRO) 10 MG tablet, TAKE 1 TABLET(10 MG) BY MOUTH DAILY, Disp: 90 tablet, Rfl: 3    fluticasone (FLONASE) 50 MCG/ACT nasal spray, Spray 2 sprays in nostril 1 spray each nostril in the AM, 2 sprays in the PM, Disp: , Rfl:     gabapentin (NEURONTIN) 300 MG capsule, Take 1 capsule (300 mg) by mouth 3 times daily, Disp: 270 capsule, Rfl: 3    guaiFENesin-codeine (ROBITUSSIN AC) 100-10 MG/5ML solution, Take 5-10 mLs by mouth every 4 hours as needed for cough, Disp: 180 mL, Rfl: 0    hydrochlorothiazide (HYDRODIURIL) 25 MG tablet, Take 1 tablet (25 mg) by mouth every other day, Disp: 90 tablet, Rfl: 3    irbesartan (AVAPRO) 300 MG tablet, TAKE 1 TABLET(300 MG) BY MOUTH DAILY, Disp: 90 tablet, Rfl: 3    omeprazole (PRILOSEC) 20 MG DR capsule, TAKE 1 CAPSULE BY MOUTH  "TWICE DAILY (Patient taking differently: daily), Disp: 180 capsule, Rfl: 3    simvastatin (ZOCOR) 10 MG tablet, TAKE 1 TABLET(10 MG) BY MOUTH EVERY 24 HOURS, Disp: 90 tablet, Rfl: 3    sotalol (BETAPACE) 80 MG tablet, Take 0.5 tablets (40 mg) by mouth 2 times daily, Disp: 90 tablet, Rfl: 1    traZODone (DESYREL) 50 MG tablet, TAKE 1 TABLET(50 MG) BY MOUTH AT BEDTIME, Disp: 90 tablet, Rfl: 2    Vitamin D3 (CHOLECALCIFEROL) 25 mcg (1000 units) tablet, Take 1 tablet (25 mcg) by mouth daily, Disp: , Rfl:     warfarin ANTICOAGULANT (COUMADIN) 2.5 MG tablet, Take by mouth 3.75 mg (2.5 mg x 1.5 tablet) every day OR as directed by INR clinic, Disp: 135 tablet, Rfl: 1    Wheat Dextrin (BENEFIBER PO), Take by mouth daily Once daily, Disp: , Rfl:    No Known Allergies       Lab Results    Chemistry CBC Cardiac Enzymes/BNP/TSH/INR   Recent Labs   Lab Test 01/09/24  1104      POTASSIUM 3.8   CHLORIDE 100   CO2 27   GLC 96   BUN 10.3   CR 0.64   GFRESTIMATED 88   EBONY 9.3     Recent Labs   Lab Test 01/09/24  1104 01/02/23  1120 11/18/22  1144   CR 0.64 0.58 0.63          Recent Labs   Lab Test 01/09/24  1104   WBC 5.3   HGB 12.2   HCT 36.8   MCV 86        Recent Labs   Lab Test 01/09/24  1104 11/18/22  1144 12/23/21  1007   HGB 12.2 12.5 12.7    Recent Labs   Lab Test 05/31/21  1938   TROPONINI <0.01     No results for input(s): \"BNP\", \"NTBNPI\", \"NTBNP\" in the last 17424 hours.  Recent Labs   Lab Test 11/18/22  1144   TSH 0.96     Recent Labs   Lab Test 03/19/24  0000 03/12/24  0000 03/01/24  0000   INR 2.2 2.4 2.7         Data Review    ECGs (tracings independently reviewed)  2/2/2024 - SB 45bpm, QT//468ms  1/29/2024 (post DCCV) - SB 50bpm, QT//448ms    Holter monitoring from 11/3/2023 to 11/4/2023 (duration 24hrs). (independently reviewed)  Continuous atrial fibrillation, ventricular rates 60 to 134bpm, average 88bpm.   There were no pauses of greater than 3 seconds.   Rare premature ventricular " contractions (<1%).   No symptoms recorded.   Diary entry for bike ride correlated to interval with atrial fibrillation and ventricular rates up to 134bpm    6/15/2021 TTE    No previous study for comparison.    Left ventricle ejection fraction is normal. The calculated left ventricular ejection fraction is 66%.    Normal left ventricular size and systolic function.    Normal right ventricular size and systolic function.    Left atrial volume is mildly increased.    Mild aortic regurgitation.    Mild mitral regurgitation.       Cc: Bailey Amador CNP, Kevin Ho MD Amila Dilusha William, MD  4/10/2024  10:30 AM

## 2024-04-10 NOTE — LETTER
4/10/2024    Kevin Ho MD  [address] [address]    RE: Rene Matthews       Dear Colleague,     I had the pleasure of seeing Rene Matthews in the [facility].     [facility]  Cardiac Electrophysiology  [address]  [place] [address]   Office: [phone]  Fax: [phone]     Patient: Rene Matthews   : 1940       CHIEF COMPLAINT/REASON FOR VISIT  Persistent atrial fibrillation      Assessment/Recommendations   Rene Matthews is a 83 year old female with persistent atrial fibrillation, sinus bradycardia, HTN referred by Bailey Amador CNP for consultation regarding atrial fibrillation.    Stage 3B/Persistent atrial fibrillation - minimal symptomatic, co-existing sinus node dysfunction  [id number] 4  We reviewed atrial fibrillation physiology and considerations including managing stroke risk, rate control, cardioversion, antiarrhythmic drug therapy +/- pacemaker, and catheter ablation.  Antiarrhythmic drug options are limited given co-occurrence with SND.  We discussed atrial fibrillation ablation procedures, anticipated success rates, the potential need for re-do ablation vs addition of anti-arrhythmic drugs, procedural risks (including groin bleeding, tamponade, phrenic or esophageal injury, stroke, pulmonary vein stenosis) and recovery expectations.  She would prefer to continue current medical therapy for now, with further consideration for ablation vs PPM/escalated AAD pending time to AF recurrence  - continue sotalol 40mg twice daily for now  - continue warfarin - she is looking into percutaneous LAAO  - we discussed the ongoing importance of lifestyle modification (maintaining a healthy weight, aerobic activity, sleep apnea diagnosis and management, alcohol avoidance) as part of a long term strategy for atrial fibrillation management    Sinus node dysfunction - asymptomatic  - expectant management    Follow up: as     "        History of Present Illness   Rene Matthews is a 83 year old female with persistent atrial fibrillation, sinus bradycardia, HTN referred by Bailey Amador CNP for consultation regarding atrial fibrillation.    Mrs. Matthews's atrial fibrillation history is as summarized below:  Symptoms: palpitations  Symptom onset date: 8/2022  Diagnosis date: paroxysmal AF by MCT 9/2022, persistent 11/2/2023  Admissions/ER visits: none  Prior medical therapies: sotalol (1/29/2024-present)  Prior DCCVs: 1/29/2024  Prior ablations: none  Percutaneous left atrial appendage occlusion: none    She reports no symptomatic change after DCCV.  She is active with walking, biking, playing pickleball.  She denies exertional dyspnea, chest pain, syncope.  Her  has AF.       Physical Examination  Review of Systems   VITALS: BP (!) 154/68 (BP Location: Right arm, Patient Position: Sitting, Cuff Size: Adult Regular)   Pulse (!) 42   Resp 16   Ht 1.626 m (5' 4\")   Wt 60.6 kg (133 lb 9.6 oz)   LMP  (LMP Unknown)   BMI 22.93 kg/m    Wt Readings from Last 3 Encounters:   03/08/24 61.2 kg (135 lb)   02/20/24 60.2 kg (132 lb 11.2 oz)   01/29/24 60.3 kg (133 lb)     CONSTITUTIONAL: well nourished, comfortable, no distress  EYES:  Conjunctivae pink, sclerae clear.    E/N/T:  Oral mucosa pink  RESPIRATORY:  Respiratory effort is normal  CARDIOVASCULAR:  regular, bradycardic, normal S1 and S2  GASTROINTESTINAL:  Abdomen without masses or tenderness  EXTREMITIES:  No clubbing or cyanosis.    MUSCULOSKELETAL:  Overall grossly normal muscle strength  SKIN:  Overall, skin warm and dry, no lesions.  NEURO/PSYCH:  Oriented x 3 with normal affect.   Constitutional:  No weight loss or loss of appetite    Eyes:  No difficulty with vision, no double vision, no dry eyes  ENT:  No sore throat, difficulty swallowing; changes in hearing or tinnitus  Cardiovascular: As detailed above  Respiratory:  No cough  Musculoskeletal  No joint pain, muscle " aches  Neurologic:  No syncope, lightheadedness, fainting spells   Hematologic: No easy bruising, excessive bleeding tendency   Gastrointestinal:  No jaundice, abdominal pain or abdominal bloating  Genitourinary: No changes in urinary habits, no trouble urinating    Psychiatric: No anxiety or depression      Medical History  Surgical History   Past Medical History:   Diagnosis Date    Abnormal Pap smear of cervix     ASCUS with negative biopsy    Acute low back pain without sciatica 05/18/2020    Allergic rhinitis     Anxiety, generalized     Benign neoplasm of ascending colon     Burning sensation of feet     Cervical polyp     Chest pain     secondary to gerd    Chronic abdominal pain     Negative CT scan and colonoscopy, musculoskeletal etiology suspected    Chronic radicular low back pain     MICHEL after evaluation by Dr. Bradley    Chronic sinusitis 10/18/2016    Frequent headaches     GERD (gastroesophageal reflux disease)     Noncardiac chest pain    Hearing loss in left ear 10/20/2017    Herpes zoster 01/01/2006    Hyperlipidemia     Hypertension     IBS (irritable bowel syndrome)     Insomnia     Mild aortic insufficiency 11/02/2022    Nausea 11/18/2022    Osteoarthritis     Osteopenia     DEXA 2014 T score -2.0 stable, DEXA December 2017 T score -1.6 left femoral neck stable.  DEXA January 2022 -2.1 L1 L4 and -1.8 bilateral femoral neck    Overactive bladder 10/18/2016    Palpitations 06/07/2021    Symptoms suspicious for atrial fibrillation.  Echocardiogram June 2021 with normal left ventricular systolic function with mild left atrial enlargement and mild AI and MR    Peptic ulcer disease 01/01/2006    w/ gastric ulcer    Personal history of colonic polyps     colonoscopy January 2014 normal.  Colonoscopy January 2019 with multiple polyps, repeat in 3 years.  Cologuard neg  January 2023    Postmenopausal bleeding 12/23/2021    Screening     Negative for AAA CT scan 2013    Skin cancer, basal cell 10/20/2017     Urge incontinence of urine 06/07/2021    Vitamin B12 deficiency (non anemic)     Weight gain 03/12/2021    Past Surgical History:   Procedure Laterality Date    BIOPSY CERVICAL, LOCAL EXCISION, SINGLE/MULTIPLE      ENDOMETRIAL BIOPSY      Negative    OTHER SURGICAL HISTORY      Basal cell skin cancer    OTHER SURGICAL HISTORY  2012    Radiofrequency ablation right greater saphenous vein    OVARIAN CYST REMOVAL      lysis of adhesions    STRABISMUS SURGERY  2015         Family History Social History   Family History   Problem Relation Age of Onset    Cerebrovascular Disease Mother     Hypertension Sister     Hypertension Brother         Social History     Tobacco Use    Smoking status: Never     Passive exposure: Never    Smokeless tobacco: Never   Vaping Use    Vaping status: Never Used   Substance Use Topics    Alcohol use: No    Drug use: No         Medications  Allergies     Current Outpatient Medications:     acetaminophen (TYLENOL) 325 MG tablet, Take 325 mg by mouth 3 times daily as needed, Disp: , Rfl:     amLODIPine (NORVASC) 2.5 MG tablet, Take 1 tablet (2.5 mg) by mouth daily, Disp: 180 tablet, Rfl: 1    Calcium Carb-Ergocalciferol 500-200 MG-UNIT TABS, Take 1 tablet by mouth 2 times daily, Disp: , Rfl:     cyanocobalamin (VITAMIN B-12) 1000 MCG tablet, Take 1 tablet (1,000 mcg) by mouth daily, Disp: , Rfl:     escitalopram (LEXAPRO) 10 MG tablet, TAKE 1 TABLET(10 MG) BY MOUTH DAILY, Disp: 90 tablet, Rfl: 3    fluticasone (FLONASE) 50 MCG/ACT nasal spray, Spray 2 sprays in nostril 1 spray each nostril in the AM, 2 sprays in the PM, Disp: , Rfl:     gabapentin (NEURONTIN) 300 MG capsule, Take 1 capsule (300 mg) by mouth 3 times daily, Disp: 270 capsule, Rfl: 3    guaiFENesin-codeine (ROBITUSSIN AC) 100-10 MG/5ML solution, Take 5-10 mLs by mouth every 4 hours as needed for cough, Disp: 180 mL, Rfl: 0    hydrochlorothiazide (HYDRODIURIL) 25 MG tablet, Take 1 tablet (25 mg) by mouth every other day, Disp: 90  "tablet, Rfl: 3    irbesartan (AVAPRO) 300 MG tablet, TAKE 1 TABLET(300 MG) BY MOUTH DAILY, Disp: 90 tablet, Rfl: 3    omeprazole (PRILOSEC) 20 MG DR capsule, TAKE 1 CAPSULE BY MOUTH TWICE DAILY (Patient taking differently: daily), Disp: 180 capsule, Rfl: 3    simvastatin (ZOCOR) 10 MG tablet, TAKE 1 TABLET(10 MG) BY MOUTH EVERY 24 HOURS, Disp: 90 tablet, Rfl: 3    sotalol (BETAPACE) 80 MG tablet, Take 0.5 tablets (40 mg) by mouth 2 times daily, Disp: 90 tablet, Rfl: 1    traZODone (DESYREL) 50 MG tablet, TAKE 1 TABLET(50 MG) BY MOUTH AT BEDTIME, Disp: 90 tablet, Rfl: 2    Vitamin D3 (CHOLECALCIFEROL) 25 mcg (1000 units) tablet, Take 1 tablet (25 mcg) by mouth daily, Disp: , Rfl:     warfarin ANTICOAGULANT (COUMADIN) 2.5 MG tablet, Take by mouth 3.75 mg (2.5 mg x 1.5 tablet) every day OR as directed by INR clinic, Disp: 135 tablet, Rfl: 1    Wheat Dextrin (BENEFIBER PO), Take by mouth daily Once daily, Disp: , Rfl:    No Known Allergies       Lab Results    Chemistry CBC Cardiac Enzymes/BNP/TSH/INR   Recent Labs   Lab Test 01/09/24  1104      POTASSIUM 3.8   CHLORIDE 100   CO2 27   GLC 96   BUN 10.3   CR 0.64   GFRESTIMATED 88   EBONY 9.3     Recent Labs   Lab Test 01/09/24  1104 01/02/23  1120 11/18/22  1144   CR 0.64 0.58 0.63          Recent Labs   Lab Test 01/09/24  1104   WBC 5.3   HGB 12.2   HCT 36.8   MCV 86        Recent Labs   Lab Test 01/09/24  1104 11/18/22  1144 12/23/21  1007   HGB 12.2 12.5 12.7    Recent Labs   Lab Test 05/31/21  1938   TROPONINI <0.01     No results for input(s): \"BNP\", \"NTBNPI\", \"NTBNP\" in the last 94763 hours.  Recent Labs   Lab Test 11/18/22  1144   TSH 0.96     Recent Labs   Lab Test 03/19/24  0000 03/12/24  0000 03/01/24  0000   INR 2.2 2.4 2.7         Data Review    ECGs (tracings independently reviewed)  2/2/2024 - SB 45bpm, QT//468ms  1/29/2024 (post DCCV) - SB 50bpm, QT//448ms    Holter monitoring from 11/3/2023 to 11/4/2023 (duration 24hrs). " (independently reviewed)  Continuous atrial fibrillation, ventricular rates 60 to 134bpm, average 88bpm.   There were no pauses of greater than 3 seconds.   Rare premature ventricular contractions (<1%).   No symptoms recorded.   Diary entry for bike ride correlated to interval with atrial fibrillation and ventricular rates up to 134bpm    6/15/2021 TTE    No previous study for comparison.    Left ventricle ejection fraction is normal. The calculated left ventricular ejection fraction is 66%.    Normal left ventricular size and systolic function.    Normal right ventricular size and systolic function.    Left atrial volume is mildly increased.    Mild aortic regurgitation.    Mild mitral regurgitation.       Cc: Bailey Amador CNP, Kevin Ho MD Amila Dilusha William, MD  4/10/2024  10:30 AM        Thank you for allowing me to participate in the care of your patient.      Sincerely,     Yola Sims MD     [facility]  cc:   Yola Sims MD  [address]  [place] [address]

## 2024-04-16 ENCOUNTER — DOCUMENTATION ONLY (OUTPATIENT)
Dept: ANTICOAGULATION | Facility: CLINIC | Age: 84
End: 2024-04-16
Payer: COMMERCIAL

## 2024-04-16 DIAGNOSIS — I48.19 PERSISTENT ATRIAL FIBRILLATION (H): Primary | ICD-10-CM

## 2024-04-16 LAB — INR HOME MONITORING: 2.3 (ref 2–3)

## 2024-04-16 NOTE — PROGRESS NOTES
ANTICOAGULATION  MANAGEMENT-Home Monitor Managed by Exception    Rene Matthews 83 year old female is on warfarin with therapeutic INR result. (Goal INR 2.0-3.0)    Recent labs: (last 7 days)     04/16/24  0000   INR 2.3       Previous INR was Therapeutic  Medication, diet, health changes since last INR:chart reviewed; none identified  Contacted within the last 12 weeks by phone on 4/2/24  Last [facility] referral date: 08/28/2023      YESENIA     Rene was NOT contacted regarding therapeutic result today per home monitoring policy manage by exception agreement.   Current warfarin dose is to be continued:     Summary  As of 4/16/2024      Full warfarin instructions:  3.75 mg every day   Next INR check:  4/30/2024             ?   Lisha Herbert RN  Anticoagulation Clinic  4/16/2024    _______________________________________________________________________     Anticoagulation Episode Summary       Current INR goal:  2.0-3.0   TTR:  73.9% (1 y)   Target end date:  Indefinite   Send INR reminders to:  [facility]    Indications    Persistent atrial fibrillation (H) [I48.19]             Comments:  EDNA [place]             Anticoagulation Care Providers       Provider Role Specialty Phone number    Kevin Ho MD Referring Internal Medicine [phone]

## 2024-04-23 ENCOUNTER — DOCUMENTATION ONLY (OUTPATIENT)
Dept: ANTICOAGULATION | Facility: CLINIC | Age: 84
End: 2024-04-23
Payer: COMMERCIAL

## 2024-04-23 DIAGNOSIS — I48.19 PERSISTENT ATRIAL FIBRILLATION (H): Primary | ICD-10-CM

## 2024-04-23 LAB — INR HOME MONITORING: 2.7 (ref 2–3)

## 2024-04-23 NOTE — PROGRESS NOTES
ANTICOAGULATION  MANAGEMENT-Home Monitor Managed by Exception    Rene Matthews 83 year old female is on warfarin with therapeutic INR result. (Goal INR 2.0-3.0)    Recent labs: (last 7 days)     04/23/24  0000   INR 2.7       Previous INR was Therapeutic  Medication, diet, health changes since last INR:chart reviewed; none identified  Contacted within the last 12 weeks by phone on 4/2/24  Last [facility] referral date: 08/28/2023      YESENIA     Rene was NOT contacted regarding therapeutic result today per home monitoring policy manage by exception agreement.   Current warfarin dose is to be continued:     Summary  As of 4/23/2024      Full warfarin instructions:  3.75 mg every day   Next INR check:  4/30/2024             ?   Lisha Herbert RN  Anticoagulation Clinic  4/23/2024    _______________________________________________________________________     Anticoagulation Episode Summary       Current INR goal:  2.0-3.0   TTR:  74.3% (1 y)   Target end date:  Indefinite   Send INR reminders to:  [facility]    Indications    Persistent atrial fibrillation (H) [I48.19]             Comments:  EDNA Madera             Anticoagulation Care Providers       Provider Role Specialty Phone number    Kevin Ho MD Referring Internal Medicine [phone]

## 2024-04-30 ENCOUNTER — DOCUMENTATION ONLY (OUTPATIENT)
Dept: ANTICOAGULATION | Facility: CLINIC | Age: 84
End: 2024-04-30
Payer: COMMERCIAL

## 2024-04-30 DIAGNOSIS — I48.19 PERSISTENT ATRIAL FIBRILLATION (H): Primary | ICD-10-CM

## 2024-04-30 LAB — INR HOME MONITORING: 2.5 (ref 2–3)

## 2024-04-30 NOTE — PROGRESS NOTES
ANTICOAGULATION  MANAGEMENT-Home Monitor Managed by Exception    Rene Matthews 83 year old female is on warfarin with therapeutic INR result. (Goal INR 2.0-3.0)    Recent labs: (last 7 days)     04/30/24  0000   INR 2.5       Previous INR was Therapeutic  Medication, diet, health changes since last INR:chart reviewed; none identified  Contacted within the last 12 weeks by phone on 4/2/24  Last [facility] referral date: 08/28/2023      YESENIA     Rene was NOT contacted regarding therapeutic result today per home monitoring policy manage by exception agreement.   Current warfarin dose is to be continued:     Summary  As of 4/30/2024      Full warfarin instructions:  3.75 mg every day   Next INR check:  5/7/2024             ?   Yasmine Norris RN  Anticoagulation Clinic  4/30/2024    _______________________________________________________________________     Anticoagulation Episode Summary       Current INR goal:  2.0-3.0   TTR:  74.3% (1 y)   Target end date:  Indefinite   Send INR reminders to:  [facility]    Indications    Persistent atrial fibrillation (H) [I48.19]             Comments:  EDNA Madera             Anticoagulation Care Providers       Provider Role Specialty Phone number    Kevin Ho MD Referring Internal Medicine [phone]

## 2024-05-07 ENCOUNTER — DOCUMENTATION ONLY (OUTPATIENT)
Dept: ANTICOAGULATION | Facility: CLINIC | Age: 84
End: 2024-05-07
Payer: COMMERCIAL

## 2024-05-07 DIAGNOSIS — I48.19 PERSISTENT ATRIAL FIBRILLATION (H): Primary | ICD-10-CM

## 2024-05-07 LAB — INR HOME MONITORING: 2.1 (ref 2–3)

## 2024-05-07 NOTE — PROGRESS NOTES
ANTICOAGULATION  MANAGEMENT-Home Monitor Managed by Exception    Rene Matthews 83 year old female is on warfarin with therapeutic INR result. (Goal INR 2.0-3.0)    Recent labs: (last 7 days)     05/07/24  0000   INR 2.1       Previous INR was Therapeutic  Medication, diet, health changes since last INR:chart reviewed; none identified  Contacted within the last 12 weeks by phone on 4/2/24  Last [facility] referral date: 08/28/2023      YESENIA     Rene was NOT contacted regarding therapeutic result today per home monitoring policy manage by exception agreement.   Current warfarin dose is to be continued:     Summary  As of 5/7/2024      Full warfarin instructions:  3.75 mg every day   Next INR check:  5/14/2024             ?   Lisha Cruz RN  Anticoagulation Clinic  5/7/2024    _______________________________________________________________________     Anticoagulation Episode Summary       Current INR goal:  2.0-3.0   TTR:  74.3% (1 y)   Target end date:  Indefinite   Send INR reminders to:  [facility]    Indications    Persistent atrial fibrillation (H) [I48.19]             Comments:  EDNA Madera             Anticoagulation Care Providers       Provider Role Specialty Phone number    Kevin Ho MD Referring Internal Medicine [phone]

## 2024-05-15 ENCOUNTER — DOCUMENTATION ONLY (OUTPATIENT)
Dept: ANTICOAGULATION | Facility: CLINIC | Age: 84
End: 2024-05-15
Payer: COMMERCIAL

## 2024-05-15 DIAGNOSIS — I48.19 PERSISTENT ATRIAL FIBRILLATION (H): Primary | ICD-10-CM

## 2024-05-15 LAB — INR HOME MONITORING: 3 (ref 2–3)

## 2024-05-15 NOTE — PROGRESS NOTES
ANTICOAGULATION  MANAGEMENT-Home Monitor Managed by Exception    Rene Matthews 83 year old female is on warfarin with therapeutic INR result. (Goal INR 2.0-3.0)    Recent labs: (last 7 days)     05/15/24  0000   INR 3.0       Previous INR was Therapeutic  Medication, diet, health changes since last INR:chart reviewed; none identified  Contacted within the last 12 weeks by phone on 04/02/2024  Due for next call no later than: 7/1/24   Last [facility] referral date: 08/28/2023      YESENIA     Rene was NOT contacted regarding therapeutic result today per home monitoring policy manage by exception agreement.   Current warfarin dose is to be continued:     Summary  As of 5/15/2024      Full warfarin instructions:  3.75 mg every day   Next INR check:  5/21/2024             ?   Ale Mirza RN  Anticoagulation Clinic  5/15/2024    _______________________________________________________________________     Anticoagulation Episode Summary       Current INR goal:  2.0-3.0   TTR:  74.3% (1 y)   Target end date:  Indefinite   Send INR reminders to:  [facility]    Indications    Persistent atrial fibrillation (H) [I48.19]             Comments:  EDNA Madera             Anticoagulation Care Providers       Provider Role Specialty Phone number    Kevin Ho MD Referring Internal Medicine [phone]

## 2024-05-21 DIAGNOSIS — I10 HYPERTENSION, UNSPECIFIED TYPE: ICD-10-CM

## 2024-05-21 DIAGNOSIS — I48.19 PERSISTENT ATRIAL FIBRILLATION (H): ICD-10-CM

## 2024-05-21 DIAGNOSIS — I10 ESSENTIAL HYPERTENSION: ICD-10-CM

## 2024-05-21 DIAGNOSIS — I48.0 PAROXYSMAL ATRIAL FIBRILLATION (H): ICD-10-CM

## 2024-05-21 DIAGNOSIS — E78.5 HLD (HYPERLIPIDEMIA): ICD-10-CM

## 2024-05-21 RX ORDER — AMLODIPINE BESYLATE 2.5 MG/1
2.5 TABLET ORAL DAILY
Qty: 180 TABLET | Refills: 1 | Status: SHIPPED | OUTPATIENT
Start: 2024-05-21 | End: 2024-06-10 | Stop reason: ALTCHOICE

## 2024-05-21 RX ORDER — SIMVASTATIN 10 MG
TABLET ORAL
Qty: 90 TABLET | Refills: 2 | Status: SHIPPED | OUTPATIENT
Start: 2024-05-21

## 2024-05-21 RX ORDER — HYDROCHLOROTHIAZIDE 25 MG/1
25 TABLET ORAL EVERY OTHER DAY
Qty: 45 TABLET | Refills: 3 | Status: SHIPPED | OUTPATIENT
Start: 2024-05-21

## 2024-05-21 RX ORDER — SOTALOL HYDROCHLORIDE 80 MG/1
TABLET ORAL
Qty: 90 TABLET | Refills: 1 | Status: SHIPPED | OUTPATIENT
Start: 2024-05-21

## 2024-05-21 RX ORDER — WARFARIN SODIUM 2.5 MG/1
TABLET ORAL
Qty: 135 TABLET | Refills: 1 | Status: SHIPPED | OUTPATIENT
Start: 2024-05-21 | End: 2024-10-07

## 2024-05-21 NOTE — TELEPHONE ENCOUNTER
ANTICOAGULATION MANAGEMENT:  Medication Refill    Anticoagulation Summary  As of 5/15/2024      Warfarin maintenance plan:  3.75 mg (2.5 mg x 1.5) every day   Next INR check:  5/21/2024   Target end date:  Indefinite    Indications    Persistent atrial fibrillation (H) [I48.19]                 Anticoagulation Care Providers       Provider Role Specialty Phone number    Kevin Ho MD Referring Internal Medicine [phone]            Refill Criteria    Visit with referring provider/group: Meets criteria: office visit within referring provider group in the last 1 year on 3/8/24    [facility] referral last signed: 08/28/2023; within last year: Yes    Lab monitoring not exceeding 2 weeks overdue: Yes    Rene meets all criteria for refill. Rx instructions and quantity supplied updated to match patient's current dosing plan. Warfarin 90 day supply with 1 refill granted per [facility] protocol     Nikunj Montiel RN  Anticoagulation Clinic

## 2024-05-22 ENCOUNTER — DOCUMENTATION ONLY (OUTPATIENT)
Dept: ANTICOAGULATION | Facility: CLINIC | Age: 84
End: 2024-05-22
Payer: COMMERCIAL

## 2024-05-22 DIAGNOSIS — I48.19 PERSISTENT ATRIAL FIBRILLATION (H): Primary | ICD-10-CM

## 2024-05-22 LAB — INR HOME MONITORING: 2.2 (ref 2–3)

## 2024-05-22 NOTE — PROGRESS NOTES
ANTICOAGULATION  MANAGEMENT-Home Monitor Managed by Exception    Rene Matthews 83 year old female is on warfarin with therapeutic INR result. (Goal INR 2.0-3.0)    Recent labs: (last 7 days)     05/22/24  0000   INR 2.2       Previous INR was Therapeutic  Medication, diet, health changes since last INR:chart reviewed; none identified  Contacted within the last 12 weeks by phone on 4/2/2024  Last [facility] referral date: 08/28/2023      YESENIA     Rene was NOT contacted regarding therapeutic result today per home monitoring policy manage by exception agreement.   Current warfarin dose is to be continued:     Summary  As of 5/22/2024      Full warfarin instructions:  3.75 mg every day   Next INR check:  5/29/2024             ?   Lisha Herbert RN  Anticoagulation Clinic  5/22/2024    _______________________________________________________________________     Anticoagulation Episode Summary       Current INR goal:  2.0-3.0   TTR:  74.3% (1 y)   Target end date:  Indefinite   Send INR reminders to:  [facility]    Indications    Persistent atrial fibrillation (H) [I48.19]             Comments:  EDNA Madera             Anticoagulation Care Providers       Provider Role Specialty Phone number    Kevin Ho MD Referring Internal Medicine [phone]

## 2024-05-30 ENCOUNTER — TELEPHONE (OUTPATIENT)
Dept: INTERNAL MEDICINE | Facility: CLINIC | Age: 84
End: 2024-05-30
Payer: COMMERCIAL

## 2024-05-30 NOTE — TELEPHONE ENCOUNTER
Called and spoke with patient. Following her cardioversion on 1/29/24 her AVS states to discontinue metoprolol and start taking sotalol:     Pt was not aware that she was supposed to discontinue metoprolol so she has been taking both. Instructed pt to continue taking sotalol and to stop metoprolol. Pt agreeable to plan of care.     Kiki PARIS RN

## 2024-05-30 NOTE — TELEPHONE ENCOUNTER
General Call    Contacts         Type Contact Phone/Fax    05/30/2024 08:15 AM CDT Phone (Incoming) Florence Matthews (Self) [phone] (M)          Reason for Call:  metoprolol succinate ER (TOPROL XL) 50 MG 24 hr tablet - Pt has questions about this medication. She wanted to refill it but cannot cause she says its not on her medication list.  Please advise. Wants to know why its been discontinued. Please call before 9am or after 12pm.     Could we send this information to you in [employer] or would you prefer to receive a phone call?:   Patient would prefer a phone call   Okay to leave a detailed message?: No at Home number on file [phone] (home)

## 2024-06-05 ENCOUNTER — DOCUMENTATION ONLY (OUTPATIENT)
Dept: ANTICOAGULATION | Facility: CLINIC | Age: 84
End: 2024-06-05
Payer: COMMERCIAL

## 2024-06-05 DIAGNOSIS — I48.19 PERSISTENT ATRIAL FIBRILLATION (H): Primary | ICD-10-CM

## 2024-06-05 LAB — INR HOME MONITORING: 2.2 (ref 2–3)

## 2024-06-05 NOTE — PROGRESS NOTES
ANTICOAGULATION  MANAGEMENT-Home Monitor Managed by Exception    Rene Matthews 83 year old female is on warfarin with therapeutic INR result. (Goal INR 2.0-3.0)    Recent labs: (last 7 days)     06/05/24  0000   INR 2.2       Previous INR was Therapeutic  Medication, diet, health changes since last INR:chart reviewed; none identified  Contacted within the last 12 weeks by phone on 4/2/24  Last [facility] referral date: 08/28/2023      PLAN     Rene was NOT contacted regarding therapeutic result today per home monitoring policy manage by exception agreement.   Current warfarin dose is to be continued:     Summary  As of 6/5/2024      Full warfarin instructions:  3.75 mg every day   Next INR check:  6/19/2024             ?   Gretta Nazario RN  Anticoagulation Clinic  6/5/2024    _______________________________________________________________________     Anticoagulation Episode Summary       Current INR goal:  2.0-3.0   TTR:  74.3% (1 y)   Target end date:  Indefinite   Send INR reminders to:  [facility]    Indications    Persistent atrial fibrillation (H) [I48.19]             Comments:  EDNA Madera             Anticoagulation Care Providers       Provider Role Specialty Phone number    Kevin Ho MD Referring Internal Medicine [phone]

## 2024-06-07 ENCOUNTER — OFFICE VISIT (OUTPATIENT)
Dept: CARDIOLOGY | Facility: CLINIC | Age: 84
End: 2024-06-07
Payer: COMMERCIAL

## 2024-06-07 VITALS
SYSTOLIC BLOOD PRESSURE: 154 MMHG | DIASTOLIC BLOOD PRESSURE: 70 MMHG | BODY MASS INDEX: 22.66 KG/M2 | WEIGHT: 132 LBS | RESPIRATION RATE: 16 BRPM | HEART RATE: 47 BPM

## 2024-06-07 DIAGNOSIS — I48.19 PERSISTENT ATRIAL FIBRILLATION (H): Primary | ICD-10-CM

## 2024-06-07 DIAGNOSIS — I49.5 SINUS NODE DYSFUNCTION (H): ICD-10-CM

## 2024-06-07 DIAGNOSIS — I10 PRIMARY HYPERTENSION: ICD-10-CM

## 2024-06-07 DIAGNOSIS — E78.5 HYPERLIPIDEMIA, UNSPECIFIED HYPERLIPIDEMIA TYPE: ICD-10-CM

## 2024-06-07 PROCEDURE — 99215 OFFICE O/P EST HI 40 MIN: CPT | Performed by: PHYSICIAN ASSISTANT

## 2024-06-07 NOTE — PROGRESS NOTES
HEART CARE ENCOUNTER NOTE       [facility]  [phone]      Assessment/Recommendations   1.  Persistent atrial fibrillation: I have personally reviewed this patient's chart and have spoken with the patient about the treatment options, including MONIKA device.  She has a [id number] score of 4 for age >75, female gender, hypertension.  She has a HAS-BLED score of 2 for age and bleeding predisposition.   She is not a good candidate for long-term anticoagulation due to her active lifestyle playing [email or web address] ball and riding bike which increases her risk for injury and falls.      Once scheduled, the patient will stay on warfarin up until implant. 2 weeks prior to the procedure She will also start aspirin 81 mg daily. After implant, will remain on aspirin 81 mg daily in addition to warfarin for 6 weeks. The patient will then stop warfarin and start DAPT with aspirin 81 mg daily and Plavix 75 mg daily until 6 months post procedure. She will then stop Plavix and remain on aspirin 81 mg daily indefinitely.  She will have a MAEGAN or CTA approximately 3 months and 1 year post-implant.  She understands that the risks of the procedure are <2% and include, but are not limited to device embolization, air embolism, myocardial perforation, device thrombosis, ASD, stroke, or death.  We discussed expected recovery and follow-up.       The patient is a good candidate for proceeding with left atrial appendage implant.  Her questions were answered to her satisfaction.  Will need echocardiogram to assess LV function and SDM visit.    2.  Hypertension -blood pressure is elevated today.  Continue amlodipine, hydrochlorothiazide, irbesartan    3.  Hyperlipidemia -last LDL 75.  Continue simvastatin    4.  Sinus node dysfunction - bradycardic in the mid-40s. Asymptomatic        History of Present Illness/Subjective    Rene Matthews is a 83 year old female who comes in today for Watchman consult.  She is accompanied by her   for today's visit.    Rene Matthews has a past history of persistent atrial fibrillation, hypertension, hyperlipidemia, sinus node dysfunction, GERD.      She is currently tolerating her warfarin and is not significant bruising or bleeding issues.  She denies blood in her stool, urine, or nosebleeds.  She denies issues with her gait or balance. She is quite active and frequently plays pickle ball and while riding bike for 10 to 20 miles at a time.  She also has issues with urinary urgency especially at night and has to rush to get to the bathroom.  She fortunately has not had any falls.  She denies NSAID or aspirin use.  She very rarely consumes alcohol.  She denies any painful or difficulty swallowing.  She denies anyone in her immediate family that has issues with frequent nosebleeds or prolonged bleeding.  She denies a previous history of stroke, DVT, or PE.    Rene Matthews denies chest discomfort, palpitations, shortness of breath, paroxysmal nocturnal dyspnea, orthopnea, lightheadedness, dizziness, pre-syncope, or syncope.  Rene Matthews also denies any weight loss, changes in appetite, nausea or vomiting.     Medical, surgical, family, social history, and medications were reviewed and updated as necessary.    ECHO results (from 5/16/2021):  Narrative & Impression    No previous study for comparison.    Left ventricle ejection fraction is normal. The calculated left ventricular ejection fraction is 66%.    Normal left ventricular size and systolic function.    Normal right ventricular size and systolic function.    Left atrial volume is mildly increased.    Mild aortic regurgitation.    Mild mitral regurgitation.       Physical Examination Review of Systems   Vitals: BP (!) 154/70 (BP Location: Right arm, Patient Position: Sitting, Cuff Size: Adult Regular)   Pulse (!) 47   Resp 16   Wt 59.9 kg (132 lb)   LMP  (LMP Unknown)   BMI 22.66 kg/m    BMI= Body mass index is 22.66 kg/m .  Wt Readings from Last 3  Encounters:   06/07/24 59.9 kg (132 lb)   04/10/24 60.6 kg (133 lb 9.6 oz)   03/08/24 61.2 kg (135 lb)       General Appearance:   Alert, cooperative and in no acute distress   ENT/Mouth: membranes moist, no oral lesions or bleeding gums.      EYES:  no scleral icterus, normal conjunctivae   Neck: Thyroid not visualized   Chest/Lungs:   lungs are clear to auscultation, no rales or wheezing   Cardiovascular:   Irregular . Normal first and second heart sounds with no murmurs, rubs or gallops; the carotid, radial and posterior tibial pulses are intact, no edema bilaterally    Abdomen:  Soft and nontender. Bowel sounds are present in all quadrants   Extremities: no cyanosis or clubbing   Skin: no xanthelasma, warm.    Neurologic: normal gait, normal  bilateral, no tremors   Psychiatric: Normal mood and affect       Please refer above for cardiac ROS details.      Medical History  Surgical History Family History Social History   Past Medical History:   Diagnosis Date    Abnormal Pap smear of cervix     ASCUS with negative biopsy    Acute low back pain without sciatica 05/18/2020    Allergic rhinitis     Anxiety, generalized     Benign neoplasm of ascending colon     Burning sensation of feet     Cervical polyp     Chest pain     secondary to gerd    Chronic abdominal pain     Negative CT scan and colonoscopy, musculoskeletal etiology suspected    Chronic radicular low back pain     MICHEL after evaluation by Dr. Bradley    Chronic sinusitis 10/18/2016    Frequent headaches     GERD (gastroesophageal reflux disease)     Noncardiac chest pain    Hearing loss in left ear 10/20/2017    Herpes zoster 01/01/2006    Hyperlipidemia     Hypertension     IBS (irritable bowel syndrome)     Insomnia     Mild aortic insufficiency 11/02/2022    Nausea 11/18/2022    Osteoarthritis     Osteopenia     DEXA 2014 T score -2.0 stable, DEXA December 2017 T score -1.6 left femoral neck stable.  DEXA January 2022 -2.1 L1 L4 and -1.8 bilateral  femoral neck    Overactive bladder 10/18/2016    Palpitations 06/07/2021    Symptoms suspicious for atrial fibrillation.  Echocardiogram June 2021 with normal left ventricular systolic function with mild left atrial enlargement and mild AI and MR    Peptic ulcer disease 01/01/2006    w/ gastric ulcer    Personal history of colonic polyps     colonoscopy January 2014 normal.  Colonoscopy January 2019 with multiple polyps, repeat in 3 years.  Cologuard neg  January 2023    Postmenopausal bleeding 12/23/2021    Screening     Negative for AAA CT scan 2013    Skin cancer, basal cell 10/20/2017    Urge incontinence of urine 06/07/2021    Vitamin B12 deficiency (non anemic)     Weight gain 03/12/2021     Past Surgical History:   Procedure Laterality Date    BIOPSY CERVICAL, LOCAL EXCISION, SINGLE/MULTIPLE      ENDOMETRIAL BIOPSY      Negative    OTHER SURGICAL HISTORY      Basal cell skin cancer    OTHER SURGICAL HISTORY  2012    Radiofrequency ablation right greater saphenous vein    OVARIAN CYST REMOVAL      lysis of adhesions    STRABISMUS SURGERY  2015     Family History   Problem Relation Age of Onset    Cerebrovascular Disease Mother     Hypertension Sister     Hypertension Brother     Social History     Socioeconomic History    Marital status:      Spouse name: Not on file    Number of children: Not on file    Years of education: Not on file    Highest education level: Not on file   Occupational History    Not on file   Tobacco Use    Smoking status: Never     Passive exposure: Never    Smokeless tobacco: Never   Vaping Use    Vaping status: Never Used   Substance and Sexual Activity    Alcohol use: No    Drug use: No    Sexual activity: Not on file   Other Topics Concern    Not on file   Social History Narrative    Retired teacher  , [place], 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     Social Determinants of Health     Financial Resource Strain: Low Risk  (1/9/2024)    Financial Resource Strain     Within the  past 12 months, have you or your family members you live with been unable to get utilities (heat, electricity) when it was really needed?: No   Food Insecurity: Low Risk  (1/9/2024)    Food Insecurity     Within the past 12 months, did you worry that your food would run out before you got money to buy more?: No     Within the past 12 months, did the food you bought just not last and you didn t have money to get more?: No   Transportation Needs: Low Risk  (1/9/2024)    Transportation Needs     Within the past 12 months, has lack of transportation kept you from medical appointments, getting your medicines, non-medical meetings or appointments, work, or from getting things that you need?: No   Physical Activity: Not on file   Stress: Not on file   Social Connections: Unknown (1/1/2022)    Received from [facility], [facility]    Social Connections     Frequency of Communication with Friends and Family: Not on file   Interpersonal Safety: Low Risk  (3/8/2024)    Interpersonal Safety     Do you feel physically and emotionally safe where you currently live?: Yes     Within the past 12 months, have you been hit, slapped, kicked or otherwise physically hurt by someone?: No     Within the past 12 months, have you been humiliated or emotionally abused in other ways by your partner or ex-partner?: No   Housing Stability: Low Risk  (1/9/2024)    Housing Stability     Do you have housing? : Yes     Are you worried about losing your housing?: No          Medications  Allergies   Current Outpatient Medications   Medication Sig Dispense Refill    acetaminophen (TYLENOL) 325 MG tablet Take 325 mg by mouth 3 times daily as needed      amLODIPine (NORVASC) 2.5 MG tablet TAKE 1 TABLET BY MOUTH EVERY  tablet 1    Calcium Carb-Ergocalciferol 500-200 MG-UNIT TABS Take 1 tablet by mouth 2 times daily      cyanocobalamin (VITAMIN B-12) 1000 MCG tablet Take 1 tablet  "(1,000 mcg) by mouth daily      escitalopram (LEXAPRO) 10 MG tablet TAKE 1 TABLET(10 MG) BY MOUTH DAILY 90 tablet 3    fluticasone (FLONASE) 50 MCG/ACT nasal spray Spray 2 sprays in nostril 1 spray each nostril in the AM, 2 sprays in the PM      gabapentin (NEURONTIN) 300 MG capsule Take 1 capsule (300 mg) by mouth 3 times daily 270 capsule 3    hydrochlorothiazide (HYDRODIURIL) 25 MG tablet Take 1 tablet (25 mg) by mouth every other day 45 tablet 3    irbesartan (AVAPRO) 300 MG tablet TAKE 1 TABLET(300 MG) BY MOUTH DAILY 90 tablet 3    omeprazole (PRILOSEC) 20 MG DR capsule TAKE 1 CAPSULE BY MOUTH TWICE DAILY (Patient taking differently: daily) 180 capsule 3    simvastatin (ZOCOR) 10 MG tablet TAKE 1 TABLET(10 MG) BY MOUTH EVERY 24 HOURS 90 tablet 2    sotalol (BETAPACE) 80 MG tablet TAKE 1/2 TABLET(40 MG) BY MOUTH TWICE DAILY 90 tablet 1    traZODone (DESYREL) 50 MG tablet TAKE 1 TABLET(50 MG) BY MOUTH AT BEDTIME 90 tablet 2    Vitamin D3 (CHOLECALCIFEROL) 25 mcg (1000 units) tablet Take 1 tablet (25 mcg) by mouth daily      warfarin ANTICOAGULANT (COUMADIN) 2.5 MG tablet TAKE 1 AND 1/2 TABLETS (3.75 MG) BY MOUTH EVERY DAY OR AS DIRECTED BY INR CLINIC 135 tablet 1    Wheat Dextrin (BENEFIBER PO) Take by mouth daily Once daily      No Known Allergies      Lab Results    Chemistry/lipid CBC Cardiac Enzymes/BNP/TSH/INR   Recent Labs   Lab Test 01/09/24  1104   CHOL 163   HDL 71   LDL 75   TRIG 84     Recent Labs   Lab Test 01/09/24  1104 01/02/23  1120 12/23/21  1007   LDL 75 66 74     Recent Labs   Lab Test 01/09/24  1104      POTASSIUM 3.8   CHLORIDE 100   CO2 27   GLC 96   BUN 10.3   CR 0.64   GFRESTIMATED 88   EBONY 9.3     Recent Labs   Lab Test 01/09/24  1104 01/02/23  1120 11/18/22  1144   CR 0.64 0.58 0.63     No results for input(s): \"A1C\" in the last 97148 hours. Recent Labs   Lab Test 01/09/24  1104   WBC 5.3   HGB 12.2   HCT 36.8   MCV 86        Recent Labs   Lab Test 01/09/24  1104 " "11/18/22  1144 12/23/21  1007   HGB 12.2 12.5 12.7    Recent Labs   Lab Test 05/31/21  1938   TROPONINI <0.01     No results for input(s): \"BNP\", \"NTBNPI\", \"NTBNP\" in the last 74214 hours.  Recent Labs   Lab Test 11/18/22  1144   TSH 0.96     Recent Labs   Lab Test 06/05/24  0000 05/22/24  0000 05/15/24  0000   INR 2.2 2.2 3.0        40 minutes spent on the date of encounter doing education, chart prep/review, review of outside records, review of test results, interpretation with above tests, patient visit, documentation, and discussion with family.      This note has been dictated using voice recognition software. Any grammatical or context distortions are unintentional and inherent to the software.    Tori Ward PA-C  Structural Heart Program  [facility]   "

## 2024-06-07 NOTE — PATIENT INSTRUCTIONS
Rene Matthews,    It was a pleasure to see you today in the clinic regarding your Watchman Consult.     My recommendations after this visit include:     - no medication changes today   - scheduled the Watchman if you are interested in moving forward with the procedure.    - Scheduled echocardiogram to assess your heart function in anticipation for the procedure   - you will need a shared-decision making visit with one of our general cardiologists (currently scheduled for August with Dr. Mike - but we can try to rescheduled sooner if needed)      If you have questions or concerns, please call using the numbers below:    After Hours/Scheduling  [phone]    Otherwise you can dial the nurse directly at:                Linnea Foss RN    [phone]    Tori Ward PA-C  Structural Heart Program  [facility]

## 2024-06-07 NOTE — LETTER
6/7/2024    Kevin Ho MD  [address] [facility] [address]    RE: Rene Matthews       Dear Colleague,     I had the pleasure of seeing Rene Matthews in the [facility].  HEART CARE ENCOUNTER NOTE       GIOVANNI [facility]  [phone]      Assessment/Recommendations   1.  Persistent atrial fibrillation: I have personally reviewed this patient's chart and have spoken with the patient about the treatment options, including MONIKA device.  She has a [id number] score of 4 for age >75, female gender, hypertension.  She has a HAS-BLED score of 2 for age and bleeding predisposition.   She is not a good candidate for long-term anticoagulation due to her active lifestyle playing [employer] ball and riding bike which increases her risk for injury and falls.      Once scheduled, the patient will stay on warfarin up until implant. 2 weeks prior to the procedure She will also start aspirin 81 mg daily. After implant, will remain on aspirin 81 mg daily in addition to warfarin for 6 weeks. The patient will then stop warfarin and start DAPT with aspirin 81 mg daily and Plavix 75 mg daily until 6 months post procedure. She will then stop Plavix and remain on aspirin 81 mg daily indefinitely.  She will have a MAEGAN or CTA approximately 3 months and 1 year post-implant.  She understands that the risks of the procedure are <2% and include, but are not limited to device embolization, air embolism, myocardial perforation, device thrombosis, ASD, stroke, or death.  We discussed expected recovery and follow-up.       The patient is a good candidate for proceeding with left atrial appendage implant.  Her questions were answered to her satisfaction.  Will need echocardiogram to assess LV function and SDM visit.    2.  Hypertension -blood pressure is elevated today.  Continue amlodipine, hydrochlorothiazide, irbesartan    3.  Hyperlipidemia -last LDL 75.  Continue simvastatin    4.  Sinus node dysfunction  - bradycardic in the mid-40s. Asymptomatic        History of Present Illness/Subjective    Rene Matthews is a 83 year old female who comes in today for Watchman consult.  She is accompanied by her  for today's visit.    Rene Matthews has a past history of persistent atrial fibrillation, hypertension, hyperlipidemia, sinus node dysfunction, GERD.      She is currently tolerating her warfarin and is not significant bruising or bleeding issues.  She denies blood in her stool, urine, or nosebleeds.  She denies issues with her gait or balance. She is quite active and frequently plays pickle ball and while riding bike for 10 to 20 miles at a time.  She also has issues with urinary urgency especially at night and has to rush to get to the bathroom.  She fortunately has not had any falls.  She denies NSAID or aspirin use.  She very rarely consumes alcohol.  She denies any painful or difficulty swallowing.  She denies anyone in her immediate family that has issues with frequent nosebleeds or prolonged bleeding.  She denies a previous history of stroke, DVT, or PE.    Rene Matthews denies chest discomfort, palpitations, shortness of breath, paroxysmal nocturnal dyspnea, orthopnea, lightheadedness, dizziness, pre-syncope, or syncope.  Rene Matthews also denies any weight loss, changes in appetite, nausea or vomiting.     Medical, surgical, family, social history, and medications were reviewed and updated as necessary.    ECHO results (from 5/16/2021):  Narrative & Impression    No previous study for comparison.    Left ventricle ejection fraction is normal. The calculated left ventricular ejection fraction is 66%.    Normal left ventricular size and systolic function.    Normal right ventricular size and systolic function.    Left atrial volume is mildly increased.    Mild aortic regurgitation.    Mild mitral regurgitation.       Physical Examination Review of Systems   Vitals: BP (!) 154/70 (BP Location: Right arm, Patient  Position: Sitting, Cuff Size: Adult Regular)   Pulse (!) 47   Resp 16   Wt 59.9 kg (132 lb)   LMP  (LMP Unknown)   BMI 22.66 kg/m    BMI= Body mass index is 22.66 kg/m .  Wt Readings from Last 3 Encounters:   06/07/24 59.9 kg (132 lb)   04/10/24 60.6 kg (133 lb 9.6 oz)   03/08/24 61.2 kg (135 lb)       General Appearance:   Alert, cooperative and in no acute distress   ENT/Mouth: membranes moist, no oral lesions or bleeding gums.      EYES:  no scleral icterus, normal conjunctivae   Neck: Thyroid not visualized   Chest/Lungs:   lungs are clear to auscultation, no rales or wheezing   Cardiovascular:   Irregular . Normal first and second heart sounds with no murmurs, rubs or gallops; the carotid, radial and posterior tibial pulses are intact, no edema bilaterally    Abdomen:  Soft and nontender. Bowel sounds are present in all quadrants   Extremities: no cyanosis or clubbing   Skin: no xanthelasma, warm.    Neurologic: normal gait, normal  bilateral, no tremors   Psychiatric: Normal mood and affect       Please refer above for cardiac ROS details.      Medical History  Surgical History Family History Social History   Past Medical History:   Diagnosis Date    Abnormal Pap smear of cervix     ASCUS with negative biopsy    Acute low back pain without sciatica 05/18/2020    Allergic rhinitis     Anxiety, generalized     Benign neoplasm of ascending colon     Burning sensation of feet     Cervical polyp     Chest pain     secondary to gerd    Chronic abdominal pain     Negative CT scan and colonoscopy, musculoskeletal etiology suspected    Chronic radicular low back pain     MICHEL after evaluation by Dr. Bradley    Chronic sinusitis 10/18/2016    Frequent headaches     GERD (gastroesophageal reflux disease)     Noncardiac chest pain    Hearing loss in left ear 10/20/2017    Herpes zoster 01/01/2006    Hyperlipidemia     Hypertension     IBS (irritable bowel syndrome)     Insomnia     Mild aortic insufficiency  11/02/2022    Nausea 11/18/2022    Osteoarthritis     Osteopenia     DEXA 2014 T score -2.0 stable, DEXA December 2017 T score -1.6 left femoral neck stable.  DEXA January 2022 -2.1 L1 L4 and -1.8 bilateral femoral neck    Overactive bladder 10/18/2016    Palpitations 06/07/2021    Symptoms suspicious for atrial fibrillation.  Echocardiogram June 2021 with normal left ventricular systolic function with mild left atrial enlargement and mild AI and MR    Peptic ulcer disease 01/01/2006    w/ gastric ulcer    Personal history of colonic polyps     colonoscopy January 2014 normal.  Colonoscopy January 2019 with multiple polyps, repeat in 3 years.  Cologuard neg  January 2023    Postmenopausal bleeding 12/23/2021    Screening     Negative for AAA CT scan 2013    Skin cancer, basal cell 10/20/2017    Urge incontinence of urine 06/07/2021    Vitamin B12 deficiency (non anemic)     Weight gain 03/12/2021     Past Surgical History:   Procedure Laterality Date    BIOPSY CERVICAL, LOCAL EXCISION, SINGLE/MULTIPLE      ENDOMETRIAL BIOPSY      Negative    OTHER SURGICAL HISTORY      Basal cell skin cancer    OTHER SURGICAL HISTORY  2012    Radiofrequency ablation right greater saphenous vein    OVARIAN CYST REMOVAL      lysis of adhesions    STRABISMUS SURGERY  2015     Family History   Problem Relation Age of Onset    Cerebrovascular Disease Mother     Hypertension Sister     Hypertension Brother     Social History     Socioeconomic History    Marital status:      Spouse name: Not on file    Number of children: Not on file    Years of education: Not on file    Highest education level: Not on file   Occupational History    Not on file   Tobacco Use    Smoking status: Never     Passive exposure: Never    Smokeless tobacco: Never   Vaping Use    Vaping status: Never Used   Substance and Sexual Activity    Alcohol use: No    Drug use: No    Sexual activity: Not on file   Other Topics Concern    Not on file   Social History  Narrative    Retired teacher  , [place], 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     Social Determinants of Health     Financial Resource Strain: Low Risk  (1/9/2024)    Financial Resource Strain     Within the past 12 months, have you or your family members you live with been unable to get utilities (heat, electricity) when it was really needed?: No   Food Insecurity: Low Risk  (1/9/2024)    Food Insecurity     Within the past 12 months, did you worry that your food would run out before you got money to buy more?: No     Within the past 12 months, did the food you bought just not last and you didn t have money to get more?: No   Transportation Needs: Low Risk  (1/9/2024)    Transportation Needs     Within the past 12 months, has lack of transportation kept you from medical appointments, getting your medicines, non-medical meetings or appointments, work, or from getting things that you need?: No   Physical Activity: Not on file   Stress: Not on file   Social Connections: Unknown (1/1/2022)    Received from [facility], [facility]    Social Connections     Frequency of Communication with Friends and Family: Not on file   Interpersonal Safety: Low Risk  (3/8/2024)    Interpersonal Safety     Do you feel physically and emotionally safe where you currently live?: Yes     Within the past 12 months, have you been hit, slapped, kicked or otherwise physically hurt by someone?: No     Within the past 12 months, have you been humiliated or emotionally abused in other ways by your partner or ex-partner?: No   Housing Stability: Low Risk  (1/9/2024)    Housing Stability     Do you have housing? : Yes     Are you worried about losing your housing?: No          Medications  Allergies   Current Outpatient Medications   Medication Sig Dispense Refill    acetaminophen (TYLENOL) 325 MG tablet Take 325 mg by mouth 3 times daily as needed      amLODIPine (NORVASC) 2.5 MG  tablet TAKE 1 TABLET BY MOUTH EVERY  tablet 1    Calcium Carb-Ergocalciferol 500-200 MG-UNIT TABS Take 1 tablet by mouth 2 times daily      cyanocobalamin (VITAMIN B-12) 1000 MCG tablet Take 1 tablet (1,000 mcg) by mouth daily      escitalopram (LEXAPRO) 10 MG tablet TAKE 1 TABLET(10 MG) BY MOUTH DAILY 90 tablet 3    fluticasone (FLONASE) 50 MCG/ACT nasal spray Spray 2 sprays in nostril 1 spray each nostril in the AM, 2 sprays in the PM      gabapentin (NEURONTIN) 300 MG capsule Take 1 capsule (300 mg) by mouth 3 times daily 270 capsule 3    hydrochlorothiazide (HYDRODIURIL) 25 MG tablet Take 1 tablet (25 mg) by mouth every other day 45 tablet 3    irbesartan (AVAPRO) 300 MG tablet TAKE 1 TABLET(300 MG) BY MOUTH DAILY 90 tablet 3    omeprazole (PRILOSEC) 20 MG DR capsule TAKE 1 CAPSULE BY MOUTH TWICE DAILY (Patient taking differently: daily) 180 capsule 3    simvastatin (ZOCOR) 10 MG tablet TAKE 1 TABLET(10 MG) BY MOUTH EVERY 24 HOURS 90 tablet 2    sotalol (BETAPACE) 80 MG tablet TAKE 1/2 TABLET(40 MG) BY MOUTH TWICE DAILY 90 tablet 1    traZODone (DESYREL) 50 MG tablet TAKE 1 TABLET(50 MG) BY MOUTH AT BEDTIME 90 tablet 2    Vitamin D3 (CHOLECALCIFEROL) 25 mcg (1000 units) tablet Take 1 tablet (25 mcg) by mouth daily      warfarin ANTICOAGULANT (COUMADIN) 2.5 MG tablet TAKE 1 AND 1/2 TABLETS (3.75 MG) BY MOUTH EVERY DAY OR AS DIRECTED BY INR CLINIC 135 tablet 1    Wheat Dextrin (BENEFIBER PO) Take by mouth daily Once daily      No Known Allergies      Lab Results    Chemistry/lipid CBC Cardiac Enzymes/BNP/TSH/INR   Recent Labs   Lab Test 01/09/24  1104   CHOL 163   HDL 71   LDL 75   TRIG 84     Recent Labs   Lab Test 01/09/24  1104 01/02/23  1120 12/23/21  1007   LDL 75 66 74     Recent Labs   Lab Test 01/09/24  1104      POTASSIUM 3.8   CHLORIDE 100   CO2 27   GLC 96   BUN 10.3   CR 0.64   GFRESTIMATED 88   EBONY 9.3     Recent Labs   Lab Test 01/09/24  1104 01/02/23  1120 11/18/22  1144   CR 0.64 0.58  "0.63     No results for input(s): \"A1C\" in the last 63386 hours. Recent Labs   Lab Test 01/09/24  1104   WBC 5.3   HGB 12.2   HCT 36.8   MCV 86        Recent Labs   Lab Test 01/09/24  1104 11/18/22  1144 12/23/21  1007   HGB 12.2 12.5 12.7    Recent Labs   Lab Test 05/31/21  1938   TROPONINI <0.01     No results for input(s): \"BNP\", \"NTBNPI\", \"NTBNP\" in the last 87471 hours.  Recent Labs   Lab Test 11/18/22  1144   TSH 0.96     Recent Labs   Lab Test 06/05/24  0000 05/22/24  0000 05/15/24  0000   INR 2.2 2.2 3.0        40 minutes spent on the date of encounter doing education, chart prep/review, review of outside records, review of test results, interpretation with above tests, patient visit, documentation, and discussion with family.      This note has been dictated using voice recognition software. Any grammatical or context distortions are unintentional and inherent to the software.    Tori Ward PA-C  [facility]ural [facility] Program  [facility]       Thank you for allowing me to participate in the care of your patient.      Sincerely,     Tori Ward PA-C     [facility]  cc:   George Mike MD  [address] [facility] [address]  [place] [address]      "

## 2024-06-10 ENCOUNTER — OFFICE VISIT (OUTPATIENT)
Dept: CARDIOLOGY | Facility: CLINIC | Age: 84
End: 2024-06-10
Payer: COMMERCIAL

## 2024-06-10 VITALS
DIASTOLIC BLOOD PRESSURE: 81 MMHG | BODY MASS INDEX: 23 KG/M2 | HEART RATE: 54 BPM | SYSTOLIC BLOOD PRESSURE: 177 MMHG | OXYGEN SATURATION: 97 % | WEIGHT: 134 LBS

## 2024-06-10 DIAGNOSIS — R00.1 BRADYCARDIA: ICD-10-CM

## 2024-06-10 DIAGNOSIS — I10 HYPERTENSION, UNSPECIFIED TYPE: Primary | ICD-10-CM

## 2024-06-10 DIAGNOSIS — I48.19 PERSISTENT ATRIAL FIBRILLATION (H): ICD-10-CM

## 2024-06-10 PROCEDURE — 99214 OFFICE O/P EST MOD 30 MIN: CPT | Performed by: INTERNAL MEDICINE

## 2024-06-10 RX ORDER — AMLODIPINE BESYLATE 5 MG/1
5 TABLET ORAL DAILY
Qty: 90 TABLET | Refills: 3 | Status: SHIPPED | OUTPATIENT
Start: 2024-06-10

## 2024-06-10 NOTE — H&P (VIEW-ONLY)
"[facility] [address], [place] [address]   [email or web address]   OFFICE: [phone]            Impression and Plan     1.  Atrial fibrillation. Florence underwent direct-current cardioversion 29 January 2024.  She has subjectively and objectively maintain sinus rhythm since cardioversion.  Left atrial appendage occlusion device placement has been broached with Florence.  She is interested and forging ahead with this.  Parenthetically; her spouse, Nate, is currently undergoing workup for left atrial occlusion device placement as well.  Continue sotalol 40 mg twice daily.  Continue warfarin.  Plan to forge ahead with workup for left atrial appendage occlusion device placement.     2. Aortic insufficiency.  This was felt mild in degree on echocardiogram 15 Remedios 2021.      3.  Bradycardia.  She has documented for quite some time some tendency toward bradycardia.  She has not had any concerning symptoms.     4.  Hypertension.  Blood pressure is elevated in the office today.  Florence states that she does suffer from \"whitecoat hypertension\".  She reports that she generally has had favorable blood pressure readings at home though admits she has not been checking it recently.  Nonetheless, she states that she is willing to go up slightly on her antihypertensive therapy.  Plan to increase amlodipine from 2.5 mg daily to 5 mg daily.  She does have a home blood pressure monitoring device and I asked that she resume monitoring her blood pressure on an intermittent basis and call should she have a tendency toward higher readings with the aforementioned changes.       35 minutes spent reviewing prior records (including documentation, laboratory studies, cardiac testing/imaging), interview with patient along with physical exam, planning, and subsequent documentation/crafting of note).         Shared Decision Making     Florence has documented nonvalvular atrial fibrillation " (NVAF) and is currently on oral anticoagulant therapy warfarin.  MIREILLE?DS?-VASc = 4 (age [age], ?, & HTN) HAS-BLED risk score: 2 (age of 74 and bleeding predisposition). Indication(s) to consider non-oral anticoagulation therapy: her active lifestyle playing pickle ball and bicycle riding which increases her risk for injury and falls.  Florence has no contra-indication to come off oral anti-coagulant therapy if LAAC device is successfully placed.      I have personally reviewed Florence's chart and discussed the following with Florence/family; 1) The choices available for reducing stroke risk from atrial fibrillation, 2) Treatment options available including respective risk/benefits, and 3) What factors are most important for the patient in making their decision.  The [facility] shared decision making https://www.cardiosmart.org/SDM/Decision-Aids/Find-Decision-Aids/Atrial-Fibrillation was used to guide this conversation.   Florence was counseled that their decision could be made at this time or in the future if more time was needed to consider their decision.      Florence is felt to be an appropriate candidate to proceed with left atrial appendage screening and implant.         History of Present Illness    Once again I would like to thank you again for asking me to participate in the care of your patient, Rene Matthews.  As you know, but to reiterate for my own records, Rene Matthews is a 83 year old female with atrial fibrillation.  She underwent direct-current cardioversion 29 January 2024     On interview, Florence     Further review of systems is otherwise negative/noncontributory (medical record and 13 point review of systems reviewed as well and pertinent positives noted).         Cardiac Diagnostics      Echocardiogram 15 Remedios 2021:   Normal left ventricular size and systolic performance with ejection fraction of 55 to 60%.   Mild aortic insufficiency.   Normal right ventricular size and systolic performance.   Mild left atrial  enlargement.     Twelve-lead ECG (personally reviewed) 29 July 2022: Sinus rhythm with borderline first-degree AV block.  Heart rate 48 bpm.  Nonspecific T wave changes.      Twelve-lead ECG (personally reviewed) 31 May 2021: Sinus rhythm with heart rate of 68 bpm.  Borderline first-degree AV block.  Nonspecific T wave changes.            Physical Examination       BP (!) 177/81 (BP Location: Right arm, Patient Position: Sitting, Cuff Size: Adult Regular)   Pulse 54   Wt 60.8 kg (134 lb)   LMP  (LMP Unknown)   SpO2 97%   BMI 23.00 kg/m          Wt Readings from Last 3 Encounters:   06/10/24 60.8 kg (134 lb)   06/07/24 59.9 kg (132 lb)   04/10/24 60.6 kg (133 lb 9.6 oz)       The patient is alert and oriented times three. Sclerae are anicteric. Mucosal membranes are moist. Jugular venous pressure is normal. No significant adenopathy/thyromegally appreciated. Lungs are clear with good expansion. On cardiovascular exam, the patient has a regular S1 and S2. Abdomen is soft and non-tender. Extremities reveal no clubbing, cyanosis, or edema.       Family History/Social History/Risk Factors   Patient does not smoke.  Family history reviewed, and family history includes Cerebrovascular Disease in her mother; Hypertension in her brother and sister.          Medical History  Surgical History Family History Social History   Past Medical History:   Diagnosis Date    Abnormal Pap smear of cervix     ASCUS with negative biopsy    Acute low back pain without sciatica 05/18/2020    Allergic rhinitis     Anxiety, generalized     Benign neoplasm of ascending colon     Burning sensation of feet     Cervical polyp     Chest pain     secondary to gerd    Chronic abdominal pain     Negative CT scan and colonoscopy, musculoskeletal etiology suspected    Chronic radicular low back pain     MICHEL after evaluation by Dr. Bradley    Chronic sinusitis 10/18/2016    Frequent headaches     GERD (gastroesophageal reflux disease)     Noncardiac  chest pain    Hearing loss in left ear 10/20/2017    Herpes zoster 01/01/2006    Hyperlipidemia     Hypertension     IBS (irritable bowel syndrome)     Insomnia     Mild aortic insufficiency 11/02/2022    Nausea 11/18/2022    Osteoarthritis     Osteopenia     DEXA 2014 T score -2.0 stable, DEXA December 2017 T score -1.6 left femoral neck stable.  DEXA January 2022 -2.1 L1 L4 and -1.8 bilateral femoral neck    Overactive bladder 10/18/2016    Palpitations 06/07/2021    Symptoms suspicious for atrial fibrillation.  Echocardiogram June 2021 with normal left ventricular systolic function with mild left atrial enlargement and mild AI and MR    Peptic ulcer disease 01/01/2006    w/ gastric ulcer    Personal history of colonic polyps     colonoscopy January 2014 normal.  Colonoscopy January 2019 with multiple polyps, repeat in 3 years.  Cologuard neg  January 2023    Postmenopausal bleeding 12/23/2021    Screening     Negative for AAA CT scan 2013    Skin cancer, basal cell 10/20/2017    Urge incontinence of urine 06/07/2021    Vitamin B12 deficiency (non anemic)     Weight gain 03/12/2021     Past Surgical History:   Procedure Laterality Date    BIOPSY CERVICAL, LOCAL EXCISION, SINGLE/MULTIPLE      ENDOMETRIAL BIOPSY      Negative    OTHER SURGICAL HISTORY      Basal cell skin cancer    OTHER SURGICAL HISTORY  2012    Radiofrequency ablation right greater saphenous vein    OVARIAN CYST REMOVAL      lysis of adhesions    STRABISMUS SURGERY  2015     Family History   Problem Relation Age of Onset    Cerebrovascular Disease Mother     Hypertension Sister     Hypertension Brother         Social History     Socioeconomic History    Marital status:      Spouse name: Not on file    Number of children: Not on file    Years of education: Not on file    Highest education level: Not on file   Occupational History    Not on file   Tobacco Use    Smoking status: Never     Passive exposure: Never    Smokeless tobacco: Never    Vaping Use    Vaping status: Never Used   Substance and Sexual Activity    Alcohol use: No    Drug use: No    Sexual activity: Not on file   Other Topics Concern    Not on file   Social History Narrative    Retired teacher  , [place], 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     Social Determinants of Health     Financial Resource Strain: Low Risk  (1/9/2024)    Financial Resource Strain     Within the past 12 months, have you or your family members you live with been unable to get utilities (heat, electricity) when it was really needed?: No   Food Insecurity: Low Risk  (1/9/2024)    Food Insecurity     Within the past 12 months, did you worry that your food would run out before you got money to buy more?: No     Within the past 12 months, did the food you bought just not last and you didn t have money to get more?: No   Transportation Needs: Low Risk  (1/9/2024)    Transportation Needs     Within the past 12 months, has lack of transportation kept you from medical appointments, getting your medicines, non-medical meetings or appointments, work, or from getting things that you need?: No   Physical Activity: Not on file   Stress: Not on file   Social Connections: Unknown (1/1/2022)    Received from [facility], [facility]    Social Connections     Frequency of Communication with Friends and Family: Not on file   Interpersonal Safety: Low Risk  (3/8/2024)    Interpersonal Safety     Do you feel physically and emotionally safe where you currently live?: Yes     Within the past 12 months, have you been hit, slapped, kicked or otherwise physically hurt by someone?: No     Within the past 12 months, have you been humiliated or emotionally abused in other ways by your partner or ex-partner?: No   Housing Stability: Low Risk  (1/9/2024)    Housing Stability     Do you have housing? : Yes     Are you worried about losing your housing?: No           Medications   Allergies   Current Outpatient Medications   Medication Sig Dispense Refill    acetaminophen (TYLENOL) 325 MG tablet Take 325 mg by mouth 3 times daily as needed      amLODIPine (NORVASC) 2.5 MG tablet TAKE 1 TABLET BY MOUTH EVERY  tablet 1    Calcium Carb-Ergocalciferol 500-200 MG-UNIT TABS Take 1 tablet by mouth 2 times daily      cyanocobalamin (VITAMIN B-12) 1000 MCG tablet Take 1 tablet (1,000 mcg) by mouth daily      escitalopram (LEXAPRO) 10 MG tablet TAKE 1 TABLET(10 MG) BY MOUTH DAILY 90 tablet 3    fluticasone (FLONASE) 50 MCG/ACT nasal spray Spray 2 sprays in nostril 1 spray each nostril in the AM, 2 sprays in the PM      gabapentin (NEURONTIN) 300 MG capsule Take 1 capsule (300 mg) by mouth 3 times daily 270 capsule 3    hydrochlorothiazide (HYDRODIURIL) 25 MG tablet Take 1 tablet (25 mg) by mouth every other day 45 tablet 3    irbesartan (AVAPRO) 300 MG tablet TAKE 1 TABLET(300 MG) BY MOUTH DAILY 90 tablet 3    omeprazole (PRILOSEC) 20 MG DR capsule TAKE 1 CAPSULE BY MOUTH TWICE DAILY (Patient taking differently: daily) 180 capsule 3    simvastatin (ZOCOR) 10 MG tablet TAKE 1 TABLET(10 MG) BY MOUTH EVERY 24 HOURS 90 tablet 2    sotalol (BETAPACE) 80 MG tablet TAKE 1/2 TABLET(40 MG) BY MOUTH TWICE DAILY 90 tablet 1    traZODone (DESYREL) 50 MG tablet TAKE 1 TABLET(50 MG) BY MOUTH AT BEDTIME 90 tablet 2    Vitamin D3 (CHOLECALCIFEROL) 25 mcg (1000 units) tablet Take 1 tablet (25 mcg) by mouth daily      warfarin ANTICOAGULANT (COUMADIN) 2.5 MG tablet TAKE 1 AND 1/2 TABLETS (3.75 MG) BY MOUTH EVERY DAY OR AS DIRECTED BY INR CLINIC 135 tablet 1    Wheat Dextrin (BENEFIBER PO) Take by mouth daily Once daily       No Known Allergies       Lab Results    Chemistry/lipid CBC Cardiac Enzymes/BNP/TSH/INR   Recent Labs   Lab Test 01/09/24  1104   CHOL 163   HDL 71   LDL 75   TRIG 84     Recent Labs   Lab Test 01/09/24  1104 01/02/23  1120 12/23/21  1007   LDL 75 66 74     Recent Labs   Lab Test  "01/09/24  1104      POTASSIUM 3.8   CHLORIDE 100   CO2 27   GLC 96   BUN 10.3   CR 0.64   GFRESTIMATED 88   EBONY 9.3     Recent Labs   Lab Test 01/09/24  1104 01/02/23  1120 11/18/22  1144   CR 0.64 0.58 0.63     No results for input(s): \"A1C\" in the last 32175 hours.       Recent Labs   Lab Test 01/09/24  1104   WBC 5.3   HGB 12.2   HCT 36.8   MCV 86        Recent Labs   Lab Test 01/09/24  1104 11/18/22  1144 12/23/21  1007   HGB 12.2 12.5 12.7    Recent Labs   Lab Test 05/31/21  1938   TROPONINI <0.01     No results for input(s): \"BNP\", \"NTBNPI\", \"NTBNP\" in the last 18898 hours.  Recent Labs   Lab Test 11/18/22  1144   TSH 0.96     Recent Labs   Lab Test 06/05/24  0000 05/22/24  0000 05/15/24  0000   INR 2.2 2.2 3.0          Medications  Allergies   Current Outpatient Medications   Medication Sig Dispense Refill    acetaminophen (TYLENOL) 325 MG tablet Take 325 mg by mouth 3 times daily as needed      amLODIPine (NORVASC) 2.5 MG tablet TAKE 1 TABLET BY MOUTH EVERY  tablet 1    Calcium Carb-Ergocalciferol 500-200 MG-UNIT TABS Take 1 tablet by mouth 2 times daily      cyanocobalamin (VITAMIN B-12) 1000 MCG tablet Take 1 tablet (1,000 mcg) by mouth daily      escitalopram (LEXAPRO) 10 MG tablet TAKE 1 TABLET(10 MG) BY MOUTH DAILY 90 tablet 3    fluticasone (FLONASE) 50 MCG/ACT nasal spray Spray 2 sprays in nostril 1 spray each nostril in the AM, 2 sprays in the PM      gabapentin (NEURONTIN) 300 MG capsule Take 1 capsule (300 mg) by mouth 3 times daily 270 capsule 3    hydrochlorothiazide (HYDRODIURIL) 25 MG tablet Take 1 tablet (25 mg) by mouth every other day 45 tablet 3    irbesartan (AVAPRO) 300 MG tablet TAKE 1 TABLET(300 MG) BY MOUTH DAILY 90 tablet 3    omeprazole (PRILOSEC) 20 MG DR capsule TAKE 1 CAPSULE BY MOUTH TWICE DAILY (Patient taking differently: daily) 180 capsule 3    simvastatin (ZOCOR) 10 MG tablet TAKE 1 TABLET(10 MG) BY MOUTH EVERY 24 HOURS 90 tablet 2    sotalol (BETAPACE) 80 " "MG tablet TAKE 1/2 TABLET(40 MG) BY MOUTH TWICE DAILY 90 tablet 1    traZODone (DESYREL) 50 MG tablet TAKE 1 TABLET(50 MG) BY MOUTH AT BEDTIME 90 tablet 2    Vitamin D3 (CHOLECALCIFEROL) 25 mcg (1000 units) tablet Take 1 tablet (25 mcg) by mouth daily      warfarin ANTICOAGULANT (COUMADIN) 2.5 MG tablet TAKE 1 AND 1/2 TABLETS (3.75 MG) BY MOUTH EVERY DAY OR AS DIRECTED BY INR CLINIC 135 tablet 1    Wheat Dextrin (BENEFIBER PO) Take by mouth daily Once daily        No Known Allergies       Lab Results   Lab Results   Component Value Date     01/09/2024    CO2 27 01/09/2024    CO2 28 12/23/2021    BUN 10.3 01/09/2024    BUN 7 12/23/2021     Lab Results   Component Value Date    WBC 5.3 01/09/2024    HGB 12.2 01/09/2024    HCT 36.8 01/09/2024    MCV 86 01/09/2024     01/09/2024     Lab Results   Component Value Date    CHOL 163 01/09/2024    TRIG 84 01/09/2024    HDL 71 01/09/2024     Lab Results   Component Value Date    INR 2.2 06/05/2024    INR 2.4 01/29/2024     No results found for: \"BNP\"  Lab Results   Component Value Date    TROPONINI <0.01 05/31/2021     Lab Results   Component Value Date    TSH 0.96 11/18/2022    TSH 1.63 05/31/2021                  "

## 2024-06-10 NOTE — PROGRESS NOTES
"[facility] [address] [address], [place] [address]   [email or web address]   OFFICE: [phone]            Impression and Plan     1.  Atrial fibrillation. Florence underwent direct-current cardioversion 29 January 2024.  She has subjectively and objectively maintain sinus rhythm since cardioversion.  Left atrial appendage occlusion device placement has been broached with Florence.  She is interested and forging ahead with this.  Parenthetically; her spouse, Nate, is currently undergoing workup for left atrial occlusion device placement as well.  Continue sotalol 40 mg twice daily.  Continue warfarin.  Plan to forge ahead with workup for left atrial appendage occlusion device placement.     2. Aortic insufficiency.  This was felt mild in degree on echocardiogram 15 Remedios 2021.      3.  Bradycardia.  She has documented for quite some time some tendency toward bradycardia.  She has not had any concerning symptoms.     4.  Hypertension.  Blood pressure is elevated in the office today.  Florence states that she does suffer from \"whitecoat hypertension\".  She reports that she generally has had favorable blood pressure readings at home though admits she has not been checking it recently.  Nonetheless, she states that she is willing to go up slightly on her antihypertensive therapy.  Plan to increase amlodipine from 2.5 mg daily to 5 mg daily.  She does have a home blood pressure monitoring device and I asked that she resume monitoring her blood pressure on an intermittent basis and call should she have a tendency toward higher readings with the aforementioned changes.       35 minutes spent reviewing prior records (including documentation, laboratory studies, cardiac testing/imaging), interview with patient along with physical exam, planning, and subsequent documentation/crafting of note).         Shared Decision Making     Florence has documented nonvalvular atrial fibrillation " (NVAF) and is currently on oral anticoagulant therapy warfarin.  MIREILLE?DS?-VASc = 4 (age [age], ?, & HTN) HAS-BLED risk score: 2 (age of 74 and bleeding predisposition). Indication(s) to consider non-oral anticoagulation therapy: her active lifestyle playing pickle ball and bicycle riding which increases her risk for injury and falls.  Florence has no contra-indication to come off oral anti-coagulant therapy if LAAC device is successfully placed.      I have personally reviewed Florence's chart and discussed the following with Florence/family; 1) The choices available for reducing stroke risk from atrial fibrillation, 2) Treatment options available including respective risk/benefits, and 3) What factors are most important for the patient in making their decision.  The [facility] shared decision making https://www.cardiosmart.org/SDM/Decision-Aids/Find-Decision-Aids/Atrial-Fibrillation was used to guide this conversation.   Florence was counseled that their decision could be made at this time or in the future if more time was needed to consider their decision.      Florence is felt to be an appropriate candidate to proceed with left atrial appendage screening and implant.         History of Present Illness    Once again I would like to thank you again for asking me to participate in the care of your patient, Rene Matthews.  As you know, but to reiterate for my own records, Rene Matthews is a 83 year old female with atrial fibrillation.  She underwent direct-current cardioversion 29 January 2024     On interview, Florence     Further review of systems is otherwise negative/noncontributory (medical record and 13 point review of systems reviewed as well and pertinent positives noted).         Cardiac Diagnostics      Echocardiogram 15 Remedios 2021:   Normal left ventricular size and systolic performance with ejection fraction of 55 to 60%.   Mild aortic insufficiency.   Normal right ventricular size and systolic performance.   Mild left atrial  enlargement.     Twelve-lead ECG (personally reviewed) 29 July 2022: Sinus rhythm with borderline first-degree AV block.  Heart rate 48 bpm.  Nonspecific T wave changes.      Twelve-lead ECG (personally reviewed) 31 May 2021: Sinus rhythm with heart rate of 68 bpm.  Borderline first-degree AV block.  Nonspecific T wave changes.            Physical Examination       BP (!) 177/81 (BP Location: Right arm, Patient Position: Sitting, Cuff Size: Adult Regular)   Pulse 54   Wt 60.8 kg (134 lb)   LMP  (LMP Unknown)   SpO2 97%   BMI 23.00 kg/m          Wt Readings from Last 3 Encounters:   06/10/24 60.8 kg (134 lb)   06/07/24 59.9 kg (132 lb)   04/10/24 60.6 kg (133 lb 9.6 oz)       The patient is alert and oriented times three. Sclerae are anicteric. Mucosal membranes are moist. Jugular venous pressure is normal. No significant adenopathy/thyromegally appreciated. Lungs are clear with good expansion. On cardiovascular exam, the patient has a regular S1 and S2. Abdomen is soft and non-tender. Extremities reveal no clubbing, cyanosis, or edema.       Family History/Social History/Risk Factors   Patient does not smoke.  Family history reviewed, and family history includes Cerebrovascular Disease in her mother; Hypertension in her brother and sister.          Medical History  Surgical History Family History Social History   Past Medical History:   Diagnosis Date    Abnormal Pap smear of cervix     ASCUS with negative biopsy    Acute low back pain without sciatica 05/18/2020    Allergic rhinitis     Anxiety, generalized     Benign neoplasm of ascending colon     Burning sensation of feet     Cervical polyp     Chest pain     secondary to gerd    Chronic abdominal pain     Negative CT scan and colonoscopy, musculoskeletal etiology suspected    Chronic radicular low back pain     MICHEL after evaluation by Dr. Bradley    Chronic sinusitis 10/18/2016    Frequent headaches     GERD (gastroesophageal reflux disease)     Noncardiac  chest pain    Hearing loss in left ear 10/20/2017    Herpes zoster 01/01/2006    Hyperlipidemia     Hypertension     IBS (irritable bowel syndrome)     Insomnia     Mild aortic insufficiency 11/02/2022    Nausea 11/18/2022    Osteoarthritis     Osteopenia     DEXA 2014 T score -2.0 stable, DEXA December 2017 T score -1.6 left femoral neck stable.  DEXA January 2022 -2.1 L1 L4 and -1.8 bilateral femoral neck    Overactive bladder 10/18/2016    Palpitations 06/07/2021    Symptoms suspicious for atrial fibrillation.  Echocardiogram June 2021 with normal left ventricular systolic function with mild left atrial enlargement and mild AI and MR    Peptic ulcer disease 01/01/2006    w/ gastric ulcer    Personal history of colonic polyps     colonoscopy January 2014 normal.  Colonoscopy January 2019 with multiple polyps, repeat in 3 years.  Cologuard neg  January 2023    Postmenopausal bleeding 12/23/2021    Screening     Negative for AAA CT scan 2013    Skin cancer, basal cell 10/20/2017    Urge incontinence of urine 06/07/2021    Vitamin B12 deficiency (non anemic)     Weight gain 03/12/2021     Past Surgical History:   Procedure Laterality Date    BIOPSY CERVICAL, LOCAL EXCISION, SINGLE/MULTIPLE      ENDOMETRIAL BIOPSY      Negative    OTHER SURGICAL HISTORY      Basal cell skin cancer    OTHER SURGICAL HISTORY  2012    Radiofrequency ablation right greater saphenous vein    OVARIAN CYST REMOVAL      lysis of adhesions    STRABISMUS SURGERY  2015     Family History   Problem Relation Age of Onset    Cerebrovascular Disease Mother     Hypertension Sister     Hypertension Brother         Social History     Socioeconomic History    Marital status:      Spouse name: Not on file    Number of children: Not on file    Years of education: Not on file    Highest education level: Not on file   Occupational History    Not on file   Tobacco Use    Smoking status: Never     Passive exposure: Never    Smokeless tobacco: Never    Vaping Use    Vaping status: Never Used   Substance and Sexual Activity    Alcohol use: No    Drug use: No    Sexual activity: Not on file   Other Topics Concern    Not on file   Social History Narrative    Retired teacher  , [place], 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     Social Determinants of Health     Financial Resource Strain: Low Risk  (1/9/2024)    Financial Resource Strain     Within the past 12 months, have you or your family members you live with been unable to get utilities (heat, electricity) when it was really needed?: No   Food Insecurity: Low Risk  (1/9/2024)    Food Insecurity     Within the past 12 months, did you worry that your food would run out before you got money to buy more?: No     Within the past 12 months, did the food you bought just not last and you didn t have money to get more?: No   Transportation Needs: Low Risk  (1/9/2024)    Transportation Needs     Within the past 12 months, has lack of transportation kept you from medical appointments, getting your medicines, non-medical meetings or appointments, work, or from getting things that you need?: No   Physical Activity: Not on file   Stress: Not on file   Social Connections: Unknown (1/1/2022)    [facility], [facility]    Social Connections     Frequency of Communication with Friends and Family: Not on file   Interpersonal Safety: Low Risk  (3/8/2024)    Interpersonal Safety     Do you feel physically and emotionally safe where you currently live?: Yes     Within the past 12 months, have you been hit, slapped, kicked or otherwise physically hurt by someone?: No     Within the past 12 months, have you been humiliated or emotionally abused in other ways by your partner or ex-partner?: No   Housing Stability: Low Risk  (1/9/2024)    Housing Stability     Do you have housing? : Yes     Are you worried about losing your housing?: No           Medications   Allergies   Current Outpatient Medications   Medication Sig Dispense Refill    acetaminophen (TYLENOL) 325 MG tablet Take 325 mg by mouth 3 times daily as needed      amLODIPine (NORVASC) 2.5 MG tablet TAKE 1 TABLET BY MOUTH EVERY  tablet 1    Calcium Carb-Ergocalciferol 500-200 MG-UNIT TABS Take 1 tablet by mouth 2 times daily      cyanocobalamin (VITAMIN B-12) 1000 MCG tablet Take 1 tablet (1,000 mcg) by mouth daily      escitalopram (LEXAPRO) 10 MG tablet TAKE 1 TABLET(10 MG) BY MOUTH DAILY 90 tablet 3    fluticasone (FLONASE) 50 MCG/ACT nasal spray Spray 2 sprays in nostril 1 spray each nostril in the AM, 2 sprays in the PM      gabapentin (NEURONTIN) 300 MG capsule Take 1 capsule (300 mg) by mouth 3 times daily 270 capsule 3    hydrochlorothiazide (HYDRODIURIL) 25 MG tablet Take 1 tablet (25 mg) by mouth every other day 45 tablet 3    irbesartan (AVAPRO) 300 MG tablet TAKE 1 TABLET(300 MG) BY MOUTH DAILY 90 tablet 3    omeprazole (PRILOSEC) 20 MG DR capsule TAKE 1 CAPSULE BY MOUTH TWICE DAILY (Patient taking differently: daily) 180 capsule 3    simvastatin (ZOCOR) 10 MG tablet TAKE 1 TABLET(10 MG) BY MOUTH EVERY 24 HOURS 90 tablet 2    sotalol (BETAPACE) 80 MG tablet TAKE 1/2 TABLET(40 MG) BY MOUTH TWICE DAILY 90 tablet 1    traZODone (DESYREL) 50 MG tablet TAKE 1 TABLET(50 MG) BY MOUTH AT BEDTIME 90 tablet 2    Vitamin D3 (CHOLECALCIFEROL) 25 mcg (1000 units) tablet Take 1 tablet (25 mcg) by mouth daily      warfarin ANTICOAGULANT (COUMADIN) 2.5 MG tablet TAKE 1 AND 1/2 TABLETS (3.75 MG) BY MOUTH EVERY DAY OR AS DIRECTED BY INR CLINIC 135 tablet 1    Wheat Dextrin (BENEFIBER PO) Take by mouth daily Once daily       No Known Allergies       Lab Results    Chemistry/lipid CBC Cardiac Enzymes/BNP/TSH/INR   Recent Labs   Lab Test 01/09/24  1104   CHOL 163   HDL 71   LDL 75   TRIG 84     Recent Labs   Lab Test 01/09/24  1104 01/02/23  1120 12/23/21  1007   LDL 75 66 74     Recent Labs   Lab Test  "01/09/24  1104      POTASSIUM 3.8   CHLORIDE 100   CO2 27   GLC 96   BUN 10.3   CR 0.64   GFRESTIMATED 88   EBONY 9.3     Recent Labs   Lab Test 01/09/24  1104 01/02/23  1120 11/18/22  1144   CR 0.64 0.58 0.63     No results for input(s): \"A1C\" in the last 71565 hours.       Recent Labs   Lab Test 01/09/24  1104   WBC 5.3   HGB 12.2   HCT 36.8   MCV 86        Recent Labs   Lab Test 01/09/24  1104 11/18/22  1144 12/23/21  1007   HGB 12.2 12.5 12.7    Recent Labs   Lab Test 05/31/21  1938   TROPONINI <0.01     No results for input(s): \"BNP\", \"NTBNPI\", \"NTBNP\" in the last 35106 hours.  Recent Labs   Lab Test 11/18/22  1144   TSH 0.96     Recent Labs   Lab Test 06/05/24  0000 05/22/24  0000 05/15/24  0000   INR 2.2 2.2 3.0          Medications  Allergies   Current Outpatient Medications   Medication Sig Dispense Refill    acetaminophen (TYLENOL) 325 MG tablet Take 325 mg by mouth 3 times daily as needed      amLODIPine (NORVASC) 2.5 MG tablet TAKE 1 TABLET BY MOUTH EVERY  tablet 1    Calcium Carb-Ergocalciferol 500-200 MG-UNIT TABS Take 1 tablet by mouth 2 times daily      cyanocobalamin (VITAMIN B-12) 1000 MCG tablet Take 1 tablet (1,000 mcg) by mouth daily      escitalopram (LEXAPRO) 10 MG tablet TAKE 1 TABLET(10 MG) BY MOUTH DAILY 90 tablet 3    fluticasone (FLONASE) 50 MCG/ACT nasal spray Spray 2 sprays in nostril 1 spray each nostril in the AM, 2 sprays in the PM      gabapentin (NEURONTIN) 300 MG capsule Take 1 capsule (300 mg) by mouth 3 times daily 270 capsule 3    hydrochlorothiazide (HYDRODIURIL) 25 MG tablet Take 1 tablet (25 mg) by mouth every other day 45 tablet 3    irbesartan (AVAPRO) 300 MG tablet TAKE 1 TABLET(300 MG) BY MOUTH DAILY 90 tablet 3    omeprazole (PRILOSEC) 20 MG DR capsule TAKE 1 CAPSULE BY MOUTH TWICE DAILY (Patient taking differently: daily) 180 capsule 3    simvastatin (ZOCOR) 10 MG tablet TAKE 1 TABLET(10 MG) BY MOUTH EVERY 24 HOURS 90 tablet 2    sotalol (BETAPACE) 80 " "MG tablet TAKE 1/2 TABLET(40 MG) BY MOUTH TWICE DAILY 90 tablet 1    traZODone (DESYREL) 50 MG tablet TAKE 1 TABLET(50 MG) BY MOUTH AT BEDTIME 90 tablet 2    Vitamin D3 (CHOLECALCIFEROL) 25 mcg (1000 units) tablet Take 1 tablet (25 mcg) by mouth daily      warfarin ANTICOAGULANT (COUMADIN) 2.5 MG tablet TAKE 1 AND 1/2 TABLETS (3.75 MG) BY MOUTH EVERY DAY OR AS DIRECTED BY INR CLINIC 135 tablet 1    Wheat Dextrin (BENEFIBER PO) Take by mouth daily Once daily        No Known Allergies       Lab Results   Lab Results   Component Value Date     01/09/2024    CO2 27 01/09/2024    CO2 28 12/23/2021    BUN 10.3 01/09/2024    BUN 7 12/23/2021     Lab Results   Component Value Date    WBC 5.3 01/09/2024    HGB 12.2 01/09/2024    HCT 36.8 01/09/2024    MCV 86 01/09/2024     01/09/2024     Lab Results   Component Value Date    CHOL 163 01/09/2024    TRIG 84 01/09/2024    HDL 71 01/09/2024     Lab Results   Component Value Date    INR 2.2 06/05/2024    INR 2.4 01/29/2024     No results found for: \"BNP\"  Lab Results   Component Value Date    TROPONINI <0.01 05/31/2021     Lab Results   Component Value Date    TSH 0.96 11/18/2022    TSH 1.63 05/31/2021                  "

## 2024-06-10 NOTE — LETTER
"6/10/2024    Kevin Ho MD  [address] [facility] [address]    RE: Rene Matthews       Dear Colleague,     I had the pleasure of seeing Renejo Matthews in the [facility].         [facility]   [address], [place] [address]   [email or web address]   OFFICE: [phone]            Impression and Plan     1.  Atrial fibrillation. Florence underwent direct-current cardioversion 29 January 2024.  She has subjectively and objectively maintain sinus rhythm since cardioversion.  Left atrial appendage occlusion device placement has been broached with Florence.  She is interested and forging ahead with this.  Parenthetically; her spouse, Nate, is currently undergoing workup for left atrial occlusion device placement as well.  Continue sotalol 40 mg twice daily.  Continue warfarin.  Plan to forge ahead with workup for left atrial appendage occlusion device placement.     2. Aortic insufficiency.  This was felt mild in degree on echocardiogram 15 Remedios 2021.      3.  Bradycardia.  She has documented for quite some time some tendency toward bradycardia.  She has not had any concerning symptoms.     4.  Hypertension.  Blood pressure is elevated in the office today.  Florence states that she does suffer from \"whitecoat hypertension\".  She reports that she generally has had favorable blood pressure readings at home though admits she has not been checking it recently.  Nonetheless, she states that she is willing to go up slightly on her antihypertensive therapy.  Plan to increase amlodipine from 2.5 mg daily to 5 mg daily.  She does have a home blood pressure monitoring device and I asked that she resume monitoring her blood pressure on an intermittent basis and call should she have a tendency toward higher readings with the aforementioned changes.       35 minutes spent reviewing prior records (including documentation, laboratory studies, cardiac " testing/imaging), interview with patient along with physical exam, planning, and subsequent documentation/crafting of note).         Shared Decision Making     Florence has documented nonvalvular atrial fibrillation (NVAF) and is currently on oral anticoagulant therapy warfarin.  MIREILLE?DS?-VASc = 4 (age [age], ?, & HTN) HAS-BLED risk score: 2 (age of 74 and bleeding predisposition). Indication(s) to consider non-oral anticoagulation therapy: her active lifestyle playing pickle ball and bicycle riding which increases her risk for injury and falls.  Florence has no contra-indication to come off oral anti-coagulant therapy if LAAC device is successfully placed.      I have personally reviewed Florence's chart and discussed the following with Florence/family; 1) The choices available for reducing stroke risk from atrial fibrillation, 2) Treatment options available including respective risk/benefits, and 3) What factors are most important for the patient in making their decision.  The [facility] shared decision making https://www.cardiosmart.org/SDM/Decision-Aids/Find-Decision-Aids/Atrial-Fibrillation was used to guide this conversation.   Florence was counseled that their decision could be made at this time or in the future if more time was needed to consider their decision.      Florence is felt to be an appropriate candidate to proceed with left atrial appendage screening and implant.         History of Present Illness    Once again I would like to thank you again for asking me to participate in the care of your patient, Rene Matthews.  As you know, but to reiterate for my own records, Rene Matthews is a 83 year old female with atrial fibrillation.  She underwent direct-current cardioversion 29 January 2024     On interview, Florence     Further review of systems is otherwise negative/noncontributory (medical record and 13 point review of systems reviewed as well and pertinent positives noted).         Cardiac Diagnostics      Echocardiogram 15  June 2021:   Normal left ventricular size and systolic performance with ejection fraction of 55 to 60%.   Mild aortic insufficiency.   Normal right ventricular size and systolic performance.   Mild left atrial enlargement.     Twelve-lead ECG (personally reviewed) 29 July 2022: Sinus rhythm with borderline first-degree AV block.  Heart rate 48 bpm.  Nonspecific T wave changes.      Twelve-lead ECG (personally reviewed) 31 May 2021: Sinus rhythm with heart rate of 68 bpm.  Borderline first-degree AV block.  Nonspecific T wave changes.            Physical Examination       BP (!) 177/81 (BP Location: Right arm, Patient Position: Sitting, Cuff Size: Adult Regular)   Pulse 54   Wt 60.8 kg (134 lb)   LMP  (LMP Unknown)   SpO2 97%   BMI 23.00 kg/m          Wt Readings from Last 3 Encounters:   06/10/24 60.8 kg (134 lb)   06/07/24 59.9 kg (132 lb)   04/10/24 60.6 kg (133 lb 9.6 oz)       The patient is alert and oriented times three. Sclerae are anicteric. Mucosal membranes are moist. Jugular venous pressure is normal. No significant adenopathy/thyromegally appreciated. Lungs are clear with good expansion. On cardiovascular exam, the patient has a regular S1 and S2. Abdomen is soft and non-tender. Extremities reveal no clubbing, cyanosis, or edema.       Family History/Social History/Risk Factors   Patient does not smoke.  Family history reviewed, and family history includes Cerebrovascular Disease in her mother; Hypertension in her brother and sister.          Medical History  Surgical History Family History Social History   Past Medical History:   Diagnosis Date    Abnormal Pap smear of cervix     ASCUS with negative biopsy    Acute low back pain without sciatica 05/18/2020    Allergic rhinitis     Anxiety, generalized     Benign neoplasm of ascending colon     Burning sensation of feet     Cervical polyp     Chest pain     secondary to gerd    Chronic abdominal pain     Negative CT scan and colonoscopy,  musculoskeletal etiology suspected    Chronic radicular low back pain     MICHEL after evaluation by Dr. Bradley    Chronic sinusitis 10/18/2016    Frequent headaches     GERD (gastroesophageal reflux disease)     Noncardiac chest pain    Hearing loss in left ear 10/20/2017    Herpes zoster 01/01/2006    Hyperlipidemia     Hypertension     IBS (irritable bowel syndrome)     Insomnia     Mild aortic insufficiency 11/02/2022    Nausea 11/18/2022    Osteoarthritis     Osteopenia     DEXA 2014 T score -2.0 stable, DEXA December 2017 T score -1.6 left femoral neck stable.  DEXA January 2022 -2.1 L1 L4 and -1.8 bilateral femoral neck    Overactive bladder 10/18/2016    Palpitations 06/07/2021    Symptoms suspicious for atrial fibrillation.  Echocardiogram June 2021 with normal left ventricular systolic function with mild left atrial enlargement and mild AI and MR    Peptic ulcer disease 01/01/2006    w/ gastric ulcer    Personal history of colonic polyps     colonoscopy January 2014 normal.  Colonoscopy January 2019 with multiple polyps, repeat in 3 years.  Cologuard neg  January 2023    Postmenopausal bleeding 12/23/2021    Screening     Negative for AAA CT scan 2013    Skin cancer, basal cell 10/20/2017    Urge incontinence of urine 06/07/2021    Vitamin B12 deficiency (non anemic)     Weight gain 03/12/2021     Past Surgical History:   Procedure Laterality Date    BIOPSY CERVICAL, LOCAL EXCISION, SINGLE/MULTIPLE      ENDOMETRIAL BIOPSY      Negative    OTHER SURGICAL HISTORY      Basal cell skin cancer    OTHER SURGICAL HISTORY  2012    Radiofrequency ablation right greater saphenous vein    OVARIAN CYST REMOVAL      lysis of adhesions    STRABISMUS SURGERY  2015     Family History   Problem Relation Age of Onset    Cerebrovascular Disease Mother     Hypertension Sister     Hypertension Brother         Social History     Socioeconomic History    Marital status:      Spouse name: Not on file    Number of children: Not  on file    Years of education: Not on file    Highest education level: Not on file   Occupational History    Not on file   Tobacco Use    Smoking status: Never     Passive exposure: Never    Smokeless tobacco: Never   Vaping Use    Vaping status: Never Used   Substance and Sexual Activity    Alcohol use: No    Drug use: No    Sexual activity: Not on file   Other Topics Concern    Not on file   Social History Narrative    Retired teacher  , [place], 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     Social Determinants of Health     Financial Resource Strain: Low Risk  (1/9/2024)    Financial Resource Strain     Within the past 12 months, have you or your family members you live with been unable to get utilities (heat, electricity) when it was really needed?: No   Food Insecurity: Low Risk  (1/9/2024)    Food Insecurity     Within the past 12 months, did you worry that your food would run out before you got money to buy more?: No     Within the past 12 months, did the food you bought just not last and you didn t have money to get more?: No   Transportation Needs: Low Risk  (1/9/2024)    Transportation Needs     Within the past 12 months, has lack of transportation kept you from medical appointments, getting your medicines, non-medical meetings or appointments, work, or from getting things that you need?: No   Physical Activity: Not on file   Stress: Not on file   Social Connections: Unknown (1/1/2022)    Received from [facility], [facility]    Social Connections     Frequency of Communication with Friends and Family: Not on file   Interpersonal Safety: Low Risk  (3/8/2024)    Interpersonal Safety     Do you feel physically and emotionally safe where you currently live?: Yes     Within the past 12 months, have you been hit, slapped, kicked or otherwise physically hurt by someone?: No     Within the past 12 months, have you been humiliated or emotionally abused  in other ways by your partner or ex-partner?: No   Housing Stability: Low Risk  (1/9/2024)    Housing Stability     Do you have housing? : Yes     Are you worried about losing your housing?: No           Medications  Allergies   Current Outpatient Medications   Medication Sig Dispense Refill    acetaminophen (TYLENOL) 325 MG tablet Take 325 mg by mouth 3 times daily as needed      amLODIPine (NORVASC) 2.5 MG tablet TAKE 1 TABLET BY MOUTH EVERY  tablet 1    Calcium Carb-Ergocalciferol 500-200 MG-UNIT TABS Take 1 tablet by mouth 2 times daily      cyanocobalamin (VITAMIN B-12) 1000 MCG tablet Take 1 tablet (1,000 mcg) by mouth daily      escitalopram (LEXAPRO) 10 MG tablet TAKE 1 TABLET(10 MG) BY MOUTH DAILY 90 tablet 3    fluticasone (FLONASE) 50 MCG/ACT nasal spray Spray 2 sprays in nostril 1 spray each nostril in the AM, 2 sprays in the PM      gabapentin (NEURONTIN) 300 MG capsule Take 1 capsule (300 mg) by mouth 3 times daily 270 capsule 3    hydrochlorothiazide (HYDRODIURIL) 25 MG tablet Take 1 tablet (25 mg) by mouth every other day 45 tablet 3    irbesartan (AVAPRO) 300 MG tablet TAKE 1 TABLET(300 MG) BY MOUTH DAILY 90 tablet 3    omeprazole (PRILOSEC) 20 MG DR capsule TAKE 1 CAPSULE BY MOUTH TWICE DAILY (Patient taking differently: daily) 180 capsule 3    simvastatin (ZOCOR) 10 MG tablet TAKE 1 TABLET(10 MG) BY MOUTH EVERY 24 HOURS 90 tablet 2    sotalol (BETAPACE) 80 MG tablet TAKE 1/2 TABLET(40 MG) BY MOUTH TWICE DAILY 90 tablet 1    traZODone (DESYREL) 50 MG tablet TAKE 1 TABLET(50 MG) BY MOUTH AT BEDTIME 90 tablet 2    Vitamin D3 (CHOLECALCIFEROL) 25 mcg (1000 units) tablet Take 1 tablet (25 mcg) by mouth daily      warfarin ANTICOAGULANT (COUMADIN) 2.5 MG tablet TAKE 1 AND 1/2 TABLETS (3.75 MG) BY MOUTH EVERY DAY OR AS DIRECTED BY INR CLINIC 135 tablet 1    Wheat Dextrin (BENEFIBER PO) Take by mouth daily Once daily       No Known Allergies       Lab Results    Chemistry/lipid CBC Cardiac  "Enzymes/BNP/TSH/INR   Recent Labs   Lab Test 01/09/24  1104   CHOL 163   HDL 71   LDL 75   TRIG 84     Recent Labs   Lab Test 01/09/24  1104 01/02/23  1120 12/23/21  1007   LDL 75 66 74     Recent Labs   Lab Test 01/09/24  1104      POTASSIUM 3.8   CHLORIDE 100   CO2 27   GLC 96   BUN 10.3   CR 0.64   GFRESTIMATED 88   EBONY 9.3     Recent Labs   Lab Test 01/09/24  1104 01/02/23  1120 11/18/22  1144   CR 0.64 0.58 0.63     No results for input(s): \"A1C\" in the last 24402 hours.       Recent Labs   Lab Test 01/09/24  1104   WBC 5.3   HGB 12.2   HCT 36.8   MCV 86        Recent Labs   Lab Test 01/09/24  1104 11/18/22  1144 12/23/21  1007   HGB 12.2 12.5 12.7    Recent Labs   Lab Test 05/31/21  1938   TROPONINI <0.01     No results for input(s): \"BNP\", \"NTBNPI\", \"NTBNP\" in the last 18075 hours.  Recent Labs   Lab Test 11/18/22  1144   TSH 0.96     Recent Labs   Lab Test 06/05/24  0000 05/22/24  0000 05/15/24  0000   INR 2.2 2.2 3.0          Medications  Allergies   Current Outpatient Medications   Medication Sig Dispense Refill    acetaminophen (TYLENOL) 325 MG tablet Take 325 mg by mouth 3 times daily as needed      amLODIPine (NORVASC) 2.5 MG tablet TAKE 1 TABLET BY MOUTH EVERY  tablet 1    Calcium Carb-Ergocalciferol 500-200 MG-UNIT TABS Take 1 tablet by mouth 2 times daily      cyanocobalamin (VITAMIN B-12) 1000 MCG tablet Take 1 tablet (1,000 mcg) by mouth daily      escitalopram (LEXAPRO) 10 MG tablet TAKE 1 TABLET(10 MG) BY MOUTH DAILY 90 tablet 3    fluticasone (FLONASE) 50 MCG/ACT nasal spray Spray 2 sprays in nostril 1 spray each nostril in the AM, 2 sprays in the PM      gabapentin (NEURONTIN) 300 MG capsule Take 1 capsule (300 mg) by mouth 3 times daily 270 capsule 3    hydrochlorothiazide (HYDRODIURIL) 25 MG tablet Take 1 tablet (25 mg) by mouth every other day 45 tablet 3    irbesartan (AVAPRO) 300 MG tablet TAKE 1 TABLET(300 MG) BY MOUTH DAILY 90 tablet 3    omeprazole (PRILOSEC) 20 MG " "DR capsule TAKE 1 CAPSULE BY MOUTH TWICE DAILY (Patient taking differently: daily) 180 capsule 3    simvastatin (ZOCOR) 10 MG tablet TAKE 1 TABLET(10 MG) BY MOUTH EVERY 24 HOURS 90 tablet 2    sotalol (BETAPACE) 80 MG tablet TAKE 1/2 TABLET(40 MG) BY MOUTH TWICE DAILY 90 tablet 1    traZODone (DESYREL) 50 MG tablet TAKE 1 TABLET(50 MG) BY MOUTH AT BEDTIME 90 tablet 2    Vitamin D3 (CHOLECALCIFEROL) 25 mcg (1000 units) tablet Take 1 tablet (25 mcg) by mouth daily      warfarin ANTICOAGULANT (COUMADIN) 2.5 MG tablet TAKE 1 AND 1/2 TABLETS (3.75 MG) BY MOUTH EVERY DAY OR AS DIRECTED BY INR CLINIC 135 tablet 1    Wheat Dextrin (BENEFIBER PO) Take by mouth daily Once daily        No Known Allergies       Lab Results   Lab Results   Component Value Date     01/09/2024    CO2 27 01/09/2024    CO2 28 12/23/2021    BUN 10.3 01/09/2024    BUN 7 12/23/2021     Lab Results   Component Value Date    WBC 5.3 01/09/2024    HGB 12.2 01/09/2024    HCT 36.8 01/09/2024    MCV 86 01/09/2024     01/09/2024     Lab Results   Component Value Date    CHOL 163 01/09/2024    TRIG 84 01/09/2024    HDL 71 01/09/2024     Lab Results   Component Value Date    INR 2.2 06/05/2024    INR 2.4 01/29/2024     No results found for: \"BNP\"  Lab Results   Component Value Date    TROPONINI <0.01 05/31/2021     Lab Results   Component Value Date    TSH 0.96 11/18/2022    TSH 1.63 05/31/2021                      Thank you for allowing me to participate in the care of your patient.      Sincerely,     George Mike MD     [facility]  cc:   Yola Sims MD  [address] [facility] [address]  [place] [address]      "

## 2024-06-12 ENCOUNTER — DOCUMENTATION ONLY (OUTPATIENT)
Dept: ANTICOAGULATION | Facility: CLINIC | Age: 84
End: 2024-06-12
Payer: COMMERCIAL

## 2024-06-12 DIAGNOSIS — I48.19 PERSISTENT ATRIAL FIBRILLATION (H): Primary | ICD-10-CM

## 2024-06-12 LAB — INR HOME MONITORING: 2.3 (ref 2–3)

## 2024-06-12 NOTE — PROGRESS NOTES
ANTICOAGULATION  MANAGEMENT-Home Monitor Managed by Exception    Rene Matthews 83 year old female is on warfarin with therapeutic INR result. (Goal INR 2.0-3.0)    Recent labs: (last 7 days)     06/12/24  0000   INR 2.3       Previous INR was Therapeutic  Medication, diet, health changes since last INR:chart reviewed; none identified  Per cardiology notes 6/10/24, patient is going to schedule watchman procedure, this has not happened yet. Made note under calendar to continue to watch for this.  Contacted within the last 12 weeks by phone on  4/2/24   Last [facility] referral date: 08/28/2023      YESENIA     Rene was NOT contacted regarding therapeutic result today per home monitoring policy manage by exception agreement.   Current warfarin dose is to be continued:     Summary  As of 6/12/2024      Full warfarin instructions:  3.75 mg every day   Next INR check:  6/19/2024             ?   Aleksandra Gardner RN  Anticoagulation Clinic  6/12/2024    _______________________________________________________________________     Anticoagulation Episode Summary       Current INR goal:  2.0-3.0   TTR:  74.3% (1 y)   Target end date:  Indefinite   Send INR reminders to:  [facility]    Indications    Persistent atrial fibrillation (H) [I48.19]             Comments:  EDNA Madera             Anticoagulation Care Providers       Provider Role Specialty Phone number    Kevin Ho MD Referring Internal Medicine [phone]

## 2024-06-14 ENCOUNTER — ANCILLARY PROCEDURE (OUTPATIENT)
Dept: CARDIOLOGY | Facility: CLINIC | Age: 84
End: 2024-06-14
Attending: INTERNAL MEDICINE
Payer: COMMERCIAL

## 2024-06-14 DIAGNOSIS — I48.19 PERSISTENT ATRIAL FIBRILLATION (H): ICD-10-CM

## 2024-06-14 LAB — LVEF ECHO: NORMAL

## 2024-06-14 PROCEDURE — 93306 TTE W/DOPPLER COMPLETE: CPT | Performed by: INTERNAL MEDICINE

## 2024-06-17 ENCOUNTER — TELEPHONE (OUTPATIENT)
Dept: CARDIOLOGY | Facility: CLINIC | Age: 84
End: 2024-06-17
Payer: COMMERCIAL

## 2024-06-17 DIAGNOSIS — I48.19 PERSISTENT ATRIAL FIBRILLATION (H): Primary | ICD-10-CM

## 2024-06-17 NOTE — TELEPHONE ENCOUNTER
[facility] Call Center    Phone Message    May a detailed message be left on voicemail: yes     Reason for Call: Other: Florence called back, please return her call. 6/18 in the morning would be better to reach her.      Action Taken: Other: cardiology    Travel Screening: Not Applicable   Thank you!  Specialty Access Center    Date of Service:

## 2024-06-17 NOTE — TELEPHONE ENCOUNTER
H&P:  Card OV  Date: 6/10/2024 with JaniSecond Porch  Structural JENNA [] LAAC  Order Case Req Y  Order Set: to be placed on completion of pre-op Intra-Op MAEGAN Order? Y 3 month post-LAAC imaging order? Y     AC Coumadin:   Antiplatelet ASA 81-Start two weeks prior to procedure   DM/GLP-1 DM Meds- None  GLP-1- None   ED Meds NONE  - NONE     Rene Matthews, 1940, [id number]  Home:[phone] (home) Cell:[phone] (mobile)  Emergency Contact: Nate Matthews   PCP: Kevin Ho, [phone]    Important patient information for CSC/Cath Lab staff : None    LAAC Cath Lab Procedure Order   LAAC Type:  BSCI-WM  Implanting Provider: Next available (no preference)  Date Ordered and Prepped: 6/17/2024 Linnea Foss RN    Scheduling Information:  Anticipated Case Per Day:  Standard WM (4 cases per day)   Scheduling Timeframe:  Next Available  Scheduling Restrictions: Pt to start antiplatelet 81 mg ASA two weeks prior to procedure, please schedule accordingly to allow time for this.   Shared Decision Making Completed?: Done 6/10/2024 by Rashid  Current Device/Device Co Needed for Procedure:  None - None  Intra-Op MAEGAN needed?: Yes, order placed  Anesthesia: General Anesthesia  Overnight stay required?:BSCI - WM case, no overnight stay anticipated      [facility] EP Cath Lab Prep   H&P:  Completed by cardiology on 6/10/24 if scheduled within 30 days, pt will need RN education and Labs appt within 3 days of procedure. If pts LAAC scheduled outside of 30 days, pt to schedule H&P with Structural JENNA, RN Teach, and Labs within 30 days of LAAC  Pre-op Labs: CBC, BMP, Lab 276 (T&S), and INR if on Warfarin, if not completed at pre-op H&P within 7 days of procedure.  Medical Records Pertinent for Procedure:   NONE  Iodinated Contrast Dye Allergies (Does not include Shellfish, Egg, and/or Iodine Allergy): No known allergy to contrast  GLP-1 Protocol: If on Dulaglutide (Trulicity) (weekly)- Injection hold 7 days prior to procedure  ,  Exenatide extended release (Bydureon bcise) (weekly)- Injection hold 7 days prior to procedure, Exenatide (Byetta) (twice daily)- Oral Tablet hold day prior and morning of procedure and for Injection hold 7 days prior to procedure, Semaglutide (Ozempic) (weekly)- Injection and Oral hold 7 days prior to procedure, Liraglutide (Victoza, Saxenda) (daily)- Injection hold day prior and morning of procedure  Post-LAAC RN Phone Call: SOUMYA - WM: please place on LAAC RN schedule for the day following LAAC, time based on case order  Post-LAAC Follow Up Plan: All patients, regardless of vendor, to be seen at 45 days post-LAAC with Structural JENNA in clinic  Post-LAAC Imaging?: CT Pulm Vein at 3 months, and again at one year post-implant.      No Known Allergies    Current Outpatient Medications:     acetaminophen (TYLENOL) 325 MG tablet, Take 325 mg by mouth 3 times daily as needed, Disp: , Rfl:     amLODIPine (NORVASC) 5 MG tablet, Take 1 tablet (5 mg) by mouth daily, Disp: 90 tablet, Rfl: 3    Calcium Carb-Ergocalciferol 500-200 MG-UNIT TABS, Take 1 tablet by mouth 2 times daily, Disp: , Rfl:     cyanocobalamin (VITAMIN B-12) 1000 MCG tablet, Take 1 tablet (1,000 mcg) by mouth daily, Disp: , Rfl:     escitalopram (LEXAPRO) 10 MG tablet, TAKE 1 TABLET(10 MG) BY MOUTH DAILY, Disp: 90 tablet, Rfl: 3    fluticasone (FLONASE) 50 MCG/ACT nasal spray, Spray 2 sprays in nostril 1 spray each nostril in the AM, 2 sprays in the PM, Disp: , Rfl:     gabapentin (NEURONTIN) 300 MG capsule, Take 1 capsule (300 mg) by mouth 3 times daily, Disp: 270 capsule, Rfl: 3    hydrochlorothiazide (HYDRODIURIL) 25 MG tablet, Take 1 tablet (25 mg) by mouth every other day, Disp: 45 tablet, Rfl: 3    irbesartan (AVAPRO) 300 MG tablet, TAKE 1 TABLET(300 MG) BY MOUTH DAILY, Disp: 90 tablet, Rfl: 3    omeprazole (PRILOSEC) 20 MG DR capsule, TAKE 1 CAPSULE BY MOUTH TWICE DAILY (Patient taking differently: daily), Disp: 180 capsule, Rfl: 3    simvastatin  (ZOCOR) 10 MG tablet, TAKE 1 TABLET(10 MG) BY MOUTH EVERY 24 HOURS, Disp: 90 tablet, Rfl: 2    sotalol (BETAPACE) 80 MG tablet, TAKE 1/2 TABLET(40 MG) BY MOUTH TWICE DAILY, Disp: 90 tablet, Rfl: 1    traZODone (DESYREL) 50 MG tablet, TAKE 1 TABLET(50 MG) BY MOUTH AT BEDTIME, Disp: 90 tablet, Rfl: 2    Vitamin D3 (CHOLECALCIFEROL) 25 mcg (1000 units) tablet, Take 1 tablet (25 mcg) by mouth daily, Disp: , Rfl:     warfarin ANTICOAGULANT (COUMADIN) 2.5 MG tablet, TAKE 1 AND 1/2 TABLETS (3.75 MG) BY MOUTH EVERY DAY OR AS DIRECTED BY INR CLINIC, Disp: 135 tablet, Rfl: 1    Wheat Dextrin (BENEFIBER PO), Take by mouth daily Once daily, Disp: , Rfl:     Documentation Date:6/17/2024 11:18 AM  Linnea Foss RN

## 2024-06-17 NOTE — TELEPHONE ENCOUNTER
----- Message from Tori Ward PA-C sent at 6/7/2024 11:32 AM CDT -----  Hi, patient is a candidate for watchman and is interested in moving forward.  I have reordered a repeat echocardiogram to assess her LV function.  She should be okay for CTA postprocedure as long as her kidney function remains stable.  She has a shared decision making visit scheduled with Dr. Mike on Remedios 10..    Thank you,  Tori

## 2024-06-19 ENCOUNTER — ANTICOAGULATION THERAPY VISIT (OUTPATIENT)
Dept: ANTICOAGULATION | Facility: CLINIC | Age: 84
End: 2024-06-19
Payer: COMMERCIAL

## 2024-06-19 DIAGNOSIS — I48.19 PERSISTENT ATRIAL FIBRILLATION (H): Primary | ICD-10-CM

## 2024-06-19 LAB — INR HOME MONITORING: 2.2 (ref 2–3)

## 2024-06-19 NOTE — PROGRESS NOTES
ANTICOAGULATION MANAGEMENT     Rene Matthews 83 year old female is on warfarin with therapeutic INR result. (Goal INR 2.0-3.0)    Recent labs: (last 7 days)     06/19/24  0000   INR 2.2       ASSESSMENT     Source(s): Chart Review and Patient/Caregiver Call     Warfarin doses taken: Warfarin taken as instructed  Diet: No new diet changes identified  Medication/supplement changes: None noted  New illness, injury, or hospitalization: No  Signs or symptoms of bleeding or clotting: No  Previous result: Therapeutic last 2(+) visits  Additional findings: Upcoming surgery/procedure MONIKA  not scheduled yet  - patient is asking if she needs to take aspirin - advised to call cardiology and ask if she needs it.       PLAN     Recommended plan for no diet, medication or health factor changes affecting INR     Dosing Instructions: Continue your current warfarin dose with next INR in 1 week       Summary  As of 6/19/2024      Full warfarin instructions:  3.75 mg every day   Next INR check:  6/26/2024               Telephone call with Florence who verbalizes understanding and agrees to plan    Patient to recheck with home meter    Education provided:   Importance of notifying anticoagulation clinic for: upcoming surgeries and procedures 2 weeks in advance  Resume manage by exception with home monitor. Continue to submit INR results to home monitor company.You will only be called when your result is out of range. Please call and notify [facility] if new medication started, dose missed, signs or symptoms of bleeding or clotting, or a surgery/procedure is scheduled. Due for next call no later than: 9/17/24.  Contact [phone]  with any changes, questions or concerns.     Plan made per [facility] anticoagulation protocol    Lisha Herbert RN  Anticoagulation Clinic  6/19/2024    _______________________________________________________________________     Anticoagulation Episode Summary       Current INR goal:  2.0-3.0   TTR:  75.5% (1 y)   Target end  date:  Indefinite   Send INR reminders to:  [facility]    Indications    Persistent atrial fibrillation (H) [I48.19]             Comments:  EDNA Madera             Anticoagulation Care Providers       Provider Role Specialty Phone number    Kevin Ho MD Referring Internal Medicine [phone]

## 2024-06-26 ENCOUNTER — DOCUMENTATION ONLY (OUTPATIENT)
Dept: ANTICOAGULATION | Facility: CLINIC | Age: 84
End: 2024-06-26
Payer: COMMERCIAL

## 2024-06-26 ENCOUNTER — ANTICOAGULATION THERAPY VISIT (OUTPATIENT)
Dept: ANTICOAGULATION | Facility: CLINIC | Age: 84
End: 2024-06-26
Payer: COMMERCIAL

## 2024-06-26 ENCOUNTER — TELEPHONE (OUTPATIENT)
Dept: ANTICOAGULATION | Facility: CLINIC | Age: 84
End: 2024-06-26
Payer: COMMERCIAL

## 2024-06-26 DIAGNOSIS — I48.19 PERSISTENT ATRIAL FIBRILLATION (H): Primary | ICD-10-CM

## 2024-06-26 LAB — INR HOME MONITORING: 2.2 (ref 2–3)

## 2024-06-26 NOTE — PROGRESS NOTES
See TE regarding procedure plan- NO hold needed.   I did not contact pt regarding this since no change will be needed on her end.   INR due next week  Josiane Quintero RN

## 2024-06-26 NOTE — TELEPHONE ENCOUNTER
I did not contact pt regarding this since no change will be needed on her end.   INR due next week  Josiane Quintero RN

## 2024-06-26 NOTE — TELEPHONE ENCOUNTER
MAJOR-PROCEDURAL ANTICOAGULATION  MANAGEMENT    NA requesting pre-procedure hold orders for warfarin and review for bridging      Procedure date: 7/10/24       Procedure:  Left Atrial Appendage Closure - WM      Procedure location and phone number (if external): [place]     Number of warfarin hold days requested and/or target INR: unknown    Pre-op date:  H&P 6/10/24      Routing to Anticoagulation Pharmacist for review.      Josiane Quintero RN

## 2024-06-26 NOTE — TELEPHONE ENCOUNTER
"ANAI-PROCEDURAL ANTICOAGULATION  MANAGEMENT    ASSESSMENT     Warfarin interruption plan for MONIKA closure on 07/10/2024.    Indication for Anticoagulation: Atrial Fibrillation    [id number] = 4 (Hypertension, Age >= 75, and Female)    Anai-Procedure Risk stratification for thromboembolism: low (2022 Chest guidelines and 2017 ACC periprocedure pathway for NVAF Expert Consensus)    NO hold is needed for this procedure, maintain goal INR 2-3 leading into procedure    RECOMMENDATION     Continue warfarin leading up to procedure and for 6 weeks post procedure until instructed may stop warfarin by cardiology team    Plan NOT routed to referring provider for approval since no hold necessary  ?   Emma Vazquez [facility]    SUBJECTIVE/OBJECTIVE     Rene Matthews, a 83 year old female    Goal INR Range: 2.0-3.0     Patient bridged in past: No      Wt Readings from Last 3 Encounters:   06/10/24 60.8 kg (134 lb)   06/07/24 59.9 kg (132 lb)   04/10/24 60.6 kg (133 lb 9.6 oz)      Ideal body weight: 54.7 kg (120 lb 9.5 oz)  Adjusted ideal body weight: 57.1 kg (125 lb 15.3 oz)     Estimated body mass index is 23 kg/m  as calculated from the following:    Height as of 4/10/24: 1.626 m (5' 4\").    Weight as of 6/10/24: 60.8 kg (134 lb).    Lab Results   Component Value Date    INR 2.2 06/26/2024    INR 2.2 06/19/2024    INR 2.3 06/12/2024     Lab Results   Component Value Date    HGB 12.2 01/09/2024    HCT 36.8 01/09/2024     01/09/2024     Lab Results   Component Value Date    CR 0.64 01/09/2024    CR 0.58 01/02/2023    CR 0.63 11/18/2022     Estimated Creatinine Clearance: 66.2 mL/min (based on SCr of 0.64 mg/dL).    "

## 2024-06-26 NOTE — PROGRESS NOTES
ANTICOAGULATION  MANAGEMENT-Home Monitor Managed by Exception    Rene Matthews 83 year old female is on warfarin with therapeutic INR result. (Goal INR 2.0-3.0)    Recent labs: (last 7 days)     06/26/24  0000   INR 2.2       Previous INR was Therapeutic  Medication, diet, health changes since last INR:chart reviewed; none identified  Contacted within the last 12 weeks by phone on 6/19/24 procedure has been scheduled for 7/10/24 - procedure plan request sent to [facility] today - pt will need to be called with next INR to discuss.   Last [facility] referral date: 08/28/2023      PLAN     Rene was NOT contacted regarding therapeutic result today per home monitoring policy manage by exception agreement.   Current warfarin dose is to be continued:     Summary  As of 6/26/2024      Full warfarin instructions:  3.75 mg every day   Next INR check:  7/3/2024             ?   Josiane Quintero, RN  Anticoagulation Clinic  6/26/2024    _______________________________________________________________________     Anticoagulation Episode Summary       Current INR goal:  2.0-3.0   TTR:  77.5% (1 y)   Target end date:  Indefinite   Send INR reminders to:  [facility]    Indications    Persistent atrial fibrillation (H) [I48.19]             Comments:  EDNA Madera             Anticoagulation Care Providers       Provider Role Specialty Phone number    Kevin Ho MD Referring Internal Medicine [phone]

## 2024-06-27 DIAGNOSIS — I48.19 PERSISTENT ATRIAL FIBRILLATION (H): Primary | ICD-10-CM

## 2024-06-27 RX ORDER — ASPIRIN 81 MG/1
81 TABLET ORAL DAILY
Status: ON HOLD | COMMUNITY
Start: 2024-06-26 | End: 2024-07-10

## 2024-07-03 ENCOUNTER — ANTICOAGULATION THERAPY VISIT (OUTPATIENT)
Dept: ANTICOAGULATION | Facility: CLINIC | Age: 84
End: 2024-07-03
Payer: COMMERCIAL

## 2024-07-03 DIAGNOSIS — I48.19 PERSISTENT ATRIAL FIBRILLATION (H): Primary | ICD-10-CM

## 2024-07-03 LAB — INR HOME MONITORING: 2.5 (ref 2–3)

## 2024-07-03 NOTE — PROGRESS NOTES
ANTICOAGULATION MANAGEMENT     Rene Matthews 83 year old female is on warfarin with therapeutic INR result. (Goal INR 2.0-3.0)    Recent labs: (last 7 days)     07/03/24  0000   INR 2.5       ASSESSMENT     Source(s): Chart Review and Patient/Caregiver Call     Warfarin doses taken: Warfarin taken as instructed  Diet: No new diet changes identified  Medication/supplement changes:  Aspirin 81 mg started on 6/28 not expected to affect INR, but may increase risk of bleeding  New illness, injury, or hospitalization: No  Signs or symptoms of bleeding or clotting: No  Previous result: Therapeutic last 2(+) visits  Additional findings: Upcoming surgery/procedure 7/10 MONIKA Closure TE dated 6/26 for procedure plan - No warfarin hold needed, to maintain inr goal 2-3 leading to procedure Needs weekly inr for 6 weeks post procedure       PLAN     Recommended plan for ongoing change(s) affecting INR     Dosing Instructions: Continue your current warfarin dose with next INR in 1 week       Summary  As of 7/3/2024      Full warfarin instructions:  3.75 mg every day   Next INR check:  7/9/2024               Telephone call with Florence who verbalizes understanding and agrees to plan and who agrees to plan and repeated back plan correctly    Patient to recheck with home meter    Education provided: Interaction IS anticipated between warfarin and aspirin  Symptom monitoring: monitoring for bleeding signs and symptoms  Contact [phone] with any changes, questions or concerns.     Plan made per [facility] anticoagulation protocol    Lisha Herbert RN  Anticoagulation Clinic  7/3/2024    _______________________________________________________________________     Anticoagulation Episode Summary       Current INR goal:  2.0-3.0   TTR:  79.4% (1 y)   Target end date:  Indefinite   Send INR reminders to:  [facility]    Indications    Persistent atrial fibrillation (H) [I48.19]             Comments:  EDNA Madera              Anticoagulation Care Providers       Provider Role Specialty Phone number    Kevin Ho MD Referring Internal Medicine [phone]

## 2024-07-08 LAB
ABO/RH(D): NORMAL
ANTIBODY SCREEN: NEGATIVE
SPECIMEN EXPIRATION DATE: NORMAL

## 2024-07-09 ENCOUNTER — ANTICOAGULATION THERAPY VISIT (OUTPATIENT)
Dept: ANTICOAGULATION | Facility: CLINIC | Age: 84
End: 2024-07-09

## 2024-07-09 ENCOUNTER — TELEPHONE (OUTPATIENT)
Dept: CARDIOLOGY | Facility: CLINIC | Age: 84
End: 2024-07-09

## 2024-07-09 ENCOUNTER — ALLIED HEALTH/NURSE VISIT (OUTPATIENT)
Dept: CARDIOLOGY | Facility: CLINIC | Age: 84
End: 2024-07-09
Payer: COMMERCIAL

## 2024-07-09 ENCOUNTER — PREP FOR PROCEDURE (OUTPATIENT)
Dept: CARDIOLOGY | Facility: CLINIC | Age: 84
End: 2024-07-09

## 2024-07-09 ENCOUNTER — DOCUMENTATION ONLY (OUTPATIENT)
Dept: CARDIOLOGY | Facility: CLINIC | Age: 84
End: 2024-07-09

## 2024-07-09 ENCOUNTER — LAB (OUTPATIENT)
Dept: CARDIOLOGY | Facility: CLINIC | Age: 84
End: 2024-07-09
Payer: COMMERCIAL

## 2024-07-09 VITALS
WEIGHT: 134 LBS | HEART RATE: 44 BPM | DIASTOLIC BLOOD PRESSURE: 68 MMHG | RESPIRATION RATE: 16 BRPM | SYSTOLIC BLOOD PRESSURE: 148 MMHG | HEIGHT: 64 IN | BODY MASS INDEX: 22.88 KG/M2

## 2024-07-09 DIAGNOSIS — I48.19 PERSISTENT ATRIAL FIBRILLATION (H): ICD-10-CM

## 2024-07-09 DIAGNOSIS — I48.19 PERSISTENT ATRIAL FIBRILLATION (H): Primary | ICD-10-CM

## 2024-07-09 LAB
ANION GAP SERPL CALCULATED.3IONS-SCNC: 10 MMOL/L (ref 7–15)
BUN SERPL-MCNC: 9.9 MG/DL (ref 8–23)
CALCIUM SERPL-MCNC: 8.8 MG/DL (ref 8.8–10.2)
CHLORIDE SERPL-SCNC: 97 MMOL/L (ref 98–107)
CREAT SERPL-MCNC: 0.58 MG/DL (ref 0.51–0.95)
DEPRECATED HCO3 PLAS-SCNC: 28 MMOL/L (ref 22–29)
EGFRCR SERPLBLD CKD-EPI 2021: 90 ML/MIN/1.73M2
ERYTHROCYTE [DISTWIDTH] IN BLOOD BY AUTOMATED COUNT: 13.3 % (ref 10–15)
GLUCOSE SERPL-MCNC: 89 MG/DL (ref 70–99)
HCT VFR BLD AUTO: 35.1 % (ref 35–47)
HGB BLD-MCNC: 11.8 G/DL (ref 11.7–15.7)
INR PPP: 2.59 (ref 0.85–1.15)
MCH RBC QN AUTO: 30.5 PG (ref 26.5–33)
MCHC RBC AUTO-ENTMCNC: 33.6 G/DL (ref 31.5–36.5)
MCV RBC AUTO: 91 FL (ref 78–100)
PLATELET # BLD AUTO: 228 10E3/UL (ref 150–450)
POTASSIUM SERPL-SCNC: 4.2 MMOL/L (ref 3.4–5.3)
RBC # BLD AUTO: 3.87 10E6/UL (ref 3.8–5.2)
SODIUM SERPL-SCNC: 135 MMOL/L (ref 135–145)
WBC # BLD AUTO: 6.3 10E3/UL (ref 4–11)

## 2024-07-09 PROCEDURE — 86901 BLOOD TYPING SEROLOGIC RH(D): CPT

## 2024-07-09 PROCEDURE — 93000 ELECTROCARDIOGRAM COMPLETE: CPT | Performed by: INTERNAL MEDICINE

## 2024-07-09 PROCEDURE — 80048 BASIC METABOLIC PNL TOTAL CA: CPT

## 2024-07-09 PROCEDURE — 86900 BLOOD TYPING SEROLOGIC ABO: CPT

## 2024-07-09 PROCEDURE — 86850 RBC ANTIBODY SCREEN: CPT

## 2024-07-09 PROCEDURE — 85027 COMPLETE CBC AUTOMATED: CPT

## 2024-07-09 PROCEDURE — 36415 COLL VENOUS BLD VENIPUNCTURE: CPT

## 2024-07-09 PROCEDURE — 99207 PR NO CHARGE NURSE ONLY: CPT

## 2024-07-09 PROCEDURE — 85610 PROTHROMBIN TIME: CPT

## 2024-07-09 RX ORDER — CEFAZOLIN SODIUM/WATER 2 G/20 ML
2 SYRINGE (ML) INTRAVENOUS
Status: CANCELLED | OUTPATIENT
Start: 2024-07-10

## 2024-07-09 RX ORDER — ASPIRIN 81 MG/1
81 TABLET ORAL ONCE
Status: CANCELLED | OUTPATIENT
Start: 2024-07-09 | End: 2024-07-09

## 2024-07-09 RX ORDER — LIDOCAINE 40 MG/G
CREAM TOPICAL
Status: CANCELLED | OUTPATIENT
Start: 2024-07-09

## 2024-07-09 NOTE — PROGRESS NOTES
MODIFIED GLYNN SCALE   Timepoint: Pre-LAAC    Previous score: initial GLYNN    Score Description   0 No symptoms at all   1 No significant disability despite symptoms; able to carry out all usual duties and activities   2 Slight disability; unable to carry out all previous activities, but able to look after own affairs without assistance   3 Moderate disability; requiring some help, but able to walk without assistance   4 Moderately severe disability; unable to walk without assistance and unable to attend to own bodily needs without assistance   5 Severe disability; bedridden, incontinent and requiring constant nursing care and attention   6 Dead    Total score (0 - 6):  0, No reported history of stroke/TIA    Change in score if s/p LAAC? N/A  If yes, notify implanting cardiologist.    Linnea Foss RN BSN  Structural Heart Coordinator   [facility]  [phone]

## 2024-07-09 NOTE — TELEPHONE ENCOUNTER
Noted. Update in the [facility] information for weekly testing X4 weeks.    Ale Mirza RN    [facility]tico[facility]

## 2024-07-09 NOTE — PROGRESS NOTES
Rene Matthews  [address] [address]  [phone] (home)     Patient in to see RN for Pre-LAAC visit on 7/9/2024    All pre-procedure labs drawn: 7/9/2024, In process   EKG obtained: Yes   Labs reviewed: In process  Renal Issues: No  Diabetic?: No  Device?: No          Pre-procedure instructions  Patient instructed to be NPO after midnight the evening prior to procedure.  Patient instructed to shower the evening before or the morning of the procedure.  Leave all valuables at home (jewlery, rings, watches, large amounts of money).  Patient understands there are two visitors allowed during patients stay.    Patient instructed to arrange for transportation home following procedure from a responsible family member of friend. No driving for at least 72 hours post-procedure.  Patient instructed to have a responsible adult with them for 24 hours post-procedure.  Post-procedure follow up process.    Patient instructed on medications:   Take Amlodipine, Avapro, Sotalol    Aspirin 81mg morning of procedure: To be given in pre-procedure area    Education was given to patient regarding what to expect pre-procedure.     Patient was informed procedure will be done at [facility], [address] [place] [address]. They have been instructed to check in at the  at the Hospital and their arrival time is at 10:00 am    LAAC procedure, MAEGAN  and blood consent signed at the time of the appt: To be signed AM of procedure    All questions were answered to family and patient by RN.     Nate present at the time of appointment.  Nate will be present day of procedure.    Linnea Foss RN BSN  Structural Heart Coordinator   [facility]  [phone]    Patient Active Problem List   Diagnosis    Primary hypertension    Hyperlipidemia    IBS (irritable bowel syndrome)    Osteoarthritis    Allergic rhinitis    Peptic ulcer disease    Anxiety,  generalized    Insomnia    Chronic abdominal pain    GERD (gastroesophageal reflux disease)    Chronic headaches    Overactive bladder    Hearing loss in left ear    Skin cancer, basal cell    Palpitations    Urge incontinence of urine    Liver cyst    Atrophy of vagina    Flatulence symptom    Chronic radicular low back pain    Burning sensation of feet    Vitamin B12 deficiency (non anemic)    Osteopenia    Persistent atrial fibrillation (H)    Chronic fatigue    Personal history of colonic polyps    Sinus node dysfunction (H)     Current Outpatient Medications   Medication Sig Dispense Refill    acetaminophen (TYLENOL) 325 MG tablet Take 325 mg by mouth 3 times daily as needed      amLODIPine (NORVASC) 5 MG tablet Take 1 tablet (5 mg) by mouth daily 90 tablet 3    aspirin 81 MG EC tablet Take 1 tablet (81 mg) by mouth daily      Calcium Carb-Ergocalciferol 500-200 MG-UNIT TABS Take 1 tablet by mouth 2 times daily      cyanocobalamin (VITAMIN B-12) 1000 MCG tablet Take 1 tablet (1,000 mcg) by mouth daily      escitalopram (LEXAPRO) 10 MG tablet TAKE 1 TABLET(10 MG) BY MOUTH DAILY 90 tablet 3    fluticasone (FLONASE) 50 MCG/ACT nasal spray Spray 2 sprays in nostril 1 spray each nostril in the AM, 2 sprays in the PM      gabapentin (NEURONTIN) 300 MG capsule Take 1 capsule (300 mg) by mouth 3 times daily 270 capsule 3    hydrochlorothiazide (HYDRODIURIL) 25 MG tablet Take 1 tablet (25 mg) by mouth every other day 45 tablet 3    irbesartan (AVAPRO) 300 MG tablet TAKE 1 TABLET(300 MG) BY MOUTH DAILY 90 tablet 3    omeprazole (PRILOSEC) 20 MG DR capsule TAKE 1 CAPSULE BY MOUTH TWICE DAILY (Patient taking differently: daily) 180 capsule 3    simvastatin (ZOCOR) 10 MG tablet TAKE 1 TABLET(10 MG) BY MOUTH EVERY 24 HOURS 90 tablet 2    sotalol (BETAPACE) 80 MG tablet TAKE 1/2 TABLET(40 MG) BY MOUTH TWICE DAILY 90 tablet 1    traZODone (DESYREL) 50 MG tablet TAKE 1 TABLET(50 MG) BY MOUTH AT BEDTIME 90 tablet 2    Vitamin D3  (CHOLECALCIFEROL) 25 mcg (1000 units) tablet Take 1 tablet (25 mcg) by mouth daily      warfarin ANTICOAGULANT (COUMADIN) 2.5 MG tablet TAKE 1 AND 1/2 TABLETS (3.75 MG) BY MOUTH EVERY DAY OR AS DIRECTED BY INR CLINIC 135 tablet 1    Wheat Dextrin (BENEFIBER PO) Take by mouth daily Once daily       No current facility-administered medications for this visit.      No Known Allergies

## 2024-07-09 NOTE — PROGRESS NOTES
----- Message -----  From: Veronica Cotter APRN CNP  Sent: 7/9/2024   3:30 PM CDT  To: Linnea Foss RN; Tori Ward PA-C    Chart reviewed as this is Bailey's patient.  Heart rate in the 40s is fine as long as she feels okay.  Continue sotalol 40 mg twice daily.  Otherwise, she will have recurrent AF.  Veronica Haas  ----- Message -----  From: Linnea Foss RN  Sent: 7/9/2024  12:22 PM CDT  To: Tori Ward PA-C; MACARIO Brooks CNP    Saw this lady for pre-op exam today. EKG showed SB with HR 43. She is on sotalol 40 mg BID. Looks like her HR has been trending in the 40s since January of this year.    Ok to continue with current dosage?

## 2024-07-09 NOTE — PROGRESS NOTES
ANTICOAGULATION MANAGEMENT     Rene Matthews 83 year old female is on warfarin with therapeutic INR result. (Goal INR 2.0-3.0)    Recent labs: (last 7 days)     07/09/24  1212   INR 2.59*       ASSESSMENT     Source(s): Chart Review and Patient/Caregiver Call     Warfarin doses taken: Warfarin taken as instructed  Diet: No new diet changes identified  Medication/supplement changes: None noted  New illness, injury, or hospitalization: No  Signs or symptoms of bleeding or clotting: No  Previous result: Therapeutic last 2(+) visits  Additional findings: Patient scheduled for LAAC - BSCI-WM device, 7/10/2024        PLAN     Recommended plan for no diet, medication or health factor changes affecting INR     Dosing Instructions: Continue your current warfarin dose with next INR in 1 week       Summary  As of 7/9/2024      Full warfarin instructions:  3.75 mg every day   Next INR check:  7/17/2024               Telephone call with Florence who verbalizes understanding and agrees to plan    Patient to recheck with home meter    Education provided:  testing needed weekly for 4 weeks    Plan made per [facility] anticoagulation protocol    Ale Mirza RN  Anticoagulation Clinic  7/9/2024    _______________________________________________________________________     Anticoagulation Episode Summary       Current INR goal:  2.0-3.0   TTR:  81.1% (1 y)   Target end date:  Indefinite   Send INR reminders to:  [facility]    Indications    Persistent atrial fibrillation (H) [I48.19]             Comments:  EDNA Madera             Anticoagulation Care Providers       Provider Role Specialty Phone number    Kevin Ho MD Referring Internal Medicine [phone]

## 2024-07-09 NOTE — TELEPHONE ENCOUNTER
Patient scheduled for LAAC - BSCI-WM device, 7/10/2024. Patient to remain on warfarin uninterrupted prior to procedure with no change in INR goal. Patient will have INR done day of pre-op examination and day of LAAC procedure. Per post-LAAC - BSCI-WM medication guidelines, patient to remain on daily warfarin and antiplatelet medication for approximately 6 weeks following their procedure. Patient will require weekly INRs for the first 4 weeks following their procedure. Structural team will only be monitoring INR peripherally and making no dose changes to patient warfarin.  Message routed to patient's anticoagulation team to arrange and as FYI. Patient has been notified of above plan during pre-op examination with Structural RN. ANEL

## 2024-07-10 ENCOUNTER — HOSPITAL ENCOUNTER (INPATIENT)
Facility: HOSPITAL | Age: 84
LOS: 1 days | Discharge: HOME | DRG: 229 | End: 2024-07-10
Attending: INTERNAL MEDICINE | Admitting: INTERNAL MEDICINE
Payer: MEDICARE

## 2024-07-10 ENCOUNTER — HOSPITAL ENCOUNTER (OUTPATIENT)
Dept: CARDIOLOGY | Facility: HOSPITAL | Age: 84
Discharge: HOME | DRG: 229 | End: 2024-07-10
Attending: PHYSICIAN ASSISTANT | Admitting: INTERNAL MEDICINE
Payer: MEDICARE

## 2024-07-10 ENCOUNTER — ANESTHESIA (OUTPATIENT)
Dept: CARDIOLOGY | Facility: HOSPITAL | Age: 84
DRG: 229 | End: 2024-07-10
Payer: MEDICARE

## 2024-07-10 ENCOUNTER — ANESTHESIA EVENT (OUTPATIENT)
Dept: CARDIOLOGY | Facility: HOSPITAL | Age: 84
DRG: 229 | End: 2024-07-10
Payer: MEDICARE

## 2024-07-10 ENCOUNTER — TELEPHONE (OUTPATIENT)
Dept: CARDIOLOGY | Facility: CLINIC | Age: 84
End: 2024-07-10

## 2024-07-10 VITALS
OXYGEN SATURATION: 96 % | HEART RATE: 53 BPM | RESPIRATION RATE: 23 BRPM | HEIGHT: 64 IN | BODY MASS INDEX: 23 KG/M2 | TEMPERATURE: 98.3 F | DIASTOLIC BLOOD PRESSURE: 67 MMHG | SYSTOLIC BLOOD PRESSURE: 151 MMHG

## 2024-07-10 DIAGNOSIS — I48.19 PERSISTENT ATRIAL FIBRILLATION (H): ICD-10-CM

## 2024-07-10 DIAGNOSIS — I48.19 PERSISTENT ATRIAL FIBRILLATION (H): Primary | ICD-10-CM

## 2024-07-10 DIAGNOSIS — Z53.8 FAILURE OF ATTEMPTED SURGICAL PROCEDURE: ICD-10-CM

## 2024-07-10 LAB
ACT BLD: 295 SECONDS (ref 74–150)
ACT BLD: 379 SECONDS (ref 74–150)
APTT PPP: 38 SECONDS (ref 22–38)
ATRIAL RATE - MUSE: 43 BPM
BLD PROD TYP BPU: NORMAL
BLD PROD TYP BPU: NORMAL
BLOOD COMPONENT TYPE: NORMAL
BLOOD COMPONENT TYPE: NORMAL
CODING SYSTEM: NORMAL
CODING SYSTEM: NORMAL
CREAT SERPL-MCNC: 0.54 MG/DL (ref 0.51–0.95)
CROSSMATCH: NORMAL
CROSSMATCH: NORMAL
DIASTOLIC BLOOD PRESSURE - MUSE: NORMAL MMHG
EGFRCR SERPLBLD CKD-EPI 2021: >90 ML/MIN/1.73M2
HGB BLD-MCNC: 10.3 G/DL (ref 11.7–15.7)
HOLD SPECIMEN: NORMAL
HOLD SPECIMEN: NORMAL
INR PPP: 2.56 (ref 0.85–1.15)
INTERPRETATION ECG - MUSE: NORMAL
LVEF ECHO: NORMAL
P AXIS - MUSE: 66 DEGREES
PR INTERVAL - MUSE: 226 MS
QRS DURATION - MUSE: 94 MS
QT - MUSE: 510 MS
QTC - MUSE: 430 MS
R AXIS - MUSE: 1 DEGREES
SYSTOLIC BLOOD PRESSURE - MUSE: NORMAL MMHG
T AXIS - MUSE: 72 DEGREES
UNIT ABO/RH: NORMAL
UNIT ABO/RH: NORMAL
UNIT NUMBER: NORMAL
UNIT NUMBER: NORMAL
UNIT STATUS: NORMAL
UNIT STATUS: NORMAL
UNIT TYPE ISBT: 6200
UNIT TYPE ISBT: 6200
VENTRICULAR RATE- MUSE: 43 BPM

## 2024-07-10 PROCEDURE — 255N000002 HC RX 255 OP 636: Performed by: INTERNAL MEDICINE

## 2024-07-10 PROCEDURE — 272N000001 HC OR GENERAL SUPPLY STERILE: Performed by: INTERNAL MEDICINE

## 2024-07-10 PROCEDURE — 85018 HEMOGLOBIN: CPT | Performed by: PHYSICIAN ASSISTANT

## 2024-07-10 PROCEDURE — 250N000011 HC RX IP 250 OP 636: Performed by: INTERNAL MEDICINE

## 2024-07-10 PROCEDURE — 710N000010 HC RECOVERY PHASE 1, LEVEL 2, PER MIN

## 2024-07-10 PROCEDURE — C1760 CLOSURE DEV, VASC: HCPCS | Performed by: INTERNAL MEDICINE

## 2024-07-10 PROCEDURE — 33340 PERQ CLSR TCAT L ATR APNDGE: CPT | Mod: 53 | Performed by: INTERNAL MEDICINE

## 2024-07-10 PROCEDURE — 93355 ECHO TRANSESOPHAGEAL (TEE): CPT | Performed by: INTERNAL MEDICINE

## 2024-07-10 PROCEDURE — 86923 COMPATIBILITY TEST ELECTRIC: CPT | Performed by: INTERNAL MEDICINE

## 2024-07-10 PROCEDURE — 02H73DZ INSERTION OF INTRALUMINAL DEVICE INTO LEFT ATRIUM, PERCUTANEOUS APPROACH: ICD-10-PCS | Performed by: INTERNAL MEDICINE

## 2024-07-10 PROCEDURE — 02PA3DZ REMOVAL OF INTRALUMINAL DEVICE FROM HEART, PERCUTANEOUS APPROACH: ICD-10-PCS | Performed by: INTERNAL MEDICINE

## 2024-07-10 PROCEDURE — 82565 ASSAY OF CREATININE: CPT | Performed by: PHYSICIAN ASSISTANT

## 2024-07-10 PROCEDURE — 93355 ECHO TRANSESOPHAGEAL (TEE): CPT

## 2024-07-10 PROCEDURE — C1894 INTRO/SHEATH, NON-LASER: HCPCS | Performed by: INTERNAL MEDICINE

## 2024-07-10 PROCEDURE — 258N000003 HC RX IP 258 OP 636: Performed by: NURSE ANESTHETIST, CERTIFIED REGISTERED

## 2024-07-10 PROCEDURE — 85610 PROTHROMBIN TIME: CPT | Performed by: INTERNAL MEDICINE

## 2024-07-10 PROCEDURE — 258N000003 HC RX IP 258 OP 636: Performed by: INTERNAL MEDICINE

## 2024-07-10 PROCEDURE — 250N000011 HC RX IP 250 OP 636: Performed by: NURSE ANESTHETIST, CERTIFIED REGISTERED

## 2024-07-10 PROCEDURE — 85347 COAGULATION TIME ACTIVATED: CPT

## 2024-07-10 PROCEDURE — 36415 COLL VENOUS BLD VENIPUNCTURE: CPT | Performed by: PHYSICIAN ASSISTANT

## 2024-07-10 PROCEDURE — 36415 COLL VENOUS BLD VENIPUNCTURE: CPT | Performed by: INTERNAL MEDICINE

## 2024-07-10 PROCEDURE — 370N000017 HC ANESTHESIA TECHNICAL FEE, PER MIN: Performed by: INTERNAL MEDICINE

## 2024-07-10 PROCEDURE — 99100 ANES PT EXTEME AGE<1 YR&>70: CPT | Performed by: NURSE ANESTHETIST, CERTIFIED REGISTERED

## 2024-07-10 PROCEDURE — 33340 PERQ CLSR TCAT L ATR APNDGE: CPT | Mod: 74

## 2024-07-10 PROCEDURE — 85730 THROMBOPLASTIN TIME PARTIAL: CPT | Performed by: INTERNAL MEDICINE

## 2024-07-10 PROCEDURE — 33340 PERQ CLSR TCAT L ATR APNDGE: CPT | Performed by: NURSE ANESTHETIST, CERTIFIED REGISTERED

## 2024-07-10 PROCEDURE — 250N000009 HC RX 250: Performed by: NURSE ANESTHETIST, CERTIFIED REGISTERED

## 2024-07-10 PROCEDURE — C1889 IMPLANT/INSERT DEVICE, NOC: HCPCS | Performed by: INTERNAL MEDICINE

## 2024-07-10 PROCEDURE — 120N000001 HC R&B MED SURG/OB

## 2024-07-10 PROCEDURE — 33340 PERQ CLSR TCAT L ATR APNDGE: CPT | Performed by: ANESTHESIOLOGY

## 2024-07-10 PROCEDURE — 250N000013 HC RX MED GY IP 250 OP 250 PS 637: Performed by: INTERNAL MEDICINE

## 2024-07-10 RX ORDER — OXYCODONE HYDROCHLORIDE 5 MG/1
10 TABLET ORAL EVERY 4 HOURS PRN
Status: DISCONTINUED | OUTPATIENT
Start: 2024-07-10 | End: 2024-07-10 | Stop reason: HOSPADM

## 2024-07-10 RX ORDER — ONDANSETRON 2 MG/ML
INJECTION INTRAMUSCULAR; INTRAVENOUS PRN
Status: DISCONTINUED | OUTPATIENT
Start: 2024-07-10 | End: 2024-07-10

## 2024-07-10 RX ORDER — CEFAZOLIN SODIUM/WATER 2 G/20 ML
2 SYRINGE (ML) INTRAVENOUS
Status: DISCONTINUED | OUTPATIENT
Start: 2024-07-10 | End: 2024-07-10 | Stop reason: HOSPADM

## 2024-07-10 RX ORDER — GLYCOPYRROLATE 0.2 MG/ML
INJECTION, SOLUTION INTRAMUSCULAR; INTRAVENOUS PRN
Status: DISCONTINUED | OUTPATIENT
Start: 2024-07-10 | End: 2024-07-10

## 2024-07-10 RX ORDER — ASPIRIN 81 MG/1
81 TABLET ORAL ONCE
Status: COMPLETED | OUTPATIENT
Start: 2024-07-10 | End: 2024-07-10

## 2024-07-10 RX ORDER — OXYCODONE HYDROCHLORIDE 5 MG/1
5 TABLET ORAL EVERY 4 HOURS PRN
Status: DISCONTINUED | OUTPATIENT
Start: 2024-07-10 | End: 2024-07-10 | Stop reason: HOSPADM

## 2024-07-10 RX ORDER — PROPOFOL 10 MG/ML
INJECTION, EMULSION INTRAVENOUS PRN
Status: DISCONTINUED | OUTPATIENT
Start: 2024-07-10 | End: 2024-07-10

## 2024-07-10 RX ORDER — ONDANSETRON 2 MG/ML
4 INJECTION INTRAMUSCULAR; INTRAVENOUS EVERY 6 HOURS PRN
Status: DISCONTINUED | OUTPATIENT
Start: 2024-07-10 | End: 2024-07-10 | Stop reason: HOSPADM

## 2024-07-10 RX ORDER — ACETAMINOPHEN 325 MG/1
650 TABLET ORAL EVERY 4 HOURS PRN
Status: DISCONTINUED | OUTPATIENT
Start: 2024-07-10 | End: 2024-07-10 | Stop reason: HOSPADM

## 2024-07-10 RX ORDER — FENTANYL CITRATE 50 UG/ML
INJECTION, SOLUTION INTRAMUSCULAR; INTRAVENOUS PRN
Status: DISCONTINUED | OUTPATIENT
Start: 2024-07-10 | End: 2024-07-10

## 2024-07-10 RX ORDER — SODIUM CHLORIDE 9 MG/ML
INJECTION, SOLUTION INTRAVENOUS CONTINUOUS
Status: ACTIVE | OUTPATIENT
Start: 2024-07-10 | End: 2024-07-10

## 2024-07-10 RX ORDER — LIDOCAINE HYDROCHLORIDE 10 MG/ML
INJECTION, SOLUTION INFILTRATION; PERINEURAL PRN
Status: DISCONTINUED | OUTPATIENT
Start: 2024-07-10 | End: 2024-07-10

## 2024-07-10 RX ORDER — NALOXONE HYDROCHLORIDE 0.4 MG/ML
0.4 INJECTION, SOLUTION INTRAMUSCULAR; INTRAVENOUS; SUBCUTANEOUS
Status: DISCONTINUED | OUTPATIENT
Start: 2024-07-10 | End: 2024-07-10 | Stop reason: HOSPADM

## 2024-07-10 RX ORDER — IODIXANOL 320 MG/ML
INJECTION, SOLUTION INTRAVASCULAR
Status: DISCONTINUED | OUTPATIENT
Start: 2024-07-10 | End: 2024-07-10 | Stop reason: HOSPADM

## 2024-07-10 RX ORDER — HEPARIN SODIUM 1000 [USP'U]/ML
INJECTION, SOLUTION INTRAVENOUS; SUBCUTANEOUS
Status: DISCONTINUED | OUTPATIENT
Start: 2024-07-10 | End: 2024-07-10 | Stop reason: HOSPADM

## 2024-07-10 RX ORDER — ONDANSETRON 4 MG/1
4 TABLET, ORALLY DISINTEGRATING ORAL EVERY 6 HOURS PRN
Status: DISCONTINUED | OUTPATIENT
Start: 2024-07-10 | End: 2024-07-10 | Stop reason: HOSPADM

## 2024-07-10 RX ORDER — LIDOCAINE 40 MG/G
CREAM TOPICAL
Status: DISCONTINUED | OUTPATIENT
Start: 2024-07-10 | End: 2024-07-10 | Stop reason: HOSPADM

## 2024-07-10 RX ORDER — NALOXONE HYDROCHLORIDE 0.4 MG/ML
0.2 INJECTION, SOLUTION INTRAMUSCULAR; INTRAVENOUS; SUBCUTANEOUS
Status: DISCONTINUED | OUTPATIENT
Start: 2024-07-10 | End: 2024-07-10 | Stop reason: HOSPADM

## 2024-07-10 RX ORDER — CEFAZOLIN SODIUM/WATER 2 G/20 ML
SYRINGE (ML) INTRAVENOUS PRN
Status: DISCONTINUED | OUTPATIENT
Start: 2024-07-10 | End: 2024-07-10

## 2024-07-10 RX ORDER — SODIUM CHLORIDE, SODIUM LACTATE, POTASSIUM CHLORIDE, CALCIUM CHLORIDE 600; 310; 30; 20 MG/100ML; MG/100ML; MG/100ML; MG/100ML
INJECTION, SOLUTION INTRAVENOUS CONTINUOUS PRN
Status: DISCONTINUED | OUTPATIENT
Start: 2024-07-10 | End: 2024-07-10

## 2024-07-10 RX ADMIN — SODIUM CHLORIDE, POTASSIUM CHLORIDE, SODIUM LACTATE AND CALCIUM CHLORIDE: 600; 310; 30; 20 INJECTION, SOLUTION INTRAVENOUS at 13:10

## 2024-07-10 RX ADMIN — PHENYLEPHRINE HYDROCHLORIDE 100 MCG: 10 INJECTION INTRAVENOUS at 13:55

## 2024-07-10 RX ADMIN — ROCURONIUM BROMIDE 40 MG: 50 INJECTION, SOLUTION INTRAVENOUS at 12:13

## 2024-07-10 RX ADMIN — PHENYLEPHRINE HYDROCHLORIDE 200 MCG: 10 INJECTION INTRAVENOUS at 13:19

## 2024-07-10 RX ADMIN — FENTANYL CITRATE 50 MCG: 50 INJECTION INTRAMUSCULAR; INTRAVENOUS at 12:13

## 2024-07-10 RX ADMIN — ROCURONIUM BROMIDE 10 MG: 50 INJECTION, SOLUTION INTRAVENOUS at 13:39

## 2024-07-10 RX ADMIN — SUGAMMADEX 200 MG: 100 INJECTION, SOLUTION INTRAVENOUS at 14:15

## 2024-07-10 RX ADMIN — PROPOFOL 100 MG: 10 INJECTION, EMULSION INTRAVENOUS at 12:13

## 2024-07-10 RX ADMIN — PHENYLEPHRINE HYDROCHLORIDE 0.5 MCG/KG/MIN: 10 INJECTION INTRAVENOUS at 13:27

## 2024-07-10 RX ADMIN — Medication 2 G: at 12:20

## 2024-07-10 RX ADMIN — SODIUM CHLORIDE, POTASSIUM CHLORIDE, SODIUM LACTATE AND CALCIUM CHLORIDE: 600; 310; 30; 20 INJECTION, SOLUTION INTRAVENOUS at 12:07

## 2024-07-10 RX ADMIN — SODIUM CHLORIDE 500 ML: 9 INJECTION, SOLUTION INTRAVENOUS at 10:40

## 2024-07-10 RX ADMIN — ASPIRIN 81 MG: 81 TABLET, COATED ORAL at 10:54

## 2024-07-10 RX ADMIN — PROPOFOL 20 MG: 10 INJECTION, EMULSION INTRAVENOUS at 13:43

## 2024-07-10 RX ADMIN — ONDANSETRON 4 MG: 2 INJECTION INTRAMUSCULAR; INTRAVENOUS at 14:15

## 2024-07-10 RX ADMIN — FENTANYL CITRATE 50 MCG: 50 INJECTION INTRAMUSCULAR; INTRAVENOUS at 12:32

## 2024-07-10 RX ADMIN — PHENYLEPHRINE HYDROCHLORIDE 100 MCG: 10 INJECTION INTRAVENOUS at 13:13

## 2024-07-10 RX ADMIN — LIDOCAINE HYDROCHLORIDE 5 ML: 10 INJECTION, SOLUTION INFILTRATION; PERINEURAL at 12:13

## 2024-07-10 RX ADMIN — SODIUM CHLORIDE, POTASSIUM CHLORIDE, SODIUM LACTATE AND CALCIUM CHLORIDE: 600; 310; 30; 20 INJECTION, SOLUTION INTRAVENOUS at 14:15

## 2024-07-10 RX ADMIN — GLYCOPYRROLATE 0.2 MG: 0.2 INJECTION INTRAMUSCULAR; INTRAVENOUS at 12:37

## 2024-07-10 RX ADMIN — PHENYLEPHRINE HYDROCHLORIDE 100 MCG: 10 INJECTION INTRAVENOUS at 13:25

## 2024-07-10 RX ADMIN — PHENYLEPHRINE HYDROCHLORIDE 100 MCG: 10 INJECTION INTRAVENOUS at 12:47

## 2024-07-10 ASSESSMENT — ACTIVITIES OF DAILY LIVING (ADL)
ADLS_ACUITY_SCORE: 35

## 2024-07-10 ASSESSMENT — ENCOUNTER SYMPTOMS: DYSRHYTHMIAS: 1

## 2024-07-10 NOTE — ANESTHESIA PROCEDURE NOTES
Airway       Patient location during procedure: OR       Procedure Start/Stop Times: 7/10/2024 12:16 PM  Staff -        CRNA: Dino Taylor APRN CRNA       Performed By: CRNA  Consent for Airway        Urgency: elective  Indications and Patient Condition       Indications for airway management: patricia-procedural       Induction type:intravenous       Mask difficulty assessment: 1 - vent by mask    Final Airway Details       Final airway type: endotracheal airway       Successful airway: ETT - single  Endotracheal Airway Details        ETT size (mm): 7.0       Cuffed: yes       Successful intubation technique: video laryngoscopy       VL Blade Size: Glidescope 3       Grade View of Cords: 1       Adjucts: stylet       Position: Right       Measured from: lips       Secured at (cm): 20       Bite block used: None    Post intubation assessment        Placement verified by: capnometry, equal breath sounds and chest rise        Number of attempts at approach: 1       Number of other approaches attempted: 0       Secured with: silk tape       Ease of procedure: easy       Dentition: Intact    Medication(s) Administered   Medication Administration Time: 7/10/2024 12:16 PM

## 2024-07-10 NOTE — ANESTHESIA PREPROCEDURE EVALUATION
Anesthesia Pre-Procedure Evaluation    Patient: Rene Matthews   MRN: [id number] : 1940        Procedure : Procedure(s):  Left Atrial Appendage Closure - WM          Past Medical History:   Diagnosis Date    Abnormal Pap smear of cervix     ASCUS with negative biopsy    Acute low back pain without sciatica 2020    Allergic rhinitis     Anxiety, generalized     Benign neoplasm of ascending colon     Burning sensation of feet     Cervical polyp     Chest pain     secondary to gerd    Chronic abdominal pain     Negative CT scan and colonoscopy, musculoskeletal etiology suspected    Chronic radicular low back pain     MICHEL after evaluation by Dr. Bradley    Chronic sinusitis 10/18/2016    Frequent headaches     GERD (gastroesophageal reflux disease)     Noncardiac chest pain    Hearing loss in left ear 10/20/2017    Herpes zoster 2006    Hyperlipidemia     Hypertension     IBS (irritable bowel syndrome)     Insomnia     Mild aortic insufficiency 2022    Nausea 2022    Osteoarthritis     Osteopenia     DEXA  T score -2.0 stable, DEXA 2017 T score -1.6 left femoral neck stable.  DEXA 2022 -2.1 L1 L4 and -1.8 bilateral femoral neck    Overactive bladder 10/18/2016    Palpitations 2021    Symptoms suspicious for atrial fibrillation.  Echocardiogram 2021 with normal left ventricular systolic function with mild left atrial enlargement and mild AI and MR    Peptic ulcer disease 2006    w/ gastric ulcer    Personal history of colonic polyps     colonoscopy 2014 normal.  Colonoscopy 2019 with multiple polyps, repeat in 3 years.  Cologuard neg  2023    Postmenopausal bleeding 2021    Screening     Negative for AAA CT scan     Skin cancer, basal cell 10/20/2017    Urge incontinence of urine 2021    Vitamin B12 deficiency (non anemic)     Weight gain 2021      Past Surgical History:   Procedure Laterality Date    BIOPSY  CERVICAL, LOCAL EXCISION, SINGLE/MULTIPLE      ENDOMETRIAL BIOPSY      Negative    OTHER SURGICAL HISTORY      Basal cell skin cancer    OTHER SURGICAL HISTORY  2012    Radiofrequency ablation right greater saphenous vein    OVARIAN CYST REMOVAL      lysis of adhesions    STRABISMUS SURGERY  2015      No Known Allergies   Social History     Tobacco Use    Smoking status: Never     Passive exposure: Never    Smokeless tobacco: Never   Substance Use Topics    Alcohol use: No      Wt Readings from Last 1 Encounters:   07/09/24 60.8 kg (134 lb)        Anesthesia Evaluation   Pt has had prior anesthetic. Type: General.    No history of anesthetic complications       ROS/MED HX  ENT/Pulmonary:       Neurologic:    (-) no CVA and no TIA   Cardiovascular: Comment: TTE 2024:  Interpretation Summary  The left ventricle is normal in size.  Left ventricular function is normal.The ejection fraction is 55-60%.  Normal right ventricle size and systolic function.  The left atrium is moderately dilated.  There is mild (1+) aortic regurgitation.      (+)  hypertension- -   -  - -                        dysrhythmias, a-fib,             METS/Exercise Tolerance:     Hematologic:       Musculoskeletal:       GI/Hepatic:     (+) GERD,            liver disease,       Renal/Genitourinary:    (-) renal disease   Endo:    (-) obesity   Psychiatric/Substance Use:       Infectious Disease:       Malignancy:       Other:            Physical Exam    Airway        Mallampati: II   TM distance: > 3 FB   Neck ROM: full     Respiratory Devices and Support         Dental       (+) Minor Abnormalities - some fillings, tiny chips      Cardiovascular          Rhythm and rate: regular     Pulmonary           breath sounds clear to auscultation           OUTSIDE LABS:  CBC:   Lab Results   Component Value Date    WBC 6.3 07/09/2024    WBC 5.3 01/09/2024    HGB 11.8 07/09/2024    HGB 12.2 01/09/2024    HCT 35.1 07/09/2024    HCT 36.8 01/09/2024      "07/09/2024     01/09/2024     BMP:   Lab Results   Component Value Date     07/09/2024     01/09/2024    POTASSIUM 4.2 07/09/2024    POTASSIUM 3.8 01/09/2024    CHLORIDE 97 (L) 07/09/2024    CHLORIDE 100 01/09/2024    CO2 28 07/09/2024    CO2 27 01/09/2024    BUN 9.9 07/09/2024    BUN 10.3 01/09/2024    CR 0.58 07/09/2024    CR 0.64 01/09/2024    GLC 89 07/09/2024    GLC 96 01/09/2024     COAGS:   Lab Results   Component Value Date    PTT 30 07/03/2019    INR 2.59 (H) 07/09/2024     POC: No results found for: \"BGM\", \"HCG\", \"HCGS\"  HEPATIC:   Lab Results   Component Value Date    ALBUMIN 4.6 01/09/2024    PROTTOTAL 7.9 01/09/2024    ALT 12 01/09/2024    AST 23 01/09/2024    ALKPHOS 66 01/09/2024    BILITOTAL 0.4 01/09/2024     OTHER:   Lab Results   Component Value Date    EBONY 8.8 07/09/2024    MAG 2.4 (H) 01/09/2024    LIPASE 28 09/16/2019    TSH 0.96 11/18/2022    SED 8 09/21/2020       Anesthesia Plan    ASA Status:  3    NPO Status:  NPO Appropriate    Anesthesia Type: General.     - Airway: ETT   Induction: Intravenous.      Techniques and Equipment:     - Lines/Monitors: 2nd IV     - Drips/Meds: Phenylephrine     Consents    Anesthesia Plan(s) and associated risks, benefits, and realistic alternatives discussed. Questions answered and patient/representative(s) expressed understanding.     - Discussed: Risks, Benefits and Alternatives for BOTH SEDATION and the PROCEDURE were discussed     - Discussed with:             Postoperative Care    Pain management: IV analgesics, Oral pain medications, Multi-modal analgesia.   PONV prophylaxis: Ondansetron (or other 5HT-3), Dexamethasone or Solumedrol     Comments:               Radha Henao MD    I have reviewed the pertinent notes and labs in the chart from the past 30 days and (re)examined the patient.  Any updates or changes from those notes are reflected in this note.     # Hyponatremia: Lowest Na = 135 mmol/L in last 30 days, will monitor as " appropriate        # Drug Induced Coagulation Defect: home medication list includes an anticoagulant medication  # Drug Induced Platelet Defect: home medication list includes an antiplatelet medication

## 2024-07-10 NOTE — ANESTHESIA CARE TRANSFER NOTE
Patient: Rene Matthews    Procedure: Procedure(s):  Left Atrial Appendage Closure - WM       Diagnosis: other  Diagnosis Additional Information: No value filed.    Anesthesia Type:   General     Note:      Level of Consciousness: awake  Oxygen Supplementation: face mask  Level of Supplemental Oxygen (L/min / FiO2): 8  Independent Airway: airway patency satisfactory and stable  Dentition: dentition unchanged  Vital Signs Stable: post-procedure vital signs reviewed and stable  Report to RN Given: handoff report given  Patient transferred to: Cardiac Special Care      Vitals:  Vitals Value Taken Time   /65 07/10/24 1428   Temp     Pulse 48 07/10/24 1429   Resp 0 07/10/24 1429   SpO2 100 % 07/10/24 1429   Vitals shown include unfiled device data.    Electronically Signed By: MACARIO Braun CRNA  July 10, 2024  2:30 PM

## 2024-07-10 NOTE — ANESTHESIA POSTPROCEDURE EVALUATION
Patient: Rene Matthews    Procedure: Procedure(s):  Left Atrial Appendage Closure - WM  Aborted Procedure       Anesthesia Type:  General    Note:  Disposition: Outpatient   Postop Pain Control: Uneventful            Sign Out: Well controlled pain   PONV: No   Neuro/Psych: Uneventful            Sign Out: Acceptable/Baseline neuro status   Airway/Respiratory: Uneventful            Sign Out: Acceptable/Baseline resp. status   CV/Hemodynamics: Uneventful            Sign Out: Acceptable CV status; No obvious hypovolemia; No obvious fluid overload   Other NRE: NONE   DID A NON-ROUTINE EVENT OCCUR? No           Last vitals:  Vitals Value Taken Time   /59 07/10/24 1630   Temp     Pulse 49 07/10/24 1635   Resp 24 07/10/24 1635   SpO2 95 % 07/10/24 1635   Vitals shown include unfiled device data.    Electronically Signed By: Radha Henao MD  July 10, 2024  4:36 PM

## 2024-07-10 NOTE — Clinical Note
Delivery system prepped and inserted per 's instructions.     20 MM Watchman FLX  GTIN: [id number]  Exp date: 06-

## 2024-07-10 NOTE — DISCHARGE INSTRUCTIONS
Going home after Left Atrial Appendage Occlusion Device Implant    Once Home:  You should arrange for someone to stay with you for the first 24 hours after discharge  Make sure you are taking all of your medications as directed in your discharge summary.  Do not stop taking these medications (especially your blood thinner and baby aspirin) without speaking to your provider.   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  Do not scrub the site.  For the first 2 days: Do not stoop or squat. When you cough, sneeze or move your bowels, hold your hand over the puncture site and press gently.  Do not use lotion or powder near the puncture site for 3 days.  Ok to use ice packs at groin sites 20 minutes at a time for groin discomfort    If you start bleeding from the site in your groin, lie down flat and press firmly on the site. Call your doctor as soon as you can.    Call your doctor if:  You have a large or growing hard lump around the site.  The site is red, swollen, hot or tender.  Blood or fluid is draining from the site.  You have chills or a fever greater than 101 F (38 C).  Your leg or arm feels numb or cool.  You have hives, a rash or unusual itching.    To reduce the risk of infection, avoid dental procedures (including cleanings) for the first 6 weeks.  Contact your cardiology clinic for an antibiotic should you need to see the dentist in the first 6 weeks post left atrial appendage implant.    Dial 911 if you have bleeding that is heavy or does not stop OR for any NEW signs/symptoms of a stroke:  Visual disturbance  Problems with talking  Smile only occurs on half your face  Numbness on one side of your face or  body  Sudden headache  Confusion  Problems with walking or balance    Follow up appointments:     6 Week Post Implant Visit Date: August 21 at 2:10 pm with Beulah Cr PA-C  At [facility]  [address], [address]  [facility] [address]      Your Procedural Physician was: Dr. King     To reach the [facility] nurse coordinators please call:  Linnea Foss RN    [phone]        If you have issues tonight when you get home:      Call [phone] and enter your phone number.  Beulah, the PA will call you back.      If after 9 pm, call the [facility] at [phone] and you will be connected to the on call doctor                                                                    If you are calling after hours, please listen to the entire voicemail, a live  will answer at the end of the message

## 2024-07-10 NOTE — Clinical Note
left atrial appendage Cine(s)  injected and visualized utilizing power injector system. LAP 4, giving 500 mL bolus

## 2024-07-10 NOTE — DISCHARGE SUMMARY
HEART CARE INPATIENT ENCOUNTER NOTE      Structural Discharge Summary    Primary Care Physician:  Kevin Ho    Discharge Provider: Tori Ward PA-C     Admission Date: 7/10/2024. Admission Diagnoses: Persistent atrial fibrillation (H) [I48.19]   Discharge Date: July 10, 2024   Disposition: Home   Condition at Discharge: Stable  Code Status: Full Code     Principal Diagnosis:  None    Discharge Diagnoses:  Active Problems:    Persistent atrial fibrillation (H)      Consult/s: None  Significant Diagnostic Studies:   Procedural MAEGAN -   Interpretation Summary     Structural MAEGAN for Left Atrial Appendage Occlusion Device:     Pre-Device:  1. Normal left ventricular size and systolic function. LVEF:60-65%  2. No left atrial appendage thrombus or spontaneous contrast. Severe left  atrial enlargement.  3. No ASD or PFO by color flow.  4. No pericardial effusion.     Post Device:  1. Multiple attempt to seat Watchman FLX Â  Device in left atrial appendage by  2D and 3D imaging was not successful. No device deployed.  3. Small ASD with unidirectional flow (left to right) by color flow doppler.  4. No post procedural pericardial effusion.     Treatments: procedures: Aborted LAAO implant (Watchman FLX)    Discharge Medications:   Current Discharge Medication List        CONTINUE these medications which have NOT CHANGED    Details   amLODIPine (NORVASC) 5 MG tablet Take 1 tablet (5 mg) by mouth daily  Qty: 90 tablet, Refills: 3    Associated Diagnoses: Hypertension, unspecified type      Calcium Carb-Ergocalciferol 500-200 MG-UNIT TABS Take 1 tablet by mouth 2 times daily      cyanocobalamin (VITAMIN B-12) 1000 MCG tablet Take 1 tablet (1,000 mcg) by mouth daily    Associated Diagnoses: Vitamin B12 deficiency (non anemic)      escitalopram (LEXAPRO) 10 MG tablet TAKE 1 TABLET(10 MG) BY MOUTH DAILY  Qty: 90 tablet, Refills: 3    Associated Diagnoses: Anxiety, generalized      fluticasone (FLONASE) 50 MCG/ACT nasal  spray Spray 2 sprays in nostril 1 spray each nostril in the AM, 2 sprays in the PM      gabapentin (NEURONTIN) 300 MG capsule Take 1 capsule (300 mg) by mouth 3 times daily  Qty: 270 capsule, Refills: 3    Associated Diagnoses: Primary osteoarthritis involving multiple joints; Chronic nonintractable headache, unspecified headache type      hydrochlorothiazide (HYDRODIURIL) 25 MG tablet Take 1 tablet (25 mg) by mouth every other day  Qty: 45 tablet, Refills: 3    Associated Diagnoses: Essential hypertension      irbesartan (AVAPRO) 300 MG tablet TAKE 1 TABLET(300 MG) BY MOUTH DAILY  Qty: 90 tablet, Refills: 3    Associated Diagnoses: Primary hypertension      omeprazole (PRILOSEC) 20 MG DR capsule TAKE 1 CAPSULE BY MOUTH TWICE DAILY  Qty: 180 capsule, Refills: 3    Associated Diagnoses: Gastroesophageal reflux disease without esophagitis      simvastatin (ZOCOR) 10 MG tablet TAKE 1 TABLET(10 MG) BY MOUTH EVERY 24 HOURS  Qty: 90 tablet, Refills: 2    Associated Diagnoses: HLD (hyperlipidemia)      sotalol (BETAPACE) 80 MG tablet TAKE 1/2 TABLET(40 MG) BY MOUTH TWICE DAILY  Qty: 90 tablet, Refills: 1    Associated Diagnoses: Persistent atrial fibrillation (H)      traZODone (DESYREL) 50 MG tablet TAKE 1 TABLET(50 MG) BY MOUTH AT BEDTIME  Qty: 90 tablet, Refills: 2    Associated Diagnoses: Insomnia, unspecified type      Vitamin D3 (CHOLECALCIFEROL) 25 mcg (1000 units) tablet Take 1 tablet (25 mcg) by mouth daily    Associated Diagnoses: Vitamin D deficiency      warfarin ANTICOAGULANT (COUMADIN) 2.5 MG tablet TAKE 1 AND 1/2 TABLETS (3.75 MG) BY MOUTH EVERY DAY OR AS DIRECTED BY INR CLINIC  Qty: 135 tablet, Refills: 1    Associated Diagnoses: Paroxysmal atrial fibrillation (H)      Wheat Dextrin (BENEFIBER PO) Take by mouth daily Once daily      acetaminophen (TYLENOL) 325 MG tablet Take 325 mg by mouth 3 times daily as needed           STOP taking these medications       aspirin 81 MG EC tablet Comments:   Reason for  "Stopping:               Discharge Instructions:  Follow up appointment with Beulah Cr PA-C in 45 days (August 21)    Diet: Regular diet. Increase fluids over the next 2 days.   Activity: Activity as tolerated   Restrictions: No lifting >10 lbs or vigorous exercise for 5 days. No driving for 3 days. May return to work in 5 days  Wound / drain care: OK to shower next day. Keep wound clean and dry. Ok to use Ice packs PRN for discomfort. No baths, hot tubs or swimming pools for 5 days.    Hospital Summary:   Ms. Rene Matthews is a 83 year old female who underwent attempted LAAO implant with a Watchman device, however the procedure was aborted due to difficult anatomy. The patient was recovered in [facility], on bedrest for 4 hours.  The patient then dangled at the edge of bed and ambulated; she voided without difficulty.  The vascular access site remained stable prior to discharge.  Post procedure the patient denied chest pain/pressure, palpitations, or shortness of breath.      Repeat labs were reviewed and were stable.  Activity restrictions and reportable signs and symptoms were discussed with the patient who verbalizes understanding.  She should remain on Warfarin. She may be a candidate for Amulet.    Follow up is arranged as above.        Physical Examination   /54   Pulse 51   Temp 98.3  F (36.8  C) (Oral)   Resp 18   Ht 1.626 m (5' 4\")   LMP  (LMP Unknown)   SpO2 95%   BMI 23.00 kg/m    Body mass index is 23 kg/m .  Wt Readings from Last 3 Encounters:   07/09/24 60.8 kg (134 lb)   06/10/24 60.8 kg (134 lb)   06/07/24 59.9 kg (132 lb)       Intake/Output Summary (Last 24 hours) at 7/10/2024 1542  Last data filed at 7/10/2024 1449  Gross per 24 hour   Intake 1500 ml   Output 150 ml   Net 1350 ml     General Appearance:   no distress, normal body habitus   Chest/Lungs:   Normal respiratory effort. Respirations are even and unlabored. Lungs are clear to auscultation, no rales or wheezing. No chest wall " tenderness.    Cardiovascular:   Regular. Normal S1, S2 with no murmurs, rubs, or gallops; the carotid, radial, dorsalis pedis and posterior tibial pulses are intact   Abdomen:  soft, non-tender to palpation, non-distended. + bowel sounds.   Extremities: No edema    Skin: Right groin site WNL   Neurologic: Alert and oriented x3, calm and able to move all 4 extremities appropriately            Lab Results    Chemistry/lipid CBC    Creat 7/10/2024 - 0.54 Hgb 7/10/2024 - 10.3        Tori Ward PA-C  Structural Heart Program  [facility]

## 2024-07-10 NOTE — INTERVAL H&P NOTE
I have reviewed the surgical (or preoperative) H&P that is linked to this encounter, and examined the patient. There are no significant changes    Clinical Conditions Present on Arrival:  Clinically Significant Risk Factors Present on Admission         # Hyponatremia: Lowest Na = 135 mmol/L in last 30 days, will monitor as appropriate        # Drug Induced Coagulation Defect: home medication list includes an anticoagulant medication  # Drug Induced Platelet Defect: home medication list includes an antiplatelet medication

## 2024-07-10 NOTE — Clinical Note
Delivery system prepped and inserted per 's instructions.     24 MM [employer] FLX  GTIN: [id number]  Exp date: 02- 12-Mar-2021 23:45

## 2024-07-10 NOTE — Clinical Note
Delivery system prepped and inserted per 's instructions.     [employer] FLX 24MM  GTIN: [id number]  Exp date- 07-

## 2024-07-11 ENCOUNTER — ANTICOAGULATION THERAPY VISIT (OUTPATIENT)
Dept: ANTICOAGULATION | Facility: CLINIC | Age: 84
End: 2024-07-11

## 2024-07-11 ENCOUNTER — VIRTUAL VISIT (OUTPATIENT)
Dept: CARDIOLOGY | Facility: CLINIC | Age: 84
End: 2024-07-11
Payer: COMMERCIAL

## 2024-07-11 DIAGNOSIS — I48.19 PERSISTENT ATRIAL FIBRILLATION (H): Primary | ICD-10-CM

## 2024-07-11 PROCEDURE — 99207 PR NO CHARGE NURSE ONLY: CPT | Mod: 93

## 2024-07-11 NOTE — PROGRESS NOTES
ANTICOAGULATION MANAGEMENT     Rene Matthews 83 year old female is on warfarin with therapeutic INR result. (Goal INR 2.0-3.0)    Recent labs: (last 7 days)     07/10/24  1035   INR 2.56*       ASSESSMENT     Source(s): Chart Review and Patient/Caregiver Call     Warfarin doses taken: Warfarin taken as instructed  Diet: No new diet changes identified  Medication/supplement changes: None noted  New illness, injury, or hospitalization: No  Signs or symptoms of bleeding or clotting: No  Previous result: Therapeutic last 2(+) visits  Additional findings: None  Patient was supposed to have a Watchman procedure yesterday but it was aborted due to her anatomy.  Patient may have a different procedure (amulet) in August but is not sure.  Advised patient to find out if she needs to hold warfarin for that procedure an notify [facility]. Patient stated understanding.       PLAN     Recommended plan for no diet, medication or health factor changes affecting INR     Dosing Instructions: Continue your current warfarin dose with next INR in 1 week       Summary  As of 7/11/2024      Full warfarin instructions:  3.75 mg every day   Next INR check:  7/17/2024               Telephone call with Florence who verbalizes understanding and agrees to plan  Sent [employer] message with dosing and follow up instructions    Patient to recheck with home meter    Education provided: Please call back if any changes to your diet, medications or how you've been taking warfarin  Resume manage by exception with home monitor. Continue to submit INR results to home monitor company.You will only be called when your result is out of range. Please call and notify [facility] if new medication started, dose missed, signs or symptoms of bleeding or clotting, or a surgery/procedure is scheduled. Due for next call no later than: 10/9/24.    Plan made per [facility] anticoagulation protocol    Rukhsana Estes RN  Anticoagulation  Clinic  7/11/2024    _______________________________________________________________________     Anticoagulation Episode Summary       Current INR goal:  2.0-3.0   TTR:  81.3% (1 y)   Target end date:  Indefinite   Send INR reminders to:  [facility]    Indications    Persistent atrial fibrillation (H) [I48.19]             Comments:  EDNA Madera             Anticoagulation Care Providers       Provider Role Specialty Phone number    Kevin Ho MD Referring Internal Medicine [phone]

## 2024-07-11 NOTE — PROGRESS NOTES
Phone call to patient for post attempted  Watchman Flx device placement with Dr. Dr. King on 7/10/2024.    Pt denies SOB/CP, slight sore throat.  No peripheral edema noted, no abdominal bloating or discomfort.     Pt denies any neurological changes at this time.    Pt denies generalized or localized pain at this time.   Pt is having bowel movements and is voiding without difficulty.      Patient would like to wait to remove bandage until later but has been informed of what to monitor for and when to call writer.     Pt continues anticoagulation medications: Warfarin/Coumadin.    Reviewed post op LAAC healing process, f/u appts, physical restrictions, nutritional requirements, when to contact the heart clinic, and contact information was given to the pt for further concerns or questions.  Pt verbalized understanding.     Linnea Foss RN BSN  [facility] Heart Coordinator   [facility]  [phone]     The following information was relayed to the patient via phone and MyChart:    Florence,    Here is some information on what to monitor for and when to give us a call following your attempted procedure yesterday.    Your anticoagulation medication (Warfarin/Coumadin):    It is important to remain on your anticoagulation medication uninterrupted after your attempted procedure to reduce your risk of a stroke or heart attack, DO NOT STOP THIS MEDICATION.    Healing from your attempted procedure:    Stay well hydrated, it is important to increase your fluid intake during your recovery period.  Eat foods high in protein to help the healing process.  Gradually increase your activity over the several days, back to baseline;  No aggressive exercise for 5 days post-procedure.  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  No lifting more that 10 lb for 5 days after procedure.  No driving for 3 days post-procedure.   Keep your incision  site clean, dry and open to air.  Ok to use ice packs at groin sites for 20 minutes at a time for groin discomfort.   Please delay any dental procedures until 6 weeks post implant. You will not require anti-biotic prophylaxis treatment after this time frame.  If you need urgent dental care prior to the 6 week post-procedure restriction, please contact your cardiologist for prophylactic antibiotic.     Please call me if any of the following occur:    Shortness of breath, dizziness, or chest pain.  Changes in your groin sites including:  Swelling, hardening, drainage, increase in bruising or an increase in pain.          Dial 911 for signs/symptoms of a stroke:    New onset visual disturbance.  New problems with talking/speech.  Smile only occurs on half your face.  Numbness on one side of your body or face.  Sudden headache.  New onset of confusion.  New problems with walking or balance.     Thank you,    Linnea Foss, Structural Heart Coordinator -[phone]    After hours please contact the on call service at [phone]

## 2024-07-12 ENCOUNTER — TELEPHONE (OUTPATIENT)
Dept: CARDIOLOGY | Facility: CLINIC | Age: 84
End: 2024-07-12
Payer: COMMERCIAL

## 2024-07-12 NOTE — TELEPHONE ENCOUNTER
[facility] Call Center    Phone Message    May a detailed message be left on voicemail: yes     Reason for Call: Other: Pt would like a call back as she stated she had a procedure the other day and every thing is fine but she can not have a bowel movement and she would like to discuss what can be done asap     Action Taken: Other: Cardio    Travel Screening: Not Applicable     Date of Service:

## 2024-07-12 NOTE — TELEPHONE ENCOUNTER
Phone call to patient, she cannot have a bowel movement. We discussed use of OTC medications for constipation. Writer emphasized avoiding straining. Patient will try miralax today and if no success, will try magnesium citrate tomorrow. Emphasized staying well hydrated during this time. She verbalized understanding. No further questions at this time. [facility]

## 2024-07-15 NOTE — TELEPHONE ENCOUNTER
Patient called back, needs further instruction about her constipation. She also has further questions about the amulet that is supposed to be placed. Please call her back to discuss.

## 2024-07-16 NOTE — TELEPHONE ENCOUNTER
Phone call to patient, she states she hasn't had significant bowel movement in about a week. Encouraged patient to reach out to PCP to discuss constipation. Discussed that patient is currently being reviewed for potential Amulet and writer will call her with updates. No further questions at this time. ANEL

## 2024-07-17 ENCOUNTER — ANTICOAGULATION THERAPY VISIT (OUTPATIENT)
Dept: ANTICOAGULATION | Facility: CLINIC | Age: 84
End: 2024-07-17
Payer: COMMERCIAL

## 2024-07-17 DIAGNOSIS — I48.19 PERSISTENT ATRIAL FIBRILLATION (H): Primary | ICD-10-CM

## 2024-07-17 LAB — INR HOME MONITORING: 2.3 (ref 2–3)

## 2024-07-17 NOTE — TELEPHONE ENCOUNTER
Per review from [employer] Amulet team, anatomy appears to be suitable for [facility] Amulet device. [facility]

## 2024-07-17 NOTE — PROGRESS NOTES
ANTICOAGULATION  MANAGEMENT-Home Monitor Managed by Exception    Rene Matthews 83 year old female is on warfarin with therapeutic INR result. (Goal INR 2.0-3.0)    Recent labs: (last 7 days)     07/17/24  0000   INR 2.3       Previous INR was Therapeutic  Medication, diet, health changes since last INR:chart reviewed; none identified  Contacted within the last 12 weeks by phone on 7/11/24  Last [facility] referral date: 08/28/2023  Per 7/11/24 encounter: Patient may have a different procedure (amulet) in August but is not sure. Advised patient to find out if she needs to hold warfarin for that procedure and notify [facility].       YESENIA     Rene was NOT contacted regarding therapeutic result today per home monitoring policy manage by exception agreement.   Current warfarin dose is to be continued:     Summary  As of 7/17/2024      Full warfarin instructions:  3.75 mg every day   Next INR check:  7/24/2024             ?   Lisha Herbert RN  Anticoagulation Clinic  7/17/2024    _______________________________________________________________________     Anticoagulation Episode Summary       Current INR goal:  2.0-3.0   TTR:  82.1% (1 y)   Target end date:  Indefinite   Send INR reminders to:  [facility] HOME MONITORING    Indications    Persistent atrial fibrillation (H) [I48.19]             Comments:  EDNA Madera             Anticoagulation Care Providers       Provider Role Specialty Phone number    Kevin Ho MD Referring Internal Medicine [phone]

## 2024-07-18 ENCOUNTER — DOCUMENTATION ONLY (OUTPATIENT)
Dept: ANTICOAGULATION | Facility: CLINIC | Age: 84
End: 2024-07-18
Payer: COMMERCIAL

## 2024-07-18 DIAGNOSIS — I48.19 PERSISTENT ATRIAL FIBRILLATION (H): Primary | ICD-10-CM

## 2024-07-18 NOTE — PROGRESS NOTES
ANTICOAGULATION CLINIC REFERRAL RENEWAL REQUEST       An annual renewal order is required for all patients referred to [facility] Anticoagulation Clinic.?  Please review and sign the pended referral order for Rene Matthews.       ANTICOAGULATION SUMMARY      Warfarin indication(s)   Atrial Fibrillation    Mechanical heart valve present?  NO       Current goal range   INR: 2.0-3.0     Goal appropriate for indication? Goal INR 2-3, standard for indication(s) above     Time in Therapeutic Range (TTR)  (Goal > 60%) 82%       Office visit with referring provider's group within last year yes on 3/8/24       Lisha Herbert RN  [facility] Anticoagulation Clinic

## 2024-07-22 NOTE — TELEPHONE ENCOUNTER
Phone call to patient, discussed suitable anatomy by [place] review. Discussed need for CT to assess other anatomy prior to scheduling. She is agreeable. Call transferred to scheduling to arrange. LUCIA

## 2024-07-24 ENCOUNTER — DOCUMENTATION ONLY (OUTPATIENT)
Dept: ANTICOAGULATION | Facility: CLINIC | Age: 84
End: 2024-07-24
Payer: COMMERCIAL

## 2024-07-24 DIAGNOSIS — I48.19 PERSISTENT ATRIAL FIBRILLATION (H): Primary | ICD-10-CM

## 2024-07-24 LAB — INR HOME MONITORING: 2 (ref 2–3)

## 2024-07-24 NOTE — PROGRESS NOTES
ANTICOAGULATION  MANAGEMENT-Home Monitor Managed by Exception    Rene Matthews 83 year old female is on warfarin with therapeutic INR result. (Goal INR 2.0-3.0)    Recent labs: (last 7 days)     07/24/24  0000   INR 2.0       Previous INR was Therapeutic  Medication, diet, health changes since last INR:chart reviewed; none identified  Contacted within the last 12 weeks by phone on 7/11/24   Last [facility] referral date: 07/18/2024      YESENIA     Rene was NOT contacted regarding therapeutic result today per home monitoring policy manage by exception agreement.   Current warfarin dose is to be continued:     Summary  As of 7/24/2024      Full warfarin instructions:  3.75 mg every day   Next INR check:  7/31/2024             ?   Aleksandra Gardner RN  Anticoagulation Clinic  7/24/2024    _______________________________________________________________________     Anticoagulation Episode Summary       Current INR goal:  2.0-3.0   TTR:  82.1% (1 y)   Target end date:  Indefinite   Send INR reminders to:  [facility] HOME MONITORING    Indications    Persistent atrial fibrillation (H) [I48.19]             Comments:  EDNA Madera             Anticoagulation Care Providers       Provider Role Specialty Phone number    Kevin Ho MD Referring Internal Medicine [phone]

## 2024-07-30 ENCOUNTER — NURSE TRIAGE (OUTPATIENT)
Dept: INTERNAL MEDICINE | Facility: CLINIC | Age: 84
End: 2024-07-30
Payer: COMMERCIAL

## 2024-07-30 NOTE — TELEPHONE ENCOUNTER
Nurse Triage SBAR    Is this a 2nd Level Triage? NO    Situation: Covid + today with home test.     Background: Home covid test positive 7/30/24    Assessment: Pt began having body aches last night. This morning, continues to have body aches, cough, headache. No breathing difficulty, fever or chills.     Protocol Recommended Disposition:   Home Care    Recommendation: discussed home cares, quarantine, Paxlovid, going to ER if breathing difficulty. Pt declines Paxlovid. Pt will call back if questions or symptoms.         Does the patient meet one of the following criteria for ADS visit consideration? No   Reason for Disposition   COVID-19 diagnosed by positive lab test (e.g., PCR, rapid self-test kit) and mild symptoms (e.g., cough, fever, others) and no complications or SOB    Additional Information   Negative: SEVERE difficulty breathing (e.g., struggling for each breath, speaks in single words)   Negative: Difficult to awaken or acting confused (e.g., disoriented, slurred speech)   Negative: Bluish (or gray) lips or face now   Negative: Shock suspected (e.g., cold/pale/clammy skin, too weak to stand, low BP, rapid pulse)   Negative: Sounds like a life-threatening emergency to the triager   Negative: Diagnosed or suspected COVID-19 and symptoms lasting 3 or more weeks   Negative: COVID-19 exposure and no symptoms   Negative: COVID-19 vaccine reaction suspected (e.g., fever, headache, muscle aches) occurring 1 to 3 days after getting vaccine   Negative: COVID-19 vaccine, questions about   Negative: Lives with someone known to have influenza (flu test positive) and flu-like symptoms (e.g., cough, runny nose, sore throat, SOB; with or without fever)   Negative: Possible COVID-19 symptoms and triager concerned about severity of symptoms or other causes   Negative: COVID-19 and breastfeeding, questions about   Negative: SEVERE or constant chest pain or pressure  (Exception: Mild central chest pain, present only when  coughing.)   Negative: MODERATE difficulty breathing (e.g., speaks in phrases, SOB even at rest, pulse 100-120)   Negative: Headache and stiff neck (can't touch chin to chest)   Negative: Oxygen level (e.g., pulse oximetry) 90% or lower   Negative: Chest pain or pressure  (Exception: MILD central chest pain, present only when coughing.)   Negative: Drinking very little and dehydration suspected (e.g., no urine > 12 hours, very dry mouth, very lightheaded)   Negative: Patient sounds very sick or weak to the triager   Negative: MILD difficulty breathing (e.g., minimal/no SOB at rest, SOB with walking, pulse <100)   Negative: Fever > 103 F (39.4 C)   Negative: Fever > 101 F (38.3 C) and over 60 years of age   Negative: Fever > 100.0 F (37.8 C) and bedridden (e.g., CVA, chronic illness, recovering from surgery)   Negative: HIGH RISK patient (e.g., weak immune system, age > 64 years, obesity with BMI of 30 or higher, pregnant, chronic lung disease or other chronic medical condition) and COVID symptoms (e.g., cough, fever)  (Exceptions: Already seen by doctor or NP/PA and no new or worsening symptoms.)   Negative: HIGH RISK patient and influenza is widespread in the community and ONE OR MORE respiratory symptoms: cough, sore throat, runny or stuffy nose   Negative: HIGH RISK patient and influenza exposure within the last 7 days and ONE OR MORE respiratory symptoms: cough, sore throat, runny or stuffy nose   Negative: Oxygen level (e.g., pulse oximetry) 91 to 94%   Negative: COVID-19 infection suspected by caller or triager and mild symptoms (cough, fever, or others) and negative COVID-19 rapid test   Negative: Fever present > 3 days (72 hours)   Negative: Fever returns after gone for over 24 hours and symptoms worse or not improved   Negative: Continuous (nonstop) coughing interferes with work or school and no improvement using cough treatment per Care Advice   Negative: Cough present > 3 weeks    Protocols used:  Coronavirus (COVID-19) Diagnosed or [id number]

## 2024-07-31 ENCOUNTER — ANTICOAGULATION THERAPY VISIT (OUTPATIENT)
Dept: ANTICOAGULATION | Facility: CLINIC | Age: 84
End: 2024-07-31

## 2024-07-31 DIAGNOSIS — I48.19 PERSISTENT ATRIAL FIBRILLATION (H): Primary | ICD-10-CM

## 2024-07-31 LAB — INR HOME MONITORING: 1.4 (ref 2–3)

## 2024-07-31 NOTE — PROGRESS NOTES
ANTICOAGULATION MANAGEMENT     Rene Matthews 83 year old female is on warfarin with subtherapeutic INR result. (Goal INR 2.0-3.0)    Recent labs: (last 7 days)     07/31/24  0000   INR 1.4*       ASSESSMENT     Source(s): Chart Review and Patient/Caregiver Call     Warfarin doses taken: Warfarin taken as instructed  Diet: No new diet changes identified  Medication/supplement changes:  hoping to start Paxlovid  New illness, injury, or hospitalization: Yes: Covid positive on 7/29  Signs or symptoms of bleeding or clotting: No  Previous result: Therapeutic last 2(+) visits  Additional findings: None       PLAN     Recommended plan for temporary change(s) affecting INR     Dosing Instructions: booster dose then continue your current warfarin dose with next INR in 2 days       Summary  As of 7/31/2024      Full warfarin instructions:  7/31: 7.5 mg; Otherwise 3.75 mg every day   Next INR check:  8/2/2024               Telephone call with Florence who verbalizes understanding and agrees to plan    Patient to recheck with home meter    Education provided:  That paxlovid can decrease INR.    Plan made with [facility] Pharmacist Emma Mirza, RN  Anticoagulation Clinic  7/31/2024    _______________________________________________________________________     Anticoagulation Episode Summary       Current INR goal:  2.0-3.0   TTR:  80.2% (1 y)   Target end date:  Indefinite   Send INR reminders to:  [facility] HOME MONITORING    Indications    Persistent atrial fibrillation (H) [I48.19]             Comments:  EDNA Madera             Anticoagulation Care Providers       Provider Role Specialty Phone number    Kevin Ho MD Referring Internal Medicine [phone]

## 2024-08-01 NOTE — TELEPHONE ENCOUNTER
Patient calling back to see if she should get the Paxlovid prescription. Patient reports symptoms are improving with OTC medication. She is able to sleep well. Reviewed Paxlovid side effects and let her know it is a good sign that her symptoms are improving so Paxlovid may not be of much benefit. Patient agreeable and will wait for now. Symptoms started Monday. She will call the clinic if symptoms worsen.

## 2024-08-02 ENCOUNTER — ANTICOAGULATION THERAPY VISIT (OUTPATIENT)
Dept: ANTICOAGULATION | Facility: CLINIC | Age: 84
End: 2024-08-02
Payer: COMMERCIAL

## 2024-08-02 DIAGNOSIS — I48.19 PERSISTENT ATRIAL FIBRILLATION (H): Primary | ICD-10-CM

## 2024-08-02 LAB — INR HOME MONITORING: 1.9 (ref 2–3)

## 2024-08-02 NOTE — PROGRESS NOTES
ANTICOAGULATION MANAGEMENT     Rene Matthews 83 year old female is on warfarin with subtherapeutic INR result. (Goal INR 2.0-3.0)    Recent labs: (last 7 days)     08/02/24  0000   INR 1.9*       ASSESSMENT     Source(s): Chart Review and Patient/Caregiver Call     Warfarin doses taken: Warfarin taken as instructed  Diet: No new diet changes identified  Medication/supplement changes:  patient did not start taking the Paxlovid she is feeling better  New illness, injury, or hospitalization: Yes: tested positive for Covid on 7/29/24  Signs or symptoms of bleeding or clotting: No  Previous result: Subtherapeutic  Additional findings: None       PLAN     Recommended plan for temporary change(s) affecting INR     Dosing Instructions: Continue your current warfarin dose with next INR in 5 days       Summary  As of 8/2/2024      Full warfarin instructions:  3.75 mg every day   Next INR check:  8/7/2024               Telephone call with Florence who verbalizes understanding and agrees to plan  Sent [employer] message with dosing and follow up instructions    Patient to recheck with home meter    Education provided: Please call back if any changes to your diet, medications or how you've been taking warfarin    Plan made per [facility] anticoagulation protocol    Rukhsana Estes, RN  Anticoagulation Clinic  8/2/2024    _______________________________________________________________________     Anticoagulation Episode Summary       Current INR goal:  2.0-3.0   TTR:  79.6% (1 y)   Target end date:  Indefinite   Send INR reminders to:  [facility] HOME MONITOR[facility]    Indications    Persistent atrial fibrillation (H) [I48.19]             Comments:  EDNA Madera             Anticoagulation Care Providers       Provider Role Specialty Phone number    Kevin Ho MD Referring Internal Medicine [phone]

## 2024-08-07 ENCOUNTER — ANTICOAGULATION THERAPY VISIT (OUTPATIENT)
Dept: ANTICOAGULATION | Facility: CLINIC | Age: 84
End: 2024-08-07
Payer: COMMERCIAL

## 2024-08-07 DIAGNOSIS — I48.19 PERSISTENT ATRIAL FIBRILLATION (H): Primary | ICD-10-CM

## 2024-08-07 LAB — INR HOME MONITORING: 3.5 (ref 2–3)

## 2024-08-07 NOTE — PROGRESS NOTES
ANTICOAGULATION MANAGEMENT     Rene Matthews 83 year old female is on warfarin with supratherapeutic INR result. (Goal INR 2.0-3.0)    Recent labs: (last 7 days)     08/07/24  0000   INR 3.5*       ASSESSMENT     Source(s): Chart Review and Patient/Caregiver Call     Warfarin doses taken: Warfarin taken as instructed  Diet: No new diet changes identified  Medication/supplement changes: None noted  New illness, injury, or hospitalization: Tested positive for Covid today and four days earlier.  Signs or symptoms of bleeding or clotting: No  Previous result: Subtherapeutic  Additional findings: None       PLAN     Recommended plan for temporary change(s) affecting INR     Dosing Instructions: partial hold then continue your current warfarin dose with next INR in 1 week       Summary  As of 8/7/2024      Full warfarin instructions:  8/8: 2.5 mg; Otherwise 3.75 mg every day   Next INR check:  8/14/2024               Telephone call with Florence who agrees to plan and repeated back plan correctly    Patient to recheck with home meter    Education provided: None required    Plan made per [facility] anticoagulation protocol    Ale Mirza RN  Anticoagulation Clinic  8/7/2024    _______________________________________________________________________     Anticoagulation Episode Summary       Current INR goal:  2.0-3.0   TTR:  79.1% (1 y)   Target end date:  Indefinite   Send INR reminders to:  [facility] HOME MONITORING    Indications    Persistent atrial fibrillation (H) [I48.19]             Comments:  EDNA Madera             Anticoagulation Care Providers       Provider Role Specialty Phone number    Kevin Ho MD Referring Internal Medicine [phone]

## 2024-08-09 ENCOUNTER — HOSPITAL ENCOUNTER (OUTPATIENT)
Dept: CT IMAGING | Facility: CLINIC | Age: 84
Discharge: HOME | End: 2024-08-09
Attending: INTERNAL MEDICINE | Admitting: INTERNAL MEDICINE
Payer: MEDICARE

## 2024-08-09 DIAGNOSIS — I48.19 PERSISTENT ATRIAL FIBRILLATION (H): ICD-10-CM

## 2024-08-09 DIAGNOSIS — Z53.8 FAILURE OF ATTEMPTED SURGICAL PROCEDURE: ICD-10-CM

## 2024-08-09 PROCEDURE — 250N000011 HC RX IP 250 OP 636: Performed by: INTERNAL MEDICINE

## 2024-08-09 PROCEDURE — G1010 CDSM STANSON: HCPCS | Performed by: INTERNAL MEDICINE

## 2024-08-09 PROCEDURE — 75572 CT HRT W/3D IMAGE: CPT | Mod: MG

## 2024-08-09 PROCEDURE — 75572 CT HRT W/3D IMAGE: CPT | Mod: 26 | Performed by: INTERNAL MEDICINE

## 2024-08-09 RX ORDER — IOPAMIDOL 755 MG/ML
100 INJECTION, SOLUTION INTRAVASCULAR ONCE
Status: COMPLETED | OUTPATIENT
Start: 2024-08-09 | End: 2024-08-09

## 2024-08-09 RX ADMIN — IOPAMIDOL 120 ML: 755 INJECTION, SOLUTION INTRAVENOUS at 14:24

## 2024-08-14 LAB — BSA FOR ECHO PROCEDURE: 0 M2

## 2024-08-16 ENCOUNTER — TELEPHONE (OUTPATIENT)
Dept: CARDIOLOGY | Facility: CLINIC | Age: 84
End: 2024-08-16
Payer: COMMERCIAL

## 2024-08-16 NOTE — TELEPHONE ENCOUNTER
[facility] Call Center    Phone Message    May a detailed message be left on voicemail: yes     Reason for Call: Other: Rene called in stating that the Watchmen implantation did not work, so she is wondering if this appointment is still necessary.  Please call her back to further discuss.     Action Taken: Other: Cardiology    Travel Screening: Not Applicable    Thank you!  [facility]

## 2024-08-16 NOTE — TELEPHONE ENCOUNTER
[facility] Call Center    Phone Message    May a detailed message be left on voicemail: yes     Reason for Call: Other: Pt is wondering if the appt on 8/21 is still needed? Please review and call pt back to further advise. Thank you!     Action Taken: Message routed to:  Other: [facility] [[id number]]    Travel Screening: Not Applicable     Date of Service:

## 2024-08-19 ENCOUNTER — TELEPHONE (OUTPATIENT)
Dept: CARDIOLOGY | Facility: CLINIC | Age: 84
End: 2024-08-19
Payer: COMMERCIAL

## 2024-08-19 NOTE — TELEPHONE ENCOUNTER
Writer attempted to call patient, , and daughter (all C2C on file) with no success. Writer has tried calling patient three times and has left three voicemails with patient, one with , and one with daughter. [employer] message has also been sent. [facility]

## 2024-08-21 ENCOUNTER — OFFICE VISIT (OUTPATIENT)
Dept: CARDIOLOGY | Facility: CLINIC | Age: 84
End: 2024-08-21
Payer: COMMERCIAL

## 2024-08-21 VITALS
DIASTOLIC BLOOD PRESSURE: 65 MMHG | SYSTOLIC BLOOD PRESSURE: 135 MMHG | BODY MASS INDEX: 23 KG/M2 | WEIGHT: 134 LBS | RESPIRATION RATE: 14 BRPM | HEART RATE: 51 BPM

## 2024-08-21 DIAGNOSIS — I10 PRIMARY HYPERTENSION: ICD-10-CM

## 2024-08-21 DIAGNOSIS — Z53.8 FAILURE OF ATTEMPTED SURGICAL PROCEDURE: ICD-10-CM

## 2024-08-21 DIAGNOSIS — I48.19 PERSISTENT ATRIAL FIBRILLATION (H): Primary | ICD-10-CM

## 2024-08-21 DIAGNOSIS — I49.5 SINUS NODE DYSFUNCTION (H): ICD-10-CM

## 2024-08-21 PROCEDURE — 99214 OFFICE O/P EST MOD 30 MIN: CPT | Performed by: INTERNAL MEDICINE

## 2024-08-21 NOTE — PATIENT INSTRUCTIONS
Rene Matthews,    It was a pleasure to see you today in the clinic regarding your recent attempt at Watchman implant.     My recommendations after this visit include:     - wait to hear from us regarding whether you are a candidate for the Amulet device and then decide if you want to move forward with that procedure        If you have questions or concerns, please call using the numbers below:    [facility] (Watchman/Amulet) clinic phone   [phone]       After Hours/Scheduling  [phone]    Otherwise you can dial the nurse directly at:                  Linnea Foss RN  [phone]

## 2024-08-21 NOTE — LETTER
8/21/2024    Kevin Ho MD  [address] [facility] [address]    RE: Rene Matthews       Dear Colleague,     I had the pleasure of seeing Rene Matthews in the [facility].  HEART CARE ENCOUNTER NOTE       [facility]  [phone]      Assessment/Recommendations   1.  Persistent atrial fibrillation - currently in NSR on sotalol.      Patient underwent attempted watchman implant on July 10.  Procedure was aborted after multiple attempts, and due to difficult anatomy, no device was left.  She has undergone CTA pulmonary vein imaging and her study is currently being reviewed by [employer] for recommendations as to whether she is a candidate for the amulet device.    For now, continue warfarin for stroke prophylaxis.     2.  Hypertension -blood pressure is controlled.  Continue amlodipine 5 mg daily, hydrochlorothiazide 25 mg every other day and irbesartan 300 mg daily    3.  Coronary artery disease - moderate in severity by CTA done recently.  She denies angina.     4. Hyperlipidemia - last LDL 75 (January).  Continue simvastatin       History of Present Illness/Subjective    Rene Matthews is a 83 year old female who comes in today for her 6-week follow-up visit after attempted watchman implant.  Her  accompanies her to the visit today.    Rene Matthews has a past history of persistent atrial fibrillation, hypertension, hyperlipidemia, sinus node dysfunction, GERD.      She underwent attempt at watchman implant on July 10.  Because of difficult anatomy, procedure was aborted and no device was left.  Since then, patient has been feeling tired.  She developed COVID shortly after the implant and that has.      Her INR has been labile since she had COVID.  Was as low as 1.4 and as high as 3.5.    Rene Matthews denies chest discomfort, palpitations, shortness of breath, paroxysmal nocturnal dyspnea, orthopnea, lightheadedness, dizziness, pre-syncope, or syncope.  Rene Matthews  also denies any weight loss, changes in appetite, nausea or vomiting.     Medical, surgical, family, social history, and medications were reviewed and updated as necessary.    Procedural MAEGAN 7/10/2024:  Interpretation Summary     Structural MAEGAN for Left Atrial Appendage Occlusion Device:     Pre-Device:  1. Normal left ventricular size and systolic function. LVEF:60-65%  2. No left atrial appendage thrombus or spontaneous contrast. Severe left  atrial enlargement.  3. No ASD or PFO by color flow.  4. No pericardial effusion.     Post Device:  1. Multiple attempt to seat Watchman FLX Â  Device in left atrial appendage by  2D and 3D imaging was not successful. No device deployed.  3. Small ASD with unidirectional flow (left to right) by color flow doppler.  4. No post procedural pericardial effusion.      CTA pulmonary vein 8/9/2024:  The measurement of left atrial appendage at LCX bifurcation and 40% cardiac phase:  Circumferential area: 228 mm   Perimeter: 54 mm  Measured diameters: 15x8 mm  Average diameter: 17 mm  Useful depth:  24 mm  Maximal depth:  34 mm     2.  No thrombus is identified in left atrial appendage.  Left atrium is moderately enlarged.     3.  Calcified coronary artery disease, at least moderate stenosis in proximal to mid LAD and moderate stenosis proximal RCA, right dominant system.  Not well visualized each coronary artery since this is not coronary CT angiogram study.     4.  Normal size of her thoracic aorta.     5.  No pericardial effusion or calcified pericardium.     Physical Examination Review of Systems   Vitals: /65 (BP Location: Right arm, Patient Position: Sitting, Cuff Size: Adult Regular)   Pulse 51   Resp 14   Wt 60.8 kg (134 lb)   LMP  (LMP Unknown)   BMI 23.00 kg/m    BMI= Body mass index is 23 kg/m .  Wt Readings from Last 3 Encounters:   08/21/24 60.8 kg (134 lb)   07/09/24 60.8 kg (134 lb)   06/10/24 60.8 kg (134 lb)       General Appearance:   Alert, cooperative  and in no acute distress   ENT/Mouth: membranes moist, no oral lesions or bleeding gums.      EYES:  no scleral icterus, normal conjunctivae   Neck: Thyroid not visualized   Chest/Lungs:   lungs are clear to auscultation, no rales or wheezing   Cardiovascular:   Regular . Normal first and second heart sounds with no murmurs, rubs or gallops; the carotid, radial and posterior tibial pulses are intact, no edema bilaterally    Abdomen:  Soft and nontender. Bowel sounds are present in all quadrants   Extremities: no cyanosis or clubbing   Skin: no xanthelasma, warm.    Neurologic: normal gait, normal  bilateral, no tremors   Psychiatric: Normal mood and affect       Please refer above for cardiac ROS details.      Medical History  Surgical History Family History Social History   Past Medical History:   Diagnosis Date     Abnormal Pap smear of cervix     ASCUS with negative biopsy     Acute low back pain without sciatica 05/18/2020     Allergic rhinitis      Anxiety, generalized      Atrial fibrillation (H)      Benign neoplasm of ascending colon      Burning sensation of feet      Cervical polyp      Chest pain     secondary to gerd     Chronic abdominal pain     Negative CT scan and colonoscopy, musculoskeletal etiology suspected     Chronic radicular low back pain     MICHEL after evaluation by Dr. Bradley     Chronic sinusitis 10/18/2016     Frequent headaches      GERD (gastroesophageal reflux disease)     Noncardiac chest pain     Hearing loss in left ear 10/20/2017     Herpes zoster 01/01/2006     Hyperlipidemia      Hypertension      IBS (irritable bowel syndrome)      Insomnia      Mild aortic insufficiency 11/02/2022     Nausea 11/18/2022     Osteoarthritis      Osteopenia     DEXA 2014 T score -2.0 stable, DEXA December 2017 T score -1.6 left femoral neck stable.  DEXA January 2022 -2.1 L1 L4 and -1.8 bilateral femoral neck     Overactive bladder 10/18/2016     Palpitations 06/07/2021    Symptoms suspicious for  atrial fibrillation.  Echocardiogram June 2021 with normal left ventricular systolic function with mild left atrial enlargement and mild AI and MR     Peptic ulcer disease 01/01/2006    w/ gastric ulcer     Personal history of colonic polyps     colonoscopy January 2014 normal.  Colonoscopy January 2019 with multiple polyps, repeat in 3 years.  Cologuard neg  January 2023     Postmenopausal bleeding 12/23/2021     Screening     Negative for AAA CT scan 2013     Skin cancer, basal cell 10/20/2017     Urge incontinence of urine 06/07/2021     Vitamin B12 deficiency (non anemic)      Weight gain 03/12/2021     Past Surgical History:   Procedure Laterality Date     BIOPSY CERVICAL, LOCAL EXCISION, SINGLE/MULTIPLE       CV LEFT ATRIAL APPENDAGE CLOSURE Right 7/10/2024    Procedure: Left Atrial Appendage Closure - WM;  Surgeon: Rolf King MD;  Location: [facility] CV     ENDOMETRIAL BIOPSY      Negative     OTHER SURGICAL HISTORY      Basal cell skin cancer     OTHER SURGICAL HISTORY  2012    Radiofrequency ablation right greater saphenous vein     OVARIAN CYST REMOVAL      lysis of adhesions     STRABISMUS SURGERY  2015     Family History   Problem Relation Age of Onset     Cerebrovascular Disease Mother      Hypertension Sister      Hypertension Brother     Social History     Socioeconomic History     Marital status:      Spouse name: Not on file     Number of children: Not on file     Years of education: Not on file     Highest education level: Not on file   Occupational History     Not on file   Tobacco Use     Smoking status: Never     Passive exposure: Never     Smokeless tobacco: Never   Vaping Use     Vaping status: Never Used   Substance and Sexual Activity     Alcohol use: No     Drug use: No     Sexual activity: Not on file   Other Topics Concern     Not on file   Social History Narrative    Retired teacher  , [place], 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     Social Determinants of Health      Financial Resource Strain: Low Risk  (1/9/2024)    Financial Resource Strain      Within the past 12 months, have you or your family members you live with been unable to get utilities (heat, electricity) when it was really needed?: No   Food Insecurity: Low Risk  (1/9/2024)    Food Insecurity      Within the past 12 months, did you worry that your food would run out before you got money to buy more?: No      Within the past 12 months, did the food you bought just not last and you didn t have money to get more?: No   Transportation Needs: Low Risk  (1/9/2024)    Transportation Needs      Within the past 12 months, has lack of transportation kept you from medical appointments, getting your medicines, non-medical meetings or appointments, work, or from getting things that you need?: No   Physical Activity: Not on file   Stress: Not on file   Social Connections: Unknown (1/1/2022)    Received from [facility]    Social Connections      Frequency of Communication with Friends and Family: Not on file   Interpersonal Safety: Low Risk  (3/8/2024)    Interpersonal Safety      Do you feel physically and emotionally safe where you currently live?: Yes      Within the past 12 months, have you been hit, slapped, kicked or otherwise physically hurt by someone?: No      Within the past 12 months, have you been humiliated or emotionally abused in other ways by your partner or ex-partner?: No   Housing Stability: Low Risk  (1/9/2024)    Housing Stability      Do you have housing? : Yes      Are you worried about losing your housing?: No          Medications  Allergies   Current Outpatient Medications   Medication Sig Dispense Refill     acetaminophen (TYLENOL) 325 MG tablet Take 325 mg by mouth 3 times daily as needed       amLODIPine (NORVASC) 5 MG tablet Take 1 tablet (5 mg) by mouth daily 90 tablet 3     Calcium Carb-Ergocalciferol 500-200 MG-UNIT  TABS Take 1 tablet by mouth 2 times daily       cyanocobalamin (VITAMIN B-12) 1000 MCG tablet Take 1 tablet (1,000 mcg) by mouth daily       escitalopram (LEXAPRO) 10 MG tablet TAKE 1 TABLET(10 MG) BY MOUTH DAILY 90 tablet 3     fluticasone (FLONASE) 50 MCG/ACT nasal spray Spray 2 sprays in nostril 1 spray each nostril in the AM, 2 sprays in the PM       gabapentin (NEURONTIN) 300 MG capsule Take 1 capsule (300 mg) by mouth 3 times daily 270 capsule 3     hydrochlorothiazide (HYDRODIURIL) 25 MG tablet Take 1 tablet (25 mg) by mouth every other day 45 tablet 3     irbesartan (AVAPRO) 300 MG tablet TAKE 1 TABLET(300 MG) BY MOUTH DAILY 90 tablet 3     omeprazole (PRILOSEC) 20 MG DR capsule TAKE 1 CAPSULE BY MOUTH TWICE DAILY (Patient taking differently: daily.) 180 capsule 3     simvastatin (ZOCOR) 10 MG tablet TAKE 1 TABLET(10 MG) BY MOUTH EVERY 24 HOURS 90 tablet 2     sotalol (BETAPACE) 80 MG tablet TAKE 1/2 TABLET(40 MG) BY MOUTH TWICE DAILY 90 tablet 1     traZODone (DESYREL) 50 MG tablet TAKE 1 TABLET(50 MG) BY MOUTH AT BEDTIME 90 tablet 2     Vitamin D3 (CHOLECALCIFEROL) 25 mcg (1000 units) tablet Take 1 tablet (25 mcg) by mouth daily       warfarin ANTICOAGULANT (COUMADIN) 2.5 MG tablet TAKE 1 AND 1/2 TABLETS (3.75 MG) BY MOUTH EVERY DAY OR AS DIRECTED BY INR CLINIC 135 tablet 1     Wheat Dextrin (BENEFIBER PO) Take by mouth daily Once daily      No Known Allergies      Lab Results    Chemistry/lipid CBC Cardiac Enzymes/BNP/TSH/INR   Recent Labs   Lab Test 01/09/24  1104   CHOL 163   HDL 71   LDL 75   TRIG 84     Recent Labs   Lab Test 01/09/24  1104 01/02/23  1120 12/23/21  1007   LDL 75 66 74     Last Comprehensive Metabolic Panel:  Lab Results   Component Value Date     07/09/2024    POTASSIUM 4.2 07/09/2024    CHLORIDE 97 (L) 07/09/2024    CO2 28 07/09/2024    ANIONGAP 10 07/09/2024    GLC 89 07/09/2024    BUN 9.9 07/09/2024    CR 0.54 07/10/2024    GFRESTIMATED >90 07/10/2024    EBONY 8.8 07/09/2024  "          No results for input(s): \"A1C\" in the last 30320 hours. Lab Results   Component Value Date    WBC 6.3 07/09/2024     Lab Results   Component Value Date    RBC 3.87 07/09/2024     Lab Results   Component Value Date    HGB 10.3 07/10/2024     Lab Results   Component Value Date    HCT 35.1 07/09/2024     No components found for: \"MCT\"  Lab Results   Component Value Date    MCV 91 07/09/2024     Lab Results   Component Value Date    MCH 30.5 07/09/2024     Lab Results   Component Value Date    MCHC 33.6 07/09/2024     Lab Results   Component Value Date    RDW 13.3 07/09/2024     Lab Results   Component Value Date     07/09/2024        Recent Labs   Lab Test 05/31/21  1938   TROPONINI <0.01     No results for input(s): \"BNP\", \"NTBNPI\", \"NTBNP\" in the last 90379 hours.  Recent Labs   Lab Test 11/18/22  1144   TSH 0.96     Recent Labs   Lab Test 06/05/24  0000 05/22/24  0000 05/15/24  0000   INR 2.2 2.2 3.0            This note has been dictated using voice recognition software. Any grammatical or context distortions are unintentional and inherent to the software.          Thank you for allowing me to participate in the care of your patient.      Sincerely,     FARA HERNANDEZ PA-C     [facility]  cc:   Fara Hernandez PA-C  [address] [address]      "

## 2024-08-21 NOTE — PROGRESS NOTES
HEART CARE ENCOUNTER NOTE       [facility]  [phone]      Assessment/Recommendations   1.  Persistent atrial fibrillation - currently in NSR on sotalol.      Patient underwent attempted watchman implant on July 10.  Procedure was aborted after multiple attempts, and due to difficult anatomy, no device was left.  She has undergone CTA pulmonary vein imaging and her study is currently being reviewed by [facility] for recommendations as to whether she is a candidate for the amulet device.    For now, continue warfarin for stroke prophylaxis.     2.  Hypertension -blood pressure is controlled.  Continue amlodipine 5 mg daily, hydrochlorothiazide 25 mg every other day and irbesartan 300 mg daily    3.  Coronary artery disease - moderate in severity by CTA done recently.  She denies angina.     4. Hyperlipidemia - last LDL 75 (January).  Continue simvastatin       History of Present Illness/Subjective    Rene Matthews is a 83 year old female who comes in today for her 6-week follow-up visit after attempted watchman implant.  Her  accompanies her to the visit today.    Rene Matthews has a past history of persistent atrial fibrillation, hypertension, hyperlipidemia, sinus node dysfunction, GERD.      She underwent attempt at watchman implant on July 10.  Because of difficult anatomy, procedure was aborted and no device was left.  Since then, patient has been feeling tired.  She developed COVID shortly after the implant and that has.      Her INR has been labile since she had COVID.  Was as low as 1.4 and as high as 3.5.    Rene Matthews denies chest discomfort, palpitations, shortness of breath, paroxysmal nocturnal dyspnea, orthopnea, lightheadedness, dizziness, pre-syncope, or syncope.  Rene Matthews also denies any weight loss, changes in appetite, nausea or vomiting.     Medical, surgical, family, social history, and medications were reviewed and updated as necessary.    Procedural MAEGAN  7/10/2024:  Interpretation Summary     Structural MAEGAN for Left Atrial Appendage Occlusion Device:     Pre-Device:  1. Normal left ventricular size and systolic function. LVEF:60-65%  2. No left atrial appendage thrombus or spontaneous contrast. Severe left  atrial enlargement.  3. No ASD or PFO by color flow.  4. No pericardial effusion.     Post Device:  1. Multiple attempt to seat Watchman FLX Â  Device in left atrial appendage by  2D and 3D imaging was not successful. No device deployed.  3. Small ASD with unidirectional flow (left to right) by color flow doppler.  4. No post procedural pericardial effusion.      CTA pulmonary vein 8/9/2024:  The measurement of left atrial appendage at LCX bifurcation and 40% cardiac phase:  Circumferential area: 228 mm   Perimeter: 54 mm  Measured diameters: 15x8 mm  Average diameter: 17 mm  Useful depth:  24 mm  Maximal depth:  34 mm     2.  No thrombus is identified in left atrial appendage.  Left atrium is moderately enlarged.     3.  Calcified coronary artery disease, at least moderate stenosis in proximal to mid LAD and moderate stenosis proximal RCA, right dominant system.  Not well visualized each coronary artery since this is not coronary CT angiogram study.     4.  Normal size of her thoracic aorta.     5.  No pericardial effusion or calcified pericardium.     Physical Examination Review of Systems   Vitals: /65 (BP Location: Right arm, Patient Position: Sitting, Cuff Size: Adult Regular)   Pulse 51   Resp 14   Wt 60.8 kg (134 lb)   LMP  (LMP Unknown)   BMI 23.00 kg/m    BMI= Body mass index is 23 kg/m .  Wt Readings from Last 3 Encounters:   08/21/24 60.8 kg (134 lb)   07/09/24 60.8 kg (134 lb)   06/10/24 60.8 kg (134 lb)       General Appearance:   Alert, cooperative and in no acute distress   ENT/Mouth: membranes moist, no oral lesions or bleeding gums.      EYES:  no scleral icterus, normal conjunctivae   Neck: Thyroid not visualized   Chest/Lungs:    lungs are clear to auscultation, no rales or wheezing   Cardiovascular:   Regular . Normal first and second heart sounds with no murmurs, rubs or gallops; the carotid, radial and posterior tibial pulses are intact, no edema bilaterally    Abdomen:  Soft and nontender. Bowel sounds are present in all quadrants   Extremities: no cyanosis or clubbing   Skin: no xanthelasma, warm.    Neurologic: normal gait, normal  bilateral, no tremors   Psychiatric: Normal mood and affect       Please refer above for cardiac ROS details.      Medical History  Surgical History Family History Social History   Past Medical History:   Diagnosis Date    Abnormal Pap smear of cervix     ASCUS with negative biopsy    Acute low back pain without sciatica 05/18/2020    Allergic rhinitis     Anxiety, generalized     Atrial fibrillation (H)     Benign neoplasm of ascending colon     Burning sensation of feet     Cervical polyp     Chest pain     secondary to gerd    Chronic abdominal pain     Negative CT scan and colonoscopy, musculoskeletal etiology suspected    Chronic radicular low back pain     MICHEL after evaluation by Dr. Bradley    Chronic sinusitis 10/18/2016    Frequent headaches     GERD (gastroesophageal reflux disease)     Noncardiac chest pain    Hearing loss in left ear 10/20/2017    Herpes zoster 01/01/2006    Hyperlipidemia     Hypertension     IBS (irritable bowel syndrome)     Insomnia     Mild aortic insufficiency 11/02/2022    Nausea 11/18/2022    Osteoarthritis     Osteopenia     DEXA 2014 T score -2.0 stable, DEXA December 2017 T score -1.6 left femoral neck stable.  DEXA January 2022 -2.1 L1 L4 and -1.8 bilateral femoral neck    Overactive bladder 10/18/2016    Palpitations 06/07/2021    Symptoms suspicious for atrial fibrillation.  Echocardiogram June 2021 with normal left ventricular systolic function with mild left atrial enlargement and mild AI and MR    Peptic ulcer disease 01/01/2006    w/ gastric ulcer    Personal  history of colonic polyps     colonoscopy January 2014 normal.  Colonoscopy January 2019 with multiple polyps, repeat in 3 years.  Cologuard neg  January 2023    Postmenopausal bleeding 12/23/2021    Screening     Negative for AAA CT scan 2013    Skin cancer, basal cell 10/20/2017    Urge incontinence of urine 06/07/2021    Vitamin B12 deficiency (non anemic)     Weight gain 03/12/2021     Past Surgical History:   Procedure Laterality Date    BIOPSY CERVICAL, LOCAL EXCISION, SINGLE/MULTIPLE      CV LEFT ATRIAL APPENDAGE CLOSURE Right 7/10/2024    Procedure: Left Atrial Appendage Closure - WM;  Surgeon: Rolf King MD;  Location: [facility] CV    ENDOMETRIAL BIOPSY      Negative    OTHER SURGICAL HISTORY      Basal cell skin cancer    OTHER SURGICAL HISTORY  2012    Radiofrequency ablation right greater saphenous vein    OVARIAN CYST REMOVAL      lysis of adhesions    STRABISMUS SURGERY  2015     Family History   Problem Relation Age of Onset    Cerebrovascular Disease Mother     Hypertension Sister     Hypertension Brother     Social History     Socioeconomic History    Marital status:      Spouse name: Not on file    Number of children: Not on file    Years of education: Not on file    Highest education level: Not on file   Occupational History    Not on file   Tobacco Use    Smoking status: Never     Passive exposure: Never    Smokeless tobacco: Never   Vaping Use    Vaping status: Never Used   Substance and Sexual Activity    Alcohol use: No    Drug use: No    Sexual activity: Not on file   Other Topics Concern    Not on file   Social History Narrative    Retired teacher  , [place], 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     Social Determinants of Health     Financial Resource Strain: Low Risk  (1/9/2024)    Financial Resource Strain     Within the past 12 months, have you or your family members you live with been unable to get utilities (heat, electricity) when it was really needed?: No   Food  Insecurity: Low Risk  (1/9/2024)    Food Insecurity     Within the past 12 months, did you worry that your food would run out before you got money to buy more?: No     Within the past 12 months, did the food you bought just not last and you didn t have money to get more?: No   Transportation Needs: Low Risk  (1/9/2024)    Transportation Needs     Within the past 12 months, has lack of transportation kept you from medical appointments, getting your medicines, non-medical meetings or appointments, work, or from getting things that you need?: No   Physical Activity: Not on file   Stress: Not on file   Social Connections: Unknown (1/1/2022)    [facility] [employer] [facility]    Social Connections     Frequency of Communication with Friends and Family: Not on file   Interpersonal Safety: Low Risk  (3/8/2024)    Interpersonal Safety     Do you feel physically and emotionally safe where you currently live?: Yes     Within the past 12 months, have you been hit, slapped, kicked or otherwise physically hurt by someone?: No     Within the past 12 months, have you been humiliated or emotionally abused in other ways by your partner or ex-partner?: No   Housing Stability: Low Risk  (1/9/2024)    Housing Stability     Do you have housing? : Yes     Are you worried about losing your housing?: No          Medications  Allergies   Current Outpatient Medications   Medication Sig Dispense Refill    acetaminophen (TYLENOL) 325 MG tablet Take 325 mg by mouth 3 times daily as needed      amLODIPine (NORVASC) 5 MG tablet Take 1 tablet (5 mg) by mouth daily 90 tablet 3    Calcium Carb-Ergocalciferol 500-200 MG-UNIT TABS Take 1 tablet by mouth 2 times daily      cyanocobalamin (VITAMIN B-12) 1000 MCG tablet Take 1 tablet (1,000 mcg) by mouth daily      escitalopram (LEXAPRO) 10 MG tablet TAKE 1 TABLET(10 MG) BY MOUTH DAILY 90 tablet 3    fluticasone (FLONASE) 50 MCG/ACT  "nasal spray Spray 2 sprays in nostril 1 spray each nostril in the AM, 2 sprays in the PM      gabapentin (NEURONTIN) 300 MG capsule Take 1 capsule (300 mg) by mouth 3 times daily 270 capsule 3    hydrochlorothiazide (HYDRODIURIL) 25 MG tablet Take 1 tablet (25 mg) by mouth every other day 45 tablet 3    irbesartan (AVAPRO) 300 MG tablet TAKE 1 TABLET(300 MG) BY MOUTH DAILY 90 tablet 3    omeprazole (PRILOSEC) 20 MG DR capsule TAKE 1 CAPSULE BY MOUTH TWICE DAILY (Patient taking differently: daily.) 180 capsule 3    simvastatin (ZOCOR) 10 MG tablet TAKE 1 TABLET(10 MG) BY MOUTH EVERY 24 HOURS 90 tablet 2    sotalol (BETAPACE) 80 MG tablet TAKE 1/2 TABLET(40 MG) BY MOUTH TWICE DAILY 90 tablet 1    traZODone (DESYREL) 50 MG tablet TAKE 1 TABLET(50 MG) BY MOUTH AT BEDTIME 90 tablet 2    Vitamin D3 (CHOLECALCIFEROL) 25 mcg (1000 units) tablet Take 1 tablet (25 mcg) by mouth daily      warfarin ANTICOAGULANT (COUMADIN) 2.5 MG tablet TAKE 1 AND 1/2 TABLETS (3.75 MG) BY MOUTH EVERY DAY OR AS DIRECTED BY INR CLINIC 135 tablet 1    Wheat Dextrin (BENEFIBER PO) Take by mouth daily Once daily      No Known Allergies      Lab Results    Chemistry/lipid CBC Cardiac Enzymes/BNP/TSH/INR   Recent Labs   Lab Test 01/09/24  1104   CHOL 163   HDL 71   LDL 75   TRIG 84     Recent Labs   Lab Test 01/09/24  1104 01/02/23  1120 12/23/21  1007   LDL 75 66 74     Last Comprehensive Metabolic Panel:  Lab Results   Component Value Date     07/09/2024    POTASSIUM 4.2 07/09/2024    CHLORIDE 97 (L) 07/09/2024    CO2 28 07/09/2024    ANIONGAP 10 07/09/2024    GLC 89 07/09/2024    BUN 9.9 07/09/2024    CR 0.54 07/10/2024    GFRESTIMATED >90 07/10/2024    EBONY 8.8 07/09/2024           No results for input(s): \"A1C\" in the last 18161 hours. Lab Results   Component Value Date    WBC 6.3 07/09/2024     Lab Results   Component Value Date    RBC 3.87 07/09/2024     Lab Results   Component Value Date    HGB 10.3 07/10/2024     Lab Results   Component " "Value Date    HCT 35.1 07/09/2024     No components found for: \"MCT\"  Lab Results   Component Value Date    MCV 91 07/09/2024     Lab Results   Component Value Date    MCH 30.5 07/09/2024     Lab Results   Component Value Date    MCHC 33.6 07/09/2024     Lab Results   Component Value Date    RDW 13.3 07/09/2024     Lab Results   Component Value Date     07/09/2024        Recent Labs   Lab Test 05/31/21  1938   TROPONINI <0.01     No results for input(s): \"BNP\", \"NTBNPI\", \"NTBNP\" in the last 46283 hours.  Recent Labs   Lab Test 11/18/22  1144   TSH 0.96     Recent Labs   Lab Test 06/05/24  0000 05/22/24  0000 05/15/24  0000   INR 2.2 2.2 3.0            This note has been dictated using voice recognition software. Any grammatical or context distortions are unintentional and inherent to the software.        "

## 2024-08-22 ENCOUNTER — ANTICOAGULATION THERAPY VISIT (OUTPATIENT)
Dept: ANTICOAGULATION | Facility: CLINIC | Age: 84
End: 2024-08-22
Payer: COMMERCIAL

## 2024-08-22 DIAGNOSIS — I48.19 PERSISTENT ATRIAL FIBRILLATION (H): Primary | ICD-10-CM

## 2024-08-22 LAB — INR HOME MONITORING: 2.9 (ref 2–3)

## 2024-08-22 NOTE — PROGRESS NOTES
ANTICOAGULATION MANAGEMENT     Rene Matthews 84 year old female is on warfarin with therapeutic INR result. (Goal INR 2.0-3.0)    Recent labs: (last 7 days)     08/22/24  0000   INR 2.9       ASSESSMENT     Source(s): Chart Review and Patient/Caregiver Call     Warfarin doses taken: Warfarin taken as instructed  Diet: No new diet changes identified  Medication/supplement changes: None noted  New illness, injury, or hospitalization: No  Signs or symptoms of bleeding or clotting: No  Previous result: Supratherapeutic  Additional findings: None       PLAN     Recommended plan for no diet, medication or health factor changes affecting INR     Dosing Instructions: Continue your current warfarin dose with next INR in 2 weeks       Summary  As of 8/22/2024      Full warfarin instructions:  3.75 mg every day   Next INR check:  9/5/2024               Telephone call with Florence who verbalizes understanding and agrees to plan    Patient to recheck with home meter    Education provided: Please call back if any changes to your diet, medications or how you've been taking warfarin  Resume manage by exception with home monitor. Continue to submit INR results to home monitor company.You will only be called when your result is out of range. Please call and notify [facility] if new medication started, dose missed, signs or symptoms of bleeding or clotting, or a surgery/procedure is scheduled. Due for next call no later than: 11/20/24.  Contact [phone] with any changes, questions or concerns.     Plan made per [facility] anticoagulation protocol    Ale Mirza, RN  Anticoagulation Clinic  8/22/2024    _______________________________________________________________________     Anticoagulation Episode Summary       Current INR goal:  2.0-3.0   TTR:  75.7% (1 y)   Target end date:  Indefinite   Send INR reminders to:  [facility]    Indications    Persistent atrial fibrillation (H) [I48.19]             Comments:  EDNA Madera              Anticoagulation Care Providers       Provider Role Specialty Phone number    Kevin Ho MD Referring Internal Medicine [phone]

## 2024-08-28 NOTE — TELEPHONE ENCOUNTER
Per Montoya review, pt anatomy deemed suitable for device placement.  Will contact pt when date available.

## 2024-09-04 NOTE — TELEPHONE ENCOUNTER
Health Call Center    Phone Message    May a detailed message be left on voicemail: yes     Reason for Call: Other: Florence called in to report that her surgery wasn't successful and she is wondering if she still needs this Wednesday's appt.  Please call Florence back to further discuss.  Ideally, she would like to be called back today.     Action Taken: Other: Cardiology    Travel Screening: Not Applicable     Thank you!  [facility]                                                                 black/eschar

## 2024-09-05 ENCOUNTER — DOCUMENTATION ONLY (OUTPATIENT)
Dept: ANTICOAGULATION | Facility: CLINIC | Age: 84
End: 2024-09-05
Payer: COMMERCIAL

## 2024-09-05 DIAGNOSIS — I48.19 PERSISTENT ATRIAL FIBRILLATION (H): Primary | ICD-10-CM

## 2024-09-05 LAB — INR HOME MONITORING: 2.4 (ref 2–3)

## 2024-09-05 NOTE — PROGRESS NOTES
"  ANTICOAGULATION  MANAGEMENT-Home Monitor Managed by Exception    Rene Matthews 84 year old female is on warfarin with therapeutic INR result. (Goal INR 2.0-3.0)    Recent labs: (last 7 days)     09/05/24  0000   INR 2.4       Previous INR was Therapeutic  Medication, diet, health changes since last INR:chart reviewed; none identified  Contacted within the last 12 weeks by phone on 8/22/24  Last [facility] referral date: 07/29/2024  Per 8/21/24 cardiology notes: \"Patient underwent attempted watchman implant on July 10.  Procedure was aborted after multiple attempts, and due to difficult anatomy, no device was left.  She has undergone CTA pulmonary vein imaging and her study is currently being reviewed by [facility] for recommendations as to whether she is a candidate for the amulet device\"      YESENIA     Rene was NOT contacted regarding therapeutic result today per home monitoring policy manage by exception agreement.   Current warfarin dose is to be continued:     Summary  As of 9/5/2024      Full warfarin instructions:  3.75 mg every day   Next INR check:  9/19/2024             ?   Lisha Cruz RN  Anticoagulation Clinic  9/5/2024    _______________________________________________________________________     Anticoagulation Episode Summary       Current INR goal:  2.0-3.0   TTR:  78.4% (1 y)   Target end date:  Indefinite   Send INR reminders to:  [facility]IT[facility]    Indications    Persistent atrial fibrillation (H) [I48.19]             Comments:  EDNA Madera             Anticoagulation Care Providers       Provider Role Specialty Phone number    Kevin Ho MD Referring Internal Medicine [phone]              "

## 2024-09-05 NOTE — TELEPHONE ENCOUNTER
Phone call to patient, she would like to wait at this time to move forward with [facility]. She has writer's direct office number to call if/when she is ready to move forward. Thanked her for her time. No further questions. ANEL

## 2024-09-12 ENCOUNTER — DOCUMENTATION ONLY (OUTPATIENT)
Dept: ANTICOAGULATION | Facility: CLINIC | Age: 84
End: 2024-09-12
Payer: COMMERCIAL

## 2024-09-12 DIAGNOSIS — I48.19 PERSISTENT ATRIAL FIBRILLATION (H): Primary | ICD-10-CM

## 2024-09-12 LAB — INR HOME MONITORING: 2.9 (ref 2–3)

## 2024-09-12 NOTE — PROGRESS NOTES
ANTICOAGULATION  MANAGEMENT-Home Monitor Managed by Exception    Rene Matthews 84 year old female is on warfarin with therapeutic INR result. (Goal INR 2.0-3.0)    Recent labs: (last 7 days)     09/12/24  0000   INR 2.9       Previous INR was Therapeutic  Medication, diet, health changes since last INR:chart reviewed; none identified  Contacted within the last 12 weeks by phone on  8/22/24   Last [facility] referral date: 07/29/2024      YESENIA     Rene was NOT contacted regarding therapeutic result today per home monitoring policy manage by exception agreement.   Current warfarin dose is to be continued:     Summary  As of 9/12/2024      Full warfarin instructions:  3.75 mg every day   Next INR check:  9/19/2024             ?   Aleksandra Gardner RN  Anticoagulation Clinic  9/12/2024    _______________________________________________________________________     Anticoagulation Episode Summary       Current INR goal:  2.0-3.0   TTR:  78.4% (1 y)   Target end date:  Indefinite   Send INR reminders to:  [facility]    Indications    Persistent atrial fibrillation (H) [I48.19]             Comments:  EDNA Madera             Anticoagulation Care Providers       Provider Role Specialty Phone number    Kevin Ho MD Referring Internal Medicine [phone]

## 2024-09-19 ENCOUNTER — DOCUMENTATION ONLY (OUTPATIENT)
Dept: ANTICOAGULATION | Facility: CLINIC | Age: 84
End: 2024-09-19
Payer: COMMERCIAL

## 2024-09-19 LAB — INR HOME MONITORING: 2.1 (ref 2–3)

## 2024-09-19 NOTE — PROGRESS NOTES
ANTICOAGULATION  MANAGEMENT-Home Monitor Managed by Exception    Rene Matthews 84 year old female is on warfarin with therapeutic INR result. (Goal INR 2.0-3.0)    Recent labs: (last 7 days)     09/19/24  0000   INR 2.1       Previous INR was Therapeutic  Medication, diet, health changes since last INR:chart reviewed; none identified  Contacted within the last 12 weeks by phone on 8/22/24  Last [facility] referral date: 07/29/2024      YESENIA     Rene was NOT contacted regarding therapeutic result today per home monitoring policy manage by exception agreement.   Current warfarin dose is to be continued:     Summary  As of 9/19/2024      Full warfarin instructions:  3.75 mg every day   Next INR check:  9/26/2024             ?   Yue Casas RN  Anticoagulation Clinic  9/19/2024    _______________________________________________________________________     Anticoagulation Episode Summary       Current INR goal:  2.0-3.0   TTR:  78.4% (1 y)   Target end date:  Indefinite   Send INR reminders to:  [facility]    Indications    Persistent atrial fibrillation (H) [I48.19]             Comments:  EDNA Madera             Anticoagulation Care Providers       Provider Role Specialty Phone number    Kevin Ho MD Referring Internal Medicine [phone]

## 2024-09-26 ENCOUNTER — DOCUMENTATION ONLY (OUTPATIENT)
Dept: ANTICOAGULATION | Facility: CLINIC | Age: 84
End: 2024-09-26
Payer: COMMERCIAL

## 2024-09-26 DIAGNOSIS — I48.19 PERSISTENT ATRIAL FIBRILLATION (H): Primary | ICD-10-CM

## 2024-09-26 LAB — INR HOME MONITORING: 2.2 (ref 2–3)

## 2024-09-26 NOTE — PROGRESS NOTES
ANTICOAGULATION  MANAGEMENT-Home Monitor Managed by Exception    Rene Matthews 84 year old female is on warfarin with therapeutic INR result. (Goal INR 2.0-3.0)    Recent labs: (last 7 days)     09/26/24  0000   INR 2.2       Previous INR was Therapeutic  Medication, diet, health changes since last INR:chart reviewed; none identified  Contacted within the last 12 weeks by phone on 08/22/2024  Due for next call no later than: 11/20/24.   Last [facility] referral date: 07/29/2024      YESENIA     Rene was NOT contacted regarding therapeutic result today per home monitoring policy manage by exception agreement.   Current warfarin dose is to be continued:     Summary  As of 9/26/2024      Full warfarin instructions:  3.75 mg every day   Next INR check:  10/3/2024             ?   Ale Mirza RN  Anticoagulation Clinic  9/26/2024    _______________________________________________________________________     Anticoagulation Episode Summary       Current INR goal:  2.0-3.0   TTR:  78.4% (1 y)   Target end date:  Indefinite   Send INR reminders to:  [facility]    Indications    Persistent atrial fibrillation (H) [I48.19]             Comments:  EDNA Madera             Anticoagulation Care Providers       Provider Role Specialty Phone number    Kevin Ho MD Referring Internal Medicine [phone]

## 2024-09-30 ENCOUNTER — TRANSFERRED RECORDS (OUTPATIENT)
Dept: HEALTH INFORMATION MANAGEMENT | Facility: CLINIC | Age: 84
End: 2024-09-30
Payer: COMMERCIAL

## 2024-10-03 ENCOUNTER — DOCUMENTATION ONLY (OUTPATIENT)
Dept: ANTICOAGULATION | Facility: CLINIC | Age: 84
End: 2024-10-03
Payer: COMMERCIAL

## 2024-10-03 DIAGNOSIS — I48.19 PERSISTENT ATRIAL FIBRILLATION (H): Primary | ICD-10-CM

## 2024-10-03 LAB — INR HOME MONITORING: 2.4 (ref 2–3)

## 2024-10-03 NOTE — PROGRESS NOTES
ANTICOAGULATION  MANAGEMENT-Home Monitor Managed by Exception    Rene Matthews 84 year old female is on warfarin with therapeutic INR result. (Goal INR 2.0-3.0)    Recent labs: (last 7 days)     10/03/24  0000   INR 2.4       Previous INR was Therapeutic  Medication, diet, health changes since last INR:chart reviewed; none identified  Contacted within the last 12 weeks by phone on 8/22/24   Due for next call no later than: 11/20/24.   Last [facility] referral date: 07/29/2024      YESENIA     Rene was NOT contacted regarding therapeutic result today per home monitoring policy manage by exception agreement.   Current warfarin dose is to be continued:     Summary  As of 10/3/2024      Full warfarin instructions:  3.75 mg every day   Next INR check:  10/17/2024             ?   Dior Gale RN  Anticoagulation Clinic  10/3/2024    _______________________________________________________________________     Anticoagulation Episode Summary       Current INR goal:  2.0-3.0   TTR:  78.4% (1 y)   Target end date:  Indefinite   Send INR reminders to:  [facility]    Indications    Persistent atrial fibrillation (H) [I48.19]             Comments:  EDNA Madera             Anticoagulation Care Providers       Provider Role Specialty Phone number    Kevin Ho MD Referring Internal Medicine [phone]

## 2024-10-07 ENCOUNTER — TELEPHONE (OUTPATIENT)
Dept: INTERNAL MEDICINE | Facility: CLINIC | Age: 84
End: 2024-10-07
Payer: COMMERCIAL

## 2024-10-07 DIAGNOSIS — I48.0 PAROXYSMAL ATRIAL FIBRILLATION (H): ICD-10-CM

## 2024-10-07 RX ORDER — WARFARIN SODIUM 2.5 MG/1
TABLET ORAL
Qty: 135 TABLET | Refills: 1 | Status: SHIPPED | OUTPATIENT
Start: 2024-10-07

## 2024-10-07 NOTE — TELEPHONE ENCOUNTER
ANTICOAGULATION MANAGEMENT:  Medication Refill    Anticoagulation Summary  As of 10/3/2024      Warfarin maintenance plan:  3.75 mg (2.5 mg x 1.5) every day   Next INR check:  10/17/2024   Target end date:  Indefinite    Indications    Persistent atrial fibrillation (H) [I48.19]                 Anticoagulation Care Providers       Provider Role Specialty Phone number    Kevin Ho MD Referring Internal Medicine [phone]            Refill Criteria    Visit with referring provider/group: Meets criteria: visit within referring provider group in the last 15 months on 3/8/24    [facility] referral last signed: 07/29/2024; within last year: Yes    Lab monitoring not exceeding 2 weeks overdue: Yes    Rene meets all criteria for refill. Rx instructions and quantity supplied updated to match patient's current dosing plan. Warfarin 90 day supply with 1 refill granted per [facility] protocol     ACN called and updated patient that refill of warfarin was sent to her pharmacy as requested.    Lisha Herbert RN  Anticoagulation Clinic

## 2024-10-07 NOTE — TELEPHONE ENCOUNTER
Medication Question or Refill    Contacts       Contact Date/Time Type Contact Phone/Fax    10/07/2024 04:08 PM CDT Phone (Incoming) Florence Matthews (Self) [phone] (M)            What medication are you calling about (include dose and sig)?: warfarin     Preferred Pharmacy:   [id number] - [place], [place] - [address] [facility] [address]  [address]  [place] [address]  Phone: [phone] Fax: [phone]      Controlled Substance Agreement on file:   CSA -- Patient Level:    CSA: None found at the patient level.       Who prescribed the medication?: Kevin Ho     Do you need a refill? Yes    When did you use the medication last? 10/7/2024        Do you have any questions or concerns?  Yes: Pt needs refill, completely out of Warfarin as of 10/7/2024      Could we send this information to you in [employer] [facility] or would you prefer to receive a phone call?:   Patient would prefer a phone call   Okay to leave a detailed message?: Yes at Cell number on file:    Telephone Information:   Mobile [phone]

## 2024-10-17 ENCOUNTER — DOCUMENTATION ONLY (OUTPATIENT)
Dept: ANTICOAGULATION | Facility: CLINIC | Age: 84
End: 2024-10-17
Payer: COMMERCIAL

## 2024-10-17 DIAGNOSIS — I48.19 PERSISTENT ATRIAL FIBRILLATION (H): Primary | ICD-10-CM

## 2024-10-17 LAB — INR HOME MONITORING: 2.4 (ref 2–3)

## 2024-10-17 NOTE — PROGRESS NOTES
ANTICOAGULATION  MANAGEMENT-Home Monitor Managed by Exception    Rene Matthews 84 year old female is on warfarin with therapeutic INR result. (Goal INR 2.0-3.0)    Recent labs: (last 7 days)     10/17/24  0000   INR 2.4       Previous INR was Therapeutic  Medication, diet, health changes since last INR:chart reviewed; none identified  Contacted within the last 12 weeks by phone on 8/22/24   Due for next call no later than: 11/20/24.   Last [facility] referral date: 07/29/2024      YESENIA     Rene was NOT contacted regarding therapeutic result today per home monitoring policy manage by exception agreement.   Current warfarin dose is to be continued:     Summary  As of 10/17/2024      Full warfarin instructions:  3.75 mg every day   Next INR check:  10/31/2024             ?   Lisha Herbert RN  Anticoagulation Clinic  10/17/2024    _______________________________________________________________________     Anticoagulation Episode Summary       Current INR goal:  2.0-3.0   TTR:  78.4% (1 y)   Target end date:  Indefinite   Send INR reminders to:  [facility]    Indications    Persistent atrial fibrillation (H) [I48.19]             Comments:  EDNA Madera             Anticoagulation Care Providers       Provider Role Specialty Phone number    Kevin Ho MD Referring Internal Medicine [phone]

## 2024-10-25 ENCOUNTER — DOCUMENTATION ONLY (OUTPATIENT)
Dept: ANTICOAGULATION | Facility: CLINIC | Age: 84
End: 2024-10-25
Payer: COMMERCIAL

## 2024-10-25 DIAGNOSIS — I48.19 PERSISTENT ATRIAL FIBRILLATION (H): Primary | ICD-10-CM

## 2024-10-25 LAB — INR HOME MONITORING: 2.4 (ref 2–3)

## 2024-10-25 NOTE — PROGRESS NOTES
ANTICOAGULATION  MANAGEMENT-Home Monitor Managed by Exception    Rene Matthews 84 year old female is on warfarin with therapeutic INR result. (Goal INR 2.0-3.0)    Recent labs: (last 7 days)     10/25/24  0000   INR 2.4       Previous INR was Therapeutic  Medication, diet, health changes since last INR:chart reviewed; none identified  Contacted within the last 12 weeks by phone on 8/22/24  Last [facility] referral date: 07/29/2024      YESENIA     Rene was NOT contacted regarding therapeutic result today per home monitoring policy manage by exception agreement.   Current warfarin dose is to be continued:     Summary  As of 10/25/2024      Full warfarin instructions:  3.75 mg every day   Next INR check:  11/8/2024             ?   Marlen Alaniz RN  Anticoagulation Clinic  10/25/2024    _______________________________________________________________________     Anticoagulation Episode Summary       Current INR goal:  2.0-3.0   TTR:  78.4% (1 y)   Target end date:  Indefinite   Send INR reminders to:  [facility]    Indications    Persistent atrial fibrillation (H) [I48.19]             Comments:  EDNA Madera             Anticoagulation Care Providers       Provider Role Specialty Phone number    Kevin Ho MD Referring Internal Medicine [phone]

## 2024-10-29 ENCOUNTER — TRANSFERRED RECORDS (OUTPATIENT)
Dept: HEALTH INFORMATION MANAGEMENT | Facility: CLINIC | Age: 84
End: 2024-10-29
Payer: COMMERCIAL

## 2024-10-31 ENCOUNTER — DOCUMENTATION ONLY (OUTPATIENT)
Dept: ANTICOAGULATION | Facility: CLINIC | Age: 84
End: 2024-10-31
Payer: COMMERCIAL

## 2024-10-31 DIAGNOSIS — I48.19 PERSISTENT ATRIAL FIBRILLATION (H): Primary | ICD-10-CM

## 2024-10-31 LAB — INR HOME MONITORING: 2 (ref 2–3)

## 2024-10-31 NOTE — PROGRESS NOTES
ANTICOAGULATION  MANAGEMENT-Home Monitor Managed by Exception    Rene Matthews 84 year old female is on warfarin with therapeutic INR result. (Goal INR 2.0-3.0)    Recent labs: (last 7 days)     10/31/24  0000   INR 2.0       Previous INR was Therapeutic  Medication, diet, health changes since last INR:chart reviewed; none identified  Contacted within the last 12 weeks by phone on  8/22/24   Last [facility] referral date: 07/29/2024      YESENIA     Rene was NOT contacted regarding therapeutic result today per home monitoring policy manage by exception agreement.   Current warfarin dose is to be continued:     Summary  As of 10/31/2024      Full warfarin instructions:  3.75 mg every day   Next INR check:  11/7/2024             ?   Aleksandra Gardner RN  Anticoagulation Clinic  10/31/2024    _______________________________________________________________________     Anticoagulation Episode Summary       Current INR goal:  2.0-3.0   TTR:  79.3% (1 y)   Target end date:  Indefinite   Send INR reminders to:  [facility]    Indications    Persistent atrial fibrillation (H) [I48.19]             Comments:  EDNA Madera             Anticoagulation Care Providers       Provider Role Specialty Phone number    Kevin Ho MD Referring Internal Medicine [phone]

## 2024-11-07 ENCOUNTER — ANTICOAGULATION THERAPY VISIT (OUTPATIENT)
Dept: ANTICOAGULATION | Facility: CLINIC | Age: 84
End: 2024-11-07
Payer: COMMERCIAL

## 2024-11-07 DIAGNOSIS — I48.19 PERSISTENT ATRIAL FIBRILLATION (H): Primary | ICD-10-CM

## 2024-11-07 LAB
INR HOME MONITORING: 1.5 (ref 2–3)
INR HOME MONITORING: 1.5 (ref 2–3)

## 2024-11-13 DIAGNOSIS — G47.00 INSOMNIA, UNSPECIFIED TYPE: ICD-10-CM

## 2024-11-13 DIAGNOSIS — K21.9 GASTROESOPHAGEAL REFLUX DISEASE WITHOUT ESOPHAGITIS: ICD-10-CM

## 2024-11-14 ENCOUNTER — ANTICOAGULATION THERAPY VISIT (OUTPATIENT)
Dept: ANTICOAGULATION | Facility: CLINIC | Age: 84
End: 2024-11-14
Payer: COMMERCIAL

## 2024-11-14 DIAGNOSIS — I48.19 PERSISTENT ATRIAL FIBRILLATION (H): Primary | ICD-10-CM

## 2024-11-14 DIAGNOSIS — F41.1 ANXIETY, GENERALIZED: ICD-10-CM

## 2024-11-14 LAB — INR HOME MONITORING: 2.1 (ref 2–3)

## 2024-11-14 RX ORDER — ESCITALOPRAM OXALATE 10 MG/1
10 TABLET ORAL DAILY
Qty: 90 TABLET | Refills: 3 | Status: SHIPPED | OUTPATIENT
Start: 2024-11-14

## 2024-11-14 RX ORDER — TRAZODONE HYDROCHLORIDE 50 MG/1
TABLET, FILM COATED ORAL
Qty: 90 TABLET | Refills: 1 | Status: SHIPPED | OUTPATIENT
Start: 2024-11-14

## 2024-11-14 NOTE — TELEPHONE ENCOUNTER
Pt called back saying her Rx for Lexapro was denied from pharmacy, and that is all they told her. It's too soon to refill. (Pt called yesterday as well, check telephone encounter from 11/13)    Will have an RN call Pt back.

## 2024-11-14 NOTE — TELEPHONE ENCOUNTER
Call to patient. Medication was last filled for a quantity of 40, she did not receive the full 90 day supply. Patient states she has about a week and a half left of medication.     Routing to provider for review.

## 2024-11-14 NOTE — PROGRESS NOTES
ANTICOAGULATION MANAGEMENT     Rene Matthews 84 year old female is on warfarin with therapeutic INR result. (Goal INR 2.0-3.0)    Recent labs: (last 7 days)     11/14/24  0000   INR 2.1       ASSESSMENT     Source(s): Chart Review and Patient/Caregiver Call     Warfarin doses taken: Warfarin taken as instructed  Diet: No new diet changes identified  Medication/supplement changes: None noted  New illness, injury, or hospitalization: No  Signs or symptoms of bleeding or clotting: No  Previous result: Subtherapeutic  Additional findings: None       PLAN     Recommended plan for no diet, medication or health factor changes affecting INR     Dosing Instructions: Continue your current warfarin dose with next INR in 1 week       Summary  As of 11/14/2024      Full warfarin instructions:  5 mg every Sun, Thu; 3.75 mg all other days   Next INR check:  11/21/2024               Telephone call with Florence who verbalizes understanding and agrees to plan    Patient to recheck with home meter    Education provided: Please call back if any changes to your diet, medications or how you've been taking warfarin  Resume manage by exception with home monitor. Continue to submit INR results to home monitor company.You will only be called when your result is out of range. Please call and notify [facility] if new medication started, dose missed, signs or symptoms of bleeding or clotting, or a surgery/procedure is scheduled. Due for next call no later than: 2/12/25.  Contact [phone] with any changes, questions or concerns.     Plan made per [facility] anticoagulation protocol    Ale Mirza RN  11/14/2024  Anticoagulation Clinic  [facility] for routing messages: [facility]  [facility] patient phone line: [phone]        _______________________________________________________________________     Anticoagulation Episode Summary       Current INR goal:  2.0-3.0   TTR:  79.6% (1 y)   Target end date:  Indefinite   Send INR reminders  to:  [facility]    Indications    Persistent atrial fibrillation (H) [I48.19]             Comments:  EDNA [facility]era             Anticoagulation Care Providers       Provider Role Specialty Phone number    Kevin Ho MD Referring Internal Medicine [phone]

## 2024-11-21 ENCOUNTER — DOCUMENTATION ONLY (OUTPATIENT)
Dept: ANTICOAGULATION | Facility: CLINIC | Age: 84
End: 2024-11-21
Payer: COMMERCIAL

## 2024-11-21 DIAGNOSIS — I48.19 PERSISTENT ATRIAL FIBRILLATION (H): Primary | ICD-10-CM

## 2024-11-21 LAB — INR HOME MONITORING: 2.1 (ref 2–3)

## 2024-11-21 NOTE — PROGRESS NOTES
ANTICOAGULATION  MANAGEMENT-Home Monitor Managed by Exception    Rene Matthews 84 year old female is on warfarin with therapeutic INR result. (Goal INR 2.0-3.0)    Recent labs: (last 7 days)     11/21/24  0000   INR 2.1       Previous INR was Therapeutic  Medication, diet, health changes since last INR:chart reviewed; none identified  Contacted within the last 12 weeks by phone on 11/14/2024  Due for next call no later than: 2/12/25.   Last [facility] referral date: 07/29/2024      YESENIA     Rene was NOT contacted regarding therapeutic result today per home monitoring policy manage by exception agreement.   Current warfarin dose is to be continued:     Summary  As of 11/21/2024      Full warfarin instructions:  5 mg every Sun, Thu; 3.75 mg all other days   Next INR check:  11/28/2024             ?   Ale Mirza RN  Anticoagulation Clinic  11/21/2024    _______________________________________________________________________     Anticoagulation Episode Summary       Current INR goal:  2.0-3.0   TTR:  81.6% (1 y)   Target end date:  Indefinite   Send INR reminders to:  [facility]    Indications    Persistent atrial fibrillation (H) [I48.19]             Comments:  EDNA Madera             Anticoagulation Care Providers       Provider Role Specialty Phone number    Kevin Ho MD Referring Internal Medicine [phone]

## 2024-11-28 LAB — INR HOME MONITORING: 3.1 (ref 2–3)

## 2024-12-05 ENCOUNTER — ANTICOAGULATION THERAPY VISIT (OUTPATIENT)
Dept: ANTICOAGULATION | Facility: CLINIC | Age: 84
End: 2024-12-05
Payer: COMMERCIAL

## 2024-12-05 DIAGNOSIS — I48.19 PERSISTENT ATRIAL FIBRILLATION (H): Primary | ICD-10-CM

## 2024-12-05 LAB — INR HOME MONITORING: 2.8 (ref 2–3)

## 2024-12-05 NOTE — PROGRESS NOTES
ANTICOAGULATION MANAGEMENT     Rene Matthews 84 year old female is on warfarin with therapeutic INR result. (Goal INR 2.0-3.0)    Recent labs: (last 7 days)     12/05/24  0000   INR 2.8       ASSESSMENT     Source(s): Chart Review  Previous INR was Supratherapeutic  Medication, diet, health changes since last INR chart reviewed; none identified         PLAN     Recommended plan for no diet, medication or health factor changes affecting INR     Dosing Instructions: Continue your current warfarin dose with next INR in 1 week       Summary  As of 12/5/2024      Full warfarin instructions:  5 mg every Sun, Thu; 3.75 mg all other days   Next INR check:  12/12/2024               Detailed voice message left for Florence with dosing instructions and follow up date.     Patient to recheck with home meter    Education provided: Please call back if any changes to your diet, medications or how you've been taking warfarin  Symptom monitoring: monitoring for bleeding signs and symptoms and monitoring for clotting signs and symptoms  Resume manage by exception with home monitor. Continue to submit INR results to home monitor company.You will only be called when your result is out of range and at 90 day check in. Please call and notify [facility] if new medication started, dose missed, signs or symptoms of bleeding or clotting, or a surgery/procedure is scheduled. Due for next call no later than: 3/5/25.    Plan made per [facility] anticoagulation protocol    Lisha Cruz RN  12/5/2024  Anticoagulation Clinic  [facility] for routing messages: p [facility]AG HOME MONITOR[facility]  [facility] patient phone line: [phone]        _______________________________________________________________________     Anticoagulation Episode Summary       Current INR goal:  2.0-3.0   TTR:  82.9% (1 y)   Target end date:  Indefinite   Send INR reminders to:  [facility]    Indications    Persistent atrial fibrillation (H) [I48.19]             Comments:  EDNA Madera              Anticoagulation Care Providers       Provider Role Specialty Phone number    Kevin Ho MD Referring Internal Medicine [phone]

## 2024-12-12 ENCOUNTER — DOCUMENTATION ONLY (OUTPATIENT)
Dept: ANTICOAGULATION | Facility: CLINIC | Age: 84
End: 2024-12-12
Payer: COMMERCIAL

## 2024-12-12 DIAGNOSIS — I48.19 PERSISTENT ATRIAL FIBRILLATION (H): Primary | ICD-10-CM

## 2024-12-12 LAB — INR HOME MONITORING: 2.4 (ref 2–3)

## 2024-12-12 NOTE — PROGRESS NOTES
ANTICOAGULATION  MANAGEMENT-Home Monitor Managed by Exception    Rene Matthews 84 year old female is on warfarin with therapeutic INR result. (Goal INR 2.0-3.0)    Recent labs: (last 7 days)     12/12/24  0000   INR 2.4       Previous INR was Therapeutic  Medication, diet, health changes since last INR:chart reviewed; none identified  Contacted within the last 12 weeks by phone on 12/5/24  Due for next call no later than: 3/5/25  Last [facility] referral date: 07/29/2024      YESENIA     Rene was NOT contacted regarding therapeutic result today per home monitoring policy manage by exception agreement.   Current warfarin dose is to be continued:     Summary  As of 12/12/2024      Full warfarin instructions:  5 mg every Sun, Thu; 3.75 mg all other days   Next INR check:  12/19/2024             ?   Lima Galindo RN  Anticoagulation Clinic  12/12/2024    _______________________________________________________________________     Anticoagulation Episode Summary       Current INR goal:  2.0-3.0   TTR:  83.7% (1 y)   Target end date:  Indefinite   Send INR reminders to:  [facility]    Indications    Persistent atrial fibrillation (H) [I48.19]             Comments:  EDNA Madera             Anticoagulation Care Providers       Provider Role Specialty Phone number    Kevin Ho MD Referring Internal Medicine [phone]

## 2024-12-19 ENCOUNTER — DOCUMENTATION ONLY (OUTPATIENT)
Dept: ANTICOAGULATION | Facility: CLINIC | Age: 84
End: 2024-12-19
Payer: COMMERCIAL

## 2024-12-19 DIAGNOSIS — I48.19 PERSISTENT ATRIAL FIBRILLATION (H): Primary | ICD-10-CM

## 2024-12-19 LAB — INR HOME MONITORING: 2.1 (ref 2–3)

## 2024-12-19 NOTE — PROGRESS NOTES
ANTICOAGULATION  MANAGEMENT-Home Monitor Managed by Exception    Rene Matthews 84 year old female is on warfarin with therapeutic INR result. (Goal INR 2.0-3.0)    Recent labs: (last 7 days)     12/19/24  0000   INR 2.1       Previous INR was Therapeutic  Medication, diet, health changes since last INR:chart reviewed; none identified  Contacted within the last 12 weeks by phone on 12/5/24  Due for next call no later than: 3/5/25.   Last [facility] referral date: 07/29/2024      YESENIA     Rene was NOT contacted regarding therapeutic result today per home monitoring policy manage by exception agreement.   Current warfarin dose is to be continued:     Summary  As of 12/19/2024      Full warfarin instructions:  5 mg every Sun, Thu; 3.75 mg all other days   Next INR check:  12/26/2024             ?   Lisha Herbert RN  Anticoagulation Clinic  12/19/2024    _______________________________________________________________________     Anticoagulation Episode Summary       Current INR goal:  2.0-3.0   TTR:  85.7% (1 y)   Target end date:  Indefinite   Send INR reminders to:  [facility]    Indications    Persistent atrial fibrillation (H) [I48.19]             Comments:  EDNA Madera             Anticoagulation Care Providers       Provider Role Specialty Phone number    Kevin Ho MD Referring Internal Medicine [phone]

## 2025-01-02 ENCOUNTER — ANTICOAGULATION THERAPY VISIT (OUTPATIENT)
Dept: ANTICOAGULATION | Facility: CLINIC | Age: 85
End: 2025-01-02
Payer: COMMERCIAL

## 2025-01-02 DIAGNOSIS — I48.19 PERSISTENT ATRIAL FIBRILLATION (H): Primary | ICD-10-CM

## 2025-01-02 LAB — INR HOME MONITORING: 1.8 (ref 2–3)

## 2025-01-02 NOTE — PROGRESS NOTES
ANTICOAGULATION MANAGEMENT     Rene Matthews 84 year old female is on warfarin with subtherapeutic INR result. (Goal INR 2.0-3.0)    Recent labs: (last 7 days)     01/02/25  0000   INR 1.8*       ASSESSMENT     Source(s): Chart Review and Patient/Caregiver Call     Warfarin doses taken: Warfarin taken as instructed  Diet: Increased greens/vitamin K in diet; plans to resume previous intake  Medication/supplement changes: None noted  New illness, injury, or hospitalization: No  Signs or symptoms of bleeding or clotting: No  Previous result: Therapeutic last 2(+) visits  Additional findings: None       PLAN     Recommended plan for temporary change(s) affecting INR     Dosing Instructions: booster dose then continue your current warfarin dose with next INR in 2 weeks (patient reports she would prefer to take booster dose on 1/3/25 to spread things more evenly).        Summary  As of 1/2/2025      Full warfarin instructions:  1/3: 5 mg; Otherwise 5 mg every Sun, Thu; 3.75 mg all other days   Next INR check:  1/16/2025               Telephone call with Florence who verbalizes understanding and agrees to plan    Patient to recheck with home meter    Education provided: Please call back if any changes to your diet, medications or how you've been taking warfarin  Dietary considerations: impact of vitamin K foods on INR  Symptom monitoring: monitoring for bleeding signs and symptoms and monitoring for clotting signs and symptoms    Plan made per [facility] anticoagulation protocol    Lisha Cruz RN  1/2/2025  Anticoagulation Clinic  [employer] for routing messages: p ANTICOAG HOME MONITORING  [facility] patient phone line: [phone]        _______________________________________________________________________     Anticoagulation Episode Summary       Current INR goal:  2.0-3.0   TTR:  85.1% (1 y)   Target end date:  Indefinite   Send INR reminders to:  [facility]    Indications    Persistent atrial fibrillation (H)  [I48.19]             Comments:  EDNA Madera             Anticoagulation Care Providers       Provider Role Specialty Phone number    Kevin Ho MD Referring Internal Medicine [phone]

## 2025-01-14 ENCOUNTER — OFFICE VISIT (OUTPATIENT)
Dept: INTERNAL MEDICINE | Facility: CLINIC | Age: 85
End: 2025-01-14
Payer: COMMERCIAL

## 2025-01-14 VITALS
OXYGEN SATURATION: 99 % | TEMPERATURE: 97.6 F | BODY MASS INDEX: 22.2 KG/M2 | DIASTOLIC BLOOD PRESSURE: 60 MMHG | HEIGHT: 64 IN | WEIGHT: 130 LBS | HEART RATE: 48 BPM | RESPIRATION RATE: 14 BRPM | SYSTOLIC BLOOD PRESSURE: 122 MMHG

## 2025-01-14 DIAGNOSIS — Z78.0 POSTMENOPAUSAL STATUS: ICD-10-CM

## 2025-01-14 DIAGNOSIS — I49.5 SINUS NODE DYSFUNCTION (H): ICD-10-CM

## 2025-01-14 DIAGNOSIS — E53.8 VITAMIN B12 DEFICIENCY (NON ANEMIC): ICD-10-CM

## 2025-01-14 DIAGNOSIS — Z00.00 ENCOUNTER FOR MEDICARE ANNUAL WELLNESS EXAM: Primary | ICD-10-CM

## 2025-01-14 DIAGNOSIS — E78.5 HYPERLIPIDEMIA, UNSPECIFIED HYPERLIPIDEMIA TYPE: ICD-10-CM

## 2025-01-14 DIAGNOSIS — Z85.828 HISTORY OF SKIN CANCER: ICD-10-CM

## 2025-01-14 DIAGNOSIS — I48.19 PERSISTENT ATRIAL FIBRILLATION (H): ICD-10-CM

## 2025-01-14 DIAGNOSIS — K27.9 PEPTIC ULCER DISEASE: ICD-10-CM

## 2025-01-14 DIAGNOSIS — D64.9 MILD ANEMIA: ICD-10-CM

## 2025-01-14 DIAGNOSIS — F41.1 ANXIETY, GENERALIZED: ICD-10-CM

## 2025-01-14 DIAGNOSIS — R51.9 CHRONIC NONINTRACTABLE HEADACHE, UNSPECIFIED HEADACHE TYPE: ICD-10-CM

## 2025-01-14 DIAGNOSIS — I10 PRIMARY HYPERTENSION: ICD-10-CM

## 2025-01-14 DIAGNOSIS — I25.10 NONOBSTRUCTIVE ATHEROSCLEROSIS OF CORONARY ARTERY: ICD-10-CM

## 2025-01-14 DIAGNOSIS — Z51.81 ENCOUNTER FOR THERAPEUTIC DRUG MONITORING: ICD-10-CM

## 2025-01-14 DIAGNOSIS — G89.29 CHRONIC NONINTRACTABLE HEADACHE, UNSPECIFIED HEADACHE TYPE: ICD-10-CM

## 2025-01-14 DIAGNOSIS — K21.9 GASTROESOPHAGEAL REFLUX DISEASE WITHOUT ESOPHAGITIS: ICD-10-CM

## 2025-01-14 DIAGNOSIS — F51.01 PRIMARY INSOMNIA: ICD-10-CM

## 2025-01-14 DIAGNOSIS — M85.89 OSTEOPENIA OF MULTIPLE SITES: ICD-10-CM

## 2025-01-14 PROBLEM — R53.82 CHRONIC FATIGUE: Status: RESOLVED | Noted: 2022-11-18 | Resolved: 2025-01-14

## 2025-01-14 PROBLEM — R20.8 BURNING SENSATION OF FEET: Status: RESOLVED | Noted: 2021-12-23 | Resolved: 2025-01-14

## 2025-01-14 PROBLEM — R00.2 PALPITATIONS: Status: RESOLVED | Noted: 2021-06-07 | Resolved: 2025-01-14

## 2025-01-14 PROBLEM — C44.91 SKIN CANCER, BASAL CELL: Status: RESOLVED | Noted: 2017-10-20 | Resolved: 2025-01-14

## 2025-01-14 PROBLEM — R14.3 FLATULENCE SYMPTOM: Status: RESOLVED | Noted: 2021-10-26 | Resolved: 2025-01-14

## 2025-01-14 LAB
ALBUMIN SERPL BCG-MCNC: 4.4 G/DL (ref 3.5–5.2)
ALBUMIN UR-MCNC: 30 MG/DL
ALP SERPL-CCNC: 67 U/L (ref 40–150)
ALT SERPL W P-5'-P-CCNC: 10 U/L (ref 0–50)
ANION GAP SERPL CALCULATED.3IONS-SCNC: 10 MMOL/L (ref 7–15)
APPEARANCE UR: ABNORMAL
AST SERPL W P-5'-P-CCNC: 22 U/L (ref 0–45)
BACTERIA #/AREA URNS HPF: ABNORMAL /HPF
BILIRUB SERPL-MCNC: 0.6 MG/DL
BILIRUB UR QL STRIP: NEGATIVE
BUN SERPL-MCNC: 10.5 MG/DL (ref 8–23)
CALCIUM SERPL-MCNC: 9.3 MG/DL (ref 8.8–10.4)
CHLORIDE SERPL-SCNC: 95 MMOL/L (ref 98–107)
CHOLEST SERPL-MCNC: 191 MG/DL
COLOR UR AUTO: YELLOW
CREAT SERPL-MCNC: 0.63 MG/DL (ref 0.51–0.95)
EGFRCR SERPLBLD CKD-EPI 2021: 88 ML/MIN/1.73M2
ERYTHROCYTE [DISTWIDTH] IN BLOOD BY AUTOMATED COUNT: 15.2 % (ref 10–15)
FASTING STATUS PATIENT QL REPORTED: YES
FASTING STATUS PATIENT QL REPORTED: YES
GLUCOSE SERPL-MCNC: 89 MG/DL (ref 70–99)
GLUCOSE UR STRIP-MCNC: NEGATIVE MG/DL
HCO3 SERPL-SCNC: 28 MMOL/L (ref 22–29)
HCT VFR BLD AUTO: 35.3 % (ref 35–47)
HDLC SERPL-MCNC: 75 MG/DL
HGB BLD-MCNC: 11.5 G/DL (ref 11.7–15.7)
HGB UR QL STRIP: ABNORMAL
IRON BINDING CAPACITY (ROCHE): 443 UG/DL (ref 240–430)
IRON SATN MFR SERPL: 21 % (ref 15–46)
IRON SERPL-MCNC: 93 UG/DL (ref 37–145)
KETONES UR STRIP-MCNC: NEGATIVE MG/DL
LDLC SERPL CALC-MCNC: 95 MG/DL
LEUKOCYTE ESTERASE UR QL STRIP: ABNORMAL
MCH RBC QN AUTO: 27.9 PG (ref 26.5–33)
MCHC RBC AUTO-ENTMCNC: 32.6 G/DL (ref 31.5–36.5)
MCV RBC AUTO: 86 FL (ref 78–100)
NITRATE UR QL: POSITIVE
NONHDLC SERPL-MCNC: 116 MG/DL
PH UR STRIP: 7.5 [PH] (ref 5–8)
PLATELET # BLD AUTO: 249 10E3/UL (ref 150–450)
POTASSIUM SERPL-SCNC: 3.6 MMOL/L (ref 3.4–5.3)
PROT SERPL-MCNC: 7.6 G/DL (ref 6.4–8.3)
RBC # BLD AUTO: 4.12 10E6/UL (ref 3.8–5.2)
RBC #/AREA URNS AUTO: ABNORMAL /HPF
SODIUM SERPL-SCNC: 133 MMOL/L (ref 135–145)
SP GR UR STRIP: 1.01 (ref 1–1.03)
SQUAMOUS #/AREA URNS AUTO: ABNORMAL /LPF
TRIGL SERPL-MCNC: 103 MG/DL
TSH SERPL DL<=0.005 MIU/L-ACNC: 1.26 UIU/ML (ref 0.3–4.2)
UROBILINOGEN UR STRIP-ACNC: 0.2 E.U./DL
VIT B12 SERPL-MCNC: 1176 PG/ML (ref 232–1245)
VIT D+METAB SERPL-MCNC: 35 NG/ML (ref 20–50)
WBC # BLD AUTO: 5.3 10E3/UL (ref 4–11)
WBC #/AREA URNS AUTO: ABNORMAL /HPF

## 2025-01-14 PROCEDURE — 81001 URINALYSIS AUTO W/SCOPE: CPT | Performed by: INTERNAL MEDICINE

## 2025-01-14 PROCEDURE — 83540 ASSAY OF IRON: CPT | Performed by: INTERNAL MEDICINE

## 2025-01-14 PROCEDURE — G2211 COMPLEX E/M VISIT ADD ON: HCPCS | Performed by: INTERNAL MEDICINE

## 2025-01-14 PROCEDURE — 80053 COMPREHEN METABOLIC PANEL: CPT | Performed by: INTERNAL MEDICINE

## 2025-01-14 PROCEDURE — 82306 VITAMIN D 25 HYDROXY: CPT | Performed by: INTERNAL MEDICINE

## 2025-01-14 PROCEDURE — G0439 PPPS, SUBSEQ VISIT: HCPCS | Performed by: INTERNAL MEDICINE

## 2025-01-14 PROCEDURE — 85027 COMPLETE CBC AUTOMATED: CPT | Performed by: INTERNAL MEDICINE

## 2025-01-14 PROCEDURE — 80061 LIPID PANEL: CPT | Performed by: INTERNAL MEDICINE

## 2025-01-14 PROCEDURE — 82607 VITAMIN B-12: CPT | Performed by: INTERNAL MEDICINE

## 2025-01-14 PROCEDURE — 83550 IRON BINDING TEST: CPT | Performed by: INTERNAL MEDICINE

## 2025-01-14 PROCEDURE — 87186 SC STD MICRODIL/AGAR DIL: CPT | Performed by: INTERNAL MEDICINE

## 2025-01-14 PROCEDURE — 84443 ASSAY THYROID STIM HORMONE: CPT | Performed by: INTERNAL MEDICINE

## 2025-01-14 PROCEDURE — 36415 COLL VENOUS BLD VENIPUNCTURE: CPT | Performed by: INTERNAL MEDICINE

## 2025-01-14 PROCEDURE — 99214 OFFICE O/P EST MOD 30 MIN: CPT | Mod: 25 | Performed by: INTERNAL MEDICINE

## 2025-01-14 RX ORDER — GABAPENTIN 300 MG/1
300 CAPSULE ORAL 2 TIMES DAILY
Status: SHIPPED
Start: 2025-01-14

## 2025-01-14 SDOH — HEALTH STABILITY: PHYSICAL HEALTH: ON AVERAGE, HOW MANY DAYS PER WEEK DO YOU ENGAGE IN MODERATE TO STRENUOUS EXERCISE (LIKE A BRISK WALK)?: 6 DAYS

## 2025-01-14 ASSESSMENT — SOCIAL DETERMINANTS OF HEALTH (SDOH): HOW OFTEN DO YOU GET TOGETHER WITH FRIENDS OR RELATIVES?: TWICE A WEEK

## 2025-01-14 NOTE — PROGRESS NOTES
Preventive Care Visit  [facility]  Kevin Ho MD, Internal Medicine  Jan 14, 2025      Assessment & Plan     Encounter for Medicare annual wellness exam  Immunizations are reviewed and everything is up-to-date.  She has a living will.  Non-smoker.  She does not use alcohol.  Regular exercise and good diet habits discussed.  Cologuard was negative in early 2023.  Recommending annual mammogram for breast cancer screening.   Last DEXA was 2022 and I am recommending repeating this year. Dementia and depression screening completed.  Recommending annual eye exam.  Recommending seeing a dentist every 6 months.  Skin exam performed and recommending regular use of sunblock.  She sees a dermatologist every year.   Will screen for diabetes with fasting glucose.     Persistent atrial fibrillation (H)  Successful cardioversion to normal sinus rhythm which is being maintained with sotalol 40 mg twice daily.  Metoprolol discontinued.  Attempt at watchman and was aborted for technical reasons.  She continues anticoagulation with warfarin.  - TSH with free T4 reflex; Future    Primary hypertension  Excellent blood pressure control taking amlodipine, irbesartan, and HCTZ.  - UA Macroscopic with reflex to Microscopic and Culture - Lab Collect; Future  - TSH with free T4 reflex; Future    Sinus node dysfunction (H)  Metoprolol discontinued    Nonobstructive coronary artery disease  CTA demonstrating moderate stenosis proximal LAD and involving mid RCA, right dominant system.  No exertional chest pain.  She does not take aspirin while anticoagulated with warfarin.  Continues on simvastatin and good blood pressure control.  Non-smoker.    Hyperlipidemia, unspecified hyperlipidemia type  Recheck lipid profile taking simvastatin 10 mg daily.  Tolerating medication without side effect.  - TSH with free T4 reflex; Future  - Lipid Profile (Chol, Trig, HDL, LDL calc); Future    Anxiety, generalized  Anxiety  doing much better since starting Lexapro    Peptic ulcer disease  Continue omeprazole daily for prophylaxis    Gastroesophageal reflux disease without esophagitis  Reflux symptoms well-controlled with omeprazole    Primary insomnia  Using trazodone at bedtime to help with sleeping    Vitamin B12 deficiency (non anemic)  Continues B12 replacement.  Recheck level  - Vitamin B12; Future    History of skin cancer  She sees her dermatologist every year    Osteopenia of multiple sites  Last DEXA with T-score -2.1 lumbar spine.  This was in 2022.  I am recommending repeating to screen for osteoporosis.  She is try to maintain good calcium and vitamin D intake and continues with weightbearing exercise  - Vitamin D deficiency screening; Future    Encounter for therapeutic drug monitoring  Monitor CBC while on anticoagulation.  Monitor LFTs while on statin and renal function and electrolytes while taking diuretic  - CBC with platelets; Future  - Comprehensive metabolic panel (BMP + Alb, Alk Phos, ALT, AST, Total. Bili, TP); Future  -Magnesium  Chronic nonintractable headache, unspecified headache type  Headaches are well-controlled.  We discussed cutting back on gabapentin to only twice daily  - gabapentin (NEURONTIN) 300 MG capsule; Take 1 capsule (300 mg) by mouth 2 times daily.    Postmenopausal status  Arrange for DEXA  - DX Bone Density; Future    The longitudinal plan of care for the diagnosis(es)/condition(s) as documented were addressed during this visit. Due to the added complexity in care, I will continue to support Florence in the subsequent management and with ongoing continuity of care.     Patient has been advised of split billing requirements and indicates understanding: Yes        Counseling  Appropriate preventive services were addressed with this patient via screening, questionnaire, or discussion as appropriate for fall prevention, nutrition, physical activity, Tobacco-use cessation, social engagement, weight  loss and cognition.  Checklist reviewing preventive services available has been given to the patient.  Reviewed patient's diet, addressing concerns and/or questions.   The patient was instructed to see the dentist every 6 months.       Follow-up in 1 year for annual wellness visit    Lucy Henriquez is a 84 year old, presenting for the following: Here for her annual wellness visit and to follow-up medical problems including hypertension, atrial fibrillation, anxiety, and other concerns.  See assessment and plan for details  Physical (AWV, fasting)        1/14/2025     9:03 AM   Additional Questions   Roomed by              Health Care Directive  Patient states has Advance Directive and will bring in a copy to clinic.      1/14/2025   General Health   How would you rate your overall physical health? Good   Feel stress (tense, anxious, or unable to sleep) Not at all         1/14/2025   Nutrition   Diet: Regular (no restrictions)    Breakfast skipped       Multiple values from one day are sorted in reverse-chronological order         1/14/2025   Exercise   Days per week of moderate/strenous exercise 6 days         1/14/2025   Social Factors   Frequency of gathering with friends or relatives Twice a week   Worry food won't last until get money to buy more No    No   Food not last or not have enough money for food? No    No   Do you have housing? (Housing is defined as stable permanent housing and does not include staying ouside in a car, in a tent, in an abandoned building, in an overnight shelter, or couch-surfing.) Yes    Yes   Are you worried about losing your housing? No    No   Lack of transportation? No    No   Unable to get utilities (heat,electricity)? No    No       Multiple values from one day are sorted in reverse-chronological order         1/14/2025   Fall Risk   Fallen 2 or more times in the past year? No    No   Trouble with walking or balance? No    No       Multiple values from one day are sorted  in reverse-chronological order          1/14/2025   Activities of Daily Living- Home Safety   Needs help with the following daily activites None of the above   Safety concerns in the home None of the above         1/14/2025   Dental   Dentist two times every year? (!) NO         1/14/2025   Hearing Screening   Hearing concerns? None of the above         1/14/2025   Driving Risk Screening   Patient/family members have concerns about driving No         1/14/2025   General Alertness/Fatigue Screening   Have you been more tired than usual lately? No         1/14/2025   Urinary Incontinence Screening   Bothered by leaking urine in past 6 months No         1/14/2025   TB Screening   Were you born outside of the [place]? No         Today's PHQ-2 Score:       1/14/2025     9:01 AM   PHQ-2 ( 1999 [employer])   Q1: Little interest or pleasure in doing things 0   Q2: Feeling down, depressed or hopeless 0   PHQ-2 Score 0    Q1: Little interest or pleasure in doing things Not at all   Q2: Feeling down, depressed or hopeless Not at all   PHQ-2 Score 0       Patient-reported           1/14/2025   Substance Use   Alcohol more than 3/day or more than 7/wk Not Applicable   Do you have a current opioid prescription? No   How severe/bad is pain from 1 to 10? 0/10 (No Pain)   Do you use any other substances recreationally? No     Social History     Tobacco Use    Smoking status: Never     Passive exposure: Never    Smokeless tobacco: Never   Vaping Use    Vaping status: Never Used   Substance Use Topics    Alcohol use: No    Drug use: No                        Reviewed and updated as needed this visit by Provider                    Past Medical History:   Diagnosis Date    Abnormal Pap smear of cervix     ASCUS with negative biopsy    Acute low back pain without sciatica 05/18/2020    Allergic rhinitis     Anxiety, generalized     Atrial fibrillation (H)     Benign neoplasm of ascending colon     Burning sensation of feet     Cervical polyp      Chest pain     secondary to gerd    Chronic abdominal pain     Negative CT scan and colonoscopy, musculoskeletal etiology suspected    Chronic radicular low back pain     MICHEL after evaluation by Dr. Bradley    Chronic sinusitis 10/18/2016    Flatulence symptom 10/26/2021    Frequent headaches     GERD (gastroesophageal reflux disease)     Noncardiac chest pain    Hearing loss in left ear 10/20/2017    Herpes zoster 01/01/2006    Hyperlipidemia     Hypertension     IBS (irritable bowel syndrome)     Insomnia     Mild aortic insufficiency 11/02/2022    Nausea 11/18/2022    Osteoarthritis     Osteopenia     DEXA 2014 T score -2.0 stable, DEXA December 2017 T score -1.6 left femoral neck stable.  DEXA January 2022 -2.1 L1 L4 and -1.8 bilateral femoral neck    Overactive bladder 10/18/2016    Palpitations 06/07/2021    Symptoms suspicious for atrial fibrillation.  Echocardiogram June 2021 with normal left ventricular systolic function with mild left atrial enlargement and mild AI and MR    Peptic ulcer disease 01/01/2006    w/ gastric ulcer    Personal history of colonic polyps     colonoscopy January 2014 normal.  Colonoscopy January 2019 with multiple polyps, repeat in 3 years.  Cologuard neg  January 2023    Postmenopausal bleeding 12/23/2021    Screening     Negative for AAA CT scan 2013    Skin cancer, basal cell 10/20/2017    Urge incontinence of urine 06/07/2021    Vitamin B12 deficiency (non anemic)     Weight gain 03/12/2021     Past Surgical History:   Procedure Laterality Date    BIOPSY CERVICAL, LOCAL EXCISION, SINGLE/MULTIPLE      CV LEFT ATRIAL APPENDAGE CLOSURE Right 7/10/2024    Procedure: Left Atrial Appendage Closure - WM;  Surgeon: Rolf King MD;  Location: [facility] CV    ENDOMETRIAL BIOPSY      Negative    OTHER SURGICAL HISTORY      Basal cell skin cancer    OTHER SURGICAL HISTORY  2012    Radiofrequency ablation right greater saphenous vein    OVARIAN CYST REMOVAL      lysis of  "adhesions    STRABISMUS SURGERY  2015     Current providers sharing in care for this patient include:  Patient Care Team:  Kevin Ho MD as PCP - General  Kevin Ho MD as Assigned PCP  Ariana Rios PA-C as Physician Assistant (Physician Assistant - Medical)  Yola Sims MD as MD (Cardiovascular Disease)  Beulah Cr PA-C as Assigned Heart and Vascular Provider    The following health maintenance items are reviewed in Epic and correct as of today:  Health Maintenance   Topic Date Due    LIPID  01/09/2025    ANNUAL REVIEW OF HM ORDERS  01/09/2025    MEDICARE ANNUAL WELLNESS VISIT  01/09/2025    BMP  07/09/2025    FALL RISK ASSESSMENT  01/14/2026    ADVANCE CARE PLANNING  01/19/2029    DTAP/TDAP/TD IMMUNIZATION (5 - Td or Tdap) 01/21/2032    DEXA  01/12/2037    PHQ-2 (once per calendar year)  Completed    INFLUENZA VACCINE  Completed    Pneumococcal Vaccine: 50+ Years  Completed    ZOSTER IMMUNIZATION  Completed    RSV VACCINE  Completed    COVID-19 Vaccine  Completed    HPV IMMUNIZATION  Aged Out    MENINGITIS IMMUNIZATION  Aged Out    RSV MONOCLONAL ANTIBODY  Aged Out         Review of Systems  Constitutional, HEENT, cardiovascular, pulmonary, GI, , musculoskeletal, neuro, skin, endocrine and psych systems are negative, except as otherwise noted.     Objective    Exam  /60 (BP Location: Right arm, Patient Position: Sitting, Cuff Size: Adult Regular)   Pulse (!) 48   Temp 97.6  F (36.4  C) (Oral)   Resp 14   Ht 1.613 m (5' 3.5\")   Wt 59 kg (130 lb)   LMP  (LMP Unknown)   SpO2 99%   Breastfeeding No   BMI 22.67 kg/m     Estimated body mass index is 22.67 kg/m  as calculated from the following:    Height as of this encounter: 1.613 m (5' 3.5\").    Weight as of this encounter: 59 kg (130 lb).    Physical Exam  EYES: Eyelids, conjunctiva, and sclera were normal. Pupils were normal. Cornea, iris, and lens were normal bilaterally.  HEAD, EARS, NOSE, MOUTH, AND " THROAT: Head and face were normal. TMs and external auditory canals are normal.  Oropharynx normal  NECK: Neck appearance was normal. There were no neck masses and the thyroid was not enlarged and no nodules are felt.  No lymphadenopathy.  RESPIRATORY: Breathing pattern was normal and the chest moved symmetrically.   Lung sounds were normal and there were no rales or wheezes.  CARDIOVASCULAR: Heart rate and rhythm were normal.  S1 and S2 were normal and there were no extra sounds or murmurs. Peripheral pulses in arms and legs were normal.  There was no peripheral edema.  No carotid bruits.  GASTROINTESTINAL:  Bowel sounds were present.   Palpation detected no tenderness, mass, or enlarged organs.   MUSCULOSKELETAL: Skeletal configuration was normal and muscle mass was normal for age. Joint appearance was overall normal.  LYMPHATIC: There were no enlarged nodes.  SKIN/HAIR/NAILS: Skin color was normal.  There were no concerning skin lesions.  NEUROLOGIC: The patient was alert and oriented to person, place, time, and circumstance. Speech was normal. Cranial nerves were normal. Motor strength was normal for age. The patient was normally coordinated.  Sensation intact.  PSYCHIATRIC:  Mood and affect were normal and the patient had normal recent and remote memory. The patient's judgment and insight were normal.         1/14/2025   Mini Cog   Clock Draw Score 2 Normal   3 Item Recall 3 objects recalled   Mini Cog Total Score 5              Signed Electronically by: Kevin Ho MD

## 2025-01-14 NOTE — PATIENT INSTRUCTIONS
Patient Education   Preventive Care Advice   This is general advice given by our system to help you stay healthy. However, your care team may have specific advice just for you. Please talk to your care team about your preventive care needs.  Nutrition  Eat 5 or more servings of fruits and vegetables each day.  Try wheat bread, brown rice and whole grain pasta (instead of white bread, rice, and pasta).  Get enough calcium and vitamin D. Check the label on foods and aim for 100% of the RDA (recommended daily allowance).  Lifestyle  Exercise at least 150 minutes each week  (30 minutes a day, 5 days a week).  Do muscle strengthening activities 2 days a week. These help control your weight and prevent disease.  No smoking.  Wear sunscreen to prevent skin cancer.  Continue to see your dermatologist every year  Have a dental exam and cleaning every 6 months.  Annual eye exam  Yearly exams  See your health care team every year to talk about:  Any changes in your health.  Any medicines your care team has prescribed.  Preventive care, family planning, and ways to prevent chronic diseases.  Shots (vaccines)   COVID-19 shot: Completed  Flu shot: Get a flu shot every year.  Tetanus shot: Get a tetanus shot every 10 years.  Pneumococcal and RSV vaccines completed  Shingles shot (for age 50 and up).  Completed  General health tests  Diabetes screening: Annually  Cholesterol test: Annually  Bone density scan (DEXA): DEXA is recommended for osteoporosis screening  Cancer screening tests  Breast cancer scan (mammogram): Annual mammogram due again in April  Colon cancer screening: It is important to start screening for colon cancer at age 45.  Cologuard was negative in early 2023.  Consider repeating after 3 years  For informational purposes only. Not to replace the advice of your health care provider. Copyright   2023 [employer]. All rights reserved. Clinically reviewed by the [facility] Transitions Program.  [employer] [id number] - REV 01/24.

## 2025-01-15 ENCOUNTER — TELEPHONE (OUTPATIENT)
Dept: INTERNAL MEDICINE | Facility: CLINIC | Age: 85
End: 2025-01-15
Payer: COMMERCIAL

## 2025-01-15 ENCOUNTER — PATIENT OUTREACH (OUTPATIENT)
Dept: CARE COORDINATION | Facility: CLINIC | Age: 85
End: 2025-01-15
Payer: COMMERCIAL

## 2025-01-15 ENCOUNTER — ANTICOAGULATION THERAPY VISIT (OUTPATIENT)
Dept: ANTICOAGULATION | Facility: CLINIC | Age: 85
End: 2025-01-15
Payer: COMMERCIAL

## 2025-01-15 ENCOUNTER — TELEPHONE (OUTPATIENT)
Dept: ANTICOAGULATION | Facility: CLINIC | Age: 85
End: 2025-01-15
Payer: COMMERCIAL

## 2025-01-15 DIAGNOSIS — I48.19 PERSISTENT ATRIAL FIBRILLATION (H): Primary | ICD-10-CM

## 2025-01-15 LAB — INR HOME MONITORING: 2.5 (ref 2–3)

## 2025-01-15 NOTE — TELEPHONE ENCOUNTER
Outgoing call to patient. Relayed provider's detailed message. Patient denies any symptoms and stated if she has UTI she always have the same symptoms but she reports feeling fine and no appointment needed for now. No further questions/concerns at this time.

## 2025-01-15 NOTE — TELEPHONE ENCOUNTER
Contact patient.  At her routine physical exam yesterday her urinalysis did show possible urinary tract infection, although it could have been contaminated sample.  Dr. Ho did not mention any urinary tract infection symptoms in the note.  Determine if she has any symptoms of urinary tract fraction.  If she does have any symptoms urinary tract infection, have her schedule a telephone appointment with me today to discuss antibiotic treatment.  Dr. Mirza is not in clinic today

## 2025-01-15 NOTE — TELEPHONE ENCOUNTER
Called patient back and confirmed coumadin dosing. No further questions.  Gretta Nazario, RN  Anticoagulation Nurse - [facility], [place]

## 2025-01-15 NOTE — TELEPHONE ENCOUNTER
Patient Returning Call    Reason for call:  returning call please call back as soon as possible    Information relayed to patient:      Patient has additional questions:      What are your questions/concerns:      Could we send this information to you in [employer] or would you prefer to receive a phone call?:   Patient would prefer a phone call   Okay to leave a detailed message?: N/A at Home number on file [phone] (home)

## 2025-01-15 NOTE — PROGRESS NOTES
ANTICOAGULATION MANAGEMENT     Rene Matthews 84 year old female is on warfarin with therapeutic INR result. (Goal INR 2.0-3.0)    Recent labs: (last 7 days)     01/15/25  0000   INR 2.5       ASSESSMENT     Source(s): Chart Review and Patient/Caregiver Call     Warfarin doses taken: Warfarin taken as instructed  Diet: No new diet changes identified  Medication/supplement changes: None noted  New illness, injury, or hospitalization: No  Signs or symptoms of bleeding or clotting: No  Previous result: Subtherapeutic  Additional findings: None       PLAN     Recommended plan for no diet, medication or health factor changes affecting INR     Dosing Instructions: Continue your current warfarin dose with next INR in 2 weeks       Summary  As of 1/15/2025      Full warfarin instructions:  5 mg every Sun, Thu; 3.75 mg all other days   Next INR check:  1/29/2025               Detailed voice message left for Florence with dosing instructions and follow up date.     Patient to recheck with home meter    Education provided: Please call back if any changes to your diet, medications or how you've been taking warfarin    Plan made per [facility] anticoagulation protocol    Gretta Nazario RN  1/15/2025  Anticoagulation Clinic  [facility] for routing messages: p ANTICOAG HOME MONITORING  [facility] patient phone line: [phone]        _______________________________________________________________________     Anticoagulation Episode Summary       Current INR goal:  2.0-3.0   TTR:  84.1% (1 y)   Target end date:  Indefinite   Send INR reminders to:  ANTICOAG HOME MONITORING    Indications    Persistent atrial fibrillation (H) [I48.19]             Comments:  EDNA [place]             Anticoagulation Care Providers       Provider Role Specialty Phone number    Kevin Ho MD Referring Internal Medicine [phone]

## 2025-01-16 LAB
BACTERIA UR CULT: ABNORMAL
BACTERIA UR CULT: ABNORMAL

## 2025-01-18 LAB
BACTERIA UR CULT: ABNORMAL
BACTERIA UR CULT: ABNORMAL

## 2025-01-21 DIAGNOSIS — I48.19 PERSISTENT ATRIAL FIBRILLATION (H): ICD-10-CM

## 2025-01-21 NOTE — TELEPHONE ENCOUNTER
Medication Question or Refill        What medication are you calling about (include dose and sig)?: sotalol (BETAPACE) 80 MG tablet     [facility] Pharm[facility]:  [id number] - [place], [place] - [address] AT [facility] [address]  [address] [place] [address]  Phone: [phone] Fax: [phone]      Controlled Substance Agreement on file:   CSA -- Patient Level:    CSA: None found at the patient level.         Do you need a refill? Yes

## 2025-01-22 RX ORDER — SOTALOL HYDROCHLORIDE 80 MG/1
40 TABLET ORAL 2 TIMES DAILY
Qty: 90 TABLET | Refills: 3 | Status: SHIPPED | OUTPATIENT
Start: 2025-01-22

## 2025-02-01 LAB — INR HOME MONITORING: 1.9 (ref 2–3)

## 2025-02-03 ENCOUNTER — ANTICOAGULATION THERAPY VISIT (OUTPATIENT)
Dept: ANTICOAGULATION | Facility: CLINIC | Age: 85
End: 2025-02-03
Payer: COMMERCIAL

## 2025-02-03 DIAGNOSIS — I48.19 PERSISTENT ATRIAL FIBRILLATION (H): Primary | ICD-10-CM

## 2025-02-03 NOTE — PROGRESS NOTES
ANTICOAGULATION MANAGEMENT     Rene Matthews 84 year old female is on warfarin with subtherapeutic INR result. (Goal INR 2.0-3.0)    Recent labs: (last 7 days)     02/01/25  0000   INR 1.9*       ASSESSMENT     Source(s): Chart Review and Patient/Caregiver Call     Warfarin doses taken: Warfarin taken as instructed  Diet: No new diet changes identified  Medication/supplement changes: None noted  New illness, injury, or hospitalization: No  Signs or symptoms of bleeding or clotting: No  Previous result: Therapeutic last visit; previously outside of goal range  Additional findings: None       PLAN     Recommended plan for no diet, medication or health factor changes affecting INR     Dosing Instructions: Continue your current warfarin dose with next INR in 2 weeks       Summary  As of 2/3/2025      Full warfarin instructions:  5 mg every Sun, Thu; 3.75 mg all other days   Next INR check:  2/14/2025               Telephone call with Florence who verbalizes understanding and agrees to plan    Patient to recheck with home meter    Education provided: Please call back if any changes to your diet, medications or how you've been taking warfarin  Goal range and lab monitoring: goal range and significance of current result    Plan made per [facility] anticoagulation protocol    Josiane Quintero, RN  2/3/2025  Anticoagulation Clinic  [facility] for routing messages: p ANTICOAG HOME MONITORING  [facility] patient phone line: [phone]        _______________________________________________________________________     Anticoagulation Episode Summary       Current INR goal:  2.0-3.0   TTR:  83.2% (1 y)   Target end date:  Indefinite   Send INR reminders to:  ANTICOAG HOME MONITORING    Indications    Persistent atrial fibrillation (H) [I48.19]             Comments:  [facility] [place]             Anticoagulation Care Providers       Provider Role Specialty Phone number    Kevin Ho MD Referring Internal Medicine [phone]

## 2025-02-11 DIAGNOSIS — I10 PRIMARY HYPERTENSION: ICD-10-CM

## 2025-02-11 DIAGNOSIS — E78.5 HLD (HYPERLIPIDEMIA): ICD-10-CM

## 2025-02-11 RX ORDER — IRBESARTAN 300 MG/1
300 TABLET ORAL DAILY
Qty: 90 TABLET | Refills: 2 | Status: SHIPPED | OUTPATIENT
Start: 2025-02-11

## 2025-02-11 RX ORDER — SIMVASTATIN 10 MG
TABLET ORAL
Qty: 90 TABLET | Refills: 3 | Status: SHIPPED | OUTPATIENT
Start: 2025-02-11

## 2025-02-19 ENCOUNTER — ANTICOAGULATION THERAPY VISIT (OUTPATIENT)
Dept: ANTICOAGULATION | Facility: CLINIC | Age: 85
End: 2025-02-19
Payer: COMMERCIAL

## 2025-02-19 DIAGNOSIS — I48.19 PERSISTENT ATRIAL FIBRILLATION (H): Primary | ICD-10-CM

## 2025-02-19 LAB — INR HOME MONITORING: 1.8 (ref 2–3)

## 2025-02-19 NOTE — PROGRESS NOTES
ANTICOAGULATION MANAGEMENT     Rene Matthews 84 year old female is on warfarin with subtherapeutic INR result. (Goal INR 2.0-3.0)    Recent labs: (last 7 days)     02/19/25  0000   INR 1.8*       ASSESSMENT     Source(s): Chart Review and Patient/Caregiver Call     Warfarin doses taken: Warfarin taken as instructed  Diet: Increased greens/vitamin K in diet; ongoing change  Medication/supplement changes: None noted  New illness, injury, or hospitalization: No  Signs or symptoms of bleeding or clotting: No  Previous result: Subtherapeutic  Additional findings: None       PLAN     Recommended plan for ongoing change(s) affecting INR     Dosing Instructions: Increase your warfarin dose (8.7% change) with next INR in 2 weeks       Summary  As of 2/19/2025      Full warfarin instructions:  3.75 mg every Mon, Wed, Fri; 5 mg all other days   Next INR check:  3/5/2025               Telephone call with Florence who verbalizes understanding and agrees to plan and who agrees to plan and repeated back plan correctly    Patient to recheck with home meter    Education provided: Contact [phone] with any changes, questions or concerns.     Plan made per [facility] anticoagulation protocol    Lisha Herbert RN  2/19/2025  Anticoagulation Clinic  [facility] for routing messages: p ANTICOAG HOME MONITORING  [facility] patient phone line: [phone]        _______________________________________________________________________     Anticoagulation Episode Summary       Current INR goal:  2.0-3.0   TTR:  78.4% (1 y)   Target end date:  Indefinite   Send INR reminders to:  ANTICOAG HOME MONITORING    Indications    Persistent atrial fibrillation (H) [I48.19]             Comments:  [facility] [place]             Anticoagulation Care Providers       Provider Role Specialty Phone number    Kevin Ho MD Referring Internal Medicine [phone]

## 2025-02-26 ENCOUNTER — ANTICOAGULATION THERAPY VISIT (OUTPATIENT)
Dept: ANTICOAGULATION | Facility: CLINIC | Age: 85
End: 2025-02-26
Payer: COMMERCIAL

## 2025-02-26 DIAGNOSIS — I48.19 PERSISTENT ATRIAL FIBRILLATION (H): Primary | ICD-10-CM

## 2025-02-26 LAB — INR HOME MONITORING: 2.2 (ref 2–3)

## 2025-02-26 NOTE — PROGRESS NOTES
ANTICOAGULATION MANAGEMENT     Rene Matthews 84 year old female is on warfarin with therapeutic INR result. (Goal INR 2.0-3.0)    Recent labs: (last 7 days)     02/26/25  0000   INR 2.2       ASSESSMENT     Source(s): Chart Review and Patient/Caregiver Call     Warfarin doses taken: Patient  stated that she is a little confused but claimed that she followed the dosing as instructed.  Diet: No new diet changes identified  Medication/supplement changes: None noted  New illness, injury, or hospitalization: No  Signs or symptoms of bleeding or clotting: No  Previous result: Subtherapeutic  Additional findings:  patient made aware that [facility] will call her again with next INR result and then plan on going back to [facility] is remains to be therapeutic.       PLAN     Recommended plan for no diet, medication or health factor changes affecting INR     Dosing Instructions: Continue your current warfarin dose with next INR in 1 week       Summary  As of 2/26/2025      Full warfarin instructions:  3.75 mg every Mon, Wed, Fri; 5 mg all other days   Next INR check:  3/5/2025               Telephone call with Florence who verbalizes understanding and agrees to plan and who agrees to plan and repeated back plan correctly    Patient to recheck with home meter    Education provided: Taking warfarin: Importance of taking warfarin as instructed  Contact [phone] with any changes, questions or concerns.     Plan made per [facility] anticoagulation protocol    Lisha Herbert RN  2/26/2025  Anticoagulation Clinic  [employer] for routing messages: p ANTICOAG HOME MONITORING  [facility] patient phone line: [phone]        _______________________________________________________________________     Anticoagulation Episode Summary       Current INR goal:  2.0-3.0   TTR:  77.4% (1 y)   Target end date:  Indefinite   Send INR reminders to:  [facility]IT[facility]    Indications    Persistent atrial fibrillation (H) [I48.19]             Comments:  EDNA Madera              Anticoagulation Care Providers       Provider Role Specialty Phone number    Kevin Ho MD Referring Internal Medicine [phone]

## 2025-03-05 ENCOUNTER — ANTICOAGULATION THERAPY VISIT (OUTPATIENT)
Dept: ANTICOAGULATION | Facility: CLINIC | Age: 85
End: 2025-03-05
Payer: COMMERCIAL

## 2025-03-05 LAB — INR HOME MONITORING: 2.5 (ref 2–3)

## 2025-03-05 NOTE — PROGRESS NOTES
ANTICOAGULATION MANAGEMENT     Rene Matthews 84 year old female is on warfarin with therapeutic INR result. (Goal INR 2.0-3.0)    Recent labs: (last 7 days)     03/05/25  0000   INR 2.5       ASSESSMENT     Source(s): Chart Review and Patient/Caregiver Call     Warfarin doses taken: Warfarin taken as instructed  Diet: No new diet changes identified  Medication/supplement changes: None noted  New illness, injury, or hospitalization: No  Signs or symptoms of bleeding or clotting: No  Previous result: Therapeutic last visit; previously outside of goal range  Additional findings: Resume MBE if next INR is therapeutic       PLAN     Recommended plan for ongoing change(s) affecting INR     Dosing Instructions: Continue your current warfarin dose with next INR in 2 weeks       Summary  As of 3/5/2025      Full warfarin instructions:  3.75 mg every Mon, Wed, Fri; 5 mg all other days   Next INR check:  3/19/2025               Telephone call with Florence who agrees to plan and repeated back plan correctly    Patient to recheck with home meter    Education provided: Resume manage by exception with home monitor. Continue to submit INR results to home monitor company.You will only be called when your result is out of range and at 90 day check in. Please call and notify [facility] if new medication started, dose missed, signs or symptoms of bleeding or clotting, or a surgery/procedure is scheduled. Due for next call no later than: 6/3/25.  Contact [phone] with any changes, questions or concerns.     Plan made per [facility] anticoagulation protocol    Yue Casas, RN  3/5/2025  Anticoagulation Clinic  [facility] for routing messages: p ANTICOAG HOME MONITORING  [facility] patient phone line: [phone]        _______________________________________________________________________     Anticoagulation Episode Summary       Current INR goal:  2.0-3.0   TTR:  77.4% (1 y)   Target end date:  Indefinite   Send INR reminders to:  [facility]  [facility]IT[facility]    Indications    Persistent atrial fibrillation (H) [I48.19]             Comments:  EDNA Madera             Anticoagulation Care Providers       Provider Role Specialty Phone number    Kevin Ho MD Referring Internal Medicine [phone]

## 2025-03-12 ENCOUNTER — DOCUMENTATION ONLY (OUTPATIENT)
Dept: ANTICOAGULATION | Facility: CLINIC | Age: 85
End: 2025-03-12
Payer: COMMERCIAL

## 2025-03-12 DIAGNOSIS — I48.19 PERSISTENT ATRIAL FIBRILLATION (H): Primary | ICD-10-CM

## 2025-03-12 LAB — INR HOME MONITORING: 2.5 (ref 2–3)

## 2025-03-12 NOTE — PROGRESS NOTES
ANTICOAGULATION  MANAGEMENT-Home Monitor Managed by Exception    Rene Matthews 84 year old female is on warfarin with therapeutic INR result. (Goal INR 2.0-3.0)    Recent labs: (last 7 days)     03/12/25  0000   INR 2.5       Previous INR was Therapeutic  Medication, diet, health changes since last INR:chart reviewed; none identified  Contacted within the last 12 weeks by phone on 3/5/25   Due for next call no later than: 6/3/25.   Last [facility] referral date: 07/29/2024      YESENIA     Rene was NOT contacted regarding therapeutic result today per home monitoring policy manage by exception agreement.   Current warfarin dose is to be continued:     Summary  As of 3/12/2025      Full warfarin instructions:  3.75 mg every Mon, Wed, Fri; 5 mg all other days   Next INR check:  3/19/2025             ?   Lisha Herbert RN  Anticoagulation Clinic  3/12/2025    _______________________________________________________________________     Anticoagulation Episode Summary       Current INR goal:  2.0-3.0   TTR:  77.4% (1 y)   Target end date:  Indefinite   Send INR reminders to:  [facility]    Indications    Persistent atrial fibrillation (H) [I48.19]             Comments:  EDNA Madera             Anticoagulation Care Providers       Provider Role Specialty Phone number    Kevin Ho MD Referring Internal Medicine [phone]

## 2025-03-19 DIAGNOSIS — R51.9 CHRONIC NONINTRACTABLE HEADACHE, UNSPECIFIED HEADACHE TYPE: ICD-10-CM

## 2025-03-19 DIAGNOSIS — G89.29 CHRONIC NONINTRACTABLE HEADACHE, UNSPECIFIED HEADACHE TYPE: ICD-10-CM

## 2025-03-19 RX ORDER — GABAPENTIN 300 MG/1
300 CAPSULE ORAL 2 TIMES DAILY
Qty: 180 CAPSULE | Refills: 0 | Status: SHIPPED | OUTPATIENT
Start: 2025-03-19

## 2025-03-25 DIAGNOSIS — I48.0 PAROXYSMAL ATRIAL FIBRILLATION (H): ICD-10-CM

## 2025-03-26 ENCOUNTER — DOCUMENTATION ONLY (OUTPATIENT)
Dept: ANTICOAGULATION | Facility: CLINIC | Age: 85
End: 2025-03-26
Payer: COMMERCIAL

## 2025-03-26 DIAGNOSIS — I48.19 PERSISTENT ATRIAL FIBRILLATION (H): Primary | ICD-10-CM

## 2025-03-26 LAB — INR HOME MONITORING: 2.8 (ref 2–3)

## 2025-03-26 RX ORDER — WARFARIN SODIUM 2.5 MG/1
TABLET ORAL
Qty: 180 TABLET | Refills: 1 | Status: SHIPPED | OUTPATIENT
Start: 2025-03-26

## 2025-03-26 NOTE — PROGRESS NOTES
ANTICOAGULATION  MANAGEMENT-Home Monitor Managed by Exception    Rene Matthews 84 year old female is on warfarin with therapeutic INR result. (Goal INR 2.0-3.0)    Recent labs: (last 7 days)     03/26/25  0000   INR 2.8       Previous INR was Therapeutic  Medication, diet, health changes since last INR:chart reviewed; none identified  Contacted within the last 12 weeks by phone on 3/5/25   Due for next call no later than: 6/3/25.   Last [facility] referral date: 07/29/2024      YESENIA     Rene was NOT contacted regarding therapeutic result today per home monitoring policy manage by exception agreement.   Current warfarin dose is to be continued:     Summary  As of 3/26/2025      Full warfarin instructions:  3.75 mg every Mon, Wed, Fri; 5 mg all other days   Next INR check:  4/9/2025             ?   Gretta Nazario, RN  Anticoagulation Clinic  3/26/2025    _______________________________________________________________________     Anticoagulation Episode Summary       Current INR goal:  2.0-3.0   TTR:  78.0% (1 y)   Target end date:  Indefinite   Send INR reminders to:  [facility]    Indications    Persistent atrial fibrillation (H) [I48.19]             Comments:  EDNA Madera             Anticoagulation Care Providers       Provider Role Specialty Phone number    Kevin Ho MD Referring Internal Medicine [phone]

## 2025-03-26 NOTE — TELEPHONE ENCOUNTER
ANTICOAGULATION MANAGEMENT:  Medication Refill    Anticoagulation Summary  As of 3/12/2025      Warfarin maintenance plan:  3.75 mg (2.5 mg x 1.5) every Mon, Wed, Fri; 5 mg (2.5 mg x 2) all other days   Next INR check:  3/19/2025   Target end date:  Indefinite    Indications    Persistent atrial fibrillation (H) [I48.19]                 Anticoagulation Care Providers       Provider Role Specialty Phone number    Kevin Ho MD Referring Internal Medicine [phone]            Refill Criteria    Visit with referring provider/group: Meets criteria: visit within referring provider group in the last 15 months on 1/14/25    [facility] referral last signed: 07/29/2024; within last year:  Yes    Lab monitoring is up to date (not exceeding 2 weeks overdue): Yes    Rene meets all criteria for refill. Rx instructions and quantity supplied updated to match patient's current dosing plan. Warfarin 90 day supply with 1 refill granted per [facility] protocol     Gretta Nazario RN  Anticoagulation Clinic

## 2025-03-26 NOTE — PROGRESS NOTES
ANTICOAGULATION     Rene Matthews is overdue for an INR check.     Mychart sent.    Aleksandra Gardner, RN  3/26/2025  Anticoagulation Clinic  [facility] for routing messages: [facility] MONITOR[facility]  [facility] patient phone line: [phone]

## 2025-04-09 ENCOUNTER — DOCUMENTATION ONLY (OUTPATIENT)
Dept: ANTICOAGULATION | Facility: CLINIC | Age: 85
End: 2025-04-09
Payer: COMMERCIAL

## 2025-04-09 DIAGNOSIS — I48.19 PERSISTENT ATRIAL FIBRILLATION (H): Primary | ICD-10-CM

## 2025-04-09 LAB — INR HOME MONITORING: 2.8 (ref 2–3)

## 2025-04-09 NOTE — PROGRESS NOTES
ANTICOAGULATION  MANAGEMENT-Home Monitor Managed by Exception    Rene Matthews 84 year old female is on warfarin with therapeutic INR result. (Goal INR 2.0-3.0)    Recent labs: (last 7 days)     04/09/25  0000   INR 2.8       Previous INR was Therapeutic  Medication, diet, health changes since last INR:chart reviewed; none identified  Contacted within the last 12 weeks by phone on  3/5/25   Due for next call no later than: 6/3/25.   Last [facility] referral date: 07/29/2024      YESENIA     Rene was NOT contacted regarding therapeutic result today per home monitoring policy manage by exception agreement.   Current warfarin dose is to be continued:     Summary  As of 4/9/2025      Full warfarin instructions:  3.75 mg every Mon, Wed, Fri; 5 mg all other days   Next INR check:  4/23/2025             ?   Aleksandra Gardner RN  Anticoagulation Clinic  4/9/2025    _______________________________________________________________________     Anticoagulation Episode Summary       Current INR goal:  2.0-3.0   TTR:  79.0% (1 y)   Target end date:  Indefinite   Send INR reminders to:  [facility]    Indications    Persistent atrial fibrillation (H) [I48.19]             Comments:  EDNA Madera             Anticoagulation Care Providers       Provider Role Specialty Phone number    Kevin Ho MD Referring Internal Medicine [phone]

## 2025-04-24 ENCOUNTER — DOCUMENTATION ONLY (OUTPATIENT)
Dept: ANTICOAGULATION | Facility: CLINIC | Age: 85
End: 2025-04-24
Payer: COMMERCIAL

## 2025-04-24 DIAGNOSIS — I48.19 PERSISTENT ATRIAL FIBRILLATION (H): Primary | ICD-10-CM

## 2025-04-24 LAB — INR HOME MONITORING: 2.9 (ref 2–3)

## 2025-04-24 NOTE — PROGRESS NOTES
ANTICOAGULATION  MANAGEMENT-Home Monitor Managed by Exception    Rene Matthews 84 year old female is on warfarin with therapeutic INR result. (Goal INR 2.0-3.0)    Recent labs: (last 7 days)     04/24/25  0000   INR 2.9       Previous INR was Therapeutic  Medication, diet, health changes since last INR:chart reviewed; none identified  Contacted within the last 12 weeks by phone on 3/5/25  Due for next call no later than: 6/3/25.   Last [facility] referral date: 07/29/2024      YESENIA     Rene was NOT contacted regarding therapeutic result today per home monitoring policy manage by exception agreement.   Current warfarin dose is to be continued:     Summary  As of 4/24/2025      Full warfarin instructions:  3.75 mg every Mon, Wed, Fri; 5 mg all other days   Next INR check:  5/8/2025             ?   Lisha Herbert RN  Anticoagulation Clinic  4/24/2025    _______________________________________________________________________     Anticoagulation Episode Summary       Current INR goal:  2.0-3.0   TTR:  79.0% (1 y)   Target end date:  Indefinite   Send INR reminders to:  [facility]    Indications    Persistent atrial fibrillation (H) [I48.19]             Comments:  EDNA Madera             Anticoagulation Care Providers       Provider Role Specialty Phone number    Kevin Ho MD Referring Internal Medicine [phone]
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
## 2025-04-29 ENCOUNTER — TELEPHONE (OUTPATIENT)
Dept: INTERNAL MEDICINE | Facility: CLINIC | Age: 85
End: 2025-04-29
Payer: COMMERCIAL

## 2025-04-29 NOTE — TELEPHONE ENCOUNTER
Wondering if needs a refill of metoprolol. Confirmed that it was discontinued in January 2024 when hospitalized.  Patient verbalized understanding.   She did not have a current bottle so thinks that she is not taking it, but is not sure.  Since no rx she believes she has not been taking this.    Lisha Hitchcock, HARMEETN RN

## 2025-05-07 ENCOUNTER — RESULTS FOLLOW-UP (OUTPATIENT)
Dept: ANTICOAGULATION | Facility: CLINIC | Age: 85
End: 2025-05-07
Payer: COMMERCIAL

## 2025-05-07 ENCOUNTER — DOCUMENTATION ONLY (OUTPATIENT)
Dept: ANTICOAGULATION | Facility: CLINIC | Age: 85
End: 2025-05-07
Payer: COMMERCIAL

## 2025-05-07 DIAGNOSIS — I48.19 PERSISTENT ATRIAL FIBRILLATION (H): Primary | ICD-10-CM

## 2025-05-07 LAB — INR HOME MONITORING: 2.2 (ref 2–3)

## 2025-05-07 NOTE — PROGRESS NOTES
ANTICOAGULATION  MANAGEMENT-Home Monitor Managed by Exception    eRne Matthews 84 year old female is on warfarin with therapeutic INR result. (Goal INR 2.0-3.0)    Recent labs: (last 7 days)     05/07/25  0000   INR 2.2       Previous INR was Therapeutic  Medication, diet, health changes since last INR:chart reviewed; none identified  Contacted within the last 12 weeks by phone on 3/5/25  Due for next call no later than: 6/3/25   Last [facility] referral date: 07/29/2024      YESENIA     Rene was NOT contacted regarding therapeutic result today per home monitoring policy manage by exception agreement.   Current warfarin dose is to be continued:     Summary  As of 5/7/2025      Full warfarin instructions:  3.75 mg every Mon, Wed, Fri; 5 mg all other days   Next INR check:  5/21/2025             ?   Maxim Briones RN  Anticoagulation Clinic  5/7/2025    _______________________________________________________________________     Anticoagulation Episode Summary       Current INR goal:  2.0-3.0   TTR:  79.0% (1 y)   Target end date:  Indefinite   Send INR reminders to:  [facility]    Indications    Persistent atrial fibrillation (H) [I48.19]             Comments:  EDNA Madera             Anticoagulation Care Providers       Provider Role Specialty Phone number    Kevin Ho MD Referring Internal Medicine [phone]

## 2025-05-13 DIAGNOSIS — I10 HYPERTENSION, UNSPECIFIED TYPE: ICD-10-CM

## 2025-05-13 DIAGNOSIS — I10 ESSENTIAL HYPERTENSION: ICD-10-CM

## 2025-05-13 DIAGNOSIS — G47.00 INSOMNIA, UNSPECIFIED TYPE: ICD-10-CM

## 2025-05-13 DIAGNOSIS — K21.9 GASTROESOPHAGEAL REFLUX DISEASE WITHOUT ESOPHAGITIS: ICD-10-CM

## 2025-05-13 RX ORDER — TRAZODONE HYDROCHLORIDE 50 MG/1
50 TABLET ORAL AT BEDTIME
Qty: 90 TABLET | Refills: 3 | Status: SHIPPED | OUTPATIENT
Start: 2025-05-13

## 2025-05-13 RX ORDER — AMLODIPINE BESYLATE 5 MG/1
5 TABLET ORAL DAILY
Qty: 90 TABLET | Refills: 0 | Status: SHIPPED | OUTPATIENT
Start: 2025-05-13

## 2025-05-13 RX ORDER — OMEPRAZOLE 20 MG/1
20 CAPSULE, DELAYED RELEASE ORAL 2 TIMES DAILY
Qty: 180 CAPSULE | Refills: 1 | Status: SHIPPED | OUTPATIENT
Start: 2025-05-13

## 2025-05-13 RX ORDER — HYDROCHLOROTHIAZIDE 25 MG/1
25 TABLET ORAL EVERY OTHER DAY
Qty: 45 TABLET | Refills: 1 | Status: SHIPPED | OUTPATIENT
Start: 2025-05-13

## 2025-05-29 ENCOUNTER — RESULTS FOLLOW-UP (OUTPATIENT)
Dept: ANTICOAGULATION | Facility: CLINIC | Age: 85
End: 2025-05-29
Payer: COMMERCIAL

## 2025-05-29 ENCOUNTER — ANTICOAGULATION THERAPY VISIT (OUTPATIENT)
Dept: ANTICOAGULATION | Facility: CLINIC | Age: 85
End: 2025-05-29
Payer: COMMERCIAL

## 2025-05-29 DIAGNOSIS — I48.19 PERSISTENT ATRIAL FIBRILLATION (H): Primary | ICD-10-CM

## 2025-05-29 LAB — INR HOME MONITORING: 2.1 (ref 2–3)

## 2025-05-29 NOTE — PROGRESS NOTES
ANTICOAGULATION MANAGEMENT     Rene Matthews 84 year old female is on warfarin with therapeutic INR result. (Goal INR 2.0-3.0)    Recent labs: (last 7 days)     05/29/25  0000   INR 2.1       ASSESSMENT     Source(s): Chart Review and Patient/Caregiver Call     Warfarin doses taken: Warfarin taken as instructed  Diet: No new diet changes identified  Medication/supplement changes: None noted  New illness, injury, or hospitalization: No  Signs or symptoms of bleeding or clotting: No  Previous result: Therapeutic last 2(+) visits  Additional findings: Quarterly patient outreach per Home Monitor Protocol       PLAN     Recommended plan for no diet, medication or health factor changes affecting INR     Dosing Instructions: Continue your current warfarin dose with next INR in 2 weeks       Summary  As of 5/29/2025      Full warfarin instructions:  3.75 mg every Mon, Wed, Fri; 5 mg all other days   Next INR check:  6/12/2025               Telephone call with Florence who verbalizes understanding and agrees to plan and who agrees to plan and repeated back plan correctly    Patient to recheck with home meter    Education provided: Resume manage by exception with home monitor. Continue to submit INR results to home monitor company.You will only be called when your result is out of range and at 90 day check in. Please call and notify [facility] if new medication started, dose missed, signs or symptoms of bleeding or clotting, or a surgery/procedure is scheduled. Due for next call no later than: 8/27/25.  Contact [phone] with any changes, questions or concerns.     Plan made per [facility] anticoagulation protocol    Lisha Herbert RN  5/29/2025  Anticoagulation Clinic  [facility] for routing messages: [facility] MONITOR[facility]  [facility] patient phone line: [phone]        _______________________________________________________________________     Anticoagulation Episode Summary       Current INR goal:  2.0-3.0   TTR:  79.0% (1 y)   Target  end date:  Indefinite   Send INR reminders to:  [facility]IT[facility]    Indications    Persistent atrial fibrillation (H) [I48.19]             Comments:  EDNA Madera             Anticoagulation Care Providers       Provider Role Specialty Phone number    Kevin Ho MD Referring Internal Medicine [phone]

## 2025-06-12 ENCOUNTER — RESULTS FOLLOW-UP (OUTPATIENT)
Dept: ANTICOAGULATION | Facility: CLINIC | Age: 85
End: 2025-06-12

## 2025-06-12 ENCOUNTER — DOCUMENTATION ONLY (OUTPATIENT)
Dept: ANTICOAGULATION | Facility: CLINIC | Age: 85
End: 2025-06-12
Payer: COMMERCIAL

## 2025-06-12 DIAGNOSIS — I48.19 PERSISTENT ATRIAL FIBRILLATION (H): Primary | ICD-10-CM

## 2025-06-12 LAB — INR HOME MONITORING: 2.9 (ref 2–3)

## 2025-06-12 NOTE — PROGRESS NOTES
ANTICOAGULATION  MANAGEMENT-Home Monitor Managed by Exception    Rene Matthews 84 year old female is on warfarin with therapeutic INR result. (Goal INR 2.0-3.0)    Recent labs: (last 7 days)     06/12/25  0000   INR 2.9       Previous INR was Therapeutic  Medication, diet, health changes since last INR:chart reviewed; none identified  Contacted within the last 12 weeks by phone on 5/29/25  Due for next call no later than: 8/27/25   Last [facility] referral date: 07/29/2024      YESENIA     Rene was NOT contacted regarding therapeutic result today per home monitoring policy manage by exception agreement.   Current warfarin dose is to be continued:     Summary  As of 6/12/2025      Full warfarin instructions:  3.75 mg every Mon, Wed, Fri; 5 mg all other days   Next INR check:  6/26/2025             ?   Lisha Cruz RN  Anticoagulation Clinic  6/12/2025    _______________________________________________________________________     Anticoagulation Episode Summary       Current INR goal:  2.0-3.0   TTR:  79.0% (1 y)   Target end date:  Indefinite   Send INR reminders to:  [facility]IT[facility]    Indications    Persistent atrial fibrillation (H) [I48.19]             Comments:  EDNA Madera             Anticoagulation Care Providers       Provider Role Specialty Phone number    Kevin Ho MD Referring Internal Medicine [phone]

## 2025-06-17 DIAGNOSIS — G89.29 CHRONIC NONINTRACTABLE HEADACHE, UNSPECIFIED HEADACHE TYPE: ICD-10-CM

## 2025-06-17 DIAGNOSIS — R51.9 CHRONIC NONINTRACTABLE HEADACHE, UNSPECIFIED HEADACHE TYPE: ICD-10-CM

## 2025-06-17 RX ORDER — GABAPENTIN 300 MG/1
300 CAPSULE ORAL 2 TIMES DAILY
Qty: 180 CAPSULE | Refills: 3 | Status: SHIPPED | OUTPATIENT
Start: 2025-06-17

## 2025-06-26 ENCOUNTER — RESULTS FOLLOW-UP (OUTPATIENT)
Dept: ANTICOAGULATION | Facility: CLINIC | Age: 85
End: 2025-06-26

## 2025-06-26 ENCOUNTER — DOCUMENTATION ONLY (OUTPATIENT)
Dept: ANTICOAGULATION | Facility: CLINIC | Age: 85
End: 2025-06-26
Payer: COMMERCIAL

## 2025-06-26 ENCOUNTER — ANTICOAGULATION THERAPY VISIT (OUTPATIENT)
Dept: ANTICOAGULATION | Facility: CLINIC | Age: 85
End: 2025-06-26
Payer: COMMERCIAL

## 2025-06-26 DIAGNOSIS — I48.19 PERSISTENT ATRIAL FIBRILLATION (H): Primary | ICD-10-CM

## 2025-06-26 LAB — INR HOME MONITORING: 2.9 (ref 2–3)

## 2025-06-26 NOTE — PROGRESS NOTES
ANTICOAGULATION  MANAGEMENT-Home Monitor Managed by Exception    Rene Matthews 84 year old female is on warfarin with therapeutic INR result. (Goal INR 2.0-3.0)    Recent labs: (last 7 days)     06/26/25  0000   INR 2.9       Previous INR was Therapeutic  Medication, diet, health changes since last INR:chart reviewed; none identified  Contacted within the last 12 weeks by phone on 5/29/25  Due for next call no later than: 8/27/25.   Last [facility] referral date: 07/29/2024      YESENIA     Rene was NOT contacted regarding therapeutic result today per home monitoring policy manage by exception agreement.   Current warfarin dose is to be continued:     Summary  As of 6/26/2025      Full warfarin instructions:  3.75 mg every Mon, Wed, Fri; 5 mg all other days   Next INR check:  7/10/2025             ?   Dior Gale RN  Anticoagulation Clinic  6/26/2025    _______________________________________________________________________     Anticoagulation Episode Summary       Current INR goal:  2.0-3.0   TTR:  79.0% (1 y)   Target end date:  Indefinite   Send INR reminders to:  [facility]    Indications    Persistent atrial fibrillation (H) [I48.19]             Comments:  EDNA Madera             Anticoagulation Care Providers       Provider Role Specialty Phone number    Kevin Ho MD Referring Internal Medicine [phone]

## 2025-06-26 NOTE — PROGRESS NOTES
ANTICOAGULATION CLINIC REFERRAL RENEWAL REQUEST       An annual renewal order is required for all patients referred to [facility].?  Please review and sign the pended referral order for Rene Matthews.       ANTICOAGULATION SUMMARY      Warfarin indication(s)   Atrial Fibrillation    Mechanical heart valve present?  NO       Current goal range   INR: 2.0-3.0     Goal appropriate for indication? Goal INR 2-3, standard for indication(s) above     Time in Therapeutic Range (TTR)  (Goal > 60%) 79%       Office visit with referring provider's group within last year yes on 1/14/25   Additional standing orders None       Dior Gale RN  [facility]

## 2025-07-10 ENCOUNTER — DOCUMENTATION ONLY (OUTPATIENT)
Dept: ANTICOAGULATION | Facility: CLINIC | Age: 85
End: 2025-07-10
Payer: COMMERCIAL

## 2025-07-10 DIAGNOSIS — I48.19 PERSISTENT ATRIAL FIBRILLATION (H): Primary | ICD-10-CM

## 2025-07-10 LAB — INR HOME MONITORING: 3 (ref 2–3)

## 2025-07-10 NOTE — PROGRESS NOTES
ANTICOAGULATION  MANAGEMENT-Home Monitor Managed by Exception    Rene GIOVANNI Matthews 84 year old female is on warfarin with therapeutic INR result. (Goal INR 2.0-3.0)    Recent labs: (last 7 days)     07/10/25  0000   INR 3.0       Previous INR was Therapeutic  Medication, diet, health changes since last INR:chart reviewed; none identified  Contacted within the last 12 weeks by phone on 5/29/25  Due for next call no later than: 8/27/25   Last [facility] referral date: 06/26/2025      YESENIA     Rene was NOT contacted regarding therapeutic result today per home monitoring policy manage by exception agreement.   Current warfarin dose is to be continued:       ?   Lisha Cruz RN  Anticoagulation Clinic  7/10/2025    _______________________________________________________________________     Anticoagulation Episode Summary       Current INR goal:  2.0-3.0   TTR:  79.0% (1 y)   Target end date:  Indefinite   Send INR reminders to:  [facility] HOME MONITORING    Indications    Persistent atrial fibrillation (H) [I48.19]             Comments:  EDNA Madera             Anticoagulation Care Providers       Provider Role Specialty Phone number    Kevin Ho MD Referring Internal Medicine [phone]

## 2025-07-24 ENCOUNTER — RESULTS FOLLOW-UP (OUTPATIENT)
Dept: ANTICOAGULATION | Facility: CLINIC | Age: 85
End: 2025-07-24
Payer: COMMERCIAL

## 2025-07-24 ENCOUNTER — DOCUMENTATION ONLY (OUTPATIENT)
Dept: ANTICOAGULATION | Facility: CLINIC | Age: 85
End: 2025-07-24
Payer: COMMERCIAL

## 2025-07-24 DIAGNOSIS — I48.19 PERSISTENT ATRIAL FIBRILLATION (H): Primary | ICD-10-CM

## 2025-07-24 LAB — INR HOME MONITORING: 2.5 (ref 2–3)

## 2025-07-24 NOTE — PROGRESS NOTES
ANTICOAGULATION  MANAGEMENT-Home Monitor Managed by Exception    Rene GIOVANNI Matthews 84 year old female is on warfarin with therapeutic INR result. (Goal INR 2.0-3.0)    Recent labs: (last 7 days)     07/24/25  0000   INR 2.5       Care Everywhere updated: yes    Previous INR was Therapeutic  Medication, diet, health changes since last INR:chart reviewed; none identified  Contacted within the last 12 weeks by phone on 5/29/25  Due for next call no later than: 8/27/25   Last [facility] referral date: 07/10/2025      YESENIA     Rene was NOT contacted regarding therapeutic result today per home monitoring policy manage by exception agreement.   Current warfarin dose is to be continued:     Summary  As of 7/24/2025      Full warfarin instructions:  3.75 mg every Mon, Wed, Fri; 5 mg all other days   Next INR check:  8/7/2025             ?   Maxim Briones RN  Anticoagulation Clinic  7/24/2025    _______________________________________________________________________     Anticoagulation Episode Summary       Current INR goal:  2.0-3.0   TTR:  79.0% (1 y)   Target end date:  Indefinite   Send INR reminders to:  [facility] HOME MONITOR[facility]    Indications    Persistent atrial fibrillation (H) [I48.19]             Comments:  EDNA Madera             Anticoagulation Care Providers       Provider Role Specialty Phone number    Kevin Ho MD Referring Internal Medicine [phone]

## 2025-08-07 ENCOUNTER — DOCUMENTATION ONLY (OUTPATIENT)
Dept: ANTICOAGULATION | Facility: CLINIC | Age: 85
End: 2025-08-07
Payer: COMMERCIAL

## 2025-08-07 DIAGNOSIS — I48.19 PERSISTENT ATRIAL FIBRILLATION (H): Primary | ICD-10-CM

## 2025-08-07 LAB — INR HOME MONITORING: 2.6 (ref 2–3)

## 2025-08-12 DIAGNOSIS — I10 HYPERTENSION, UNSPECIFIED TYPE: ICD-10-CM

## 2025-08-12 RX ORDER — AMLODIPINE BESYLATE 5 MG/1
5 TABLET ORAL DAILY
Qty: 90 TABLET | Refills: 1 | Status: SHIPPED | OUTPATIENT
Start: 2025-08-12

## 2025-08-21 ENCOUNTER — RESULTS FOLLOW-UP (OUTPATIENT)
Dept: ANTICOAGULATION | Facility: CLINIC | Age: 85
End: 2025-08-21
Payer: COMMERCIAL

## 2025-08-21 ENCOUNTER — ANTICOAGULATION THERAPY VISIT (OUTPATIENT)
Dept: ANTICOAGULATION | Facility: CLINIC | Age: 85
End: 2025-08-21
Payer: COMMERCIAL

## 2025-08-21 DIAGNOSIS — I48.19 PERSISTENT ATRIAL FIBRILLATION (H): Primary | ICD-10-CM

## 2025-08-21 LAB — INR HOME MONITORING: 3.3 (ref 2–3)

## 2025-09-04 ENCOUNTER — ANTICOAGULATION THERAPY VISIT (OUTPATIENT)
Dept: ANTICOAGULATION | Facility: CLINIC | Age: 85
End: 2025-09-04
Payer: COMMERCIAL

## 2025-09-04 ENCOUNTER — RESULTS FOLLOW-UP (OUTPATIENT)
Dept: ANTICOAGULATION | Facility: CLINIC | Age: 85
End: 2025-09-04
Payer: COMMERCIAL

## 2025-09-04 DIAGNOSIS — I48.19 PERSISTENT ATRIAL FIBRILLATION (H): Primary | ICD-10-CM

## 2025-09-04 LAB — INR HOME MONITORING: 2.7 (ref 2–3)

## (undated) DEVICE — MANIFOLD KIT ANGIO AUTOMATED 014613

## (undated) DEVICE — KIT HAND CONTROL ACIST 014644 AR-P54

## (undated) DEVICE — CLOSURE DEVICE 6FR VASC PROGLIDE MEDICATED SUTURE 12673-03

## (undated) DEVICE — SYR ANGIOGRAPHY MULTIUSE KIT ACIST 014612

## (undated) DEVICE — SHEATH GUIDING VERSACROSS D1 CURVE L85 CM L180 CBL VXAK0003

## (undated) DEVICE — CATH ANGIO INFINITI PIGTAIL 155 6 SH 6FRX110CM 534654S

## (undated) DEVICE — ELECTRODE DEFIB CADENCE 22550R

## (undated) DEVICE — TRANSDUCER TRAY ARTERIAL 42646-06

## (undated) DEVICE — GUIDEWIRE JTIP 3MM .035 180CM IQ35F180J3

## (undated) DEVICE — INTRO MICRO MINI STICK 4FR STIFF NITINOL 45-753

## (undated) DEVICE — INTRODUCER CHECK FLO 16FRX30CM .038

## (undated) DEVICE — INTRO SHEATH 8FRX10CM PINNACLE RSS802

## (undated) DEVICE — SHEATH WITH DILATOR ACCESS SYSTEM FXD CRV DBL M635TU80020

## (undated) DEVICE — OCCLUDER CV WATCHMAN FLX OD20 MM M635WU50200: Type: IMPLANTABLE DEVICE | Status: NON-FUNCTIONAL

## (undated) DEVICE — CUSTOM PACK CORONARY SAN5BCRHEA

## (undated) DEVICE — OCCLUDER CV WATCHMAN FLX OD24 MM M635WU50240: Type: IMPLANTABLE DEVICE | Status: NON-FUNCTIONAL

## (undated) DEVICE — SHEATH WITH DILATOR ACCESS SYSTEM FXD CURVE M635TU80010

## (undated) RX ORDER — FENTANYL CITRATE 50 UG/ML
INJECTION, SOLUTION INTRAMUSCULAR; INTRAVENOUS
Status: DISPENSED
Start: 2024-07-10

## (undated) RX ORDER — ASPIRIN 81 MG/1
TABLET ORAL
Status: DISPENSED
Start: 2024-07-10